# Patient Record
Sex: FEMALE | Race: WHITE | HISPANIC OR LATINO | Employment: OTHER | URBAN - METROPOLITAN AREA
[De-identification: names, ages, dates, MRNs, and addresses within clinical notes are randomized per-mention and may not be internally consistent; named-entity substitution may affect disease eponyms.]

---

## 2017-01-17 ENCOUNTER — HOSPITAL ENCOUNTER (OUTPATIENT)
Dept: LAB | Facility: MEDICAL CENTER | Age: 67
End: 2017-01-17
Attending: FAMILY MEDICINE
Payer: MEDICARE

## 2017-01-17 LAB
ALBUMIN SERPL BCP-MCNC: 3.6 G/DL (ref 3.2–4.9)
ALBUMIN/GLOB SERPL: 1 G/DL
ALP SERPL-CCNC: 58 U/L (ref 30–99)
ALT SERPL-CCNC: 11 U/L (ref 2–50)
ANION GAP SERPL CALC-SCNC: 5 MMOL/L (ref 0–11.9)
AST SERPL-CCNC: 13 U/L (ref 12–45)
BILIRUB SERPL-MCNC: 0.4 MG/DL (ref 0.1–1.5)
BUN SERPL-MCNC: 16 MG/DL (ref 8–22)
CALCIUM SERPL-MCNC: 9.2 MG/DL (ref 8.5–10.5)
CHLORIDE SERPL-SCNC: 102 MMOL/L (ref 96–112)
CO2 SERPL-SCNC: 31 MMOL/L (ref 20–33)
CREAT SERPL-MCNC: 0.63 MG/DL (ref 0.5–1.4)
GLOBULIN SER CALC-MCNC: 3.6 G/DL (ref 1.9–3.5)
GLUCOSE SERPL-MCNC: 134 MG/DL (ref 65–99)
LIPASE SERPL-CCNC: 46 U/L (ref 11–82)
POTASSIUM SERPL-SCNC: 4.2 MMOL/L (ref 3.6–5.5)
PROT SERPL-MCNC: 7.2 G/DL (ref 6–8.2)
SODIUM SERPL-SCNC: 138 MMOL/L (ref 135–145)

## 2017-01-17 PROCEDURE — 80053 COMPREHEN METABOLIC PANEL: CPT

## 2017-01-17 PROCEDURE — 83690 ASSAY OF LIPASE: CPT

## 2017-01-17 PROCEDURE — 36415 COLL VENOUS BLD VENIPUNCTURE: CPT

## 2017-01-17 PROCEDURE — 80061 LIPID PANEL: CPT

## 2017-01-18 LAB
CHOLEST SERPL-MCNC: 182 MG/DL (ref 100–199)
HDLC SERPL-MCNC: 40 MG/DL
LDLC SERPL CALC-MCNC: 114 MG/DL
TRIGL SERPL-MCNC: 141 MG/DL (ref 0–149)

## 2017-07-07 ENCOUNTER — HOSPITAL ENCOUNTER (OUTPATIENT)
Dept: LAB | Facility: MEDICAL CENTER | Age: 67
End: 2017-07-07
Attending: FAMILY MEDICINE
Payer: MEDICARE

## 2017-07-07 LAB
ALBUMIN SERPL BCP-MCNC: 3.7 G/DL (ref 3.2–4.9)
ALBUMIN/GLOB SERPL: 1 G/DL
ALP SERPL-CCNC: 65 U/L (ref 30–99)
ALT SERPL-CCNC: 9 U/L (ref 2–50)
ANION GAP SERPL CALC-SCNC: 6 MMOL/L (ref 0–11.9)
AST SERPL-CCNC: 13 U/L (ref 12–45)
BASOPHILS # BLD AUTO: 1 % (ref 0–1.8)
BASOPHILS # BLD: 0.07 K/UL (ref 0–0.12)
BILIRUB SERPL-MCNC: 0.4 MG/DL (ref 0.1–1.5)
BUN SERPL-MCNC: 17 MG/DL (ref 8–22)
CALCIUM SERPL-MCNC: 9 MG/DL (ref 8.5–10.5)
CHLORIDE SERPL-SCNC: 104 MMOL/L (ref 96–112)
CHOLEST SERPL-MCNC: 153 MG/DL (ref 100–199)
CO2 SERPL-SCNC: 28 MMOL/L (ref 20–33)
CREAT SERPL-MCNC: 0.64 MG/DL (ref 0.5–1.4)
EOSINOPHIL # BLD AUTO: 0.44 K/UL (ref 0–0.51)
EOSINOPHIL NFR BLD: 6 % (ref 0–6.9)
ERYTHROCYTE [DISTWIDTH] IN BLOOD BY AUTOMATED COUNT: 52.1 FL (ref 35.9–50)
GFR SERPL CREATININE-BSD FRML MDRD: >60 ML/MIN/1.73 M 2
GLOBULIN SER CALC-MCNC: 3.8 G/DL (ref 1.9–3.5)
GLUCOSE SERPL-MCNC: 131 MG/DL (ref 65–99)
HCT VFR BLD AUTO: 44.8 % (ref 37–47)
HDLC SERPL-MCNC: 37 MG/DL
HGB BLD-MCNC: 13.6 G/DL (ref 12–16)
IMM GRANULOCYTES # BLD AUTO: 0.02 K/UL (ref 0–0.11)
IMM GRANULOCYTES NFR BLD AUTO: 0.3 % (ref 0–0.9)
LDLC SERPL CALC-MCNC: 85 MG/DL
LYMPHOCYTES # BLD AUTO: 2.59 K/UL (ref 1–4.8)
LYMPHOCYTES NFR BLD: 35.5 % (ref 22–41)
MCH RBC QN AUTO: 29.4 PG (ref 27–33)
MCHC RBC AUTO-ENTMCNC: 30.4 G/DL (ref 33.6–35)
MCV RBC AUTO: 96.8 FL (ref 81.4–97.8)
MONOCYTES # BLD AUTO: 0.41 K/UL (ref 0–0.85)
MONOCYTES NFR BLD AUTO: 5.6 % (ref 0–13.4)
NEUTROPHILS # BLD AUTO: 3.76 K/UL (ref 2–7.15)
NEUTROPHILS NFR BLD: 51.6 % (ref 44–72)
NRBC # BLD AUTO: 0 K/UL
NRBC BLD AUTO-RTO: 0 /100 WBC
PLATELET # BLD AUTO: 271 K/UL (ref 164–446)
PMV BLD AUTO: 11 FL (ref 9–12.9)
POTASSIUM SERPL-SCNC: 4.5 MMOL/L (ref 3.6–5.5)
PROT SERPL-MCNC: 7.5 G/DL (ref 6–8.2)
RBC # BLD AUTO: 4.63 M/UL (ref 4.2–5.4)
SODIUM SERPL-SCNC: 138 MMOL/L (ref 135–145)
TRIGL SERPL-MCNC: 156 MG/DL (ref 0–149)
TSH SERPL DL<=0.005 MIU/L-ACNC: 3.67 UIU/ML (ref 0.3–3.7)
WBC # BLD AUTO: 7.3 K/UL (ref 4.8–10.8)

## 2017-07-07 PROCEDURE — 80061 LIPID PANEL: CPT

## 2017-07-07 PROCEDURE — 84443 ASSAY THYROID STIM HORMONE: CPT

## 2017-07-07 PROCEDURE — 80053 COMPREHEN METABOLIC PANEL: CPT

## 2017-07-07 PROCEDURE — 36415 COLL VENOUS BLD VENIPUNCTURE: CPT

## 2017-07-07 PROCEDURE — 85025 COMPLETE CBC W/AUTO DIFF WBC: CPT

## 2017-10-13 ENCOUNTER — TELEPHONE (OUTPATIENT)
Dept: CARDIOLOGY | Facility: PHYSICIAN GROUP | Age: 67
End: 2017-10-13

## 2017-10-13 NOTE — TELEPHONE ENCOUNTER
Spoke with Indu Medellin's office and they are going to fax over EKg or recent labs if on file to main Right fax number [phone])

## 2017-10-16 ENCOUNTER — OFFICE VISIT (OUTPATIENT)
Dept: CARDIOLOGY | Facility: MEDICAL CENTER | Age: 67
End: 2017-10-16
Payer: MEDICARE

## 2017-10-16 VITALS
OXYGEN SATURATION: 97 % | HEIGHT: 65 IN | BODY MASS INDEX: 36.92 KG/M2 | WEIGHT: 221.6 LBS | DIASTOLIC BLOOD PRESSURE: 80 MMHG | HEART RATE: 68 BPM | SYSTOLIC BLOOD PRESSURE: 110 MMHG

## 2017-10-16 DIAGNOSIS — R06.83 SNORING: ICD-10-CM

## 2017-10-16 DIAGNOSIS — E78.00 PURE HYPERCHOLESTEROLEMIA: ICD-10-CM

## 2017-10-16 DIAGNOSIS — R53.83 OTHER FATIGUE: ICD-10-CM

## 2017-10-16 DIAGNOSIS — E66.9 OBESITY (BMI 30.0-34.9): ICD-10-CM

## 2017-10-16 DIAGNOSIS — R01.1 HEART MURMUR: ICD-10-CM

## 2017-10-16 PROCEDURE — 93000 ELECTROCARDIOGRAM COMPLETE: CPT | Performed by: INTERNAL MEDICINE

## 2017-10-16 PROCEDURE — 99204 OFFICE O/P NEW MOD 45 MIN: CPT | Performed by: INTERNAL MEDICINE

## 2017-10-16 RX ORDER — LISINOPRIL 10 MG/1
10 TABLET ORAL DAILY
COMMUNITY
End: 2019-04-08

## 2017-10-16 ASSESSMENT — ENCOUNTER SYMPTOMS
SPEECH CHANGE: 0
BLURRED VISION: 0
BRUISES/BLEEDS EASILY: 0
CHILLS: 0
FEVER: 0
WHEEZING: 0
WEIGHT LOSS: 1
EYE DISCHARGE: 0
LOSS OF CONSCIOUSNESS: 0
VOMITING: 0
NERVOUS/ANXIOUS: 0
EYE PAIN: 0
DEPRESSION: 0
COUGH: 0
ABDOMINAL PAIN: 0
NAUSEA: 0
INSOMNIA: 1
HEMOPTYSIS: 0
MYALGIAS: 0
PALPITATIONS: 0

## 2017-10-16 NOTE — PROGRESS NOTES
Subjective:   Madelyn Schmitz is a 67 y.o. female who presents today for evaluation of heart murmur     She is seen in consultation at the request of Dr. Medellin for above issue    She primarily speaks Nepalese.  She has diabetes mellitus and hyperlipidemia.  Lately, she has been feeling more tired.   She denies any chest pain, palpitations or shortness of breath.  She is however rather sedentary.  She walks 30 minutes a couple times a week but does not do much elee.    She was recently told that she has heart murmur.  She reported frequent strept throat growing up in [place].    She used to work as a maid for 10 years in [employer]      Past Medical History:   Diagnosis Date   • Diabetes    • Hyperlipidemia      No past surgical history on file.  Family History   Problem Relation Age of Onset   • Heart Disease Father      History   Smoking Status   • Never Smoker   Smokeless Tobacco   • Never Used     Allergies   Allergen Reactions   • Ibuprofen Rash   • Penicillins Rash     Outpatient Encounter Prescriptions as of 10/16/2017   Medication Sig Dispense Refill   • lisinopril (PRINIVIL) 10 MG Tab Take 10 mg by mouth every day.     • NAPROXEN PO Take  by mouth.     • simvastatin (ZOCOR) 20 MG TABS Take 20 mg by mouth every evening.     • metformin SR (GLUCOPHAGE XR) 500 MG TB24 Take 500 mg by mouth every day.     • hydrochlorothiazide (HYDRODIURIL) 25 MG TABS Take 25 mg by mouth every day.     • prometh-phenylephrine-codeine (PHENERGAN VC CODEINE) 5-6.25-10 MG/5ML SYRP Take 5 mL by mouth every 6 hours as needed. 100 mL 0   • doxycycline (VIBRAMYCIN) 100 MG TABS Take 1 Tab by mouth 2 times a day. 20 Tab 0   • hydrocodone-acetaminophen (NORCO) 5-325 MG TABS per tablet Take 1-2 Tabs by mouth every 8 hours as needed. 30 Tab 0   • cyclobenzaprine (FLEXERIL) 10 MG TABS Take 1 Tab by mouth 3 times a day as needed for Mild Pain or Muscle Spasms. 30 Tab 0   • cetirizine (ZYRTEC) 10 MG TABS Take 10 mg by mouth every day.    "  • methylPREDNISolone (MEDROL DOSPACK) 4 MG TABS Take  by mouth every day. follow package directions 1 Each 0     No facility-administered encounter medications on file as of 10/16/2017.      Review of Systems   Constitutional: Positive for malaise/fatigue and weight loss. Negative for chills and fever.        Loss 15 lbs last 8-9 months   HENT: Negative for congestion.    Eyes: Negative for blurred vision, pain and discharge.   Respiratory: Negative for cough, hemoptysis and wheezing.         Snores per    Cardiovascular: Negative for chest pain and palpitations.   Gastrointestinal: Negative for abdominal pain, nausea and vomiting.   Musculoskeletal: Negative for joint pain and myalgias.   Skin: Negative for itching and rash.   Neurological: Negative for speech change and loss of consciousness.   Endo/Heme/Allergies: Does not bruise/bleed easily.   Psychiatric/Behavioral: Negative for depression. The patient has insomnia. The patient is not nervous/anxious.    All other systems reviewed and are negative.       Objective:   /80   Pulse 68   Ht 1.651 m (5' 5\")   Wt 100.5 kg (221 lb 9.6 oz)   SpO2 97%   BMI 36.88 kg/m²     Physical Exam   Constitutional: She is oriented to person, place, and time. No distress.   Obese   HENT:   Mouth/Throat: Mucous membranes are normal.   Eyes: Conjunctivae and EOM are normal.   Neck: No JVD present. No tracheal deviation present. No thyroid mass and no thyromegaly present.   Cardiovascular: Normal rate, regular rhythm and intact distal pulses.  Exam reveals distant heart sounds.    Murmur heard.   Systolic murmur is present with a grade of 3/6   A2 diminished   Pulmonary/Chest: Effort normal and breath sounds normal. No respiratory distress. She exhibits no tenderness.   Abdominal: Soft. There is no tenderness.   Musculoskeletal: Normal range of motion. She exhibits no edema.   Neurological: She is alert and oriented to person, place, and time. She has normal " strength. She displays no tremor.   Skin: Skin is warm and dry. She is not diaphoretic.   Psychiatric: She has a normal mood and affect. Her behavior is normal.   Vitals reviewed.    EKG today by my review is normal  TSH and CMP in July except glucose      Assessment:     1. Heart murmur probably aortic stenosis EKG   2. Other fatigue     3. Obesity (BMI 30.0-34.9)     4. Pure hypercholesterolemia         Medical Decision Making:  Today's Assessment / Status / Plan:     Her murmur is very suggestive of aortic valve issue.  Will obtain echocardiography.  Her fatigue is likely multifactorial including sedentary life style.  She also likely has some degree of sleep apnea.  Will arrange for overnight pulse oxymetry.  I advised her to gradually increase her exercise such as try to walk at least 4-5 days a week. She was encourage to lose weight. Will see her back in 4-6 weeks.  We will keep you posted about our findings and further recommendations as they become available. Please also do not hesitate to call for any questions.  Thank you kindly for allowing me to participate in the care of this patient.

## 2017-10-16 NOTE — LETTER
[facility]   [address], [address]  [place] [address]  Phone: [phone]  Fax: [phone]              Madelyn Schmitz  1950    Encounter Date: 10/16/2017    Anirudh Pappas M.D.          CARDIOLOGY CONSULTATION NOTE:  Subjective:   Madelyn Schmitz is a 67 y.o. female who presents today     Past Medical History:   Diagnosis Date   • Diabetes    • Hyperlipidemia      No past surgical history on file.  Family History   Problem Relation Age of Onset   • Heart Disease Father      History   Smoking Status   • Never Smoker   Smokeless Tobacco   • Never Used     Allergies   Allergen Reactions   • Ibuprofen Rash   • Penicillins Rash     Outpatient Encounter Prescriptions as of 10/16/2017   Medication Sig Dispense Refill   • lisinopril (PRINIVIL) 10 MG Tab Take 10 mg by mouth every day.     • NAPROXEN PO Take  by mouth.     • simvastatin (ZOCOR) 20 MG TABS Take 20 mg by mouth every evening.     • metformin SR (GLUCOPHAGE XR) 500 MG TB24 Take 500 mg by mouth every day.     • hydrochlorothiazide (HYDRODIURIL) 25 MG TABS Take 25 mg by mouth every day.     • prometh-phenylephrine-codeine (PHENERGAN VC CODEINE) 5-6.25-10 MG/5ML SYRP Take 5 mL by mouth every 6 hours as needed. 100 mL 0   • doxycycline (VIBRAMYCIN) 100 MG TABS Take 1 Tab by mouth 2 times a day. 20 Tab 0   • hydrocodone-acetaminophen (NORCO) 5-325 MG TABS per tablet Take 1-2 Tabs by mouth every 8 hours as needed. 30 Tab 0   • cyclobenzaprine (FLEXERIL) 10 MG TABS Take 1 Tab by mouth 3 times a day as needed for Mild Pain or Muscle Spasms. 30 Tab 0   • cetirizine (ZYRTEC) 10 MG TABS Take 10 mg by mouth every day.     • methylPREDNISolone (MEDROL DOSPACK) 4 MG TABS Take  by mouth every day. follow package directions 1 Each 0     No facility-administered encounter medications on file as of 10/16/2017.      Review of Systems   Constitutional: Positive for malaise/fatigue and weight loss. Negative  "for chills and fever.        Loss 15 lbs last 8-9 months   HENT: Negative for congestion.    Eyes: Negative for blurred vision, pain and discharge.   Respiratory: Negative for cough, hemoptysis and wheezing.    Cardiovascular: Negative for chest pain and palpitations.   Gastrointestinal: Negative for abdominal pain, nausea and vomiting.   Musculoskeletal: Negative for joint pain and myalgias.   Skin: Negative for itching and rash.   Neurological: Negative for speech change and loss of consciousness.   Endo/Heme/Allergies: Does not bruise/bleed easily.   Psychiatric/Behavioral: Negative for depression. The patient is not nervous/anxious.    All other systems reviewed and are negative.       Objective:   /80   Pulse 68   Ht 1.651 m (5' 5\")   Wt 100.5 kg (221 lb 9.6 oz)   SpO2 97%   BMI 36.88 kg/m²      Physical Exam   Constitutional: She is oriented to person, place, and time. No distress.   Obese   HENT:   Mouth/Throat: Mucous membranes are normal.   Eyes: Conjunctivae and EOM are normal.   Neck: No JVD present. No tracheal deviation present. No thyroid mass and no thyromegaly present.   Cardiovascular: Normal rate, regular rhythm and intact distal pulses.  Exam reveals distant heart sounds.    Murmur heard.   Systolic murmur is present with a grade of 3/6   A2 diminished   Pulmonary/Chest: Effort normal and breath sounds normal. No respiratory distress. She exhibits no tenderness.   Abdominal: Soft. There is no tenderness.   Musculoskeletal: Normal range of motion. She exhibits no edema.   Neurological: She is alert and oriented to person, place, and time. She has normal strength. She displays no tremor.   Skin: Skin is warm and dry. She is not diaphoretic.   Psychiatric: She has a normal mood and affect. Her behavior is normal.   Vitals reviewed.    EKG today by my review is normal  TSH and CMP in July except glucose      Assessment:     1. Heart murmur probably aortic stenosis EKG   2. Other " fatigue     3. Obesity (BMI 30.0-34.9)     4. Pure hypercholesterolemia         Medical Decision Making:  Today's Assessment / Status / Plan:              No Recipients

## 2017-10-17 LAB — EKG IMPRESSION: NORMAL

## 2017-10-20 ENCOUNTER — TELEPHONE (OUTPATIENT)
Dept: CARDIOLOGY | Facility: MEDICAL CENTER | Age: 67
End: 2017-10-20

## 2017-10-23 ENCOUNTER — HOSPITAL ENCOUNTER (OUTPATIENT)
Dept: LAB | Facility: MEDICAL CENTER | Age: 67
End: 2017-10-23
Attending: FAMILY MEDICINE
Payer: MEDICARE

## 2017-10-23 LAB
ALBUMIN SERPL BCP-MCNC: 3.7 G/DL (ref 3.2–4.9)
ALBUMIN/GLOB SERPL: 1 G/DL
ALP SERPL-CCNC: 67 U/L (ref 30–99)
ALT SERPL-CCNC: 8 U/L (ref 2–50)
ANION GAP SERPL CALC-SCNC: 5 MMOL/L (ref 0–11.9)
AST SERPL-CCNC: 15 U/L (ref 12–45)
BASOPHILS # BLD AUTO: 0.6 % (ref 0–1.8)
BASOPHILS # BLD: 0.04 K/UL (ref 0–0.12)
BILIRUB SERPL-MCNC: 0.5 MG/DL (ref 0.1–1.5)
BUN SERPL-MCNC: 12 MG/DL (ref 8–22)
CALCIUM SERPL-MCNC: 9.4 MG/DL (ref 8.5–10.5)
CHLORIDE SERPL-SCNC: 102 MMOL/L (ref 96–112)
CHOLEST SERPL-MCNC: 177 MG/DL (ref 100–199)
CO2 SERPL-SCNC: 29 MMOL/L (ref 20–33)
CREAT SERPL-MCNC: 0.51 MG/DL (ref 0.5–1.4)
EOSINOPHIL # BLD AUTO: 0.31 K/UL (ref 0–0.51)
EOSINOPHIL NFR BLD: 4.7 % (ref 0–6.9)
ERYTHROCYTE [DISTWIDTH] IN BLOOD BY AUTOMATED COUNT: 50.6 FL (ref 35.9–50)
GFR SERPL CREATININE-BSD FRML MDRD: >60 ML/MIN/1.73 M 2
GLOBULIN SER CALC-MCNC: 3.6 G/DL (ref 1.9–3.5)
GLUCOSE SERPL-MCNC: 115 MG/DL (ref 65–99)
HCT VFR BLD AUTO: 42.8 % (ref 37–47)
HDLC SERPL-MCNC: 40 MG/DL
HGB BLD-MCNC: 13.2 G/DL (ref 12–16)
IMM GRANULOCYTES # BLD AUTO: 0.02 K/UL (ref 0–0.11)
IMM GRANULOCYTES NFR BLD AUTO: 0.3 % (ref 0–0.9)
LDLC SERPL CALC-MCNC: 115 MG/DL
LYMPHOCYTES # BLD AUTO: 2.41 K/UL (ref 1–4.8)
LYMPHOCYTES NFR BLD: 36.9 % (ref 22–41)
MCH RBC QN AUTO: 29.3 PG (ref 27–33)
MCHC RBC AUTO-ENTMCNC: 30.8 G/DL (ref 33.6–35)
MCV RBC AUTO: 94.9 FL (ref 81.4–97.8)
MONOCYTES # BLD AUTO: 0.39 K/UL (ref 0–0.85)
MONOCYTES NFR BLD AUTO: 6 % (ref 0–13.4)
NEUTROPHILS # BLD AUTO: 3.36 K/UL (ref 2–7.15)
NEUTROPHILS NFR BLD: 51.5 % (ref 44–72)
NRBC # BLD AUTO: 0 K/UL
NRBC BLD AUTO-RTO: 0 /100 WBC
PLATELET # BLD AUTO: 298 K/UL (ref 164–446)
PMV BLD AUTO: 10.4 FL (ref 9–12.9)
POTASSIUM SERPL-SCNC: 4.6 MMOL/L (ref 3.6–5.5)
PROT SERPL-MCNC: 7.3 G/DL (ref 6–8.2)
RBC # BLD AUTO: 4.51 M/UL (ref 4.2–5.4)
SODIUM SERPL-SCNC: 136 MMOL/L (ref 135–145)
TRIGL SERPL-MCNC: 112 MG/DL (ref 0–149)
WBC # BLD AUTO: 6.5 K/UL (ref 4.8–10.8)

## 2017-10-23 PROCEDURE — 36415 COLL VENOUS BLD VENIPUNCTURE: CPT

## 2017-10-23 PROCEDURE — 80053 COMPREHEN METABOLIC PANEL: CPT

## 2017-10-23 PROCEDURE — 85025 COMPLETE CBC W/AUTO DIFF WBC: CPT

## 2017-10-23 PROCEDURE — 80061 LIPID PANEL: CPT

## 2017-10-31 ENCOUNTER — HOSPITAL ENCOUNTER (OUTPATIENT)
Dept: CARDIOLOGY | Facility: MEDICAL CENTER | Age: 67
End: 2017-10-31
Attending: INTERNAL MEDICINE
Payer: MEDICARE

## 2017-10-31 DIAGNOSIS — R53.83 OTHER FATIGUE: ICD-10-CM

## 2017-10-31 DIAGNOSIS — R06.83 SNORING: ICD-10-CM

## 2017-10-31 DIAGNOSIS — R01.1 HEART MURMUR: ICD-10-CM

## 2017-10-31 PROCEDURE — 93306 TTE W/DOPPLER COMPLETE: CPT | Mod: 26 | Performed by: INTERNAL MEDICINE

## 2017-10-31 PROCEDURE — 93306 TTE W/DOPPLER COMPLETE: CPT

## 2017-11-01 ENCOUNTER — HOSPITAL ENCOUNTER (OUTPATIENT)
Facility: MEDICAL CENTER | Age: 67
End: 2017-11-01
Attending: FAMILY MEDICINE
Payer: MEDICARE

## 2017-11-01 LAB
CYTOLOGY REG CYTOL: NORMAL
LV EJECT FRACT  99904: 75
LV EJECT FRACT MOD 2C 99903: 70.41
LV EJECT FRACT MOD 4C 99902: 76.68
LV EJECT FRACT MOD BP 99901: 73.61

## 2017-11-01 PROCEDURE — 88175 CYTOPATH C/V AUTO FLUID REDO: CPT

## 2017-11-06 ENCOUNTER — TELEPHONE (OUTPATIENT)
Dept: CARDIOLOGY | Facility: MEDICAL CENTER | Age: 67
End: 2017-11-06

## 2017-11-07 NOTE — TELEPHONE ENCOUNTER
L/m educating pt to please call back to discus Echo and OPO results.     ----- Message from Anirudh Pappas M.D. sent at 11/6/2017 10:22 AM PST -----  ECHO showed moderate aortic stenosis  Need f/u ECHO in 6 months or so  OPO suggested sig sleep apnea  Need sleep study and pulm referral    ----- Message -----  From: Monika Wiggins R.N.  Sent: 11/1/2017   4:05 PM  To: Anirudh Pappas M.D.    FV 12/20.  To JEANNE BECK.

## 2017-11-08 DIAGNOSIS — R06.83 SNORING: ICD-10-CM

## 2017-11-08 DIAGNOSIS — G47.34 NOCTURNAL HYPOXEMIA: ICD-10-CM

## 2017-11-08 DIAGNOSIS — R01.1 HEART MURMUR: ICD-10-CM

## 2017-11-08 DIAGNOSIS — R53.83 FATIGUE, UNSPECIFIED TYPE: ICD-10-CM

## 2017-11-08 NOTE — TELEPHONE ENCOUNTER
S/w pt's daughter-in law, Soci, and informed about Echo and OPO results. They state understanding and will FU with Dr. QUINONEZ 12/20.    Order placed for repeat echo, referral to pulmonary, and sleep study.

## 2017-11-10 ENCOUNTER — TELEPHONE (OUTPATIENT)
Dept: CARDIOLOGY | Facility: MEDICAL CENTER | Age: 67
End: 2017-11-10

## 2017-11-10 NOTE — TELEPHONE ENCOUNTER
The referral has been sent to the scheduling pool.      Should the pt have questions or if they don't get a call they can call and schedule themselves      [phone]     Letter mailed to patient with this information.

## 2017-11-10 NOTE — TELEPHONE ENCOUNTER
Referral has been sent to Centralized Scheduling      [phone] if the pt needs to call     Letter mailed to patient with this information.

## 2017-11-13 ENCOUNTER — HOSPITAL ENCOUNTER (OUTPATIENT)
Dept: RADIOLOGY | Facility: MEDICAL CENTER | Age: 67
End: 2017-11-13
Attending: FAMILY MEDICINE
Payer: MEDICARE

## 2017-11-13 DIAGNOSIS — N93.8 DYSFUNCTIONAL UTERINE BLEEDING: ICD-10-CM

## 2017-11-13 DIAGNOSIS — R10.31 ABDOMINAL PAIN, RIGHT LOWER QUADRANT: ICD-10-CM

## 2017-11-13 PROCEDURE — 76830 TRANSVAGINAL US NON-OB: CPT

## 2017-12-01 ENCOUNTER — HOSPITAL ENCOUNTER (OUTPATIENT)
Dept: RADIOLOGY | Facility: MEDICAL CENTER | Age: 67
End: 2017-12-01

## 2017-12-05 ENCOUNTER — HOSPITAL ENCOUNTER (OUTPATIENT)
Dept: RADIOLOGY | Facility: MEDICAL CENTER | Age: 67
End: 2017-12-05
Attending: FAMILY MEDICINE
Payer: MEDICARE

## 2017-12-05 DIAGNOSIS — Z12.31 VISIT FOR SCREENING MAMMOGRAM: ICD-10-CM

## 2017-12-05 PROCEDURE — G0202 SCR MAMMO BI INCL CAD: HCPCS

## 2017-12-20 ENCOUNTER — OFFICE VISIT (OUTPATIENT)
Dept: CARDIOLOGY | Facility: MEDICAL CENTER | Age: 67
End: 2017-12-20
Payer: MEDICARE

## 2017-12-20 VITALS
HEART RATE: 93 BPM | SYSTOLIC BLOOD PRESSURE: 116 MMHG | DIASTOLIC BLOOD PRESSURE: 78 MMHG | OXYGEN SATURATION: 97 % | HEIGHT: 65 IN | BODY MASS INDEX: 36.32 KG/M2 | WEIGHT: 218 LBS

## 2017-12-20 DIAGNOSIS — E78.00 PURE HYPERCHOLESTEROLEMIA: ICD-10-CM

## 2017-12-20 DIAGNOSIS — E66.9 OBESITY (BMI 30.0-34.9): ICD-10-CM

## 2017-12-20 DIAGNOSIS — I35.0 AORTIC STENOSIS, MODERATE: ICD-10-CM

## 2017-12-20 PROCEDURE — 99214 OFFICE O/P EST MOD 30 MIN: CPT | Performed by: INTERNAL MEDICINE

## 2017-12-20 RX ORDER — ATORVASTATIN CALCIUM 40 MG/1
40 TABLET, FILM COATED ORAL DAILY
Qty: 30 TAB | Refills: 11 | Status: SHIPPED | OUTPATIENT
Start: 2017-12-20 | End: 2019-04-08

## 2017-12-20 ASSESSMENT — ENCOUNTER SYMPTOMS
VOMITING: 0
NERVOUS/ANXIOUS: 0
LOSS OF CONSCIOUSNESS: 0
CHILLS: 0
PALPITATIONS: 0
SPEECH CHANGE: 0
EYE DISCHARGE: 0
FEVER: 0
NAUSEA: 0
MYALGIAS: 0
EYE PAIN: 0
BRUISES/BLEEDS EASILY: 0
ABDOMINAL PAIN: 0
HEMOPTYSIS: 0
COUGH: 0
DEPRESSION: 0
WHEEZING: 0
BLURRED VISION: 0

## 2017-12-20 NOTE — PROGRESS NOTES
Subjective:   Madelyn Schmitz is a 67 y.o. female who presents today for f/u heart murmur, HTN and hypercholesterolemia    The patient is doing well from cardiac standpoint.   She denies any chest pain, palpitation, dizziness, syncope or other heart failure type symptoms.  Denies any side effect from medications.    ECHO showed moderate aortic stenosis, NL EF    Labs in October     Past Medical History:   Diagnosis Date   • Diabetes    • Hyperlipidemia      History reviewed. No pertinent surgical history.  Family History   Problem Relation Age of Onset   • Heart Disease Father      History   Smoking Status   • Never Smoker   Smokeless Tobacco   • Never Used     Allergies   Allergen Reactions   • Ibuprofen Rash   • Penicillins Rash     Outpatient Encounter Prescriptions as of 12/20/2017   Medication Sig Dispense Refill   • lisinopril (PRINIVIL) 10 MG Tab Take 10 mg by mouth every day.     • NAPROXEN PO Take  by mouth.     • simvastatin (ZOCOR) 20 MG TABS Take 20 mg by mouth every evening.     • metformin SR (GLUCOPHAGE XR) 500 MG TB24 Take 500 mg by mouth every day.     • hydrochlorothiazide (HYDRODIURIL) 25 MG TABS Take 25 mg by mouth every day.     • prometh-phenylephrine-codeine (PHENERGAN VC CODEINE) 5-6.25-10 MG/5ML SYRP Take 5 mL by mouth every 6 hours as needed. 100 mL 0   • doxycycline (VIBRAMYCIN) 100 MG TABS Take 1 Tab by mouth 2 times a day. 20 Tab 0   • hydrocodone-acetaminophen (NORCO) 5-325 MG TABS per tablet Take 1-2 Tabs by mouth every 8 hours as needed. 30 Tab 0   • cyclobenzaprine (FLEXERIL) 10 MG TABS Take 1 Tab by mouth 3 times a day as needed for Mild Pain or Muscle Spasms. 30 Tab 0   • cetirizine (ZYRTEC) 10 MG TABS Take 10 mg by mouth every day.     • methylPREDNISolone (MEDROL DOSPACK) 4 MG TABS Take  by mouth every day. follow package directions 1 Each 0     No facility-administered encounter medications on file as of 12/20/2017.      Review of Systems   Constitutional: Negative  "for chills and fever.   HENT: Negative for congestion.    Eyes: Negative for blurred vision, pain and discharge.   Respiratory: Negative for cough, hemoptysis and wheezing.    Cardiovascular: Negative for chest pain and palpitations.   Gastrointestinal: Negative for abdominal pain, nausea and vomiting.   Musculoskeletal: Negative for joint pain and myalgias.   Skin: Negative for itching and rash.   Neurological: Negative for speech change and loss of consciousness.   Endo/Heme/Allergies: Does not bruise/bleed easily.   Psychiatric/Behavioral: Negative for depression. The patient is not nervous/anxious.    All other systems reviewed and are negative.       Objective:   /78   Pulse 93   Ht 1.651 m (5' 5\")   Wt 98.9 kg (218 lb)   SpO2 97%   BMI 36.28 kg/m²     Physical Exam   Constitutional: She is oriented to person, place, and time. She appears well-developed. No distress.   HENT:   Mouth/Throat: Mucous membranes are normal.   Eyes: Conjunctivae and EOM are normal.   Neck: No JVD present. No tracheal deviation present. No thyroid mass and no thyromegaly present.   Cardiovascular: Normal rate, regular rhythm and intact distal pulses.  Exam reveals distant heart sounds.    Murmur heard.   Systolic murmur is present with a grade of 3/6   Pulmonary/Chest: Effort normal and breath sounds normal. No respiratory distress. She exhibits no tenderness.   Abdominal: Soft. There is no tenderness.   Musculoskeletal: Normal range of motion. She exhibits no edema.   Neurological: She is alert and oriented to person, place, and time. She has normal strength. She displays no tremor.   Skin: Skin is warm and dry. She is not diaphoretic.   Psychiatric: She has a normal mood and affect. Her behavior is normal.   Vitals reviewed.    Labs 10/23 , CMP NL except glucose 115    Assessment:     1. Aortic stenosis, moderate     2. Obesity (BMI 30.0-34.9)     3. Pure hypercholesterolemia         Medical Decision Making:  " Today's Assessment / Status / Plan:     We discussed natural history and management of aortic stenosis.  Will repeat echocardiography next summer or sooner as needed.  Her LDL is not at goal. Will try switching from simvastatin to atorvastatin 40 mg/d.  Will recheck lipid in 2-3 months.  RTC 6 months or sooner as needed.  We will keep you posted about our findings and further recommendations as they become available. Please also do not hesitate to call for any questions.  Thank you kindly for allowing me to participate in the care of this patient.

## 2017-12-20 NOTE — LETTER
[facility]   [address], [address]  [place] [address]  Phone: [phone]  Fax: [phone]              Madelyn Schmitz  1950    Encounter Date: 12/20/2017    Anirudh Pappas M.D.          PROGRESS NOTE:  Subjective:   Madelyn Schmitz is a 67 y.o. female who presents today     Past Medical History:   Diagnosis Date   • Diabetes    • Hyperlipidemia      History reviewed. No pertinent surgical history.  Family History   Problem Relation Age of Onset   • Heart Disease Father      History   Smoking Status   • Never Smoker   Smokeless Tobacco   • Never Used     Allergies   Allergen Reactions   • Ibuprofen Rash   • Penicillins Rash     Outpatient Encounter Prescriptions as of 12/20/2017   Medication Sig Dispense Refill   • lisinopril (PRINIVIL) 10 MG Tab Take 10 mg by mouth every day.     • NAPROXEN PO Take  by mouth.     • simvastatin (ZOCOR) 20 MG TABS Take 20 mg by mouth every evening.     • metformin SR (GLUCOPHAGE XR) 500 MG TB24 Take 500 mg by mouth every day.     • hydrochlorothiazide (HYDRODIURIL) 25 MG TABS Take 25 mg by mouth every day.     • prometh-phenylephrine-codeine (PHENERGAN VC CODEINE) 5-6.25-10 MG/5ML SYRP Take 5 mL by mouth every 6 hours as needed. 100 mL 0   • doxycycline (VIBRAMYCIN) 100 MG TABS Take 1 Tab by mouth 2 times a day. 20 Tab 0   • hydrocodone-acetaminophen (NORCO) 5-325 MG TABS per tablet Take 1-2 Tabs by mouth every 8 hours as needed. 30 Tab 0   • cyclobenzaprine (FLEXERIL) 10 MG TABS Take 1 Tab by mouth 3 times a day as needed for Mild Pain or Muscle Spasms. 30 Tab 0   • cetirizine (ZYRTEC) 10 MG TABS Take 10 mg by mouth every day.     • methylPREDNISolone (MEDROL DOSPACK) 4 MG TABS Take  by mouth every day. follow package directions 1 Each 0     No facility-administered encounter medications on file as of 12/20/2017.      Review of Systems   Constitutional: Negative for chills and fever.   HENT: Negative for  "congestion.    Eyes: Negative for blurred vision, pain and discharge.   Respiratory: Negative for cough, hemoptysis and wheezing.    Cardiovascular: Negative for chest pain and palpitations.   Gastrointestinal: Negative for abdominal pain, nausea and vomiting.   Musculoskeletal: Negative for joint pain and myalgias.   Skin: Negative for itching and rash.   Neurological: Negative for speech change and loss of consciousness.   Endo/Heme/Allergies: Does not bruise/bleed easily.   Psychiatric/Behavioral: Negative for depression. The patient is not nervous/anxious.    All other systems reviewed and are negative.       Objective:   /78   Pulse 93   Ht 1.651 m (5' 5\")   Wt 98.9 kg (218 lb)   SpO2 97%   BMI 36.28 kg/m²      Physical Exam   Constitutional: She is oriented to person, place, and time. She appears well-developed. No distress.   HENT:   Mouth/Throat: Mucous membranes are normal.   Eyes: Conjunctivae and EOM are normal.   Neck: No JVD present. No tracheal deviation present. No thyroid mass and no thyromegaly present.   Cardiovascular: Normal rate, regular rhythm and intact distal pulses.  Exam reveals distant heart sounds.    Murmur heard.   Systolic murmur is present with a grade of 3/6   Pulmonary/Chest: Effort normal and breath sounds normal. No respiratory distress. She exhibits no tenderness.   Abdominal: Soft. There is no tenderness.   Musculoskeletal: Normal range of motion. She exhibits no edema.   Neurological: She is alert and oriented to person, place, and time. She has normal strength. She displays no tremor.   Skin: Skin is warm and dry. She is not diaphoretic.   Psychiatric: She has a normal mood and affect. Her behavior is normal.   Vitals reviewed.    Labs 10/23 , CMP NL except glucose 115    Assessment:     1. Aortic stenosis, moderate     2. Obesity (BMI 30.0-34.9)     3. Pure hypercholesterolemia         Medical Decision Making:  Today's Assessment / Status / Plan:            "       No Recipients

## 2018-01-19 ENCOUNTER — HOSPITAL ENCOUNTER (OUTPATIENT)
Dept: LAB | Facility: MEDICAL CENTER | Age: 68
End: 2018-01-19
Attending: FAMILY MEDICINE
Payer: MEDICARE

## 2018-01-19 DIAGNOSIS — E78.00 PURE HYPERCHOLESTEROLEMIA: ICD-10-CM

## 2018-01-19 DIAGNOSIS — E66.9 OBESITY (BMI 30.0-34.9): ICD-10-CM

## 2018-01-19 LAB
CHOLEST SERPL-MCNC: 112 MG/DL (ref 100–199)
HDLC SERPL-MCNC: 40 MG/DL
LDLC SERPL CALC-MCNC: 47 MG/DL
TRIGL SERPL-MCNC: 123 MG/DL (ref 0–149)

## 2018-01-19 PROCEDURE — 80061 LIPID PANEL: CPT

## 2018-01-19 PROCEDURE — 36415 COLL VENOUS BLD VENIPUNCTURE: CPT

## 2018-01-23 ENCOUNTER — TELEPHONE (OUTPATIENT)
Dept: CARDIOLOGY | Facility: MEDICAL CENTER | Age: 68
End: 2018-01-23

## 2018-01-23 NOTE — TELEPHONE ENCOUNTER
----- Message from Anirudh Pappas M.D. sent at 1/23/2018 11:49 AM PST -----  LDL dropped almost 70 point  Now excellent  Great job  Cont same dose if no issue  ----- Message -----  From: Monika Wiggins R.N.  Sent: 1/19/2018   6:16 PM  To: Anirudh Pappas M.D.    FV 6/27.  To JEANNE BECK

## 2018-02-23 ENCOUNTER — SLEEP CENTER VISIT (OUTPATIENT)
Dept: SLEEP MEDICINE | Facility: MEDICAL CENTER | Age: 68
End: 2018-02-23
Payer: MEDICARE

## 2018-02-23 VITALS
TEMPERATURE: 98.1 F | OXYGEN SATURATION: 94 % | DIASTOLIC BLOOD PRESSURE: 70 MMHG | BODY MASS INDEX: 35.82 KG/M2 | WEIGHT: 215 LBS | HEIGHT: 65 IN | RESPIRATION RATE: 15 BRPM | HEART RATE: 73 BPM | SYSTOLIC BLOOD PRESSURE: 115 MMHG

## 2018-02-23 DIAGNOSIS — R63.8 INCREASED BMI: ICD-10-CM

## 2018-02-23 DIAGNOSIS — G47.34 NOCTURNAL HYPOXEMIA: ICD-10-CM

## 2018-02-23 DIAGNOSIS — I10 SYSTEMIC PRIMARY ARTERIAL HYPERTENSION: ICD-10-CM

## 2018-02-23 DIAGNOSIS — G47.33 OBSTRUCTIVE SLEEP APNEA-HYPOPNEA SYNDROME: ICD-10-CM

## 2018-02-23 DIAGNOSIS — E78.5 DYSLIPIDEMIA: ICD-10-CM

## 2018-02-23 PROCEDURE — 99204 OFFICE O/P NEW MOD 45 MIN: CPT | Performed by: INTERNAL MEDICINE

## 2018-02-23 RX ORDER — ZOLPIDEM TARTRATE 5 MG/1
TABLET ORAL
Qty: 3 TAB | Refills: 0 | Status: SHIPPED | OUTPATIENT
Start: 2018-02-23 | End: 2018-03-23

## 2018-02-23 NOTE — PROGRESS NOTES
CC: Evaluation of sleep apnea    HPI:  Ms.Maria Jumana Schmitz is a 69 y/o F kindly referred by Dr. Pritchett for evaluation of nocturnal hypoxemia.     The patient underwent a 10/27/2017 nocturnal oximetry which showed a total of 419 desaturations. Her saturations were less than or equal to 88% for 306.8 minutes and less than or equal to 89% for 325.7 minutes with a low saturation of 62%. The oximetric pattern was both sawtoothed and clustered highly suggestive of sleep apnea.    The patient's symptoms include 3-4 nocturnal awakenings, nocturia ×3-4, tiredness during the day, falling asleep accidentally, snoring, restless and nervous legs 3 times a week, and morning headaches. She may follow asleep accidentally when watching TV.    Dr. QUINONEZ evaluated her in October and found her to have a moderate degree of aortic stenosis.    Charisma assisted with the H and P with Georgian translation.            There are no active problems to display for this patient.      Past Medical History:   Diagnosis Date   • Back pain    • Bronchitis    • Chickenpox    • Diabetes    • Hyperlipidemia    • Mumps    • Nasal drainage         Past Surgical History:   Procedure Laterality Date   • PRIMARY C SECTION         Family History   Problem Relation Age of Onset   • Heart Disease Father    • Asthma Brother    • Asthma Brother    • Sleep Apnea Neg Hx        Social History     Social History   • Marital status:      Spouse name: N/A   • Number of children: N/A   • Years of education: N/A     Occupational History   • Not on file.     Social History Main Topics   • Smoking status: Never Smoker   • Smokeless tobacco: Never Used   • Alcohol use No   • Drug use: No   • Sexual activity: Not on file     Other Topics Concern   • Not on file     Social History Narrative   • No narrative on file       Current Outpatient Prescriptions   Medication Sig Dispense Refill   • Magnesium 400 MG Cap Take  by mouth every day.     • zolpidem (AMBIEN) 5 MG  "Tab Take 1-2 tablets by mouth every evening as needed for insomnia. Bring to sleep study. 3 Tab 0   • atorvastatin (LIPITOR) 40 MG Tab Take 1 Tab by mouth every day. (Patient taking differently: Take 20 mg by mouth every day.) 30 Tab 11   • metformin SR (GLUCOPHAGE XR) 500 MG TB24 Take 500 mg by mouth every day.     • lisinopril (PRINIVIL) 10 MG Tab Take 10 mg by mouth every day.     • NAPROXEN PO Take  by mouth.     • hydrochlorothiazide (HYDRODIURIL) 25 MG TABS Take 25 mg by mouth every day.     • prometh-phenylephrine-codeine (PHENERGAN VC CODEINE) 5-6.25-10 MG/5ML SYRP Take 5 mL by mouth every 6 hours as needed. 100 mL 0   • doxycycline (VIBRAMYCIN) 100 MG TABS Take 1 Tab by mouth 2 times a day. 20 Tab 0   • hydrocodone-acetaminophen (NORCO) 5-325 MG TABS per tablet Take 1-2 Tabs by mouth every 8 hours as needed. 30 Tab 0   • cyclobenzaprine (FLEXERIL) 10 MG TABS Take 1 Tab by mouth 3 times a day as needed for Mild Pain or Muscle Spasms. 30 Tab 0   • cetirizine (ZYRTEC) 10 MG TABS Take 10 mg by mouth every day.     • methylPREDNISolone (MEDROL DOSPACK) 4 MG TABS Take  by mouth every day. follow package directions 1 Each 0     No current facility-administered medications for this visit.     \"CURRENT RX\"    ALLERGIES: Ibuprofen and Penicillins    ROS    Constitutional: Complains of fatigue and weight loss but denies fever, chills, sweats   Eyes: Denies glasses, pain, drainage, double vision. Complains of vision loss.   Ears/Nose/Mouth/Throat: Denies earache, difficulty hearing, ringing/buzzing in ears, rhinitis/nasal congestion, injury, recurrent sore throat, persistent hoarseness, decayed teeth/toothaches.   Cardiovascular: Denies chest pain, tightness, palpitations, swelling in legs/feet, difficulty breathing when lying down but gets better when sitting up. Complains of fainting Respiratory: Denies shortness of breath, cough, sputum, wheezing, painful breathing, coughing up blood.   Sleep: Per history of present " "illness  Gastrointestinal: Denies heartburn, difficulty swallowing, nausea, abdominal pain, diarrhea, constipation.   Genitourinary: Denies frequent urination, painful urination, blood in urine, discharge.   Musculoskeletal: Complains of painful joints, sore muscles, back pain.   Integumentary: Complains of rashes, lumps, color changes.   Neurological: Complains of frequent headaches, dizziness, weakness    PHYSICAL EXAM  MSEW    /70   Pulse 73   Temp 36.7 °C (98.1 °F)   Resp 15   Ht 1.651 m (5' 5\")   Wt 97.5 kg (215 lb)   SpO2 94%   BMI 35.78 kg/m²   Appearance: Well-nourished, well-developed, no acute distress  Eyes:  PERRLA, EOMI  Hearing:  Grossly intact  Nose:  Normal, no lesions or deformities, turbinates moist  Oropharynx:  Tongue normal, posterior pharynx without erythema or exudate; dentures  Mallampati classification:    Neck: Supple, trachea midline, no masses  Respiratory effort:  No intercostal retractions or use of accessory muscles  Lung auscultation:  No wheezes rhonchi rubs or rales  Cardiac auscultation:  No  rubs, or gallops, no regular rhythm, normal rate. 2/6 systolic murmur.  Abdomen:  No tenderness, no organomegaly; obese  Extremities:  No cyanosis, clubbing, edema  Gait and Station:  Normal  Digits and nails: No clubbing, cyanosis, petechiae, or nodes  Musculoskeletal:  Grossly normal  Skin:  No rashes  Orientation:  Oriented time, place, and person  Mood and affect:  No depression, anxiety, agitation  Judgment:  Intact    PROBLEMS:    1. Obstructive sleep apnea-hypopnea syndrome  - zolpidem (AMBIEN) 5 MG Tab; Take 1-2 tablets by mouth every evening as needed for insomnia. Bring to sleep study.  Dispense: 3 Tab; Refill: 0  - POLYSOMNOGRAPHY, 4 OR MORE; Future  2. Increased BMI  - OBESITY COUNSELING (No Charge): Patient identified as having weight management issue.  Appropriate orders and counseling given.  Body mass index is 35.78 kg/m².  3. Nocturnal hypoxemia  4. " Dyslipidemia  5. Systemic primary arterial hypertension  6. Never smoker  7. Vaccinations - encourage to get the many overdue vaccinations - see Best Practice Advisory          PLAN:   The patient has signs and symptoms consistent with obstructive sleep apnea hypopnea syndrome. Will schedule to have a nocturnal polysomnogram using zolpidem to assist with sleep onset and maintenance should the need arise. Will return after the results are available to determine further diagnostic needs and/or treatment options.    The risks of untreated sleep apnea were discussed with the patient at length. Patients with CANDIDA are at increased risk of cardiovascular disease including coronary artery disease, systemic arterial hypertension, pulmonary arterial hypertension, cardiac arrythmias, and stroke. CANDIDA patients have an increased risk of motor vehicle accidents, type 2 diabetes, chronic kidney disease, and non-alcoholic liver disease. The patient was advised to avoid driving a motor vehicle when drowsy.    Positive airway pressure, such as CPAP, is considered first-line and preferred therapy for sleep apnea and may reverse both symptoms and risks.           Return for after sleep study.

## 2018-03-06 ENCOUNTER — HOSPITAL ENCOUNTER (OUTPATIENT)
Facility: MEDICAL CENTER | Age: 68
End: 2018-03-06
Attending: FAMILY MEDICINE
Payer: MEDICARE

## 2018-03-06 PROCEDURE — 87086 URINE CULTURE/COLONY COUNT: CPT

## 2018-03-09 LAB
BACTERIA UR CULT: NORMAL
SIGNIFICANT IND 70042: NORMAL
SITE SITE: NORMAL
SOURCE SOURCE: NORMAL

## 2018-03-29 ENCOUNTER — SLEEP STUDY (OUTPATIENT)
Dept: SLEEP MEDICINE | Facility: MEDICAL CENTER | Age: 68
End: 2018-03-29
Attending: INTERNAL MEDICINE
Payer: MEDICARE

## 2018-03-29 DIAGNOSIS — G47.33 OBSTRUCTIVE SLEEP APNEA-HYPOPNEA SYNDROME: ICD-10-CM

## 2018-03-29 PROCEDURE — 95811 POLYSOM 6/>YRS CPAP 4/> PARM: CPT | Performed by: FAMILY MEDICINE

## 2018-03-30 NOTE — PROCEDURES
CLINICAL COMMENTS:  The patient underwent a split night polysomnogram with a CPAP titration using the standard montage for measurement of parameters of sleep, respiratory events, movement abnormalities, heart rate and rhythm. A microphone was used to monitor snoring.    INTERPRETATION: The diagnostic recording time was 199.5 minutes with a sleep period of 149.9 minutes.  Total sleep time was 69.5 minutes with a sleep efficiency of 34.8%.  The sleep latency was 49.6 minutes, and REM latency was N/A minutes.  The patient had 60 arousals in total, for an arousal index of 51.8.        RESPIRATORY: The patient had 14 apneas in total.  Of these, 14 were obstructive apneas, and 0 were central apneas.  This resulted in an apnea index (AI) of 12.1.  The patient had 106 hypopneas in total, which resulted in a hypopnea index of 91.5.  The overall AHI was 103.6, while the AHI during REM was N/A.  The supine AHI = 103.8.    OXIMETRY: Oxygen saturation monitoring showed a mean SpO2 of 91.0% for the diagnostic part of the study, with a minimum oxygen saturation of 72.0%.  Oxygen saturations were below 89% for 27.6% of sleep time.    CARDIAC: The highest heart rate for the first part of the study was 96.0 beats per minute.  The average heart rate during sleep was 78.6 bpm, while the highest heart rate was 86.0 bpm.    LIMB MOVEMENTS: There were a total of 0 periodic limb movements during sleep, of which 0 were PLMS arousals.  This resulted in a PLMS index of 0.0 and a PLMS arousal index of 0.0.    TREATMENT    Treatment recording time was 243.4 minutes with a total sleep time of 195.0min.  The patient had an arousal index of 9.8.      RESPIRATORY: The patient had 2 obstructive apneas, 0 central apneas, and 30 hypopneas for an overall AHI was 9.8.    OXIMETRY: The mean SpO2 during treatment was 91.5%, with a minimum oxygen saturation of 81.0%.      CPAP was tried from 4cm to [id number].      Technical summary: The patient underwent a  split-night polysomnogram. This was a 16 channel montage study to include a 6 channel EEG, a 2 channel EOG, and chin EMG, left and right leg EMG, a snore channel, a nasal pressure transducer, and a nasal oral airflow semester and a CFLOW pressure transducer.   Respiratory effort was assessed with the use of a thoracic and abdominal monitor and overnight oximetry was obtained. Audio and video recordings were reviewed. This was a fully attended study and sleep stage scoring was performed. The test was technically adequate.    General sleep summary:      Diagnostic:  During the overnight study, the sleep latency was 49 min which is prolonged. The total sleep time was 69 min and sleep efficiency was 34 % which is decreased.  Sleep stage proportions showed no N# or REM sleep.  In regards to sleep quality there was a sever degree of sleep fragmentation as shown by the arousal index of 51 an hour which is increased. The arousals were due to  respiratory events.     Treatment: During the treatment portion of the study, REM and slow wave sleep was observed*.   The total sleep time was 195 and sleep efficiency was 80%.  Sleep stage proportions showed slow wave sleep rebound and decreased REM sleep .  In regards to sleep quality there was a mild degree of sleep fragmentation as shown by the arousal index of 9/hr. The arousals were due to spontaneous arousal.       Respiratory summary:   Diagnostic: During the diagnostic portion of the the study, the patient demonstrated sever obstructive sleep apnea as shown by the apnea hypopnea index of 103/hr. There are a total of 14 apneas and 106 hypopneas. In the supine position the respiratory disturbance index was 103 an hour and in the non-supine position the respiratory disturbance index was 101 per hour. The O2 stacie was 72% and the average SpO2 was 90 %. She spent 27 % of sleep time below 89% O2 saturation. There was snoring. The patient demonstrated a NSR with an average heartbeat  of 79 bpm.     CPAP Titration:  Due to the significant number of obstructive respiratory events observed during the diagnostic portion of the study a CPAP titration trial was performed during the second half of the night. The CPAP pressure was initiated at 5 cm of water and the pressure was increased in an attempt to eliminate all sleep disordered breathing and snoring. The CPAP pressure was increased to 11 cm water and at this final pressure the patient was observed in the supine position but REM sleep stage. The apnea hypopnea index improved to 1.1/hr with improved O2 stacie of  88%.He spent 18 % of sleep time below 89% O2 saturation Snoring was resolved.  The patient continued to demonstrate NSR  and the average HR was 79. The patient utilized small eson 2 mask with heated humidification. The CPAP was well-tolerated and there was minimal air leaks. No supplemental oxygen was required.    Periodic limb movement summary:   Diagnostic:The patient demonstrated an normal degree of periodic limb movements.  Treatment:The patient demonstrated an normal degree of periodic limb movements .  Impression:  1. Inadequate sleep time in diagnostic part of the study  2. Sever obstructive sleep apnea with AHI of 103/hr. Due to severity of the disease she met the split study protocol. It was a successful titration. The titration started with CPAP 5 cm and the best tolerated was 11 cm. The AHI improved to 1.1/hr with improved O2 stacie of 88% and average O2 saturation of 92 %.       Recommendations:  I recommend CPAP  of 11 cm with small eson 2 mask. I also recommend 30 day compliance download to assess the efficacy to the recommended pressure, measure leak, apnea hypopnea index and compliance for further outpatient monitoring and management of CPAP therapy. In some cases alternative treatment options may prove effective in resolving sleep apnea and these options include upper airway surgery, the use of a dental orthotic or weight  loss and positional therapy. Clinical correlation is required. In general patients with sleep apnea are advised to avoid alcohol and sedatives and to not operate a motor vehicle while drowsy and are at a greater risk for cardiovascular disease.

## 2018-04-06 ENCOUNTER — APPOINTMENT (OUTPATIENT)
Dept: SLEEP MEDICINE | Facility: MEDICAL CENTER | Age: 68
End: 2018-04-06
Payer: MEDICARE

## 2018-04-06 ENCOUNTER — SLEEP CENTER VISIT (OUTPATIENT)
Dept: SLEEP MEDICINE | Facility: MEDICAL CENTER | Age: 68
End: 2018-04-06
Payer: MEDICARE

## 2018-04-06 VITALS
HEART RATE: 77 BPM | OXYGEN SATURATION: 95 % | RESPIRATION RATE: 15 BRPM | WEIGHT: 215 LBS | BODY MASS INDEX: 35.82 KG/M2 | DIASTOLIC BLOOD PRESSURE: 82 MMHG | SYSTOLIC BLOOD PRESSURE: 122 MMHG | HEIGHT: 65 IN

## 2018-04-06 DIAGNOSIS — I10 ESSENTIAL HYPERTENSION: ICD-10-CM

## 2018-04-06 DIAGNOSIS — G47.33 OSA (OBSTRUCTIVE SLEEP APNEA): ICD-10-CM

## 2018-04-06 PROCEDURE — 99213 OFFICE O/P EST LOW 20 MIN: CPT | Performed by: NURSE PRACTITIONER

## 2018-04-06 NOTE — PROGRESS NOTES
Chief Complaint   Patient presents with   • Follow-Up     SS Results          HPI: This patient is a 68 y.o. female, who presents for sleep study results.    She was seen in consultation with Dr. Ramirez. She was initially referred by Dr. QUINONEZ cardiology. Overnight oximetry showed desaturations suggestive of CANDIDA. Sleep symptoms included frequent nocturnal awakenings, loud snoring, EDS, a.m. headache.    PSG indicates very severe obstructive sleep apnea with an AHI of 103.6, minimum oxygen saturation of 72 %. She was successfully titrated to CPAP 11 cm H2O.    Our MA Velma provides translation. Pt is Arabic speaking.    Past Medical History:   Diagnosis Date   • Back pain    • Bronchitis    • Chickenpox    • Diabetes    • Hyperlipidemia    • Mumps    • Nasal drainage        Social History   Substance Use Topics   • Smoking status: Never Smoker   • Smokeless tobacco: Never Used   • Alcohol use No       Family History   Problem Relation Age of Onset   • Heart Disease Father    • Asthma Brother    • Asthma Brother    • Sleep Apnea Neg Hx        Current medications as of today   Current Outpatient Prescriptions   Medication Sig Dispense Refill   • Magnesium 400 MG Cap Take  by mouth every day.     • atorvastatin (LIPITOR) 40 MG Tab Take 1 Tab by mouth every day. (Patient taking differently: Take 20 mg by mouth every day.) 30 Tab 11   • lisinopril (PRINIVIL) 10 MG Tab Take 10 mg by mouth every day.     • NAPROXEN PO Take  by mouth.     • metformin SR (GLUCOPHAGE XR) 500 MG TB24 Take 500 mg by mouth every day.     • hydrochlorothiazide (HYDRODIURIL) 25 MG TABS Take 25 mg by mouth every day.     • prometh-phenylephrine-codeine (PHENERGAN VC CODEINE) 5-6.25-10 MG/5ML SYRP Take 5 mL by mouth every 6 hours as needed. 100 mL 0   • doxycycline (VIBRAMYCIN) 100 MG TABS Take 1 Tab by mouth 2 times a day. 20 Tab 0   • hydrocodone-acetaminophen (NORCO) 5-325 MG TABS per tablet Take 1-2 Tabs by mouth every 8 hours as needed. 30 Tab 0  "  • cyclobenzaprine (FLEXERIL) 10 MG TABS Take 1 Tab by mouth 3 times a day as needed for Mild Pain or Muscle Spasms. 30 Tab 0   • cetirizine (ZYRTEC) 10 MG TABS Take 10 mg by mouth every day.     • methylPREDNISolone (MEDROL DOSPACK) 4 MG TABS Take  by mouth every day. follow package directions 1 Each 0     No current facility-administered medications for this visit.        Allergies: Ibuprofen and Penicillins    Blood pressure 122/82, pulse 77, resp. rate 15, height 1.651 m (5' 5\"), weight 97.5 kg (215 lb), SpO2 95 %.      ROS: As per HPI and otherwise negative if not stated.      Physical exam:   Constitutional: Well-nourished, well-developed, in no acute distress  Eyes: PERRL  Neck: supple, trachea midline  Respiratory: no intercostal retractions or accessory muscle use   Musculoskeletal: no clubbing or cyanosis  Skin: No rashes or lesions noted on exposed skin  Neuro: No focal deficit noted  Psychiatric: Oriented to time, person and place.     Diagnosis:  1. CANDIDA (obstructive sleep apnea)  DME CPAP   2. BMI 35.0-35.9,adult     3. Essential hypertension         Plan:  Testing reviewed in detail with the patient. I reviewed with the patient the pathophysiology of obstructive sleep apnea, as well as potential cardiac and neurologic risks associated with untreated sleep apnea. We reviewed treatment options including CPAP/BiPAP therapy, ENT referral, dental appliance. Given the severity of her sleep apnea CPAP is preferred treatment. She is not sure she will be able to tolerate but is amenable to a trial      1. Initiate CPAP 11 cm H2O, order to preferred home care. She understands she can try different masks for comfort.  2. Follow-up in approximately 8-12 weeks to review compliance download, sooner if needed  3. Patient encouraged to follow-up with PCP and cardiology as scheduled for routine health maintenance    "

## 2018-09-07 ENCOUNTER — HOSPITAL ENCOUNTER (OUTPATIENT)
Dept: LAB | Facility: MEDICAL CENTER | Age: 68
End: 2018-09-07
Attending: PHYSICIAN ASSISTANT
Payer: MEDICARE

## 2018-09-07 LAB
ALBUMIN SERPL BCP-MCNC: 3.9 G/DL (ref 3.2–4.9)
ALBUMIN/GLOB SERPL: 1.1 G/DL
ALP SERPL-CCNC: 58 U/L (ref 30–99)
ALT SERPL-CCNC: 8 U/L (ref 2–50)
ANION GAP SERPL CALC-SCNC: 6 MMOL/L (ref 0–11.9)
AST SERPL-CCNC: 14 U/L (ref 12–45)
BASOPHILS # BLD AUTO: 0.6 % (ref 0–1.8)
BASOPHILS # BLD: 0.04 K/UL (ref 0–0.12)
BILIRUB SERPL-MCNC: 0.5 MG/DL (ref 0.1–1.5)
BUN SERPL-MCNC: 18 MG/DL (ref 8–22)
CALCIUM SERPL-MCNC: 9.7 MG/DL (ref 8.5–10.5)
CHLORIDE SERPL-SCNC: 102 MMOL/L (ref 96–112)
CHOLEST SERPL-MCNC: 214 MG/DL (ref 100–199)
CO2 SERPL-SCNC: 30 MMOL/L (ref 20–33)
CREAT SERPL-MCNC: 0.63 MG/DL (ref 0.5–1.4)
CREAT UR-MCNC: 75.2 MG/DL
EOSINOPHIL # BLD AUTO: 0.36 K/UL (ref 0–0.51)
EOSINOPHIL NFR BLD: 5.6 % (ref 0–6.9)
ERYTHROCYTE [DISTWIDTH] IN BLOOD BY AUTOMATED COUNT: 55.1 FL (ref 35.9–50)
GLOBULIN SER CALC-MCNC: 3.5 G/DL (ref 1.9–3.5)
GLUCOSE SERPL-MCNC: 117 MG/DL (ref 65–99)
HCT VFR BLD AUTO: 41 % (ref 37–47)
HDLC SERPL-MCNC: 44 MG/DL
HGB BLD-MCNC: 12.9 G/DL (ref 12–16)
IMM GRANULOCYTES # BLD AUTO: 0.02 K/UL (ref 0–0.11)
IMM GRANULOCYTES NFR BLD AUTO: 0.3 % (ref 0–0.9)
LDLC SERPL CALC-MCNC: 145 MG/DL
LYMPHOCYTES # BLD AUTO: 2.01 K/UL (ref 1–4.8)
LYMPHOCYTES NFR BLD: 31.4 % (ref 22–41)
MCH RBC QN AUTO: 30.4 PG (ref 27–33)
MCHC RBC AUTO-ENTMCNC: 31.5 G/DL (ref 33.6–35)
MCV RBC AUTO: 96.5 FL (ref 81.4–97.8)
MICROALBUMIN UR-MCNC: 1.8 MG/DL
MICROALBUMIN/CREAT UR: 24 MG/G (ref 0–30)
MONOCYTES # BLD AUTO: 0.45 K/UL (ref 0–0.85)
MONOCYTES NFR BLD AUTO: 7 % (ref 0–13.4)
NEUTROPHILS # BLD AUTO: 3.53 K/UL (ref 2–7.15)
NEUTROPHILS NFR BLD: 55.1 % (ref 44–72)
NRBC # BLD AUTO: 0 K/UL
NRBC BLD-RTO: 0 /100 WBC
PLATELET # BLD AUTO: 256 K/UL (ref 164–446)
PMV BLD AUTO: 10.5 FL (ref 9–12.9)
POTASSIUM SERPL-SCNC: 4.7 MMOL/L (ref 3.6–5.5)
PROT SERPL-MCNC: 7.4 G/DL (ref 6–8.2)
RBC # BLD AUTO: 4.25 M/UL (ref 4.2–5.4)
SODIUM SERPL-SCNC: 138 MMOL/L (ref 135–145)
TRIGL SERPL-MCNC: 124 MG/DL (ref 0–149)
TSH SERPL DL<=0.005 MIU/L-ACNC: 3.4 UIU/ML (ref 0.38–5.33)
WBC # BLD AUTO: 6.4 K/UL (ref 4.8–10.8)

## 2018-09-07 PROCEDURE — 84443 ASSAY THYROID STIM HORMONE: CPT

## 2018-09-07 PROCEDURE — 36415 COLL VENOUS BLD VENIPUNCTURE: CPT

## 2018-09-07 PROCEDURE — 82043 UR ALBUMIN QUANTITATIVE: CPT

## 2018-09-07 PROCEDURE — 80053 COMPREHEN METABOLIC PANEL: CPT

## 2018-09-07 PROCEDURE — 82570 ASSAY OF URINE CREATININE: CPT

## 2018-09-07 PROCEDURE — 80061 LIPID PANEL: CPT

## 2018-09-07 PROCEDURE — 85025 COMPLETE CBC W/AUTO DIFF WBC: CPT

## 2018-10-25 ENCOUNTER — HOSPITAL ENCOUNTER (OUTPATIENT)
Dept: RADIOLOGY | Facility: MEDICAL CENTER | Age: 68
End: 2018-10-25
Attending: NURSE PRACTITIONER
Payer: MEDICARE

## 2018-10-25 DIAGNOSIS — N93.9 ABNORMAL UTERINE BLEEDING: ICD-10-CM

## 2018-10-25 PROCEDURE — 76830 TRANSVAGINAL US NON-OB: CPT

## 2019-04-08 DIAGNOSIS — Z01.810 PRE-OPERATIVE CARDIOVASCULAR EXAMINATION: ICD-10-CM

## 2019-04-08 DIAGNOSIS — Z01.811 PRE-OPERATIVE RESPIRATORY EXAMINATION: ICD-10-CM

## 2019-04-08 DIAGNOSIS — Z01.812 PRE-OPERATIVE LABORATORY EXAMINATION: ICD-10-CM

## 2019-04-08 LAB
ANION GAP SERPL CALC-SCNC: 8 MMOL/L (ref 0–11.9)
BASOPHILS # BLD AUTO: 0.6 % (ref 0–1.8)
BASOPHILS # BLD: 0.05 K/UL (ref 0–0.12)
BUN SERPL-MCNC: 17 MG/DL (ref 8–22)
CALCIUM SERPL-MCNC: 9.1 MG/DL (ref 8.5–10.5)
CHLORIDE SERPL-SCNC: 101 MMOL/L (ref 96–112)
CO2 SERPL-SCNC: 29 MMOL/L (ref 20–33)
CREAT SERPL-MCNC: 0.68 MG/DL (ref 0.5–1.4)
EKG IMPRESSION: NORMAL
EOSINOPHIL # BLD AUTO: 0.35 K/UL (ref 0–0.51)
EOSINOPHIL NFR BLD: 4.4 % (ref 0–6.9)
ERYTHROCYTE [DISTWIDTH] IN BLOOD BY AUTOMATED COUNT: 52.9 FL (ref 35.9–50)
EST. AVERAGE GLUCOSE BLD GHB EST-MCNC: 143 MG/DL
GLUCOSE SERPL-MCNC: 90 MG/DL (ref 65–99)
HBA1C MFR BLD: 6.6 % (ref 0–5.6)
HCG SERPL QL: NEGATIVE
HCT VFR BLD AUTO: 42.3 % (ref 37–47)
HGB BLD-MCNC: 13.2 G/DL (ref 12–16)
IMM GRANULOCYTES # BLD AUTO: 0.03 K/UL (ref 0–0.11)
IMM GRANULOCYTES NFR BLD AUTO: 0.4 % (ref 0–0.9)
LYMPHOCYTES # BLD AUTO: 2.6 K/UL (ref 1–4.8)
LYMPHOCYTES NFR BLD: 32.5 % (ref 22–41)
MCH RBC QN AUTO: 30.1 PG (ref 27–33)
MCHC RBC AUTO-ENTMCNC: 31.2 G/DL (ref 33.6–35)
MCV RBC AUTO: 96.6 FL (ref 81.4–97.8)
MONOCYTES # BLD AUTO: 0.6 K/UL (ref 0–0.85)
MONOCYTES NFR BLD AUTO: 7.5 % (ref 0–13.4)
NEUTROPHILS # BLD AUTO: 4.38 K/UL (ref 2–7.15)
NEUTROPHILS NFR BLD: 54.6 % (ref 44–72)
NRBC # BLD AUTO: 0 K/UL
NRBC BLD-RTO: 0 /100 WBC
PLATELET # BLD AUTO: 279 K/UL (ref 164–446)
PMV BLD AUTO: 10.3 FL (ref 9–12.9)
POTASSIUM SERPL-SCNC: 4 MMOL/L (ref 3.6–5.5)
RBC # BLD AUTO: 4.38 M/UL (ref 4.2–5.4)
SODIUM SERPL-SCNC: 138 MMOL/L (ref 135–145)
WBC # BLD AUTO: 8 K/UL (ref 4.8–10.8)

## 2019-04-08 PROCEDURE — 93005 ELECTROCARDIOGRAM TRACING: CPT

## 2019-04-08 PROCEDURE — 93010 ELECTROCARDIOGRAM REPORT: CPT | Performed by: INTERNAL MEDICINE

## 2019-04-08 PROCEDURE — 85025 COMPLETE CBC W/AUTO DIFF WBC: CPT

## 2019-04-08 PROCEDURE — 83036 HEMOGLOBIN GLYCOSYLATED A1C: CPT

## 2019-04-08 PROCEDURE — 36415 COLL VENOUS BLD VENIPUNCTURE: CPT

## 2019-04-08 PROCEDURE — 80048 BASIC METABOLIC PNL TOTAL CA: CPT

## 2019-04-08 PROCEDURE — 84703 CHORIONIC GONADOTROPIN ASSAY: CPT

## 2019-04-08 RX ORDER — BENAZEPRIL HYDROCHLORIDE 20 MG/1
20 TABLET ORAL DAILY
COMMUNITY
End: 2019-06-19

## 2019-04-15 NOTE — H&P
CHIEF COMPLAINT:  Postmenopausal bleeding.    HISTORY OF PRESENT ILLNESS:  This patient is a 69-year-old    female  3, para 3, who came in to see me as a new patient in 2018.  She was referred from the emergency room for postmenopausal   bleeding.  In the emergency room, she had had a pelvic ultrasound that   revealed a thickened endometrial stripe measuring 1.37 cm.  Otherwise, the   uterus measured 2.79 x 6.42 x 5.13 cm.  Ovaries were not visualized.  In   December, she had an endometrial biopsy in the office and the endometrial   biopsy was read as fragments of endocervical polyp and mucous.  No endometrial   glands and stroma present for evaluation.  No dysplasia or carcinoma.  The   patient has continued to have postmenopausal spotting and at this time, she is   scheduled for a hysteroscopy and possible polypectomy.  I have reviewed with   the patient the risks of surgery including infection, bleeding, perforation of   the uterus and if that occurs, an increased risk of damage to adjacent   organs.  Patient has no unanswered questions and wants to proceed.    PAST MEDICAL HISTORY:  1.  Type 2 diabetes.  2.  Hypertension.  3.  High cholesterol.    PAST SURGICAL HISTORY:  Previous  sections x3.    MEDICATIONS:  She is on Metformin 500 mg 1 tablet twice a day.  She is on   lisinopril 10 mg 1 p.o. daily.  She is on atorvastatin 1 p.o. daily.    ALLERGIES:  PENICILLIN CAUSES HIVES, ADVIL CAUSES HIVES, MOTRIN CAUSES HIVES.    OBSTETRICAL HISTORY:  She is a  3, para 3.  In , she had a primary    section at term.  In , she had a repeat  section at term   and in , she had a repeat  section at term.    GYNECOLOGIC HISTORY:  Patient thinks she went through menopause in her late   50s.  She started menstruating at age 9.  She has been spotting on and off   for about a year and most recently 2 weeks ago.  Last Pap smear was in 2018    and it was negative.  Previously patient had a tubal ligation with her last   .    SOCIAL HISTORY:  Patient denies tobacco, alcohol or drug use.  She is .    Patient is a Quaker and is in the bloodless program    FAMILY HISTORY:  Father  of acute MI.    PHYSICAL EXAMINATION:  VITAL SIGNS:  Blood pressure 130/70, heart rate is 80.  GENERAL:  Pleasant female in no acute distress.  LUNGS:  Clear to auscultation bilaterally.  CARDIOVASCULAR:  Regular rate and rhythm, no murmur.  ABDOMEN:  Soft, nontender, nondistended.  EXTREMITIES:  No calf tenderness.  GENITOURINARY:  Normal external female genitalia.  Vagina without any lesions   or discharge.  Cervix, no lesions, no discharge.  Anteverted uterus about 6   weeks' size.  No adnexal masses.  Again, endometrial biopsy from 2018, fragments of endocervical polyp and mucous.  No endometrial glands and   stroma present for evaluation.  No dysplasia or carcinoma identified.    LABORATORY DATA:  Patient's EKG from 2018, sinus rhythm, rate at 75, no   significant changes from her last EKG of 10/16/2017.  Pelvic ultrasound as   above with endometrial stripe of 1.37.    Preoperative labs are white count 8.0, H and H 13.2 and 42.3, and her   platelets are 279.  The patient's CMP:  Sodium 138, potassium 4.0, chloride   101, CO2 of 29, glucose 90, creatinine 0.68.  HCG is negative.  Hemoglobin A1c   6.6.    ASSESSMENT AND PLAN:  A 69-year-old  female.  1.  Postmenopausal bleeding/thick endometrial stripe.  Patient at this time is   scheduled for hysteroscopy with dilation and curettage with MyoSure and   possible polypectomy.  Patient is aware of the risks of infection, bleeding,   making a hole through the uterus and if that occurs, an increased risk of   damaging adjacent organs.  She has no unanswered questions and wants to   proceed.  2.  Quaker.  Patient is in the bloodless program.  She prefers    death over getting blood.  3.  Type 2 diabetes.  4.  Hypertension.  5.  Previous  sections x3.       ____________________________________     MD SAMSON JACKMAN / PEGGY    DD:  2019 23:08:24  DT:  04/15/2019 01:55:10    D#:  [id number]  Job#:  [id number]

## 2019-04-15 NOTE — H&P
DATE OF ADMISSION:  2019    CHIEF COMPLAINT:  Postmenopausal bleeding.    HISTORY OF PRESENT ILLNESS:  This patient is a 69-year-old    female  3, para 3, who came in to see me as a new patient in 2018.  She was referred from the emergency room for postmenopausal   bleeding.  In the emergency room, she had had a pelvic ultrasound that   revealed a thickened endometrial stripe measuring 1.37 cm.  Otherwise, the   uterus measured 2.79 x 6.42 x 5.13 cm.  Ovaries were not visualized.  In   December, she had an endometrial biopsy in the office and the endometrial   biopsy was read as fragments of endocervical polyp and mucous.  No endometrial   glands and stroma present for evaluation.  No dysplasia or carcinoma.  The   patient has continued to have postmenopausal spotting and at this time, she is   scheduled for a hysteroscopy and possible polypectomy.  I have reviewed with   the patient the risks of surgery including infection, bleeding, perforation of   the uterus and if that occurs, an increased risk of damage to adjacent   organs.  Patient has no unanswered questions and wants to proceed.    PAST MEDICAL HISTORY:  1.  Type 2 diabetes.  2.  Hypertension.  3.  High cholesterol.    PAST SURGICAL HISTORY:  Previous  sections x3.    MEDICATIONS:  She is on Metformin 500 mg 1 tablet twice a day.  She is on   lisinopril 10 mg 1 p.o. daily.  She is on atorvastatin 1 p.o. daily.    ALLERGIES:  PENICILLIN CAUSES HIVES, ADVIL CAUSES HIVES, MOTRIN CAUSES HIVES.    OBSTETRICAL HISTORY:  She is a  3, para 3.  In , she had a primary    section at term.  In , she had a repeat  section at term   and in , she had a repeat  section at term.    GYNECOLOGIC HISTORY:  Patient thinks she went through menopause in her late   50s.  She started menstruating at age 9.  She has been spotting on and off   for about a year and most recently 2 weeks ago.   Last Pap smear was in 2018   and it was negative.  Previously patient had a tubal ligation with her last   .    SOCIAL HISTORY:  Patient denies tobacco, alcohol or drug use.  She is .    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    PHYSICAL EXAMINATION:  VITAL SIGNS:  Blood pressure 130/70, heart rate is 80.  GENERAL:  Pleasant female in no acute distress.  LUNGS:  Clear to auscultation bilaterally.  CARDIOVASCULAR:  Regular rate and rhythm, no murmur.  ABDOMEN:  Soft, nontender, nondistended.  EXTREMITIES:  No calf tenderness.  GENITOURINARY:  Normal external female genitalia.  Vagina without any lesions   or discharge.  Cervix, no lesions, no discharge.  Anteverted uterus about 6   weeks' size.  No adnexal masses.  Again, endometrial biopsy from 2018, fragments of endocervical polyp and mucous.  No endometrial glands and   stroma present for evaluation.  No dysplasia or carcinoma identified.    LABORATORY DATA:  Patient's EKG from 2018, sinus rhythm, rate at 75, no   significant changes from her last EKG of 10/16/2017.  Pelvic ultrasound as   above with endometrial stripe of 1.37.    Preoperative labs are white count 8.0, H and H 13.2 and 42.3, and her   platelets are 279.  The patient's CMP:  Sodium 138, potassium 4.0, chloride   101, CO2 of 29, glucose 90, creatinine 0.68.  HCG is negative.  Hemoglobin A1c   6.6.    ASSESSMENT AND PLAN:  A 69-year-old  female.  1.  Postmenopausal bleeding/thick endometrial stripe.  Patient at this time is   scheduled for hysteroscopy with dilation and curettage with MyoSure and   possible polypectomy.  Patient is aware of the risks of infection, bleeding,   making a hole through the uterus and if that occurs, an increased risk of   damaging adjacent organs.  She has no unanswered questions and wants to   proceed.  2.  Restorationist.  Patient is in the  bloodless program.  She prefers   death over getting blood.  3.  Type 2 diabetes.  4.  Hypertension.  5.  Previous  sections x3.       ____________________________________     MD SAMSON JACKMAN / PEGGY    DD:  2019 23:08:24  DT:  04/15/2019 01:55:10    D#:  [id number]  Job#:  [id number]

## 2019-04-17 ENCOUNTER — ANESTHESIA EVENT (OUTPATIENT)
Dept: SURGERY | Facility: MEDICAL CENTER | Age: 69
End: 2019-04-17
Payer: MEDICARE

## 2019-04-17 ENCOUNTER — ANESTHESIA (OUTPATIENT)
Dept: SURGERY | Facility: MEDICAL CENTER | Age: 69
End: 2019-04-17
Payer: MEDICARE

## 2019-04-17 ENCOUNTER — HOSPITAL ENCOUNTER (OUTPATIENT)
Facility: MEDICAL CENTER | Age: 69
End: 2019-04-17
Attending: OBSTETRICS & GYNECOLOGY | Admitting: OBSTETRICS & GYNECOLOGY
Payer: MEDICARE

## 2019-04-17 VITALS
HEIGHT: 62 IN | SYSTOLIC BLOOD PRESSURE: 104 MMHG | TEMPERATURE: 97.4 F | HEART RATE: 69 BPM | OXYGEN SATURATION: 92 % | DIASTOLIC BLOOD PRESSURE: 78 MMHG | RESPIRATION RATE: 16 BRPM | WEIGHT: 207.45 LBS | BODY MASS INDEX: 38.18 KG/M2

## 2019-04-17 DIAGNOSIS — Z09 POSTOPERATIVE FOLLOW-UP: ICD-10-CM

## 2019-04-17 PROBLEM — E13.9 DIABETES 1.5, MANAGED AS TYPE 2 (HCC): Chronic | Status: ACTIVE | Noted: 2019-04-17

## 2019-04-17 LAB
ALBUMIN SERPL BCP-MCNC: 3.5 G/DL (ref 3.2–4.9)
ALBUMIN/GLOB SERPL: 1.3 G/DL
ALP SERPL-CCNC: 49 U/L (ref 30–99)
ALT SERPL-CCNC: 13 U/L (ref 2–50)
ANION GAP SERPL CALC-SCNC: 6 MMOL/L (ref 0–11.9)
AST SERPL-CCNC: 19 U/L (ref 12–45)
BILIRUB SERPL-MCNC: 0.3 MG/DL (ref 0.1–1.5)
BUN SERPL-MCNC: 16 MG/DL (ref 8–22)
CALCIUM SERPL-MCNC: 7.9 MG/DL (ref 8.5–10.5)
CHLORIDE SERPL-SCNC: 109 MMOL/L (ref 96–112)
CO2 SERPL-SCNC: 24 MMOL/L (ref 20–33)
CREAT SERPL-MCNC: 0.63 MG/DL (ref 0.5–1.4)
ERYTHROCYTE [DISTWIDTH] IN BLOOD BY AUTOMATED COUNT: 52.7 FL (ref 35.9–50)
GLOBULIN SER CALC-MCNC: 2.8 G/DL (ref 1.9–3.5)
GLUCOSE BLD-MCNC: 103 MG/DL (ref 65–99)
GLUCOSE SERPL-MCNC: 135 MG/DL (ref 65–99)
HCT VFR BLD AUTO: 40.1 % (ref 37–47)
HGB BLD-MCNC: 12.5 G/DL (ref 12–16)
MCH RBC QN AUTO: 30.2 PG (ref 27–33)
MCHC RBC AUTO-ENTMCNC: 31.2 G/DL (ref 33.6–35)
MCV RBC AUTO: 96.9 FL (ref 81.4–97.8)
PATHOLOGY CONSULT NOTE: NORMAL
PLATELET # BLD AUTO: 259 K/UL (ref 164–446)
PMV BLD AUTO: 9.6 FL (ref 9–12.9)
POTASSIUM SERPL-SCNC: 4.4 MMOL/L (ref 3.6–5.5)
PROT SERPL-MCNC: 6.3 G/DL (ref 6–8.2)
RBC # BLD AUTO: 4.14 M/UL (ref 4.2–5.4)
SODIUM SERPL-SCNC: 139 MMOL/L (ref 135–145)
WBC # BLD AUTO: 14.1 K/UL (ref 4.8–10.8)

## 2019-04-17 PROCEDURE — 160041 HCHG SURGERY MINUTES - EA ADDL 1 MIN LEVEL 4: Performed by: OBSTETRICS & GYNECOLOGY

## 2019-04-17 PROCEDURE — 160035 HCHG PACU - 1ST 60 MINS PHASE I: Performed by: OBSTETRICS & GYNECOLOGY

## 2019-04-17 PROCEDURE — 88305 TISSUE EXAM BY PATHOLOGIST: CPT

## 2019-04-17 PROCEDURE — 502240 HCHG MISC OR SUPPLY RC 0272: Performed by: OBSTETRICS & GYNECOLOGY

## 2019-04-17 PROCEDURE — 700111 HCHG RX REV CODE 636 W/ 250 OVERRIDE (IP)

## 2019-04-17 PROCEDURE — 160009 HCHG ANES TIME/MIN: Performed by: OBSTETRICS & GYNECOLOGY

## 2019-04-17 PROCEDURE — 160029 HCHG SURGERY MINUTES - 1ST 30 MINS LEVEL 4: Performed by: OBSTETRICS & GYNECOLOGY

## 2019-04-17 PROCEDURE — 502587 HCHG PACK, D&C: Performed by: OBSTETRICS & GYNECOLOGY

## 2019-04-17 PROCEDURE — 700111 HCHG RX REV CODE 636 W/ 250 OVERRIDE (IP): Performed by: ANESTHESIOLOGY

## 2019-04-17 PROCEDURE — 700101 HCHG RX REV CODE 250

## 2019-04-17 PROCEDURE — 36415 COLL VENOUS BLD VENIPUNCTURE: CPT

## 2019-04-17 PROCEDURE — 85027 COMPLETE CBC AUTOMATED: CPT

## 2019-04-17 PROCEDURE — 700101 HCHG RX REV CODE 250: Performed by: ANESTHESIOLOGY

## 2019-04-17 PROCEDURE — 82962 GLUCOSE BLOOD TEST: CPT

## 2019-04-17 PROCEDURE — 160025 RECOVERY II MINUTES (STATS): Performed by: OBSTETRICS & GYNECOLOGY

## 2019-04-17 PROCEDURE — 160036 HCHG PACU - EA ADDL 30 MINS PHASE I: Performed by: OBSTETRICS & GYNECOLOGY

## 2019-04-17 PROCEDURE — 80053 COMPREHEN METABOLIC PANEL: CPT

## 2019-04-17 PROCEDURE — 500448 HCHG DRESSING, TELFA 3X4: Performed by: OBSTETRICS & GYNECOLOGY

## 2019-04-17 PROCEDURE — 88342 IMHCHEM/IMCYTCHM 1ST ANTB: CPT | Mod: XU

## 2019-04-17 PROCEDURE — 160046 HCHG PACU - 1ST 60 MINS PHASE II: Performed by: OBSTETRICS & GYNECOLOGY

## 2019-04-17 PROCEDURE — 501838 HCHG SUTURE GENERAL: Performed by: OBSTETRICS & GYNECOLOGY

## 2019-04-17 PROCEDURE — 160048 HCHG OR STATISTICAL LEVEL 1-5: Performed by: OBSTETRICS & GYNECOLOGY

## 2019-04-17 PROCEDURE — 500449 HCHG DRESSING, TELFA 8X10: Performed by: OBSTETRICS & GYNECOLOGY

## 2019-04-17 PROCEDURE — 88360 TUMOR IMMUNOHISTOCHEM/MANUAL: CPT

## 2019-04-17 PROCEDURE — 160002 HCHG RECOVERY MINUTES (STAT): Performed by: OBSTETRICS & GYNECOLOGY

## 2019-04-17 PROCEDURE — 88341 IMHCHEM/IMCYTCHM EA ADD ANTB: CPT | Mod: 91

## 2019-04-17 RX ORDER — OXYCODONE HYDROCHLORIDE AND ACETAMINOPHEN 5; 325 MG/1; MG/1
1-2 TABLET ORAL EVERY 4 HOURS PRN
Qty: 15 TAB | Refills: 0 | Status: SHIPPED | OUTPATIENT
Start: 2019-04-17 | End: 2019-04-24

## 2019-04-17 RX ORDER — PHENYLEPHRINE HYDROCHLORIDE 10 MG/ML
INJECTION, SOLUTION INTRAMUSCULAR; INTRAVENOUS; SUBCUTANEOUS PRN
Status: DISCONTINUED | OUTPATIENT
Start: 2019-04-17 | End: 2019-04-17 | Stop reason: SURG

## 2019-04-17 RX ORDER — CEFAZOLIN SODIUM 1 G/3ML
INJECTION, POWDER, FOR SOLUTION INTRAMUSCULAR; INTRAVENOUS PRN
Status: DISCONTINUED | OUTPATIENT
Start: 2019-04-17 | End: 2019-04-17 | Stop reason: SURG

## 2019-04-17 RX ORDER — SODIUM CHLORIDE, SODIUM LACTATE, POTASSIUM CHLORIDE, CALCIUM CHLORIDE 600; 310; 30; 20 MG/100ML; MG/100ML; MG/100ML; MG/100ML
INJECTION, SOLUTION INTRAVENOUS CONTINUOUS
Status: DISCONTINUED | OUTPATIENT
Start: 2019-04-17 | End: 2019-04-17 | Stop reason: HOSPADM

## 2019-04-17 RX ORDER — OXYCODONE HYDROCHLORIDE AND ACETAMINOPHEN 5; 325 MG/1; MG/1
1 TABLET ORAL EVERY 4 HOURS PRN
Status: CANCELLED | OUTPATIENT
Start: 2019-04-17

## 2019-04-17 RX ORDER — HALOPERIDOL 5 MG/ML
1 INJECTION INTRAMUSCULAR
Status: DISCONTINUED | OUTPATIENT
Start: 2019-04-17 | End: 2019-04-17 | Stop reason: HOSPADM

## 2019-04-17 RX ORDER — ONDANSETRON 2 MG/ML
4 INJECTION INTRAMUSCULAR; INTRAVENOUS
Status: DISCONTINUED | OUTPATIENT
Start: 2019-04-17 | End: 2019-04-17 | Stop reason: HOSPADM

## 2019-04-17 RX ORDER — OXYCODONE HCL 5 MG/5 ML
5 SOLUTION, ORAL ORAL
Status: DISCONTINUED | OUTPATIENT
Start: 2019-04-17 | End: 2019-04-17 | Stop reason: HOSPADM

## 2019-04-17 RX ORDER — CEFAZOLIN SODIUM 1 G/3ML
INJECTION, POWDER, FOR SOLUTION INTRAMUSCULAR; INTRAVENOUS PRN
Status: DISCONTINUED | OUTPATIENT
Start: 2019-04-17 | End: 2019-04-17

## 2019-04-17 RX ORDER — DEXAMETHASONE SODIUM PHOSPHATE 4 MG/ML
INJECTION, SOLUTION INTRA-ARTICULAR; INTRALESIONAL; INTRAMUSCULAR; INTRAVENOUS; SOFT TISSUE PRN
Status: DISCONTINUED | OUTPATIENT
Start: 2019-04-17 | End: 2019-04-17 | Stop reason: SURG

## 2019-04-17 RX ORDER — SIMETHICONE 80 MG
80 TABLET,CHEWABLE ORAL EVERY 8 HOURS PRN
Status: DISCONTINUED | OUTPATIENT
Start: 2019-04-17 | End: 2019-04-17 | Stop reason: HOSPADM

## 2019-04-17 RX ORDER — HYDROMORPHONE HYDROCHLORIDE 1 MG/ML
0.1 INJECTION, SOLUTION INTRAMUSCULAR; INTRAVENOUS; SUBCUTANEOUS
Status: DISCONTINUED | OUTPATIENT
Start: 2019-04-17 | End: 2019-04-17 | Stop reason: HOSPADM

## 2019-04-17 RX ORDER — HYDROMORPHONE HYDROCHLORIDE 1 MG/ML
0.2 INJECTION, SOLUTION INTRAMUSCULAR; INTRAVENOUS; SUBCUTANEOUS
Status: DISCONTINUED | OUTPATIENT
Start: 2019-04-17 | End: 2019-04-17 | Stop reason: HOSPADM

## 2019-04-17 RX ORDER — DOCUSATE SODIUM 100 MG/1
100 CAPSULE, LIQUID FILLED ORAL 2 TIMES DAILY
Qty: 60 CAP | Refills: 1 | Status: SHIPPED | OUTPATIENT
Start: 2019-04-17 | End: 2019-06-19

## 2019-04-17 RX ORDER — MIDAZOLAM HYDROCHLORIDE 1 MG/ML
0.5 INJECTION INTRAMUSCULAR; INTRAVENOUS
Status: DISCONTINUED | OUTPATIENT
Start: 2019-04-17 | End: 2019-04-17 | Stop reason: HOSPADM

## 2019-04-17 RX ORDER — DOCUSATE SODIUM 100 MG/1
100 CAPSULE, LIQUID FILLED ORAL 2 TIMES DAILY
Qty: 30 CAP | Refills: 1 | Status: SHIPPED | OUTPATIENT
Start: 2019-04-17 | End: 2019-06-19

## 2019-04-17 RX ORDER — MEPERIDINE HYDROCHLORIDE 25 MG/ML
25 INJECTION INTRAMUSCULAR; INTRAVENOUS; SUBCUTANEOUS
Status: DISCONTINUED | OUTPATIENT
Start: 2019-04-17 | End: 2019-04-17 | Stop reason: HOSPADM

## 2019-04-17 RX ORDER — ONDANSETRON 2 MG/ML
INJECTION INTRAMUSCULAR; INTRAVENOUS PRN
Status: DISCONTINUED | OUTPATIENT
Start: 2019-04-17 | End: 2019-04-17 | Stop reason: SURG

## 2019-04-17 RX ORDER — HYDROMORPHONE HYDROCHLORIDE 1 MG/ML
0.4 INJECTION, SOLUTION INTRAMUSCULAR; INTRAVENOUS; SUBCUTANEOUS
Status: DISCONTINUED | OUTPATIENT
Start: 2019-04-17 | End: 2019-04-17 | Stop reason: HOSPADM

## 2019-04-17 RX ORDER — BUPIVACAINE HYDROCHLORIDE AND EPINEPHRINE 2.5; 5 MG/ML; UG/ML
INJECTION, SOLUTION INFILTRATION; PERINEURAL
Status: DISCONTINUED | OUTPATIENT
Start: 2019-04-17 | End: 2019-04-17 | Stop reason: HOSPADM

## 2019-04-17 RX ORDER — OXYCODONE HCL 5 MG/5 ML
10 SOLUTION, ORAL ORAL
Status: DISCONTINUED | OUTPATIENT
Start: 2019-04-17 | End: 2019-04-17 | Stop reason: HOSPADM

## 2019-04-17 RX ADMIN — PROPOFOL 200 MG: 10 INJECTION, EMULSION INTRAVENOUS at 07:53

## 2019-04-17 RX ADMIN — SODIUM CHLORIDE, SODIUM LACTATE, POTASSIUM CHLORIDE, CALCIUM CHLORIDE: 600; 310; 30; 20 INJECTION, SOLUTION INTRAVENOUS at 07:45

## 2019-04-17 RX ADMIN — PHENYLEPHRINE HYDROCHLORIDE 250 MCG: 10 INJECTION INTRAVENOUS at 08:00

## 2019-04-17 RX ADMIN — DEXAMETHASONE SODIUM PHOSPHATE 6 MG: 4 INJECTION, SOLUTION INTRA-ARTICULAR; INTRALESIONAL; INTRAMUSCULAR; INTRAVENOUS; SOFT TISSUE at 07:53

## 2019-04-17 RX ADMIN — LIDOCAINE HYDROCHLORIDE 50 MG: 20 INJECTION, SOLUTION INFILTRATION; PERINEURAL at 07:53

## 2019-04-17 RX ADMIN — SODIUM CHLORIDE, SODIUM LACTATE, POTASSIUM CHLORIDE, CALCIUM CHLORIDE: 600; 310; 30; 20 INJECTION, SOLUTION INTRAVENOUS at 07:48

## 2019-04-17 RX ADMIN — ONDANSETRON 4 MG: 2 INJECTION INTRAMUSCULAR; INTRAVENOUS at 07:53

## 2019-04-17 RX ADMIN — FENTANYL CITRATE 100 MCG: 50 INJECTION, SOLUTION INTRAMUSCULAR; INTRAVENOUS at 07:49

## 2019-04-17 RX ADMIN — CEFAZOLIN 2 G: 330 INJECTION, POWDER, FOR SOLUTION INTRAMUSCULAR; INTRAVENOUS at 08:00

## 2019-04-17 RX ADMIN — PHENYLEPHRINE HYDROCHLORIDE 750 MCG: 10 INJECTION INTRAVENOUS at 08:25

## 2019-04-17 ASSESSMENT — PAIN SCALES - GENERAL: PAIN_LEVEL: 0

## 2019-04-17 NOTE — OR SURGEON
Immediate Post OP Note    PreOp Diagnosis: Postmenopausal bleeding  Thick endometrial stripe  [facility]  Type 2 DM  HTN    PostOp Diagnosis: same    Procedure(s):  HYSTEROSCOPY, WITH TISSUE REMOVAL, USING HYSTEROSCOPIC ROTATING CUTTER BLADE - DIAGNOSTIC - Wound Class: Clean Contaminated  DILATION AND CURETTAGE - Wound Class: Clean Contaminated  POLYPECTOMY  Surgeon(s):  Racquel Gatica M.D.    Anesthesiologist/Type of Anesthesia:  Anesthesiologist: Ramiro Felton M.D./General    Surgical Staff:  Circulator: Florencia Villa R.N.  Scrub Person: Phyllis Almeida    Specimens removed if any:  ID Type Source Tests Collected by Time Destination   A : INTRAUTERINE POLYPS Other Other PATHOLOGY SPECIMEN Racquel Gatica M.D. 4/17/2019  8:27 AM        Estimated Blood Loss: 100 cc  IVF: 750 cc LR  UO: 100 cc  Fluid loss NS: 1750cc    Findings: AV 6 wk size with fluffly endometrium and uterine polyps, clear cavity without lesions at the end of the procedure. Both ostium seen.    Complications: none        4/17/2019 8:40 AM Racquel Gatica M.D.

## 2019-04-17 NOTE — OP REPORT
DATE OF SERVICE:  04/17/2019    PREOPERATIVE DIAGNOSES:  1.  Postmenopausal bleeding.  2.  Thick endometrial stripe.  3.  Shinto.  4.  Type 2 diabetes.  5.  Hypertension.    POSTOPERATIVE DIAGNOSES:  1.  Postmenopausal bleeding.  2.  Thick endometrial stripe.  3.  Shinto.  4.  Type 2 diabetes.  5.  Hypertension.    PROCEDURES:  1.  Exam under anesthesia.  2.  Hysteroscopy with polypectomy and virtual curettage with the MyoSure   light.    SURGEON:  Racquel Gatica MD    ANESTHESIOLOGIST:  Ramiro Felton MD    TYPE OF ANESTHESIA:  General.    IV FLUIDS:  750 mL of LR.    URINE OUTPUT:  100 mL.    ESTIMATED BLOOD LOSS:  100 mL.    FLUID LOSS WITH NORMAL SALINE:  1750 mL.    COMPLICATIONS:  None.    RECOVERY:  Stable to the PACU.    FINDINGS:  Anteverted uterus about 6 weeks' size with fluffy endometrium,   uterine polyps, clear cavity without lesions at the end of the procedure, both   ostium was seen.    COMPLICATIONS:  None.    DESCRIPTION OF PROCEDURE:  The patient was taken to the operating room where   she received uncomplicated general endotracheal anesthesia.  She was placed in   [facility].  An exam under anesthesia revealed the above findings.  She   was then prepped and draped in usual sterile fashion.  Her bladder was drained   with in and out catheter.  At this time, a weighted speculum was placed in   the vagina.  Anterior Kriss was placed anteriorly.  The anterior lip of the   cervix was grasped with a single tooth tenaculum and at this time, the cervix   was gently dilated with Hegar dilators up to a 7.  This was done without any   problems.  A 10 mL of Marcaine with 0.25 epinephrine was injected around the   cervix.  At this time, the MyoSure hysteroscope was introduced into the   uterine cavity, revealing the above findings with thickened endometrial stripe   with lot of fluffy endometrium and 2 small polyps.  At this time, both ostium   were seen, and  therefore, once the pictures were taken, the light MyoSure was   introduced and polypectomy was performed and then virtual curettage was   performed also with the MyoSure.  At the end of the procedure, the endometrial   cavity was clear off all lesions.  At this time, again both ostium was seen.    There was no evidence of any perforation.  There was a negative fluid balance   of 1790 mL of normal saline, but again I inspected the uterine cavity and   again there was no evidence of any perforation.  There was some fluid behind   the patient's buttocks, but otherwise the surgery at this time was completed.    The hysteroscope was then removed from the uterus.  The single tooth   tenaculum was removed.  There was one small area of bleeding from the left   tenaculum site on the cervix and this was cauterized with silver nitrate and   at the completion, the cervix was found to be hemostatic.  There was no   bleeding from the uterus.  The speculum was removed from the vagina.  The   patient was then taken out of [facility].  She woke up from anesthesia   without any problems and was taken to the recovery room in stable condition.    CMP will be done.  We will look at her electrolytes.       ____________________________________     MD SAMSON JACKMAN / PEGGY    DD:  04/17/2019 08:47:37  DT:  04/17/2019 09:17:29    D#:  [id number]  Job#:  [id number]   Parent

## 2019-04-17 NOTE — PROGRESS NOTES
0846 [facility]. Report received and care assumed. Monitors on - VSS, breathing even and unlabored. LMA removed promptly on arrival to PACU - pt denies pain or nausea.Azalia pad on - without drainage noted at this time  0900  at bedside  1014 - pt states very little pain, no nausea. kingsley h2o. BP lower than normal however cuff fits poorly - colour good skin warm and dry. HR WNL  1025 pt OOB to bathroom  - voiding without problems  1041 discharge instructions reviewed with pt and famil through juan manuel Santacruz. All questions and concerns addressed.  1058 - Lab here to draw blood work  1157 pt discharged via w/c to private car - [facility] by family and volunteer

## 2019-04-17 NOTE — ADDENDUM NOTE
Addendum  created 04/17/19 0903 by Ramiro Felton M.D.    Order list changed, Order sets accessed

## 2019-04-17 NOTE — DISCHARGE INSTRUCTIONS
Instrucciones Para [place]  (Home Care Instructions)    ACTIVIDAD: Descanse y tome todo con mucha calma las primeras 24 horas después de roberts cirugía.  Konstantin persona adulta responsable debe permanecer con usted tanay hanny periodo de tiempo.  Es normal sentirse sonoliento o sonolienta tanya esas primeras horas.  Le recomendamos que no carol nada que requiera equilibrio, primitivo decisiones a mucha coordinación de roberts parte.    NO CAROL ESTO PURANTE LAS PRIMERAS 24 HORAS:   Manejar o conducir algún vehiculo, operar maquinarias o utilizar electrodomesticos.   Beber cerveza o algún otro tipo de bebida alcohólica.   Primitivo decisiones importantes o firmar documentos legales.    INSTRUCCIONES ESPECIALES: por MD    DIETA: Para evitar las nauseas, prosiga despacito con roberts dieta a medida que pueda ir tolerándola mejor, evite comidas muy condimentadas o grasosas tanya hanny primer día.  Vaya agregando comidas más substanciadas a roberts dieta a medida que asi lo indique roberts médica.  Los bebés pueden beber leche preparada o formula, ásl delia también leche del seno de la madre a medida que vayan teniendo hambre.  SIGA AGREGANDO LIQUIDOS Y COMIDAS CON FIBRA PARA EVITAR ESTREÑIMIENTO.    DELIA BAÑARSE Y CAMBIAR LOS VENDAJES DE LA CIRUGIA: na    MEDICAMENTOS/MEDICINAS:  Vuelva a primitivo javed medicamentos diarios.  [place] los medicamentos que se le prescribe con un poco de comida.  Si no le prescribe ningún tipo de medicamento, entonces puede primitivo medicinas para el dolor que no contienen aspirina, si las necesita.  LAS MEDICINAS PARA EL DOLOR PUEDEN ESTREÑIRLE MUCHO.  [place] un suavizante para el excremento o materia fecal (stool softener) o un laxativo delia por ejemplo: senokot, pericolase, o leche de magnesia, si lo necesita.    La prescripción la administro PERCOCET Y COLACE.  La ultima sosis de medicina para el dolor fue administrada .     Se debe hacer konstantin consulta medica con el doctor en POR DOCTOR, Líame para hacer la jordy.    Usted debe LIAMAR A  ROBERTS MEDICO si tiene los siguientes síntomas:   -   Patti fiebre más torey de 101 grados Fahrenheit.   -   Un dolor incesante aún con los medicamentos, o nauseas y vómito persistente.   -   Un sangrado excesivo (vicky que traspasa los vendajes o gasas) o algúln tipo de drenaje inesperado que proviene de la henda.     -   Un color bran exagerado o hinchazón alrededor del área en donde se le hizo incisión o chris, o un drenaje de pus o con olor rose proveniente de la henda.   -    La inhabilidad de orinar o vaciar roberts vejiga en 8 horas.   -    Problemas con a respiración o william en el pecho.    Usted debe llamar al 911 si se presentan problemas con el dolor al respirar o el pecho.  Si no se puede ponnoer en comunicación con un medica o con el centro de cirugía, usted debe ir a la estación de emergencia (emergency room) más cercana o a un centro de atención de urgencia (urgent care center).  El teléfono del medico es: [phone]    LOS SÍNTOMAS DE UN LEVE RESFRIO SON MUY NORMALES.  ADEMÁS USTED PUEDE LLEGAR A SENTIR WILLIAM GENERALES DE MÚSCULOS, IRRITACIÓN EN LA GARGANTA, WILLIAM DE CROW Y/O UN POCO DE NAUSEAS.    Sie tiene alguna pregunta, llame a roberts médico.  Si roberts médico no se encuentra disponible, por favor llame al Centro de Cirugía at [phone].  el Centro está abierto de Lunes a Viernes desde las 7:00 de la manana hasta las 7:00 de la noche.  Usted también puede llamar al CENTRO DE LLAMADAS SOBRE LA TIANA o HEALTH HOTLINE.  Sugey está abierto viente y cuatro horas por alvina, siete mendieta por semana, allí podrá hablar con patti enfermera.  Llame al [phone], o al número franciscofreedom 7 [phone].    Mi firma a continuación indica que he recibido y entiendco estas instrucciones acera de los cuidados en la casa (Home Care Instructions)    Usted recibirá patti encuesta en la correspondencia en las siguientes semanas y le pedimos que por favor tome un momento para completar pam encuesta y regresaría a hosotros.   Nuestro objetivó es brindarle un cuidado muy manriquez y par lo tanto apreciamos javed coméntanos.  Muchas danae por gloria escogido el Centro [facility] Cirug[facility].

## 2019-04-17 NOTE — ANESTHESIA QCDR
2019 [facility] Clinical Data Registry (for Quality Improvement)     Postoperative nausea/vomiting risk protocol (Adult = 18 yrs and Pediatric 3-17 yrs)- (430 and 463)  General inhalation anesthetic (NOT TIVA) with PONV risk factors: Yes  Provision of anti-emetic therapy with at least 2 different classes of agents: Yes   Patient DID NOT receive anti-emetic therapy and reason is documented in Medical Record:  N/A    Multimodal Pain Management- (AQI59)  Patient undergoing Elective Surgery (i.e. Outpatient, or ASC, or Prescheduled Surgery prior to Hospital Admission): Yes  Use of Multimodal Pain Management, two or more drugs and/or interventions, NOT including systemic opioids: Yes   Exception: Documented allergy to multiple classes of analgesics:  N/A    PACU assessment of acute postoperative pain prior to Anesthesia Care End- Applies to Patients Age = 18- (ABG7)  Initial PACU pain score is which of the following: < 7/10  Patient unable to report pain score: N/A    Post-anesthetic transfer of care checklist/protocol to PACU/ICU- (426 and 427)  Upon conclusion of case, patient transferred to which of the following locations: PACU/Non-ICU  Use of transfer checklist/protocol: Yes  Exclusion: Service Performed in Patient Hospital Room (and thus did not require transfer): N/A    PACU Reintubation- (AQI31)  General anesthesia requiring endotracheal intubation (ETT) along with subsequent extubation in OR or PACU: No  Required reintubation in the PACU: N/A  Extubation was a planned trial documented in the medical record prior to removal of the original airway device: N/A    Unplanned admission to ICU related to anesthesia service up through end of PACU care- (MD51)  Unplanned admission to ICU (not initially anticipated at anesthesia start time): No

## 2019-04-17 NOTE — ANESTHESIA PREPROCEDURE EVALUATION
Relevant Problems   (+) Essential hypertension   (+) CANDIDA (obstructive sleep apnea)       Physical Exam    Airway   Mallampati: II  TM distance: >3 FB  Neck ROM: full       Cardiovascular - normal exam  Rhythm: regular  Rate: normal  (-) murmur     Dental - normal exam         Pulmonary - normal exam  Breath sounds clear to auscultation     Abdominal    Neurological - normal exam                 Anesthesia Plan    ASA 2       Plan - general       Airway plan will be LMA        Induction: intravenous    Postoperative Plan: Postoperative administration of opioids is intended.    Pertinent diagnostic labs and testing reviewed    Informed Consent:    Anesthetic plan and risks discussed with patient.    Use of blood products discussed with: patient whom consented to blood products.

## 2019-04-17 NOTE — ANESTHESIA PROCEDURE NOTES
Airway  Date/Time: 4/17/2019 7:54 AM  Performed by: SHAMAR BOATENG  Authorized by: SHAMAR BOATENG     Location:  OR  Urgency:  Elective  Indications for Airway Management:  Anesthesia  Spontaneous Ventilation: absent    Sedation Level:  Deep  Preoxygenated: Yes    Mask Difficulty Assessment:  0 - not attempted  Final Airway Type:  Supraglottic airway  Final Supraglottic Airway:  Standard LMA  SGA Size:  4  Number of Attempts at Approach:  1  Number of Other Approaches Attempted:  0

## 2019-04-17 NOTE — ANESTHESIA POSTPROCEDURE EVALUATION
Patient: Madelyn Schmitz    Procedure Summary     Date:  04/17/19 Room / Location:  [facility] ROOM 27 / SURGERY SAME DAY [facility]    Anesthesia Start:  0748 Anesthesia Stop:      Procedures:       HYSTEROSCOPY, WITH TISSUE REMOVAL, USING HYSTEROSCOPIC ROTATING CUTTER BLADE - DIAGNOSTIC (N/A Uterus)      DILATION AND CURETTAGE (N/A Uterus) Diagnosis:  (POSTMENOPAUSAL BLEEDING, INCREASED ENDOMETRIAL STRIPE THICKNESS)    Surgeon:  Racquel Gatica M.D. Responsible Provider:  Ramiro Felton M.D.    Anesthesia Type:  general ASA Status:  2          Final Anesthesia Type: general  Last vitals  BP   Blood Pressure : 104/78    Temp   36.7 °C (98.1 °F)    Pulse   Pulse: 89   Resp   16    SpO2   93 %      Anesthesia Post Evaluation    Patient location during evaluation: PACU  Patient participation: complete - patient participated  Level of consciousness: awake and alert  Pain score: 0    Airway patency: patent  Anesthetic complications: no  Cardiovascular status: hemodynamically stable  Respiratory status: acceptable  Hydration status: euvolemic    PONV: none           Nurse Pain Score: 0 (NPRS)

## 2019-04-17 NOTE — OR NURSING
1120 Hand-off from CHELE Rogers  1154 Lab resulted, levels WNL. CHELE Rogers at bedside. Per RN pt may go home now.  D/c criteria met, d/cd via wheelchair, belongings with patient.

## 2019-04-17 NOTE — ANESTHESIA TIME REPORT
Anesthesia Start and Stop Event Times     Date Time Event    4/17/2019 0748 Anesthesia Start        Responsible Staff  04/17/19    Name Role Begin End    Ramiro Felton M.D. Anesth 0748         Preop Diagnosis (Free Text):  Pre-op Diagnosis     POSTMENOPAUSAL BLEEDING, INCREASED ENDOMETRIAL STRIPE THICKNESS        Preop Diagnosis (Codes):  Diagnosis Information     Diagnosis Code(s):         Post op Diagnosis  Postmenopausal bleeding      Premium Reason  Non-Premium    Comments:

## 2019-06-12 ENCOUNTER — HOSPITAL ENCOUNTER (OUTPATIENT)
Dept: RADIOLOGY | Facility: MEDICAL CENTER | Age: 69
End: 2019-06-12
Attending: SPECIALIST
Payer: MEDICARE

## 2019-06-12 DIAGNOSIS — C54.1 ENDOMETRIAL SARCOMA (HCC): ICD-10-CM

## 2019-06-12 PROCEDURE — 71260 CT THORAX DX C+: CPT

## 2019-06-12 PROCEDURE — 700117 HCHG RX CONTRAST REV CODE 255: Performed by: SPECIALIST

## 2019-06-12 RX ADMIN — IOHEXOL 100 ML: 350 INJECTION, SOLUTION INTRAVENOUS at 12:00

## 2019-06-12 RX ADMIN — IOHEXOL 25 ML: 240 INJECTION, SOLUTION INTRATHECAL; INTRAVASCULAR; INTRAVENOUS; ORAL at 12:00

## 2019-06-19 ENCOUNTER — OFFICE VISIT (OUTPATIENT)
Dept: CARDIOLOGY | Facility: MEDICAL CENTER | Age: 69
End: 2019-06-19
Payer: MEDICARE

## 2019-06-19 ENCOUNTER — SLEEP CENTER VISIT (OUTPATIENT)
Dept: SLEEP MEDICINE | Facility: MEDICAL CENTER | Age: 69
End: 2019-06-19
Payer: MEDICARE

## 2019-06-19 ENCOUNTER — TELEPHONE (OUTPATIENT)
Dept: CARDIOLOGY | Facility: MEDICAL CENTER | Age: 69
End: 2019-06-19

## 2019-06-19 ENCOUNTER — HOSPITAL ENCOUNTER (OUTPATIENT)
Dept: CARDIOLOGY | Facility: MEDICAL CENTER | Age: 69
End: 2019-06-19
Attending: INTERNAL MEDICINE
Payer: MEDICARE

## 2019-06-19 VITALS
BODY MASS INDEX: 37.73 KG/M2 | HEIGHT: 62 IN | WEIGHT: 205.03 LBS | SYSTOLIC BLOOD PRESSURE: 112 MMHG | OXYGEN SATURATION: 96 % | DIASTOLIC BLOOD PRESSURE: 64 MMHG | HEART RATE: 72 BPM

## 2019-06-19 VITALS
SYSTOLIC BLOOD PRESSURE: 110 MMHG | OXYGEN SATURATION: 94 % | WEIGHT: 205 LBS | DIASTOLIC BLOOD PRESSURE: 74 MMHG | HEIGHT: 62 IN | RESPIRATION RATE: 16 BRPM | HEART RATE: 72 BPM | BODY MASS INDEX: 37.73 KG/M2

## 2019-06-19 DIAGNOSIS — Z01.810 PRE-OPERATIVE CARDIOVASCULAR EXAMINATION: ICD-10-CM

## 2019-06-19 DIAGNOSIS — G47.33 OSA (OBSTRUCTIVE SLEEP APNEA): ICD-10-CM

## 2019-06-19 DIAGNOSIS — R01.1 HEART MURMUR: ICD-10-CM

## 2019-06-19 DIAGNOSIS — I10 ESSENTIAL HYPERTENSION, BENIGN: ICD-10-CM

## 2019-06-19 DIAGNOSIS — I35.0 NONRHEUMATIC AORTIC (VALVE) STENOSIS: ICD-10-CM

## 2019-06-19 DIAGNOSIS — G47.30 SLEEP APNEA, UNSPECIFIED TYPE: ICD-10-CM

## 2019-06-19 DIAGNOSIS — I10 ESSENTIAL HYPERTENSION: ICD-10-CM

## 2019-06-19 DIAGNOSIS — R01.1 UNDIAGNOSED CARDIAC MURMURS: ICD-10-CM

## 2019-06-19 DIAGNOSIS — E78.00 PURE HYPERCHOLESTEROLEMIA: ICD-10-CM

## 2019-06-19 DIAGNOSIS — R53.83 OTHER FATIGUE: ICD-10-CM

## 2019-06-19 DIAGNOSIS — I35.0 NONRHEUMATIC AORTIC VALVE STENOSIS: ICD-10-CM

## 2019-06-19 LAB
EKG IMPRESSION: NORMAL
LV EJECT FRACT  99904: 75
LV EJECT FRACT MOD 2C 99903: 51.84
LV EJECT FRACT MOD 4C 99902: 58.34
LV EJECT FRACT MOD BP 99901: 57.96

## 2019-06-19 PROCEDURE — 99214 OFFICE O/P EST MOD 30 MIN: CPT | Performed by: NURSE PRACTITIONER

## 2019-06-19 PROCEDURE — 93306 TTE W/DOPPLER COMPLETE: CPT | Mod: 26 | Performed by: INTERNAL MEDICINE

## 2019-06-19 PROCEDURE — 93306 TTE W/DOPPLER COMPLETE: CPT

## 2019-06-19 PROCEDURE — 93000 ELECTROCARDIOGRAM COMPLETE: CPT | Performed by: INTERNAL MEDICINE

## 2019-06-19 PROCEDURE — 99214 OFFICE O/P EST MOD 30 MIN: CPT | Performed by: INTERNAL MEDICINE

## 2019-06-19 ASSESSMENT — ENCOUNTER SYMPTOMS
SHORTNESS OF BREATH: 0
LOSS OF CONSCIOUSNESS: 0
DIZZINESS: 0
PALPITATIONS: 0
NAUSEA: 0
VOMITING: 0
SENSORY CHANGE: 0
DOUBLE VISION: 0
FOCAL WEAKNESS: 0

## 2019-06-19 NOTE — PROGRESS NOTES
"CC:  Here for f/u sleep issues as listed below    HPI:   Madelyn presents today for follow up obstructive sleep apnea and surgical clearance.  Past medical history of type 2 diabetes, hypertension, elevated BMI at 37. She is Burmese speaking and daughter is translating.    PSG from 3/2018 indicated an AHI of 103.6 and low oxygenation of 72%.  She is here for first compliance.  Currently she is being treated with CPAP @ [id number].  Compliance download from the dates 5/20/2019 - 6/18/2019 indicates she is wearing the device 90% for an avg of 0 hours and 38 minutes per night with a reduced AHI of 0.3.  She does not tolerate pressure when it increases.  She wears it while she is awake. I suspect she is wearing it on the ramp pressure and then takes it off once pressures is at treatment at 31euF06. She also took it off, because she was under the impression she didn't need it anymore at that time. She likes the mask.         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     Priority: Medium   • CANDIDA (obstructive sleep apnea) 04/06/2018   • BMI 37.0-37.9, adult 04/06/2018   • Essential hypertension 04/06/2018       Past Medical History:   Diagnosis Date   • Arthritis     osteo, finger/shoulders   • Back pain    • Blood clotting disorder ([facility]) 2004    leg   • Bronchitis    • Chickenpox    • Dental disorder     upper/lower dentures   • Diabetes     oral meds   • Endometrial cancer ([facility])    • Heart valve disease     \"murmur\"   • High cholesterol    • Hyperlipidemia    • Hypertension    • Mumps    • Nasal drainage    • Sleep apnea     CPAP   • Snoring    • Urinary incontinence        Past Surgical History:   Procedure Laterality Date   • HYSTEROSCOPY WITH MYOSURE N/A 4/17/2019    Procedure: HYSTEROSCOPY, WITH TISSUE REMOVAL, USING HYSTEROSCOPIC ROTATING CUTTER BLADE - DIAGNOSTIC;  Surgeon: Racquel Gatica M.D.;  Location: SURGERY SAME DAY [facility];  Service: " "Gynecology   • DILATION AND CURETTAGE N/A 4/17/2019    Procedure: DILATION AND CURETTAGE;  Surgeon: Racquel Gatica M.D.;  Location: SURGERY SAME DAY [facility];  Service: Gynecology   • PRIMARY C SECTION  1972/1974/1977    x 3       Family History   Problem Relation Age of Onset   • Heart Disease Father    • Asthma Brother    • Asthma Brother    • Sleep Apnea Neg Hx        Social History     Social History   • Marital status:      Spouse name: N/A   • Number of children: N/A   • Years of education: N/A     Occupational History   • Not on file.     Social History Main Topics   • Smoking status: Never Smoker   • Smokeless tobacco: Never Used   • Alcohol use No   • Drug use: No   • Sexual activity: Not on file     Other Topics Concern   • Not on file     Social History Narrative   • No narrative on file       Current Outpatient Prescriptions   Medication Sig Dispense Refill   • metformin SR (GLUCOPHAGE XR) 500 MG TB24 Take 500 mg by mouth 2 times a day.     • lisinopril (PRINIVIL) 10 MG Tab Take 10 mg by mouth every morning.     • acetaminophen (TYLENOL) 325 MG Tab Take 325-650 mg by mouth as needed.       No current facility-administered medications for this visit.           Allergies: Bloodless; Ibuprofen; and Penicillins      ROS   Gen: Denies fever, chills, unintentional weight loss, fatigue  Resp:Denies Dyspnea  CV: Denies chest pain, chest tightness  Sleep:Denies morning headache, insomnia,   Neuro: Denies frequent headaches, weakness, dizziness  See HPI.  All other systems reviewed and negative        Vital signs for this encounter:  Vitals:    06/19/19 1614   Height: 1.575 m (5' 2\")   Weight: 93 kg (205 lb)   Weight % change since last entry.: 0 %   BP: 110/74   Pulse: 72   BMI (Calculated): 37.49   Resp: 16                   Physical Exam:   Gen:         Alert and oriented, No apparent distress.   Neck:        No Lymphadenopathy.  Lungs:     Clear to auscultation bilaterally.    CV:          " Regular rate and rhythm. No murmurs, rubs or gallops.   Abd:         Soft non tender, non distended.            Ext:          No clubbing, cyanosis, edema.    Assessment   1. CANDIDA (obstructive sleep apnea)  DME Other   2. BMI 37.0-37.9, adult  OBESITY COUNSELING (No Charge): Patient identified as having weight management issue.  Appropriate orders and counseling given.       Patient is clinically stable and will proceed with following plan. She is requesting surgical clearance with Dr. Mancia for gynecological surgery.  We do not see patient for pulmonary, only for sleep apnea.  Letter completed for CANDIDA, however instructed the daughter since they are requesting pulmonary surgical clearance, to reach back out to Dr. Mancia office, that we are unable to complete it for pulmonary purposes.    PLAN:   Patient Instructions   1) Continue CPAP and decrease to 9cmH20 to help acclimate to machine  2) Clean mask and supplies weekly and change them as insurance allows  3) Light conditioning encouraged  4) Vaccines: Up to date with Pneumovax 23  5) Return in about 2 months (around 8/19/2019), or or OLENA Greene, for if not sooner, follow up with MICHAEL Ovalles, review of symptoms, Compliance.

## 2019-06-19 NOTE — PATIENT INSTRUCTIONS
1) Continue CPAP and decrease to 9cmH20  2) Clean mask and supplies weekly and change them as insurance allows  3) Light conditioning encouraged  4) Vaccines: Up to date with Pneumovax 23  5) Return in about 2 months (around 8/19/2019), or or OLENA Greene, for if not sooner, follow up with MICHAEL Ovalles, review of symptoms, Compliance.

## 2019-06-19 NOTE — LETTER
[facility]   [address], [address]  [place] [address]  Phone: [phone]  Fax: [phone]              Madelyn Schmitz  1950    Encounter Date: 6/19/2019    Anirudh Pappas M.D.          PROGRESS NOTE:  Chief Complaint   Patient presents with   •  Aortic stenosis   Hypercholesterolemia  Hypertension  Sleep apnea    Subjective:   Madelyn Schmitz is a 69 y.o. female who presents today for preoperative cardiac evaluation and follow-up of above issue    Last seen December 2017  She has history of hypertension hypercholesterolemia and aortic stenosis.    Last echocardiography in October 2017 showed moderate aortic stenosis with normal left finger systolic function ejection fraction 75%.  She just underwent hysteroscopy with polypectomy and curettage about 2 months ago without any issue from the standpoint but complicated by postoperative bleeding.  Unfortunately she is a Jehovah witness.      LDL was 47 in January 2018 on atorvastatin 40 mg/d but went up again to 145 in September of last year.    Past Medical History:   Diagnosis Date   • Arthritis    • Back pain    • Blood clotting disorder (HCC) 2004    leg   • Bronchitis    • Chickenpox    • Dental disorder    • Diabetes    • High cholesterol    • Hyperlipidemia    • Hypertension    • Mumps    • Nasal drainage    • Urinary incontinence      Past Surgical History:   Procedure Laterality Date   • HYSTEROSCOPY WITH MYOSURE N/A 4/17/2019    Procedure: HYSTEROSCOPY, WITH TISSUE REMOVAL, USING HYSTEROSCOPIC ROTATING CUTTER BLADE - DIAGNOSTIC;  Surgeon: Racquel Gatica M.D.;  Location: SURGERY SAME DAY [facility];  Service: Gynecology   • DILATION AND CURETTAGE N/A 4/17/2019    Procedure: DILATION AND CURETTAGE;  Surgeon: Racquel Gatica M.D.;  Location: SURGERY SAME DAY [facility];  Service: Gynecology   • PRIMARY C SECTION       Family History   Problem Relation Age of Onset   • Heart  "Disease Father    • Asthma Brother    • Asthma Brother    • Sleep Apnea Neg Hx      Social History     Social History   • Marital status:      Spouse name: N/A   • Number of children: N/A   • Years of education: N/A     Occupational History   • Not on file.     Social History Main Topics   • Smoking status: Never Smoker   • Smokeless tobacco: Never Used   • Alcohol use No   • Drug use: No   • Sexual activity: Not on file     Other Topics Concern   • Not on file     Social History Narrative   • No narrative on file     Allergies   Allergen Reactions   • Bloodless      Jain   • Ibuprofen Rash   • Penicillins Rash     Outpatient Encounter Prescriptions as of 6/19/2019   Medication Sig Dispense Refill   • LISINOPRIL PO Take  by mouth.     • NAPROXEN PO Take  by mouth.     • metformin SR (GLUCOPHAGE XR) 500 MG TB24 Take 500 mg by mouth every day.     • docusate sodium (COLACE) 100 MG Cap Take 1 Cap by mouth 2 times a day. (Patient not taking: Reported on 6/19/2019) 60 Cap 1   • docusate sodium (COLACE) 100 MG Cap Take 1 Cap by mouth 2 times a day. (Patient not taking: Reported on 6/19/2019) 30 Cap 1   • benazepril (LOTENSIN) 20 MG Tab Take 20 mg by mouth every day.     • hydrochlorothiazide (HYDRODIURIL) 25 MG TABS Take 25 mg by mouth every day.       No facility-administered encounter medications on file as of 6/19/2019.      ROS     Objective:   /64 (BP Location: Left arm, Patient Position: Sitting, BP Cuff Size: Adult)   Pulse 72   Ht 1.575 m (5' 2\")   Wt 93 kg (205 lb 0.4 oz)   SpO2 96%   BMI 37.50 kg/m²      Physical Exam    Assessment:     1. Nonrheumatic aortic valve stenosis  EKG    Moderate , last echo October 2017   2. Essential hypertension  EKG   3. CANDIDA (obstructive sleep apnea)     4. Pure hypercholesterolemia         Medical Decision Making:  Today's Assessment / Status / Plan:            No Recipients              "

## 2019-06-19 NOTE — LETTER
June 26, 2019        Madelyn Schmitz  [address] [address]        Dear Dr. Mancia:    Madelyn Schmitz is currently under our care for a history of obstructive sleep apnea. She is at an increased risk for perioperative complications given this condition. However, at this time there is nothing that can be done to decrease her perioperative risk. She has been encouraged to bring her CPAP machine to the surgery.  Last office visit, 6- was her first compliance since starting her machine. She had not been using the machine while asleep.  Thoroughly discussed with daughter and patient importance of using it all night while asleep.        If you have any questions or concerns, please don't hesitate to call.        Sincerely,        STEVIE Zuñiga.    Electronically Signed

## 2019-06-19 NOTE — PROGRESS NOTES
"Chief Complaint   Patient presents with   •  Aortic stenosis   Hypercholesterolemia  Hypertension  Sleep apnea    Subjective:   Madelyn Schmitz is a 69 y.o. female who presents today for preoperative cardiac evaluation and follow-up of above issue    She is scheduled for hysterectomy next week.  Last seen December 2017  She has history of hypertension hypercholesterolemia and aortic stenosis.    Last echocardiography in October 2017 showed moderate aortic stenosis with normal left finger systolic function ejection fraction 75%.  She underwent hysteroscopy with polypectomy and curettage about 2 months ago without any issue from the standpoint.      She complains of feeling tried but has still been able to do most of household activities. She denies CP, other HF symptoms or palpitations.  She has sleep apnea as well but does not use her CPAP throughtout the night.    LDL was 47 in January 2018 on atorvastatin 40 mg/d but went up again to 145 in September of last year. She stopped taking it because she felt \"pulling in her heart\" from the statin.  She was told by a cardiologist in [place] that \"it is bad fr her heart\".      Past Medical History:   Diagnosis Date   • Arthritis    • Back pain    • Blood clotting disorder (HCC) 2004    leg   • Bronchitis    • Chickenpox    • Dental disorder    • Diabetes    • High cholesterol    • Hyperlipidemia    • Hypertension    • Mumps    • Nasal drainage    • Urinary incontinence      Past Surgical History:   Procedure Laterality Date   • HYSTEROSCOPY WITH MYOSURE N/A 4/17/2019    Procedure: HYSTEROSCOPY, WITH TISSUE REMOVAL, USING HYSTEROSCOPIC ROTATING CUTTER BLADE - DIAGNOSTIC;  Surgeon: Racquel Gatica M.D.;  Location: SURGERY SAME DAY [facility];  Service: Gynecology   • DILATION AND CURETTAGE N/A 4/17/2019    Procedure: DILATION AND CURETTAGE;  Surgeon: Racquel Gatica M.D.;  Location: SURGERY SAME DAY [facility];  Service: Gynecology   • PRIMARY C SECTION   "     Family History   Problem Relation Age of Onset   • Heart Disease Father    • Asthma Brother    • Asthma Brother    • Sleep Apnea Neg Hx      Social History     Social History   • Marital status:      Spouse name: N/A   • Number of children: N/A   • Years of education: N/A     Occupational History   • Not on file.     Social History Main Topics   • Smoking status: Never Smoker   • Smokeless tobacco: Never Used   • Alcohol use No   • Drug use: No   • Sexual activity: Not on file     Other Topics Concern   • Not on file     Social History Narrative   • No narrative on file     Allergies   Allergen Reactions   • Bloodless      Caodaism   • Ibuprofen Rash   • Penicillins Rash     Outpatient Encounter Prescriptions as of 6/19/2019   Medication Sig Dispense Refill   • LISINOPRIL PO Take  by mouth.     • NAPROXEN PO Take  by mouth.     • metformin SR (GLUCOPHAGE XR) 500 MG TB24 Take 500 mg by mouth every day.     • docusate sodium (COLACE) 100 MG Cap Take 1 Cap by mouth 2 times a day. (Patient not taking: Reported on 6/19/2019) 60 Cap 1   • docusate sodium (COLACE) 100 MG Cap Take 1 Cap by mouth 2 times a day. (Patient not taking: Reported on 6/19/2019) 30 Cap 1   • benazepril (LOTENSIN) 20 MG Tab Take 20 mg by mouth every day.     • hydrochlorothiazide (HYDRODIURIL) 25 MG TABS Take 25 mg by mouth every day.       No facility-administered encounter medications on file as of 6/19/2019.      Review of Systems   Constitutional: Positive for malaise/fatigue.   HENT: Negative for congestion.    Eyes: Negative for double vision.   Respiratory: Negative for shortness of breath.    Cardiovascular: Negative for chest pain and palpitations.   Gastrointestinal: Negative for nausea and vomiting.   Neurological: Negative for dizziness, sensory change, focal weakness and loss of consciousness.        Objective:   /64 (BP Location: Left arm, Patient Position: Sitting, BP Cuff Size: Adult)   Pulse 72   Ht 1.575  "m (5' 2\")   Wt 93 kg (205 lb 0.4 oz)   SpO2 96%   BMI 37.50 kg/m²     Physical Exam   Constitutional: No distress.   Obese   Eyes: Left eye exhibits no discharge.   Neck: No JVD present. No thyromegaly present.   Cardiovascular: Normal rate and regular rhythm.    Murmur heard.   Systolic murmur is present with a grade of 2/6   Pulmonary/Chest: Effort normal and breath sounds normal.   Abdominal: Soft.   Musculoskeletal: She exhibits no edema.   Neurological: She is alert.   Skin: Skin is warm.     EKG today by my review showed sinus rhythm, otherwise normal    Assessment:     1. Nonrheumatic aortic valve stenosis  EKG    Moderate , last echo October 2017   2. Essential hypertension  EKG   3. CANDIDA (obstructive sleep apnea)     4. Pure hypercholesterolemia         Medical Decision Making:  Today's Assessment / Status / Plan:     I believe she is at moderate risk for an upcoming gynecological surgery under general anesthesia and likely should be able to proceed.    I however would like to reassess her aortic valve prior to the procedure.  I advised to undergo repeated echocardiography.  Her hypertension is well controlled. She does not wish to restart statin.  We will continue her current medication.  We will keep you posted about our findings and further recommendations as they become available. Please also do not hesitate to call for any questions.  Thank you kindly for allowing me to participate in the care of this patient.  "

## 2019-06-24 ENCOUNTER — HOSPITAL ENCOUNTER (OUTPATIENT)
Dept: RADIOLOGY | Facility: MEDICAL CENTER | Age: 69
End: 2019-06-24
Attending: SPECIALIST | Admitting: SPECIALIST
Payer: MEDICARE

## 2019-06-24 DIAGNOSIS — Z01.811 PRE-OPERATIVE RESPIRATORY EXAMINATION: ICD-10-CM

## 2019-06-24 DIAGNOSIS — Z01.812 PRE-OPERATIVE LABORATORY EXAMINATION: ICD-10-CM

## 2019-06-24 LAB
ALBUMIN SERPL BCP-MCNC: 3.9 G/DL (ref 3.2–4.9)
ALBUMIN/GLOB SERPL: 1.2 G/DL
ALP SERPL-CCNC: 63 U/L (ref 30–99)
ALT SERPL-CCNC: 10 U/L (ref 2–50)
ANION GAP SERPL CALC-SCNC: 8 MMOL/L (ref 0–11.9)
APTT PPP: 29.8 SEC (ref 24.7–36)
AST SERPL-CCNC: 14 U/L (ref 12–45)
BASOPHILS # BLD AUTO: 0.6 % (ref 0–1.8)
BASOPHILS # BLD: 0.04 K/UL (ref 0–0.12)
BILIRUB SERPL-MCNC: 0.3 MG/DL (ref 0.1–1.5)
BUN SERPL-MCNC: 14 MG/DL (ref 8–22)
CALCIUM SERPL-MCNC: 9.5 MG/DL (ref 8.5–10.5)
CANCER AG125 SERPL-ACNC: 9.2 U/ML (ref 0–35)
CHLORIDE SERPL-SCNC: 101 MMOL/L (ref 96–112)
CO2 SERPL-SCNC: 30 MMOL/L (ref 20–33)
CREAT SERPL-MCNC: 0.64 MG/DL (ref 0.5–1.4)
EOSINOPHIL # BLD AUTO: 0.35 K/UL (ref 0–0.51)
EOSINOPHIL NFR BLD: 4.9 % (ref 0–6.9)
ERYTHROCYTE [DISTWIDTH] IN BLOOD BY AUTOMATED COUNT: 50.5 FL (ref 35.9–50)
GLOBULIN SER CALC-MCNC: 3.2 G/DL (ref 1.9–3.5)
GLUCOSE SERPL-MCNC: 97 MG/DL (ref 65–99)
HCT VFR BLD AUTO: 40.8 % (ref 37–47)
HGB BLD-MCNC: 12.7 G/DL (ref 12–16)
IMM GRANULOCYTES # BLD AUTO: 0.02 K/UL (ref 0–0.11)
IMM GRANULOCYTES NFR BLD AUTO: 0.3 % (ref 0–0.9)
INR PPP: 1.01 (ref 0.87–1.13)
LYMPHOCYTES # BLD AUTO: 2.51 K/UL (ref 1–4.8)
LYMPHOCYTES NFR BLD: 35.1 % (ref 22–41)
MCH RBC QN AUTO: 29.9 PG (ref 27–33)
MCHC RBC AUTO-ENTMCNC: 31.1 G/DL (ref 33.6–35)
MCV RBC AUTO: 96 FL (ref 81.4–97.8)
MONOCYTES # BLD AUTO: 0.55 K/UL (ref 0–0.85)
MONOCYTES NFR BLD AUTO: 7.7 % (ref 0–13.4)
NEUTROPHILS # BLD AUTO: 3.69 K/UL (ref 2–7.15)
NEUTROPHILS NFR BLD: 51.4 % (ref 44–72)
NRBC # BLD AUTO: 0 K/UL
NRBC BLD-RTO: 0 /100 WBC
PLATELET # BLD AUTO: 259 K/UL (ref 164–446)
PMV BLD AUTO: 10.1 FL (ref 9–12.9)
POTASSIUM SERPL-SCNC: 4 MMOL/L (ref 3.6–5.5)
PROT SERPL-MCNC: 7.1 G/DL (ref 6–8.2)
PROTHROMBIN TIME: 13.5 SEC (ref 12–14.6)
RBC # BLD AUTO: 4.25 M/UL (ref 4.2–5.4)
SODIUM SERPL-SCNC: 139 MMOL/L (ref 135–145)
WBC # BLD AUTO: 7.2 K/UL (ref 4.8–10.8)

## 2019-06-24 PROCEDURE — 85610 PROTHROMBIN TIME: CPT

## 2019-06-24 PROCEDURE — 80053 COMPREHEN METABOLIC PANEL: CPT

## 2019-06-24 PROCEDURE — 85025 COMPLETE CBC W/AUTO DIFF WBC: CPT

## 2019-06-24 PROCEDURE — 86304 IMMUNOASSAY TUMOR CA 125: CPT

## 2019-06-24 PROCEDURE — 71045 X-RAY EXAM CHEST 1 VIEW: CPT

## 2019-06-24 PROCEDURE — 85730 THROMBOPLASTIN TIME PARTIAL: CPT

## 2019-06-24 RX ORDER — LISINOPRIL 10 MG/1
5 TABLET ORAL EVERY MORNING
COMMUNITY
End: 2019-11-06 | Stop reason: SDUPTHER

## 2019-06-24 RX ORDER — ACETAMINOPHEN 325 MG/1
325-650 TABLET ORAL PRN
Status: ON HOLD | COMMUNITY
End: 2019-06-27

## 2019-06-25 NOTE — OR NURSING
Patient Mu-ism, enrolled in bloodless program per her wish. Spoke with Siena, Dr. Mancia's surgery scheduler, to notify of wishes. Per Siena it is ok  not to do the COD at pre admit testing.

## 2019-06-27 ENCOUNTER — ANESTHESIA EVENT (OUTPATIENT)
Dept: SURGERY | Facility: MEDICAL CENTER | Age: 69
End: 2019-06-27
Payer: MEDICARE

## 2019-06-27 ENCOUNTER — APPOINTMENT (OUTPATIENT)
Dept: RADIOLOGY | Facility: MEDICAL CENTER | Age: 69
End: 2019-06-27
Attending: SPECIALIST
Payer: MEDICARE

## 2019-06-27 ENCOUNTER — ANESTHESIA (OUTPATIENT)
Dept: SURGERY | Facility: MEDICAL CENTER | Age: 69
End: 2019-06-27
Payer: MEDICARE

## 2019-06-27 ENCOUNTER — HOSPITAL ENCOUNTER (OUTPATIENT)
Facility: MEDICAL CENTER | Age: 69
End: 2019-06-27
Attending: SPECIALIST | Admitting: SPECIALIST
Payer: MEDICARE

## 2019-06-27 VITALS
HEART RATE: 60 BPM | BODY MASS INDEX: 35.15 KG/M2 | DIASTOLIC BLOOD PRESSURE: 76 MMHG | WEIGHT: 205.91 LBS | OXYGEN SATURATION: 95 % | HEIGHT: 64 IN | TEMPERATURE: 96.8 F | SYSTOLIC BLOOD PRESSURE: 149 MMHG | RESPIRATION RATE: 16 BRPM

## 2019-06-27 DIAGNOSIS — G89.18 POSTOPERATIVE PAIN: ICD-10-CM

## 2019-06-27 LAB
GLUCOSE BLD-MCNC: 73 MG/DL (ref 65–99)
PATHOLOGY CONSULT NOTE: NORMAL

## 2019-06-27 PROCEDURE — 88309 TISSUE EXAM BY PATHOLOGIST: CPT

## 2019-06-27 PROCEDURE — 700101 HCHG RX REV CODE 250: Performed by: SPECIALIST

## 2019-06-27 PROCEDURE — 502779 HCHG SUTURE, QUILL: Performed by: SPECIALIST

## 2019-06-27 PROCEDURE — 700105 HCHG RX REV CODE 258: Performed by: ANESTHESIOLOGY

## 2019-06-27 PROCEDURE — 160042 HCHG SURGERY MINUTES - EA ADDL 1 MIN LEVEL 5: Performed by: SPECIALIST

## 2019-06-27 PROCEDURE — 82962 GLUCOSE BLOOD TEST: CPT

## 2019-06-27 PROCEDURE — 88112 CYTOPATH CELL ENHANCE TECH: CPT

## 2019-06-27 PROCEDURE — 700105 HCHG RX REV CODE 258: Performed by: SPECIALIST

## 2019-06-27 PROCEDURE — 160009 HCHG ANES TIME/MIN: Performed by: SPECIALIST

## 2019-06-27 PROCEDURE — A9270 NON-COVERED ITEM OR SERVICE: HCPCS | Performed by: ANESTHESIOLOGY

## 2019-06-27 PROCEDURE — 700101 HCHG RX REV CODE 250: Performed by: ANESTHESIOLOGY

## 2019-06-27 PROCEDURE — 160048 HCHG OR STATISTICAL LEVEL 1-5: Performed by: SPECIALIST

## 2019-06-27 PROCEDURE — 501838 HCHG SUTURE GENERAL: Performed by: SPECIALIST

## 2019-06-27 PROCEDURE — 160002 HCHG RECOVERY MINUTES (STAT): Performed by: SPECIALIST

## 2019-06-27 PROCEDURE — 160025 RECOVERY II MINUTES (STATS): Performed by: SPECIALIST

## 2019-06-27 PROCEDURE — 502714 HCHG ROBOTIC SURGERY SERVICES: Performed by: SPECIALIST

## 2019-06-27 PROCEDURE — 74018 RADEX ABDOMEN 1 VIEW: CPT

## 2019-06-27 PROCEDURE — 88307 TISSUE EXAM BY PATHOLOGIST: CPT

## 2019-06-27 PROCEDURE — 160047 HCHG PACU  - EA ADDL 30 MINS PHASE II: Performed by: SPECIALIST

## 2019-06-27 PROCEDURE — 160046 HCHG PACU - 1ST 60 MINS PHASE II: Performed by: SPECIALIST

## 2019-06-27 PROCEDURE — 160031 HCHG SURGERY MINUTES - 1ST 30 MINS LEVEL 5: Performed by: SPECIALIST

## 2019-06-27 PROCEDURE — 88305 TISSUE EXAM BY PATHOLOGIST: CPT

## 2019-06-27 PROCEDURE — 500864 HCHG NEEDLE, SPINAL 18G: Performed by: SPECIALIST

## 2019-06-27 PROCEDURE — 700111 HCHG RX REV CODE 636 W/ 250 OVERRIDE (IP): Performed by: ANESTHESIOLOGY

## 2019-06-27 PROCEDURE — 500854 HCHG NEEDLE, INSUFFLATION FOR STEP: Performed by: SPECIALIST

## 2019-06-27 PROCEDURE — 160036 HCHG PACU - EA ADDL 30 MINS PHASE I: Performed by: SPECIALIST

## 2019-06-27 PROCEDURE — 700104 HCHG RX REV CODE 254: Performed by: SPECIALIST

## 2019-06-27 PROCEDURE — 160035 HCHG PACU - 1ST 60 MINS PHASE I: Performed by: SPECIALIST

## 2019-06-27 PROCEDURE — 700102 HCHG RX REV CODE 250 W/ 637 OVERRIDE(OP): Performed by: ANESTHESIOLOGY

## 2019-06-27 RX ORDER — ACETAMINOPHEN 500 MG
1000 TABLET ORAL ONCE
Status: COMPLETED | OUTPATIENT
Start: 2019-06-27 | End: 2019-06-27

## 2019-06-27 RX ORDER — OXYCODONE HCL 20 MG/1
20 TABLET, FILM COATED, EXTENDED RELEASE ORAL ONCE
Status: COMPLETED | OUTPATIENT
Start: 2019-06-27 | End: 2019-06-27

## 2019-06-27 RX ORDER — OXYCODONE AND ACETAMINOPHEN 7.5; 325 MG/1; MG/1
1-2 TABLET ORAL EVERY 6 HOURS PRN
Qty: 28 TAB | Refills: 0 | Status: SHIPPED | OUTPATIENT
Start: 2019-06-27 | End: 2019-07-04

## 2019-06-27 RX ORDER — DEXAMETHASONE SODIUM PHOSPHATE 4 MG/ML
INJECTION, SOLUTION INTRA-ARTICULAR; INTRALESIONAL; INTRAMUSCULAR; INTRAVENOUS; SOFT TISSUE PRN
Status: DISCONTINUED | OUTPATIENT
Start: 2019-06-27 | End: 2019-06-27 | Stop reason: SURG

## 2019-06-27 RX ORDER — INDOCYANINE GREEN AND WATER 25 MG
KIT INJECTION
Status: DISCONTINUED | OUTPATIENT
Start: 2019-06-27 | End: 2019-06-27 | Stop reason: HOSPADM

## 2019-06-27 RX ORDER — BUPIVACAINE HYDROCHLORIDE AND EPINEPHRINE 2.5; 5 MG/ML; UG/ML
INJECTION, SOLUTION EPIDURAL; INFILTRATION; INTRACAUDAL; PERINEURAL
Status: DISCONTINUED | OUTPATIENT
Start: 2019-06-27 | End: 2019-06-27 | Stop reason: HOSPADM

## 2019-06-27 RX ORDER — KETOROLAC TROMETHAMINE 30 MG/ML
INJECTION, SOLUTION INTRAMUSCULAR; INTRAVENOUS PRN
Status: DISCONTINUED | OUTPATIENT
Start: 2019-06-27 | End: 2019-06-27 | Stop reason: SURG

## 2019-06-27 RX ORDER — GABAPENTIN 300 MG/1
600 CAPSULE ORAL ONCE
Status: COMPLETED | OUTPATIENT
Start: 2019-06-27 | End: 2019-06-27

## 2019-06-27 RX ORDER — HALOPERIDOL 5 MG/ML
1 INJECTION INTRAMUSCULAR
Status: DISCONTINUED | OUTPATIENT
Start: 2019-06-27 | End: 2019-06-27 | Stop reason: HOSPADM

## 2019-06-27 RX ORDER — SODIUM CHLORIDE, SODIUM LACTATE, POTASSIUM CHLORIDE, CALCIUM CHLORIDE 600; 310; 30; 20 MG/100ML; MG/100ML; MG/100ML; MG/100ML
INJECTION, SOLUTION INTRAVENOUS CONTINUOUS
Status: DISCONTINUED | OUTPATIENT
Start: 2019-06-27 | End: 2019-06-27 | Stop reason: HOSPADM

## 2019-06-27 RX ORDER — SODIUM CHLORIDE, SODIUM GLUCONATE, SODIUM ACETATE, POTASSIUM CHLORIDE AND MAGNESIUM CHLORIDE 526; 502; 368; 37; 30 MG/100ML; MG/100ML; MG/100ML; MG/100ML; MG/100ML
INJECTION, SOLUTION INTRAVENOUS
Status: DISCONTINUED | OUTPATIENT
Start: 2019-06-27 | End: 2019-06-27 | Stop reason: SURG

## 2019-06-27 RX ORDER — ONDANSETRON 2 MG/ML
INJECTION INTRAMUSCULAR; INTRAVENOUS PRN
Status: DISCONTINUED | OUTPATIENT
Start: 2019-06-27 | End: 2019-06-27 | Stop reason: SURG

## 2019-06-27 RX ORDER — ONDANSETRON 4 MG/1
4 TABLET, FILM COATED ORAL EVERY 6 HOURS PRN
Qty: 30 TAB | Refills: 0 | Status: SHIPPED | OUTPATIENT
Start: 2019-06-27 | End: 2019-08-01

## 2019-06-27 RX ORDER — OXYCODONE HCL 5 MG/5 ML
10 SOLUTION, ORAL ORAL
Status: COMPLETED | OUTPATIENT
Start: 2019-06-27 | End: 2019-06-27

## 2019-06-27 RX ORDER — MEPERIDINE HYDROCHLORIDE 25 MG/ML
12.5 INJECTION INTRAMUSCULAR; INTRAVENOUS; SUBCUTANEOUS
Status: DISCONTINUED | OUTPATIENT
Start: 2019-06-27 | End: 2019-06-27 | Stop reason: HOSPADM

## 2019-06-27 RX ORDER — DIPHENHYDRAMINE HYDROCHLORIDE 50 MG/ML
12.5 INJECTION INTRAMUSCULAR; INTRAVENOUS
Status: DISCONTINUED | OUTPATIENT
Start: 2019-06-27 | End: 2019-06-27 | Stop reason: HOSPADM

## 2019-06-27 RX ORDER — ONDANSETRON 2 MG/ML
4 INJECTION INTRAMUSCULAR; INTRAVENOUS
Status: COMPLETED | OUTPATIENT
Start: 2019-06-27 | End: 2019-06-27

## 2019-06-27 RX ORDER — HYDROMORPHONE HYDROCHLORIDE 1 MG/ML
1 INJECTION, SOLUTION INTRAMUSCULAR; INTRAVENOUS; SUBCUTANEOUS
Status: DISCONTINUED | OUTPATIENT
Start: 2019-06-27 | End: 2019-06-27 | Stop reason: HOSPADM

## 2019-06-27 RX ORDER — CELECOXIB 200 MG/1
400 CAPSULE ORAL ONCE
Status: COMPLETED | OUTPATIENT
Start: 2019-06-27 | End: 2019-06-27

## 2019-06-27 RX ORDER — SODIUM CHLORIDE, SODIUM GLUCONATE, SODIUM ACETATE, POTASSIUM CHLORIDE AND MAGNESIUM CHLORIDE 526; 502; 368; 37; 30 MG/100ML; MG/100ML; MG/100ML; MG/100ML; MG/100ML
500 INJECTION, SOLUTION INTRAVENOUS CONTINUOUS
Status: DISCONTINUED | OUTPATIENT
Start: 2019-06-27 | End: 2019-06-27 | Stop reason: HOSPADM

## 2019-06-27 RX ORDER — HYDROMORPHONE HYDROCHLORIDE 1 MG/ML
0.2 INJECTION, SOLUTION INTRAMUSCULAR; INTRAVENOUS; SUBCUTANEOUS
Status: DISCONTINUED | OUTPATIENT
Start: 2019-06-27 | End: 2019-06-27 | Stop reason: HOSPADM

## 2019-06-27 RX ORDER — HYDROMORPHONE HYDROCHLORIDE 1 MG/ML
0.4 INJECTION, SOLUTION INTRAMUSCULAR; INTRAVENOUS; SUBCUTANEOUS
Status: DISCONTINUED | OUTPATIENT
Start: 2019-06-27 | End: 2019-06-27 | Stop reason: HOSPADM

## 2019-06-27 RX ORDER — ONDANSETRON 4 MG/1
4 TABLET, FILM COATED ORAL EVERY 6 HOURS PRN
Qty: 30 TAB | Refills: 0 | Status: SHIPPED | OUTPATIENT
Start: 2019-06-27 | End: 2019-08-23

## 2019-06-27 RX ORDER — MIDAZOLAM HYDROCHLORIDE 1 MG/ML
1 INJECTION INTRAMUSCULAR; INTRAVENOUS
Status: DISCONTINUED | OUTPATIENT
Start: 2019-06-27 | End: 2019-06-27 | Stop reason: HOSPADM

## 2019-06-27 RX ORDER — OXYCODONE HCL 5 MG/5 ML
5 SOLUTION, ORAL ORAL
Status: COMPLETED | OUTPATIENT
Start: 2019-06-27 | End: 2019-06-27

## 2019-06-27 RX ORDER — IPRATROPIUM BROMIDE AND ALBUTEROL SULFATE 2.5; .5 MG/3ML; MG/3ML
3 SOLUTION RESPIRATORY (INHALATION)
Status: DISCONTINUED | OUTPATIENT
Start: 2019-06-27 | End: 2019-06-27 | Stop reason: HOSPADM

## 2019-06-27 RX ADMIN — GABAPENTIN 600 MG: 300 CAPSULE ORAL at 11:27

## 2019-06-27 RX ADMIN — KETOROLAC TROMETHAMINE 15 MG: 30 INJECTION, SOLUTION INTRAMUSCULAR at 13:58

## 2019-06-27 RX ADMIN — SODIUM CHLORIDE, SODIUM GLUCONATE, SODIUM ACETATE, POTASSIUM CHLORIDE AND MAGNESIUM CHLORIDE 500 ML: 526; 502; 368; 37; 30 INJECTION, SOLUTION INTRAVENOUS at 14:36

## 2019-06-27 RX ADMIN — EPHEDRINE SULFATE 10 MG: 50 INJECTION INTRAMUSCULAR; INTRAVENOUS; SUBCUTANEOUS at 12:48

## 2019-06-27 RX ADMIN — PROPOFOL 150 MG: 10 INJECTION, EMULSION INTRAVENOUS at 12:36

## 2019-06-27 RX ADMIN — CEFOTETAN DISODIUM 2 G: 2 INJECTION, POWDER, FOR SOLUTION INTRAMUSCULAR; INTRAVENOUS at 12:35

## 2019-06-27 RX ADMIN — ONDANSETRON 4 MG: 2 INJECTION INTRAMUSCULAR; INTRAVENOUS at 14:37

## 2019-06-27 RX ADMIN — MIDAZOLAM HYDROCHLORIDE 2 MG: 1 INJECTION, SOLUTION INTRAMUSCULAR; INTRAVENOUS at 12:31

## 2019-06-27 RX ADMIN — ONDANSETRON 4 MG: 2 INJECTION INTRAMUSCULAR; INTRAVENOUS at 13:58

## 2019-06-27 RX ADMIN — ACETAMINOPHEN 1000 MG: 500 TABLET ORAL at 11:27

## 2019-06-27 RX ADMIN — FENTANYL CITRATE 25 MCG: 50 INJECTION, SOLUTION INTRAMUSCULAR; INTRAVENOUS at 14:39

## 2019-06-27 RX ADMIN — SODIUM CHLORIDE, POTASSIUM CHLORIDE, SODIUM LACTATE AND CALCIUM CHLORIDE: 600; 310; 30; 20 INJECTION, SOLUTION INTRAVENOUS at 12:29

## 2019-06-27 RX ADMIN — OXYCODONE HYDROCHLORIDE 5 MG: 5 SOLUTION ORAL at 14:33

## 2019-06-27 RX ADMIN — FENTANYL CITRATE 100 MCG: 50 INJECTION, SOLUTION INTRAMUSCULAR; INTRAVENOUS at 13:15

## 2019-06-27 RX ADMIN — CELECOXIB 400 MG: 200 CAPSULE ORAL at 11:27

## 2019-06-27 RX ADMIN — LIDOCAINE HYDROCHLORIDE 40 MG: 20 INJECTION, SOLUTION INTRAVENOUS at 12:36

## 2019-06-27 RX ADMIN — DEXAMETHASONE SODIUM PHOSPHATE 8 MG: 4 INJECTION, SOLUTION INTRA-ARTICULAR; INTRALESIONAL; INTRAMUSCULAR; INTRAVENOUS; SOFT TISSUE at 12:40

## 2019-06-27 RX ADMIN — SUGAMMADEX 200 MG: 100 INJECTION, SOLUTION INTRAVENOUS at 13:58

## 2019-06-27 RX ADMIN — OXYCODONE HYDROCHLORIDE 20 MG: 20 TABLET, FILM COATED, EXTENDED RELEASE ORAL at 11:27

## 2019-06-27 RX ADMIN — FENTANYL CITRATE 50 MCG: 50 INJECTION, SOLUTION INTRAMUSCULAR; INTRAVENOUS at 13:12

## 2019-06-27 RX ADMIN — FENTANYL CITRATE 100 MCG: 50 INJECTION, SOLUTION INTRAMUSCULAR; INTRAVENOUS at 12:50

## 2019-06-27 RX ADMIN — SODIUM CHLORIDE, SODIUM GLUCONATE, SODIUM ACETATE, POTASSIUM CHLORIDE AND MAGNESIUM CHLORIDE: 526; 502; 368; 37; 30 INJECTION, SOLUTION INTRAVENOUS at 13:57

## 2019-06-27 RX ADMIN — SODIUM CHLORIDE, POTASSIUM CHLORIDE, SODIUM LACTATE AND CALCIUM CHLORIDE: 600; 310; 30; 20 INJECTION, SOLUTION INTRAVENOUS at 11:27

## 2019-06-27 RX ADMIN — ROCURONIUM BROMIDE 50 MG: 10 INJECTION, SOLUTION INTRAVENOUS at 12:36

## 2019-06-27 ASSESSMENT — PAIN SCALES - GENERAL: PAIN_LEVEL: 1

## 2019-06-27 NOTE — OR NURSING
Called to update pt's Spouse, Luca, on pt's status. She is alert and oriented x4. No complaints of paint. 4 lap stabs on abdomen that are CDI with 2x2s and regaderm. All questions answered, no other needs at this time.

## 2019-06-27 NOTE — ANESTHESIA PROCEDURE NOTES
Airway  Date/Time: 6/27/2019 12:37 PM  Performed by: CLARENCE YIN  Authorized by: CLARENCE YIN     Location:  OR  Urgency:  Elective  Difficult Airway: No    Indications for Airway Management:  Anesthesia  Spontaneous Ventilation: absent    Sedation Level:  Deep  Preoxygenated: Yes    Patient Position:  Sniffing  Final Airway Type:  Endotracheal airway  Final Endotracheal Airway:  ETT  Cuffed: Yes    Technique Used for Successful ETT Placement:  Direct laryngoscopy  Insertion Site:  Oral  Blade Type:  Medellin  Laryngoscope Blade/Videolaryngoscope Blade Size:  2  ETT Size (mm):  7.5  Measured from:  Lips  ETT to Lips (cm):  21  Placement Verified by: auscultation and capnometry    Cormack-Lehane Classification:  Grade III - view of epiglottis only  Number of Attempts at Approach:  1

## 2019-06-27 NOTE — PROGRESS NOTES
Pt arrived to floor from PACU. Pt A+Ox4, able to make needs known, pt Omani speaking, family at bedside to translate, pt okay with translation. PIV Patent and SL. Pain 0/10 to abd, Dressing to 4 lap sites CDI. Pt tolerating fluids.  CSMT's and OLGA's WNL,  Ice pack applied.  Educated pt on call light and Incentive Spirometer.   1800 Pt voided  Pt's VSS; denies N/V; states pain is 0/10 but tolerable level. Dressing CDI to abd. D/c orders received. IV dc'd. Pt changed into clothing with assistance. Discharge instructions given; pt and family verbalized understanding and questions answered. Patient states ready to d/c home. Prescriptions given. Pt  Completed phase 2.

## 2019-06-27 NOTE — ANESTHESIA POSTPROCEDURE EVALUATION
Patient: Madelyn Schmitz    Procedure Summary     Date:  06/27/19 Room / Location:  [place] [address] / SURGERY [facility]    Anesthesia Start:  1229 Anesthesia Stop:  1422    Procedures:       HYSTERECTOMY, ROBOT-ASSISTED, USING DA YO XI (Vagina )      SALPINGO-OOPHORECTOMY (Bilateral Vagina )      BIOPSY, LYMPH NODE, SENTINEL (Vagina ) Diagnosis:  (ENDOMETRIAL CANCER)    Surgeon:  Alexandr Mancia M.D. Responsible Provider:  Romario Thompson III, M.D.    Anesthesia Type:  general ASA Status:  3          Final Anesthesia Type: general  Last vitals  BP   Blood Pressure : 149/76    Temp   36.3 °C (97.3 °F)    Pulse   Heart Rate (Monitored): 75   Resp   16    SpO2   97 %      Anesthesia Post Evaluation    Patient location during evaluation: PACU  Patient participation: complete - patient participated  Level of consciousness: awake and alert  Pain score: 1    Airway patency: patent  Anesthetic complications: no  Cardiovascular status: hemodynamically stable  Respiratory status: acceptable  Hydration status: euvolemic    PONV: none           Nurse Pain Score: 1 (NPRS)

## 2019-06-27 NOTE — ED NOTES
Med Rec completed per patient and    Allergies reviewed  No ORAL antibiotics in last 14 days

## 2019-06-27 NOTE — ANESTHESIA TIME REPORT
Anesthesia Start and Stop Event Times     Date Time Event    6/27/2019 1229 Anesthesia Start     1422 Anesthesia Stop        Responsible Staff  06/27/19    Name Role Begin End    Romario Thompson III, M.D. Anesth 1229 1422        Preop Diagnosis (Free Text):  Pre-op Diagnosis     ENDOMETRIAL CANCER        Preop Diagnosis (Codes):  Diagnosis Information     Diagnosis Code(s):         Post op Diagnosis  Endometrial cancer (HCC)      Premium Reason  Non-Premium    Comments:

## 2019-06-27 NOTE — OR SURGEON
Immediate Post OP Note    PreOp Diagnosis: endometrial cancer    PostOp Diagnosis: same    Procedure(s):  HYSTERECTOMY, ROBOT-ASSISTED, USING DA YO XI - Wound Class: Clean Contaminated  SALPINGO-OOPHORECTOMY - Wound Class: Clean Contaminated  BIOPSY, LYMPH NODE, SENTINEL - Wound Class: Clean Contaminated    Surgeon(s):  Alexandr Mancia M.D.    Anesthesiologist/Type of Anesthesia:  Anesthesiologist: Romario Thompson III, M.D./General    Surgical Staff:  Assistant: BERTHA Rodriguez  Circulator: Hayley Nelson R.N.  Relief Circulator: Priscila Moralez R.N.  Relief Scrub: Adriana Lazaro  Scrub Person: Charity Cartagena; Yuridia Shultz    Specimens removed if any:  ID Type Source Tests Collected by Time Destination   A : right external iliac sentinel lymph node Tissue Lymph Node PATHOLOGY SPECIMEN Alexandr Mancia M.D. 6/27/2019  1:38 PM    B : left external iliac sentinel lymph node Tissue Lymph Node PATHOLOGY SPECIMEN Alexandr Mancia M.D. 6/27/2019  1:38 PM    C : uterus, bilateral tubes and ovaries, cervix Tissue Uterus PATHOLOGY SPECIMEN Alexandr Mancia M.D. 6/27/2019  1:39 PM    D : pelvic washings Body Fluid Abdominal PATHOLOGY SPECIMEN Alexandr Mancia M.D. 6/27/2019  1:55 PM        Estimated Blood Loss: 50 cc    Findings: normal uterus, sentinel nodes external iliac     Complications: none        6/27/2019 2:31 PM Alexandr Mancia M.D.

## 2019-06-27 NOTE — ANESTHESIA PREPROCEDURE EVALUATION
Relevant Problems   (+) Diabetes 1.5, managed as type 2 (HCC)   (+) Essential hypertension   (+) CANDIDA (obstructive sleep apnea)       Physical Exam    Airway   Mallampati: III  TM distance: >3 FB  Neck ROM: limited       Cardiovascular - normal exam  Rhythm: regular  Rate: normal  (-) murmur     Dental - normal exam         Pulmonary - normal exam  Breath sounds clear to auscultation     Abdominal    Neurological - normal exam         Other findings: obese            Anesthesia Plan    ASA 3       Plan - general       Airway plan will be ETT  (Bloodless)      Induction: intravenous    Postoperative Plan: Postoperative administration of opioids is intended.    Pertinent diagnostic labs and testing reviewed    Informed Consent:    Anesthetic plan and risks discussed with patient.    Use of blood products discussed with: patient whom consented to blood products.

## 2019-06-27 NOTE — ANESTHESIA QCDR
2019 [facility] Clinical Data Registry (for Quality Improvement)     Postoperative nausea/vomiting risk protocol (Adult = 18 yrs and Pediatric 3-17 yrs)- (430 and 463)  General inhalation anesthetic (NOT TIVA) with PONV risk factors: Yes  Provision of anti-emetic therapy with at least 2 different classes of agents: Yes   Patient DID NOT receive anti-emetic therapy and reason is documented in Medical Record:  N/A    Multimodal Pain Management- (AQI59)  Patient undergoing Elective Surgery (i.e. Outpatient, or ASC, or Prescheduled Surgery prior to Hospital Admission): Yes  Use of Multimodal Pain Management, two or more drugs and/or interventions, NOT including systemic opioids: Yes   Exception: Documented allergy to multiple classes of analgesics:  N/A    PACU assessment of acute postoperative pain prior to Anesthesia Care End- Applies to Patients Age = 18- (ABG7)  Initial PACU pain score is which of the following: < 7/10  Patient unable to report pain score: N/A    Post-anesthetic transfer of care checklist/protocol to PACU/ICU- (426 and 427)  Upon conclusion of case, patient transferred to which of the following locations: PACU/Non-ICU  Use of transfer checklist/protocol: Yes  Exclusion: Service Performed in Patient Hospital Room (and thus did not require transfer): N/A    PACU Reintubation- (AQI31)  General anesthesia requiring endotracheal intubation (ETT) along with subsequent extubation in OR or PACU: No  Required reintubation in the PACU: N/A  Extubation was a planned trial documented in the medical record prior to removal of the original airway device: N/A    Unplanned admission to ICU related to anesthesia service up through end of PACU care- (MD51)  Unplanned admission to ICU (not initially anticipated at anesthesia start time): No

## 2019-06-27 NOTE — OR NURSING
"Pre-op complete. POC reviewed with pt and family. Call light within reach, educated to call for assistance. Multimodals given with sip of water. Pt offered  services through iPad, pt prefers to use  and son to interpret. Refusal form signed and scanned into [employer]. Pt on bloodless program. Bloodless stickers on chart, armband in place, bloodless consents signed and \"bloodless\" entered into allergy section. FSBS= 73. Pt denies feeling symptomatic, this RN educated her to let RN know of any new s/s. Dr. Thompson notified, no new orders at this time.   "

## 2019-06-28 NOTE — DISCHARGE INSTRUCTIONS
ACTIVIDAD: Descansa y descansa las primeras 24 horas. Se recomienda que un adulto responsable permanezca con usted tanya hanny tiempo. Es normal sentirse somnoliento. Lo alentamos a no hacer nada que requiera equilibrio, juicio o coordinación.    Los síntomas parecidos a los de la gripe son normales. USTED PUEDE EXPERIMENTAR ACCIDENTES MUSCULARES GENERALIZADOS, IRRITACIÓN DE LA GARGANTA, DOLORO DE CROW Y / O ALGUNA NUSEA.    POR 24 HORAS NO:  Manejar, operar maquinaria o ejecutar electrodomésticos.  Beber cerveza o bebidas alcohólicas.  Primitivo decisiones importantes o firmar documentos legales.    INSTRUCCIONES ESPECIALES:  1. No levantar objetos pesados ??de más de 10 libras por un mínimo de 6 semanas  2. Nada en la vagina (es decir, sin tampones, duchas, relaciones sexuales) tanya un mínimo de 6 semanas  3. No conducir mientras esté tomando narcóticos  4. Regrese a nuestra oficina según las indicaciones y llame para confirmar la jordy.  Llame a nuestra oficina al [phone] si desarrolla fiebre, escalofríos, náuseas / vómitos, sangrado vaginal abundante o enrojecimiento, sensibilidad y / o drenaje de la herida, si tiene secreciones acuosas persistentes mientras deambula o drena de las heces. vagina  5. La ducha está sabas después de la ducha, asegúrese de que la herida esté seca.  6. Puedes mantener el vendaje de la herida y cambiarlo todos los días. Después de 2 semanas de la cirugía, usted puede mantener la herida desapareciendo.  7. Es posible que tenga manchas vaginales o sangrado leve, lo cual es normal.  8. Si no ha tenido konstantin evacuación intestinal tanya 2 días, tome la leche de magnesio de venta master, 1 cucharada cada 4 horas. Después de 4 dosis y si aún no ha evacuado el intestino, llame a roberts médico.  9. Puede comer konstantin dieta blanda, elio sopa, líquido, para el día # 1 y, si lo tolera, puede reanudar roberts dieta habitual.      DIETA: Para evitar las náuseas, avance lentamente la dieta según lo tolere,  evitando los alimentos picantes o grasosos tanya el primer día. Agregue alimentos más sustanciales a roberts dieta de acuerdo con las instrucciones de roberts médico. Los bebés pueden ser alimentados con fórmula o leche materna tan pronto elio tengan hambre. AUMENTE LOS FLUIDOS Y LA FIBRA PARA EVITAR LA CONSTIPACIÓN.    VESTIDO QUIRÚRGICO / BAÑO: La ducha está sabas después de la ducha, asegúrese de que la herida esté seca.    SANA DE SEGUIMIENTO:  Se debe concertar konstantin sana de seguimiento con roberts médico en 1-2 semanas; llamar para programar    Debe llamar a roberts médico si desarrolla:  Fiebre mayor de 101 grados F.  Dolor no aliviado con medicamentos, o náuseas o vómitos persistentes.  Sangrado excesivo (vicky empapada a través del apósito) o drenaje inesperado de la herida.  Enrojecimiento o hinchazón extremos alrededor del sitio de la incisión, drenaje de pus o drenaje con olor desagradable.  Incapacidad para orinar o vaciar roberts vejiga en 8 horas.  Problemas para respirar o dolor en el pecho.    Debe llamar al 911 si tiene problemas para respirar o dolor en el pecho.  Si no puede comunicarse con roberts médico o centro quirúrgico, debe ir a la alley de emergencias o al centro de atención de urgencias más cercano. Teléfono del médico: DR MENON [phone]      Depresión / riesgo de suicidio    A medida que se le da de torey de esta instalación de [facility], es importante que aprenda cómo protegerse y no dañarse a sí mismo.    Reconocer las señales de advertencia:  Cambios bruscos de personalidad, positivos o negativos, incluido el aumento de energía.  Regalando posesiones  Cambios en los patrones de alimentación: cambios significativos de peso, positivos o negativos.  Cambio en los patrones de sueño: no puedo dormir o dormir todo el tiempo  Falta de voluntad o incapacidad para comunicarse  Depresión  Tristeza inusual, desánimo y joi.  Hablar de querer morir  Descuido de la apariencia personal.  Rebelde- Comportamiento  imprudente.  Retiro de las personas / actividades que alexa  Confusión- incapacidad para concentrarse    Si usted o un ser querido observa alguno de estos comportamientos o tiene inquietudes sobre la autolesión, esto es lo que puede hacer:  Habla de ello, tus sentimientos y las razones para lastimarte.  Elimine cualquier medio que pueda usar para lastimarse (ejemplos: pastillas, cuerdas, extensiones eléctricas, arma de elissa)  Obtenga ayuda profesional de la comunidad (clem mental, abuso de sustancias, asesoramiento psicológico)  No esté solo: llame a roberts contacto seguro, alguien en quien confíe y que esté allí para usted.  Llame a roberts CRISIS HOTLINE [phone] local o al [phone]  Llame a roberts equipo local de [facility] [phone] o [email or web address]  Llame a las líneas gratuitas nacionales de prevención del suicidio  [place] de Prevención del Suicidio [phone] ([phone])  Red de línea de mario nacional [phone] ([phone]

## 2019-08-01 ENCOUNTER — HOSPITAL ENCOUNTER (OUTPATIENT)
Dept: RADIATION ONCOLOGY | Facility: MEDICAL CENTER | Age: 69
End: 2019-08-31
Attending: RADIOLOGY
Payer: MEDICARE

## 2019-08-01 VITALS
HEART RATE: 78 BPM | BODY MASS INDEX: 34.33 KG/M2 | SYSTOLIC BLOOD PRESSURE: 122 MMHG | OXYGEN SATURATION: 62 % | WEIGHT: 200 LBS | DIASTOLIC BLOOD PRESSURE: 68 MMHG | TEMPERATURE: 97.8 F

## 2019-08-01 DIAGNOSIS — C54.1 ENDOMETRIAL CANCER (HCC): ICD-10-CM

## 2019-08-01 PROCEDURE — 99214 OFFICE O/P EST MOD 30 MIN: CPT | Mod: 25 | Performed by: RADIOLOGY

## 2019-08-01 PROCEDURE — 49411 INS MARK ABD/PEL FOR RT PERQ: CPT | Performed by: RADIOLOGY

## 2019-08-01 PROCEDURE — A4648 IMPLANTABLE TISSUE MARKER: HCPCS | Performed by: RADIOLOGY

## 2019-08-01 PROCEDURE — 99205 OFFICE O/P NEW HI 60 MIN: CPT | Mod: 25 | Performed by: RADIOLOGY

## 2019-08-01 ASSESSMENT — PAIN SCALES - GENERAL: PAINLEVEL: NO PAIN

## 2019-08-01 NOTE — CT SIMULATION
Treatment Planning CT Simulation      Order Questions     Question Answer Comment    Is this for a new course of treatment? Yes     Is this an Addendum? No     Implanted Device/Pacemaker No     Simulation Status Initial     Planned Start Date 8/12/2019     Treatment Site Pelvis     Laterality None     Treatment Technique IMRT     Treatment Pattern/Frequency Daily     Concurrent Chemotherapy No     CT Technique 3D     Slice Thickness 2mm     Scan Extent Pelvis     Contrast IV      Small Bowel     IV Contrast Volume 80 cc     Treatment Device(s) Belly Board      Vac Benedict     Patient Attire Gown     Patient Position Prone     Patient Orientation Head First     Bladder Full     Treatment Image Guidance CBCT     Frequency (CBCT) Daily     Image Guidance Match Bone gold markers

## 2019-08-01 NOTE — NON-PROVIDER
Patient was seen today in clinic with Dr. Bustillos for Endometrial Cancer.  Vitals signs and weight were obtained and pain assessment was completed.  Allergies and medications were reviewed with the patient.  Review of systems completed.     Vitals/Pain:  Vitals:    08/01/19 0930   BP: 122/68   Pulse: 78   Temp: 36.6 °C (97.8 °F)   SpO2: (!) 62%   Weight: 90.7 kg (200 lb)   Pain Score: No pain        Allergies:   Bloodless; Ibuprofen; and Penicillins    Current Medications:  Current Outpatient Medications   Medication Sig Dispense Refill   • metFORMIN (GLUCOPHAGE) 500 MG Tab Take 500 mg by mouth 2 times a day, with meals.  4   • ondansetron (ZOFRAN) 4 MG Tab tablet Take 1 Tab by mouth every 6 hours as needed. 30 Tab 0   • lisinopril (PRINIVIL) 10 MG Tab Take 10 mg by mouth every morning.       No current facility-administered medications for this encounter.          PCP:  Dameon Bell R.N.

## 2019-08-01 NOTE — CONSULTS
RADIATION ONCOLOGY CONSULT    DATE OF SERVICE: 8/1/2019    IDENTIFICATION: A 69 y.o. female with Endometrial cancer (HCC)  Staging form: Corpus Uteri - Carcinoma and Carcinosarcoma, AJCC 8th Edition  - Pathologic stage from 8/1/2019: FIGO Stage IB (pT1b, pN0, cM0) - Signed by Galdino RIDLEY M.D. on 8/1/2019  Histologic grade (G): G2  Histologic grading system: 3 grade system  Lymph-vascular invasion (LVI): LVI not present (absent)/not identified  Residual tumor (R): R0 - None  Histopathologic type: Endometrioid adenocarcinoma, NOS  Diagnostic confirmation: Positive histology  Specimen type: Excision  Staged by: Managing physician  Lymph node metastasis: Absent  Morcellation performed: No  .  She is here at the kind request of Dr. Mancia to discuss adjuvant therapy.    HISTORY OF PRESENT ILLNESS: Obese, G3, P3 female who presented with postmenopausal bleeding approximately 1 years duration.  Patient is a Jain.  She had a pelvic ultrasound demonstrating thickened endometrial stripe 1.37 cm and a uterus measuring 2.8 x 6.4 x 5.1 cm.  She was referred for gynecologic evaluation with Dr. Gatica.  Initial endometrial biopsy was inserted unsuccessful.  She underwent hysteroscopy with D&C on 4/17/2019 demonstrating grade 2 endometrial adenocarcinoma.    Referral made to Dr. Mancia who evaluated patient and subsequently performed robotic assisted hysterectomy, bilateral salpingo-oophorectomy, pelvic washings, and sentinel node dissection 6/27/2019.  Pathology demonstrated 1 right external iliac node and 3 left external iliac nodes without evidence of metastatic disease.  Uterus was involved with grade 2 endometrioid adenocarcinoma invading 8 of 13 mm of the myometrium.  Cervix was not involved.  Lower uterine segment was not involved.  No evidence of lymphovascular invasion.  Pelvic washings were negative. Mismatch repair proteins were all expressed.    Currently she is feeling well denies any abdominal  "complaints.  Reports normal bowel and urinary function.  She denies any further vaginal bleeding or discharge.    PAST MEDICAL HISTORY:   Past Medical History:   Diagnosis Date   • Arthritis     osteo, finger/shoulders   • Back pain    • Blood clotting disorder (HCC) 2004    leg   • Bronchitis    • Chickenpox    • Dental disorder     upper/lower dentures   • Diabetes     oral meds   • Endometrial cancer ([facility])    • Heart valve disease     \"murmur\"   • High cholesterol    • Hyperlipidemia    • Hypertension    • Mumps    • Nasal drainage    • Sleep apnea     CPAP   • Snoring    • Urinary incontinence        PAST SURGICAL HISTORY:  Past Surgical History:   Procedure Laterality Date   • HYSTERECTOMY ROBOTIC XI  6/27/2019    Procedure: HYSTERECTOMY, ROBOT-ASSISTED, USING DA YO XI;  Surgeon: Alexandr Mancia M.D.;  Location: SURGERY [facility];  Service: Gynecology   • SALPINGO OOPHORECTOMY Bilateral 6/27/2019    Procedure: SALPINGO-OOPHORECTOMY;  Surgeon: Alexandr Mancia M.D.;  Location: SURGERY [facility];  Service: Gynecology   • NODE BIOPSY SENTINEL  6/27/2019    Procedure: BIOPSY, LYMPH NODE, SENTINEL;  Surgeon: Alexandr Mancia M.D.;  Location: SURGERY [facility];  Service: Gynecology   • HYSTEROSCOPY WITH MYOSURE N/A 4/17/2019    Procedure: HYSTEROSCOPY, WITH TISSUE REMOVAL, USING HYSTEROSCOPIC ROTATING CUTTER BLADE - DIAGNOSTIC;  Surgeon: Racquel Gatica M.D.;  Location: SURGERY SAME DAY [facility];  Service: Gynecology   • DILATION AND CURETTAGE N/A 4/17/2019    Procedure: DILATION AND CURETTAGE;  Surgeon: Racquel Gatica M.D.;  Location: SURGERY SAME DAY [facility];  Service: Gynecology   • PRIMARY C SECTION  1972/1974/1977    x 3       CURRENT MEDICATIONS:  Current Outpatient Medications   Medication Sig Dispense Refill   • metFORMIN (GLUCOPHAGE) 500 MG Tab Take 500 mg by mouth 2 times a day, with meals.  4   • ondansetron (ZOFRAN) 4 MG Tab tablet Take 1 Tab by mouth every 6 hours as needed. " 30 Tab 0   • lisinopril (PRINIVIL) 10 MG Tab Take 10 mg by mouth every morning.       No current facility-administered medications for this encounter.        ALLERGIES:    Bloodless; Ibuprofen; and Penicillins    FAMILY HISTORY:    Maternal Aunt: GYN cancer    SOCIAL HISTORY:     reports that she has never smoked. She has never used smokeless tobacco. She reports that she does not drink alcohol or use drugs.   Patient is , lives in [place], [place] and has 3 children. Patient is retired (used to work in a warehouse)    REVIEW OF SYSTEMS:  Review of systems for today's date of service was reviewed and uploaded into the electronic medical record.     Pain Scale: 0-10  Pain Assessement: 0  Pain Location, Orientation and Scale: no complaints of pain      GYNECOLOGICAL STATUS:  : 3, Para: 3 and Number of Interrupted Pregnancies: 0    HORMONE USE:  Post-menopause use 0 years    PHYSICAL EXAM:   ECOG PERFORMANCE STATUS:  1= Restricted in physically strenuous activity, but ambulatory and able to carry out work of a light sedentary nature, e.g., light housework, office work.  /68   Pulse 78   Temp 36.6 °C (97.8 °F)   Wt 90.7 kg (200 lb)   LMP  (LMP Unknown)   SpO2 (!) 62%   BMI 34.33 kg/m²   GENERAL: Obese, alert, oriented, female no acute distress.  HEENT:  Pupils are equal, round, and reactive to light.  Extraocular muscles   are intact. Sclerae nonicteric.  Conjunctivae pink.  Oral cavity, tongue   protrudes midline.   NECK:  Supple without evidence of thyromegaly.  NODES:  No peripheral adenopathy of the neck, supraclavicular fossa or axillae   bilaterally.  LUNGS:  Clear to ascultation and resonant to percussion.  HEART:  Regular rate and rhythm.  Grade 2/6 systolic ejection murmur.  ABDOMEN:  Soft. No evidence of hepatosplenomegaly.  Positive bowel sounds.  EXTREMITIES:  Without Edema.    PELVIC: Normal introitus.  Vaginal mucosa pink.  Cuff intact.  3 gold fiducial markers inserted into the cervix  to aid in the placement of radiotherapy fields.  NEUROLOGIC:  Cranial nerves II through XII were intact.  Strength is 5/5 in   lower extremities bilaterally.   There was no focal sensory deficit appreciated.    LABORATORY DATA:   Lab Results   Component Value Date/Time    SODIUM 139 06/24/2019 01:49 PM    POTASSIUM 4.0 06/24/2019 01:49 PM    CHLORIDE 101 06/24/2019 01:49 PM    CO2 30 06/24/2019 01:49 PM    GLUCOSE 97 06/24/2019 01:49 PM    BUN 14 06/24/2019 01:49 PM    CREATININE 0.64 06/24/2019 01:49 PM     Lab Results   Component Value Date/Time    ALKPHOSPHAT 63 06/24/2019 01:49 PM    ASTSGOT 14 06/24/2019 01:49 PM    ALTSGPT 10 06/24/2019 01:49 PM    TBILIRUBIN 0.3 06/24/2019 01:49 PM      Lab Results   Component Value Date/Time    WBC 7.2 06/24/2019 01:49 PM    RBC 4.25 06/24/2019 01:49 PM    HEMOGLOBIN 12.7 06/24/2019 01:49 PM    HEMATOCRIT 40.8 06/24/2019 01:49 PM    MCV 96.0 06/24/2019 01:49 PM    MCH 29.9 06/24/2019 01:49 PM    MCHC 31.1 (L) 06/24/2019 01:49 PM    MPV 10.1 06/24/2019 01:49 PM    NEUTSPOLYS 51.40 06/24/2019 01:49 PM    LYMPHOCYTES 35.10 06/24/2019 01:49 PM    MONOCYTES 7.70 06/24/2019 01:49 PM    EOSINOPHILS 4.90 06/24/2019 01:49 PM    BASOPHILS 0.60 06/24/2019 01:49 PM        RADIOLOGY DATA:  Results for orders placed during the hospital encounter of 06/12/19   CT-CHEST,ABDOMEN,PELVIS WITH    Impression 1.  No evidence for metastatic disease within the abdomen    2.  4 mm subpleural nodule within the right lower lobe. This is exceedingly unlikely to be metastatic given lack of abdominal disease and is probably postinflammatory    3.  Aortic and branch vessel atherosclerotic plaque    4.  Hepatic steatosis and hepatomegaly    5.  Thickened endometrium and small amount of endometrial fluid         IMPRESSION:    A 69 y.o. with stage Ib endometrioid adenocarcinoma with 2 adverse risk features including deep myometrial invasion and grade 2 disease.  High intermediate risk.    RECOMMENDATIONS:    Reviewed recent  trial which compared vaginal brachii therapy and 6 cycles of systemic chemotherapy with pelvic radiotherapy with or without vaginal brachytherapy.  The external beam option resulted in significantly fewer pelvic recurrences.  With similar rates of distant metastatic disease.  Reviewed these findings with the patient.  Recommended external beam radiotherapy rather than vaginal brachytherapy alone using evidence from 249.  Technical aspects benefits risks associate with radiotherapy were reviewed.  She will be treated in the prone position to minimize bowel in the radiotherapy field.  IMRT will be used to minimize bowel dose.  I do not anticipate any significant long-term adverse effects from treatment.  After discussion she would like to proceed.  She will return for simulation on August 2 with treatment anticipated to start on August 12.    Thank you for the opportunity to participate in her care.  If any questions or comments, please do not hesitate in calling.    Galdino RIDLEY M.D.  Electronically signed by: Galdino Bustillos V, 8/1/2019 10:57 AM  [phone]

## 2019-08-02 ENCOUNTER — HOSPITAL ENCOUNTER (OUTPATIENT)
Dept: RADIATION ONCOLOGY | Facility: MEDICAL CENTER | Age: 69
End: 2019-08-02

## 2019-08-02 PROCEDURE — 77334 RADIATION TREATMENT AID(S): CPT | Mod: 26 | Performed by: RADIOLOGY

## 2019-08-02 PROCEDURE — 77263 THER RADIOLOGY TX PLNG CPLX: CPT | Performed by: RADIOLOGY

## 2019-08-02 PROCEDURE — 77332 RADIATION TREATMENT AID(S): CPT | Performed by: RADIOLOGY

## 2019-08-02 PROCEDURE — 77290 THER RAD SIMULAJ FIELD CPLX: CPT | Performed by: RADIOLOGY

## 2019-08-08 PROCEDURE — 77338 DESIGN MLC DEVICE FOR IMRT: CPT | Mod: 26 | Performed by: RADIOLOGY

## 2019-08-08 PROCEDURE — 77300 RADIATION THERAPY DOSE PLAN: CPT | Mod: 26 | Performed by: RADIOLOGY

## 2019-08-08 PROCEDURE — 77301 RADIOTHERAPY DOSE PLAN IMRT: CPT | Mod: 26 | Performed by: RADIOLOGY

## 2019-08-08 PROCEDURE — 77300 RADIATION THERAPY DOSE PLAN: CPT | Performed by: RADIOLOGY

## 2019-08-08 PROCEDURE — 77338 DESIGN MLC DEVICE FOR IMRT: CPT | Performed by: RADIOLOGY

## 2019-08-08 PROCEDURE — 77301 RADIOTHERAPY DOSE PLAN IMRT: CPT | Performed by: RADIOLOGY

## 2019-08-12 ENCOUNTER — HOSPITAL ENCOUNTER (OUTPATIENT)
Dept: RADIATION ONCOLOGY | Facility: MEDICAL CENTER | Age: 69
End: 2019-08-12

## 2019-08-12 LAB
CHEMOTHERAPY INFUSION START DATE: NORMAL
CHEMOTHERAPY RECORDS: 1.8
CHEMOTHERAPY RECORDS: 4860
CHEMOTHERAPY RECORDS: NORMAL
CHEMOTHERAPY RX CANCER: NORMAL
RAD ONC ARIA COURSE TREATMENT ELAPSED DAYS: NORMAL
RAD ONC ARIA PLAN TREATMENT DATES: NORMAL
RAD ONC ARIA REFERENCE POINT DOSAGE GIVEN TO DATE: 1.8
RAD ONC ARIA REFERENCE POINT DOSAGE GIVEN TO DATE: 1.84
RAD ONC ARIA REFERENCE POINT ID: NORMAL
RAD ONC ARIA REFERENCE POINT ID: NORMAL
RAD ONC ARIA REFERENCE POINT SESSION DOSAGE GIVEN: 1.8
RAD ONC ARIA REFERENCE POINT SESSION DOSAGE GIVEN: 1.84

## 2019-08-12 PROCEDURE — 77386 HCHG IMRT DELIVERY COMPLEX: CPT | Performed by: RADIOLOGY

## 2019-08-12 PROCEDURE — 77280 THER RAD SIMULAJ FIELD SMPL: CPT | Performed by: RADIOLOGY

## 2019-08-12 NOTE — CT SIMULATION
TREATMENT:     Radiation Therapy Episodes     Radiation Therapy: IMRT (8/12/2019)      Radiation Treatments     Plan Most Recent Treatment Date Elapsed Course Treatment Days Fractions Treated Prescribed Fraction Dose (cGy) Prescribed Total Dose (cGy)    Pelvis 8/12/2019 0 @ [id number] 1 of 27 180 4,860       Reference Point Most Recent Treatment Date Elapsed Course Treatment Days Most Recent Session Dose (cGy) Total Dose (cGy)    Pelvis 8/12/2019 0 @ [id number] 180 180    Pelvis CP 8/12/2019 0 @ [id number] 184 184          First Visit Simple Simulation: Called by [employer] machine to verify treatment parameters including:  treatment site, treatment dose, and treatment setup prior to first treatment. CBCT derived shifts reviewed in Axial, Sagital and Coronal views were reviewed.  Shifts approved.  Patient treated.    I have personally reviewed the relevant data, performed the target localization, and determined all relevant factors for this patient’s simulation.

## 2019-08-13 ENCOUNTER — HOSPITAL ENCOUNTER (OUTPATIENT)
Dept: RADIATION ONCOLOGY | Facility: MEDICAL CENTER | Age: 69
End: 2019-08-13

## 2019-08-13 LAB
CHEMOTHERAPY INFUSION START DATE: NORMAL
CHEMOTHERAPY RECORDS: 1.8
CHEMOTHERAPY RECORDS: 4860
CHEMOTHERAPY RECORDS: NORMAL
CHEMOTHERAPY RX CANCER: NORMAL
RAD ONC ARIA COURSE TREATMENT ELAPSED DAYS: NORMAL
RAD ONC ARIA PLAN TREATMENT DATES: NORMAL
RAD ONC ARIA REFERENCE POINT DOSAGE GIVEN TO DATE: 3.6
RAD ONC ARIA REFERENCE POINT DOSAGE GIVEN TO DATE: 3.68
RAD ONC ARIA REFERENCE POINT ID: NORMAL
RAD ONC ARIA REFERENCE POINT ID: NORMAL
RAD ONC ARIA REFERENCE POINT SESSION DOSAGE GIVEN: 1.8
RAD ONC ARIA REFERENCE POINT SESSION DOSAGE GIVEN: 1.84

## 2019-08-13 PROCEDURE — 77386 HCHG IMRT DELIVERY COMPLEX: CPT | Performed by: RADIOLOGY

## 2019-08-13 PROCEDURE — 77014 PR CT GUIDANCE PLACEMENT RAD THERAPY FIELDS: CPT | Mod: 26 | Performed by: RADIOLOGY

## 2019-08-14 ENCOUNTER — HOSPITAL ENCOUNTER (OUTPATIENT)
Dept: RADIATION ONCOLOGY | Facility: MEDICAL CENTER | Age: 69
End: 2019-08-14

## 2019-08-14 VITALS
HEART RATE: 72 BPM | OXYGEN SATURATION: 98 % | BODY MASS INDEX: 34.84 KG/M2 | WEIGHT: 203 LBS | TEMPERATURE: 97.6 F | SYSTOLIC BLOOD PRESSURE: 138 MMHG | DIASTOLIC BLOOD PRESSURE: 73 MMHG

## 2019-08-14 DIAGNOSIS — C54.1 ENDOMETRIAL CANCER (HCC): ICD-10-CM

## 2019-08-14 LAB
CHEMOTHERAPY INFUSION START DATE: NORMAL
CHEMOTHERAPY RECORDS: 1.8
CHEMOTHERAPY RECORDS: 4860
CHEMOTHERAPY RECORDS: NORMAL
CHEMOTHERAPY RX CANCER: NORMAL
RAD ONC ARIA COURSE TREATMENT ELAPSED DAYS: NORMAL
RAD ONC ARIA PLAN TREATMENT DATES: NORMAL
RAD ONC ARIA REFERENCE POINT DOSAGE GIVEN TO DATE: 5.4
RAD ONC ARIA REFERENCE POINT DOSAGE GIVEN TO DATE: 5.52
RAD ONC ARIA REFERENCE POINT ID: NORMAL
RAD ONC ARIA REFERENCE POINT ID: NORMAL
RAD ONC ARIA REFERENCE POINT SESSION DOSAGE GIVEN: 1.8
RAD ONC ARIA REFERENCE POINT SESSION DOSAGE GIVEN: 1.84

## 2019-08-14 PROCEDURE — 77336 RADIATION PHYSICS CONSULT: CPT | Performed by: RADIOLOGY

## 2019-08-14 PROCEDURE — 77386 HCHG IMRT DELIVERY COMPLEX: CPT | Performed by: RADIOLOGY

## 2019-08-14 PROCEDURE — 77014 PR CT GUIDANCE PLACEMENT RAD THERAPY FIELDS: CPT | Mod: 26 | Performed by: RADIOLOGY

## 2019-08-14 ASSESSMENT — PAIN SCALES - GENERAL: PAINLEVEL: NO PAIN

## 2019-08-14 NOTE — PROGRESS NOTES
ON TREATMENT  NOTE  RADIATION ONCOLOGY DEPARTMENT    Patient name:  Madelyn Schmitz    Primary Physician:  Miriam Xiao M.D. MRN: [id number]  CSN: [id number]   Referring physician:  Alexandr Mancia M.D. : 1950, 69 y.o.     ENCOUNTER DATE:  19    DIAGNOSIS:  Visit Diagnoses     ICD-10-CM   1. Endometrial cancer (HCC) C54.1     Endometrial cancer (HCC)  Staging form: Corpus Uteri - Carcinoma and Carcinosarcoma, AJCC 8th Edition  - Pathologic stage from 2019: FIGO Stage IB (pT1b, pN0, cM0) - Signed by Galdino RIDLEY M.D. on 2019  Histologic grade (G): G2  Histologic grading system: 3 grade system  Lymph-vascular invasion (LVI): LVI not present (absent)/not identified  Residual tumor (R): R0 - None  Histopathologic type: Endometrioid adenocarcinoma, NOS  Diagnostic confirmation: Positive histology  Specimen type: Excision  Staged by: Managing physician  Lymph node metastasis: Absent  Morcellation performed: No      TREATMENT SUMMARY:  Radiation Therapy Episodes     Radiation Therapy: IMRT (2019)      Radiation Treatments     Plan Most Recent Treatment Date Elapsed Course Treatment Days Fractions Treated Prescribed Fraction Dose (cGy) Prescribed Total Dose (cGy)    Pelvis 2019 2 @ [id number] 3 of 27 180 4,860       Reference Point Most Recent Treatment Date Elapsed Course Treatment Days Most Recent Session Dose (cGy) Total Dose (cGy)    Pelvis 2019 2 @ [id number] 180 540    Pelvis CP 2019 2 @ [id number] 184 552          SUBJECTIVE:  RUQ pain last night. Resolved. No Diarrhea, N, V.      VITAL SIGNS:  KPS: 80, Normal activity with effort; some signs or symptoms of disease (ECOG equivalent 1)  Encounter Vitals  Temperature: 36.4 °C (97.6 °F)  Blood Pressure : 138/73  Pulse: 72  Pulse Oximetry: 98 %  Weight: 92.1 kg (203 lb)  Pain Score: No pain  Pain Assessment 2019   Pain Assessment Denies Pain -   Pain Score 0 0   Some recent data might be hidden           PHYSICAL EXAM:  Physical Exam   Constitutional: She is oriented to person, place, and time. She appears well-developed and well-nourished.   HENT:   Head: Normocephalic.   Neurological: She is alert and oriented to person, place, and time.   Skin: Skin is warm and dry. No erythema.   Psychiatric: She has a normal mood and affect. Her behavior is normal. Judgment and thought content normal.   Nursing note and vitals reviewed.       Toxicity Assessment 8/14/2019   Toxicity Assessment Female Pelvis   Fatigue (lethargy, malaise, asthenia) None   Radiation Dermatitis None   Anorexia None   Colitis None   Constipation None   Dehydration None   Diarrhea w/o Colostomy None   Flatulence None   Nausea None   Proctitis None   Vomiting None   RT - Pain due to RT None   Dysuria (painful urination) None   Urinary Retention Normal       CURRENT MEDICATIONS:    Current Outpatient Medications:   •  metFORMIN (GLUCOPHAGE) 500 MG Tab, Take 500 mg by mouth 2 times a day, with meals., Disp: , Rfl: 4  •  ondansetron (ZOFRAN) 4 MG Tab tablet, Take 1 Tab by mouth every 6 hours as needed., Disp: 30 Tab, Rfl: 0  •  lisinopril (PRINIVIL) 10 MG Tab, Take 10 mg by mouth every morning., Disp: , Rfl:     LABORATORY DATA:   Lab Results   Component Value Date/Time    SODIUM 139 06/24/2019 01:49 PM    POTASSIUM 4.0 06/24/2019 01:49 PM    CHLORIDE 101 06/24/2019 01:49 PM    CO2 30 06/24/2019 01:49 PM    GLUCOSE 97 06/24/2019 01:49 PM    BUN 14 06/24/2019 01:49 PM    CREATININE 0.64 06/24/2019 01:49 PM       Lab Results   Component Value Date/Time    WBC 7.2 06/24/2019 01:49 PM    RBC 4.25 06/24/2019 01:49 PM    HEMOGLOBIN 12.7 06/24/2019 01:49 PM    HEMATOCRIT 40.8 06/24/2019 01:49 PM    MCV 96.0 06/24/2019 01:49 PM    MCH 29.9 06/24/2019 01:49 PM    MCHC 31.1 (L) 06/24/2019 01:49 PM    RDW 50.5 (H) 06/24/2019 01:49 PM    PLATELETCT 259 06/24/2019 01:49 PM    MPV 10.1 06/24/2019 01:49 PM    NEUTSPOLYS 51.40 06/24/2019 01:49 PM    LYMPHOCYTES  35.10 06/24/2019 01:49 PM    MONOCYTES 7.70 06/24/2019 01:49 PM    EOSINOPHILS 4.90 06/24/2019 01:49 PM    BASOPHILS 0.60 06/24/2019 01:49 PM        RADIOLOGY DATA:  No results found.    IMPRESSION:  Cancer Staging  Endometrial cancer (HCC)  Staging form: Corpus Uteri - Carcinoma and Carcinosarcoma, AJCC 8th Edition  - Pathologic stage from 8/1/2019: FIGO Stage IB (pT1b, pN0, cM0) - Signed by Galdino RIDLEY M.D. on 8/1/2019      PLAN:  No change.    Disposition:  Treatment plan reviewed. Questions answered. Continue therapy outlined.     Galdino RIDLEY M.D.

## 2019-08-15 ENCOUNTER — HOSPITAL ENCOUNTER (OUTPATIENT)
Dept: RADIATION ONCOLOGY | Facility: MEDICAL CENTER | Age: 69
End: 2019-08-15

## 2019-08-15 LAB
CHEMOTHERAPY INFUSION START DATE: NORMAL
CHEMOTHERAPY RECORDS: 1.8
CHEMOTHERAPY RECORDS: 4860
CHEMOTHERAPY RECORDS: NORMAL
CHEMOTHERAPY RX CANCER: NORMAL
RAD ONC ARIA COURSE TREATMENT ELAPSED DAYS: NORMAL
RAD ONC ARIA PLAN TREATMENT DATES: NORMAL
RAD ONC ARIA REFERENCE POINT DOSAGE GIVEN TO DATE: 7.2
RAD ONC ARIA REFERENCE POINT DOSAGE GIVEN TO DATE: 7.36
RAD ONC ARIA REFERENCE POINT ID: NORMAL
RAD ONC ARIA REFERENCE POINT ID: NORMAL
RAD ONC ARIA REFERENCE POINT SESSION DOSAGE GIVEN: 1.8
RAD ONC ARIA REFERENCE POINT SESSION DOSAGE GIVEN: 1.84

## 2019-08-15 PROCEDURE — 77014 PR CT GUIDANCE PLACEMENT RAD THERAPY FIELDS: CPT | Mod: 26 | Performed by: RADIOLOGY

## 2019-08-15 PROCEDURE — 77386 HCHG IMRT DELIVERY COMPLEX: CPT | Performed by: RADIOLOGY

## 2019-08-16 ENCOUNTER — HOSPITAL ENCOUNTER (OUTPATIENT)
Dept: RADIATION ONCOLOGY | Facility: MEDICAL CENTER | Age: 69
End: 2019-08-16

## 2019-08-16 LAB
CHEMOTHERAPY INFUSION START DATE: NORMAL
CHEMOTHERAPY RECORDS: 1.8
CHEMOTHERAPY RECORDS: 4860
CHEMOTHERAPY RECORDS: NORMAL
CHEMOTHERAPY RX CANCER: NORMAL
RAD ONC ARIA COURSE TREATMENT ELAPSED DAYS: NORMAL
RAD ONC ARIA PLAN TREATMENT DATES: NORMAL
RAD ONC ARIA REFERENCE POINT DOSAGE GIVEN TO DATE: 9
RAD ONC ARIA REFERENCE POINT DOSAGE GIVEN TO DATE: 9.2
RAD ONC ARIA REFERENCE POINT ID: NORMAL
RAD ONC ARIA REFERENCE POINT ID: NORMAL
RAD ONC ARIA REFERENCE POINT SESSION DOSAGE GIVEN: 1.8
RAD ONC ARIA REFERENCE POINT SESSION DOSAGE GIVEN: 1.84

## 2019-08-16 PROCEDURE — 77014 PR CT GUIDANCE PLACEMENT RAD THERAPY FIELDS: CPT | Mod: 26 | Performed by: RADIOLOGY

## 2019-08-16 PROCEDURE — 77386 HCHG IMRT DELIVERY COMPLEX: CPT | Performed by: RADIOLOGY

## 2019-08-16 PROCEDURE — 77427 RADIATION TX MANAGEMENT X5: CPT | Performed by: RADIOLOGY

## 2019-08-19 ENCOUNTER — HOSPITAL ENCOUNTER (OUTPATIENT)
Dept: RADIATION ONCOLOGY | Facility: MEDICAL CENTER | Age: 69
End: 2019-08-19

## 2019-08-19 LAB
CHEMOTHERAPY INFUSION START DATE: NORMAL
CHEMOTHERAPY RECORDS: 1.8
CHEMOTHERAPY RECORDS: 4860
CHEMOTHERAPY RECORDS: NORMAL
CHEMOTHERAPY RX CANCER: NORMAL
RAD ONC ARIA COURSE TREATMENT ELAPSED DAYS: NORMAL
RAD ONC ARIA PLAN TREATMENT DATES: NORMAL
RAD ONC ARIA REFERENCE POINT DOSAGE GIVEN TO DATE: 10.8
RAD ONC ARIA REFERENCE POINT DOSAGE GIVEN TO DATE: 11.05
RAD ONC ARIA REFERENCE POINT ID: NORMAL
RAD ONC ARIA REFERENCE POINT ID: NORMAL
RAD ONC ARIA REFERENCE POINT SESSION DOSAGE GIVEN: 1.8
RAD ONC ARIA REFERENCE POINT SESSION DOSAGE GIVEN: 1.84

## 2019-08-19 PROCEDURE — 77386 HCHG IMRT DELIVERY COMPLEX: CPT | Performed by: RADIOLOGY

## 2019-08-19 PROCEDURE — 77014 PR CT GUIDANCE PLACEMENT RAD THERAPY FIELDS: CPT | Mod: 26 | Performed by: RADIOLOGY

## 2019-08-20 ENCOUNTER — HOSPITAL ENCOUNTER (OUTPATIENT)
Dept: RADIATION ONCOLOGY | Facility: MEDICAL CENTER | Age: 69
End: 2019-08-20

## 2019-08-20 LAB
CHEMOTHERAPY INFUSION START DATE: NORMAL
CHEMOTHERAPY RECORDS: 1.8
CHEMOTHERAPY RECORDS: 4860
CHEMOTHERAPY RECORDS: NORMAL
CHEMOTHERAPY RX CANCER: NORMAL
RAD ONC ARIA COURSE TREATMENT ELAPSED DAYS: NORMAL
RAD ONC ARIA PLAN TREATMENT DATES: NORMAL
RAD ONC ARIA REFERENCE POINT DOSAGE GIVEN TO DATE: 12.6
RAD ONC ARIA REFERENCE POINT DOSAGE GIVEN TO DATE: 12.89
RAD ONC ARIA REFERENCE POINT ID: NORMAL
RAD ONC ARIA REFERENCE POINT ID: NORMAL
RAD ONC ARIA REFERENCE POINT SESSION DOSAGE GIVEN: 1.8
RAD ONC ARIA REFERENCE POINT SESSION DOSAGE GIVEN: 1.84

## 2019-08-20 PROCEDURE — 77014 PR CT GUIDANCE PLACEMENT RAD THERAPY FIELDS: CPT | Mod: 26 | Performed by: RADIOLOGY

## 2019-08-20 PROCEDURE — 77386 HCHG IMRT DELIVERY COMPLEX: CPT | Performed by: RADIOLOGY

## 2019-08-21 VITALS
DIASTOLIC BLOOD PRESSURE: 71 MMHG | WEIGHT: 200 LBS | TEMPERATURE: 97.8 F | BODY MASS INDEX: 34.33 KG/M2 | OXYGEN SATURATION: 98 % | SYSTOLIC BLOOD PRESSURE: 126 MMHG | HEART RATE: 90 BPM

## 2019-08-21 DIAGNOSIS — C54.1 ENDOMETRIAL CANCER (HCC): ICD-10-CM

## 2019-08-21 LAB
CHEMOTHERAPY INFUSION START DATE: NORMAL
CHEMOTHERAPY RECORDS: 1.8
CHEMOTHERAPY RECORDS: 4860
CHEMOTHERAPY RECORDS: NORMAL
CHEMOTHERAPY RX CANCER: NORMAL
RAD ONC ARIA COURSE TREATMENT ELAPSED DAYS: NORMAL
RAD ONC ARIA PLAN TREATMENT DATES: NORMAL
RAD ONC ARIA REFERENCE POINT DOSAGE GIVEN TO DATE: 14.4
RAD ONC ARIA REFERENCE POINT DOSAGE GIVEN TO DATE: 14.73
RAD ONC ARIA REFERENCE POINT ID: NORMAL
RAD ONC ARIA REFERENCE POINT ID: NORMAL
RAD ONC ARIA REFERENCE POINT SESSION DOSAGE GIVEN: 1.8
RAD ONC ARIA REFERENCE POINT SESSION DOSAGE GIVEN: 1.84

## 2019-08-21 PROCEDURE — 77336 RADIATION PHYSICS CONSULT: CPT | Performed by: RADIOLOGY

## 2019-08-21 PROCEDURE — 77014 PR CT GUIDANCE PLACEMENT RAD THERAPY FIELDS: CPT | Mod: 26 | Performed by: RADIOLOGY

## 2019-08-21 PROCEDURE — 77386 HCHG IMRT DELIVERY COMPLEX: CPT | Performed by: RADIOLOGY

## 2019-08-21 RX ORDER — DOCUSATE SODIUM 100 MG/1
100 CAPSULE, LIQUID FILLED ORAL
Refills: 1 | COMMUNITY
Start: 2019-06-25 | End: 2019-09-18

## 2019-08-21 ASSESSMENT — PAIN SCALES - GENERAL: PAINLEVEL: NO PAIN

## 2019-08-21 NOTE — PROGRESS NOTES
ON TREATMENT  NOTE  RADIATION ONCOLOGY DEPARTMENT    Patient name:  Madelyn Schmitz    Primary Physician:  Miriam Xiao M.D. MRN: [id number]  CSN: [id number]   Referring physician:  Alexandr Mancia M.D. : 1950, 69 y.o.     ENCOUNTER DATE:  19    DIAGNOSIS:  Visit Diagnoses     ICD-10-CM   1. Endometrial cancer (HCC) C54.1     Endometrial cancer (HCC)  Staging form: Corpus Uteri - Carcinoma and Carcinosarcoma, AJCC 8th Edition  - Pathologic stage from 2019: FIGO Stage IB (pT1b, pN0, cM0) - Signed by Galdino RIDLEY M.D. on 2019  Histologic grade (G): G2  Histologic grading system: 3 grade system  Lymph-vascular invasion (LVI): LVI not present (absent)/not identified  Residual tumor (R): R0 - None  Histopathologic type: Endometrioid adenocarcinoma, NOS  Diagnostic confirmation: Positive histology  Specimen type: Excision  Staged by: Managing physician  Lymph node metastasis: Absent  Morcellation performed: No      TREATMENT SUMMARY:  Radiation Therapy Episodes     Radiation Therapy: IMRT (2019)      Radiation Treatments     Plan Most Recent Treatment Date Elapsed Course Treatment Days Fractions Treated Prescribed Fraction Dose (cGy) Prescribed Total Dose (cGy)    Pelvis 2019 9 @ [id number] 8  180 4,860       Reference Point Most Recent Treatment Date Elapsed Course Treatment Days Most Recent Session Dose (cGy) Total Dose (cGy)    Pelvis 2019 9 @ [id number] 180 1,440    Pelvis CP 2019 9 @ [id number] 184 1,473          SUBJECTIVE:  Fleeting abdominal pain. No N, V, D.      VITAL SIGNS:  KPS: 70, Cares for self; unable to carry on normal activity or to do active work (ECOG equivalent 1)  Encounter Vitals  Temperature: 36.6 °C (97.8 °F)  Blood Pressure : 126/71  Pulse: 90  Pulse Oximetry: 98 %  Weight: 90.7 kg (200 lb)  Pain Score: No pain  Pain Assessment 2019   Pain Assessment - Denies Pain -   Pain Score 0 0 0   Some recent data might  be hidden          PHYSICAL EXAM:  Physical Exam   Constitutional: She is oriented to person, place, and time. She appears well-developed and well-nourished.   HENT:   Head: Normocephalic.   Neurological: She is alert and oriented to person, place, and time.   Skin: Skin is warm and dry. No erythema.   Psychiatric: She has a normal mood and affect. Her behavior is normal. Judgment and thought content normal.   Nursing note and vitals reviewed.       Toxicity Assessment 8/14/2019   Toxicity Assessment Female Pelvis   Fatigue (lethargy, malaise, asthenia) None   Radiation Dermatitis None   Anorexia None   Colitis None   Constipation None   Dehydration None   Diarrhea w/o Colostomy None   Flatulence None   Nausea None   Proctitis None   Vomiting None   RT - Pain due to RT None   Dysuria (painful urination) None   Urinary Retention Normal       CURRENT MEDICATIONS:    Current Outpatient Medications:   •   MG Cap, Take 100 mg by mouth., Disp: , Rfl: 1  •  metFORMIN (GLUCOPHAGE) 500 MG Tab, Take 500 mg by mouth 2 times a day, with meals., Disp: , Rfl: 4  •  ondansetron (ZOFRAN) 4 MG Tab tablet, Take 1 Tab by mouth every 6 hours as needed., Disp: 30 Tab, Rfl: 0  •  lisinopril (PRINIVIL) 10 MG Tab, Take 10 mg by mouth every morning., Disp: , Rfl:     LABORATORY DATA:   Lab Results   Component Value Date/Time    SODIUM 139 06/24/2019 01:49 PM    POTASSIUM 4.0 06/24/2019 01:49 PM    CHLORIDE 101 06/24/2019 01:49 PM    CO2 30 06/24/2019 01:49 PM    GLUCOSE 97 06/24/2019 01:49 PM    BUN 14 06/24/2019 01:49 PM    CREATININE 0.64 06/24/2019 01:49 PM       Lab Results   Component Value Date/Time    WBC 7.2 06/24/2019 01:49 PM    RBC 4.25 06/24/2019 01:49 PM    HEMOGLOBIN 12.7 06/24/2019 01:49 PM    HEMATOCRIT 40.8 06/24/2019 01:49 PM    MCV 96.0 06/24/2019 01:49 PM    MCH 29.9 06/24/2019 01:49 PM    MCHC 31.1 (L) 06/24/2019 01:49 PM    RDW 50.5 (H) 06/24/2019 01:49 PM    PLATELETCT 259 06/24/2019 01:49 PM    MPV 10.1  06/24/2019 01:49 PM    NEUTSPOLYS 51.40 06/24/2019 01:49 PM    LYMPHOCYTES 35.10 06/24/2019 01:49 PM    MONOCYTES 7.70 06/24/2019 01:49 PM    EOSINOPHILS 4.90 06/24/2019 01:49 PM    BASOPHILS 0.60 06/24/2019 01:49 PM        RADIOLOGY DATA:  No results found.    IMPRESSION:  Cancer Staging  Endometrial cancer (HCC)  Staging form: Corpus Uteri - Carcinoma and Carcinosarcoma, AJCC 8th Edition  - Pathologic stage from 8/1/2019: FIGO Stage IB (pT1b, pN0, cM0) - Signed by Galdino RIDLEY M.D. on 8/1/2019      PLAN:  No change.     Disposition:  Treatment plan reviewed. Questions answered. Continue therapy outlined.     Galdino RIDLEY M.D.    Orders Placed This Encounter   • Rad Onc Aria Session Summary   •  MG Cap

## 2019-08-22 ENCOUNTER — HOSPITAL ENCOUNTER (OUTPATIENT)
Dept: RADIATION ONCOLOGY | Facility: MEDICAL CENTER | Age: 69
End: 2019-08-22

## 2019-08-22 LAB
CHEMOTHERAPY INFUSION START DATE: NORMAL
CHEMOTHERAPY RECORDS: 1.8
CHEMOTHERAPY RECORDS: 4860
CHEMOTHERAPY RECORDS: NORMAL
CHEMOTHERAPY RX CANCER: NORMAL
RAD ONC ARIA COURSE TREATMENT ELAPSED DAYS: NORMAL
RAD ONC ARIA PLAN TREATMENT DATES: NORMAL
RAD ONC ARIA REFERENCE POINT DOSAGE GIVEN TO DATE: 16.2
RAD ONC ARIA REFERENCE POINT DOSAGE GIVEN TO DATE: 16.57
RAD ONC ARIA REFERENCE POINT ID: NORMAL
RAD ONC ARIA REFERENCE POINT ID: NORMAL
RAD ONC ARIA REFERENCE POINT SESSION DOSAGE GIVEN: 1.8
RAD ONC ARIA REFERENCE POINT SESSION DOSAGE GIVEN: 1.84

## 2019-08-22 PROCEDURE — 77386 HCHG IMRT DELIVERY COMPLEX: CPT | Performed by: RADIOLOGY

## 2019-08-22 PROCEDURE — 77014 PR CT GUIDANCE PLACEMENT RAD THERAPY FIELDS: CPT | Mod: 26 | Performed by: RADIOLOGY

## 2019-08-23 ENCOUNTER — HOSPITAL ENCOUNTER (OUTPATIENT)
Dept: RADIATION ONCOLOGY | Facility: MEDICAL CENTER | Age: 69
End: 2019-08-23

## 2019-08-23 ENCOUNTER — SLEEP CENTER VISIT (OUTPATIENT)
Dept: SLEEP MEDICINE | Facility: MEDICAL CENTER | Age: 69
End: 2019-08-23
Payer: MEDICARE

## 2019-08-23 VITALS
DIASTOLIC BLOOD PRESSURE: 70 MMHG | BODY MASS INDEX: 33.46 KG/M2 | HEIGHT: 64 IN | HEART RATE: 75 BPM | RESPIRATION RATE: 16 BRPM | OXYGEN SATURATION: 99 % | SYSTOLIC BLOOD PRESSURE: 102 MMHG | WEIGHT: 196 LBS

## 2019-08-23 DIAGNOSIS — G47.33 OSA (OBSTRUCTIVE SLEEP APNEA): ICD-10-CM

## 2019-08-23 LAB
CHEMOTHERAPY INFUSION START DATE: NORMAL
CHEMOTHERAPY RECORDS: 1.8
CHEMOTHERAPY RECORDS: 4860
CHEMOTHERAPY RECORDS: NORMAL
CHEMOTHERAPY RX CANCER: NORMAL
RAD ONC ARIA COURSE TREATMENT ELAPSED DAYS: NORMAL
RAD ONC ARIA PLAN TREATMENT DATES: NORMAL
RAD ONC ARIA REFERENCE POINT DOSAGE GIVEN TO DATE: 18
RAD ONC ARIA REFERENCE POINT DOSAGE GIVEN TO DATE: 18.41
RAD ONC ARIA REFERENCE POINT ID: NORMAL
RAD ONC ARIA REFERENCE POINT ID: NORMAL
RAD ONC ARIA REFERENCE POINT SESSION DOSAGE GIVEN: 1.8
RAD ONC ARIA REFERENCE POINT SESSION DOSAGE GIVEN: 1.84

## 2019-08-23 PROCEDURE — 77386 HCHG IMRT DELIVERY COMPLEX: CPT | Performed by: RADIOLOGY

## 2019-08-23 PROCEDURE — 77427 RADIATION TX MANAGEMENT X5: CPT | Performed by: RADIOLOGY

## 2019-08-23 PROCEDURE — 77014 PR CT GUIDANCE PLACEMENT RAD THERAPY FIELDS: CPT | Mod: 26 | Performed by: RADIOLOGY

## 2019-08-23 PROCEDURE — 99213 OFFICE O/P EST LOW 20 MIN: CPT | Performed by: NURSE PRACTITIONER

## 2019-08-23 RX ORDER — POLYETHYLENE GLYCOL-3350 AND ELECTROLYTES 236; 6.74; 5.86; 2.97; 22.74 G/274.31G; G/274.31G; G/274.31G; G/274.31G; G/274.31G
POWDER, FOR SOLUTION ORAL
Refills: 0 | COMMUNITY
Start: 2019-06-25 | End: 2019-09-11

## 2019-08-23 ASSESSMENT — ENCOUNTER SYMPTOMS
BRUISES/BLEEDS EASILY: 0
CHILLS: 0
GASTROINTESTINAL NEGATIVE: 1
NEUROLOGICAL NEGATIVE: 1
PSYCHIATRIC NEGATIVE: 1
EYE PAIN: 0
FEVER: 0
WEIGHT LOSS: 0
CARDIOVASCULAR NEGATIVE: 1
DIAPHORESIS: 0
RESPIRATORY NEGATIVE: 1
EYE DISCHARGE: 0
MUSCULOSKELETAL NEGATIVE: 1

## 2019-08-23 NOTE — PROGRESS NOTES
"Chief Complaint   Patient presents with   • Apnea     Last Seen 06/19/19         HPI: This patient is a 69 y.o. female, who presents for follow-up of CANDIDA with compliance check.  He is Estonian-speaking only, her granddaughter translates.    PSG from 3/2018 indicated an AHI of 103.6 and low oxygenation of 72%.  She is here for first compliance.  Currently she is being treated with CPAP @ 9cmH20. Compliance download over the past 30 days indicates 93 % compliance, average use of 2 hours 3 minutes per night, AHI of 1. Patient reports she is acclimating to therapy.  Prior compliance showed only 38 minutes of use per night.  She still feels pressures are slightly too high.  She is using nasal pillows which are comfortable.  She feels restricted by the tubing and is unable to move around.  She we will get up and go to the bathroom and does not put her machine back on after that time.  She does however feel she is getting a deeper sleep for those 2 hours and has more energy during the day      Past Medical History:   Diagnosis Date   • Arthritis     osteo, finger/shoulders   • Back pain    • Blood clotting disorder (HCC) 2004    leg   • Bronchitis    • Chickenpox    • Dental disorder     upper/lower dentures   • Diabetes     oral meds   • Endometrial cancer (HCC)    • Heart valve disease     \"murmur\"   • High cholesterol    • Hyperlipidemia    • Hypertension    • Mumps    • Nasal drainage    • Sleep apnea     CPAP   • Snoring    • Urinary incontinence        Social History     Tobacco Use   • Smoking status: Never Smoker   • Smokeless tobacco: Never Used   Substance Use Topics   • Alcohol use: No   • Drug use: No       Family History   Problem Relation Age of Onset   • Heart Disease Father    • Asthma Brother    • Asthma Brother    • Cancer Maternal Aunt         gyn cancer   • Sleep Apnea Neg Hx        Immunization History   Administered Date(s) Administered   • Pneumococcal polysaccharide vaccine (PPSV-23) 02/20/2017 " "      Current medications as of today   Current Outpatient Medications   Medication Sig Dispense Refill   • metFORMIN (GLUCOPHAGE) 500 MG Tab Take 500 mg by mouth 2 times a day, with meals.  4   • lisinopril (PRINIVIL) 10 MG Tab Take 10 mg by mouth every morning.     • GAVILYTE-G 236 g Recon Soln TAKE THE EVENING PRIOR TO SURGERY AS DIRECTED IN PRE OP INSTRUCTIONS  0   •  MG Cap Take 100 mg by mouth.  1   • ondansetron (ZOFRAN) 4 MG Tab tablet Take 1 Tab by mouth every 6 hours as needed. (Patient not taking: Reported on 8/23/2019) 30 Tab 0     No current facility-administered medications for this visit.        Allergies: Bloodless; Ibuprofen; and Penicillins    /70 (BP Location: Right arm, Patient Position: Sitting, BP Cuff Size: Adult)   Pulse 75   Resp 16   Ht 1.626 m (5' 4\")   Wt 88.9 kg (196 lb)   SpO2 99%       Review of Systems   Constitutional: Positive for malaise/fatigue. Negative for chills, diaphoresis, fever and weight loss.   HENT: Negative.    Eyes: Negative for pain and discharge.   Respiratory: Negative.    Cardiovascular: Negative.    Gastrointestinal: Negative.    Musculoskeletal: Negative.    Skin: Negative.    Neurological: Negative.    Endo/Heme/Allergies: Negative for environmental allergies. Does not bruise/bleed easily.   Psychiatric/Behavioral: Negative.        Physical Exam   Constitutional: She is oriented to person, place, and time and well-developed, well-nourished, and in no distress.   HENT:   Head: Normocephalic and atraumatic.   Eyes: Pupils are equal, round, and reactive to light.   Neck: Normal range of motion. Neck supple. No tracheal deviation present.   Cardiovascular: Normal rate, regular rhythm and normal heart sounds.   Pulmonary/Chest: Effort normal and breath sounds normal.   Musculoskeletal: Normal range of motion.   Neurological: She is alert and oriented to person, place, and time. Gait normal.   Skin: Skin is warm and dry.   Psychiatric: Mood, memory, " affect and judgment normal.       Diagnoses/Plan:    1. CANDIDA (obstructive sleep apnea)  We will adjust CPAP pressure down to 8 cm H2O.  I like her back in 2 to 3 months for compliance check.  I reiterated the importance of using her machine daily at least 4 hours per night for insurance purposes.  Ideally I would like her using her machine all night long.  I have also encouraged her to use her machine during the day to therapy.  She is very motivated to continue.      This dictation was created using voice recognition software. The accuracy of the dictation is limited to the abilities of the software. I expect there may be some errors of grammar and possibly content.

## 2019-08-26 ENCOUNTER — HOSPITAL ENCOUNTER (OUTPATIENT)
Dept: RADIATION ONCOLOGY | Facility: MEDICAL CENTER | Age: 69
End: 2019-08-26

## 2019-08-26 ENCOUNTER — HOSPITAL ENCOUNTER (OUTPATIENT)
Dept: LAB | Facility: MEDICAL CENTER | Age: 69
End: 2019-08-26
Attending: RADIOLOGY
Payer: MEDICARE

## 2019-08-26 DIAGNOSIS — C54.1 ENDOMETRIAL CANCER (HCC): ICD-10-CM

## 2019-08-26 LAB
ALBUMIN SERPL BCP-MCNC: 3.8 G/DL (ref 3.2–4.9)
ALBUMIN/GLOB SERPL: 1.2 G/DL
ALP SERPL-CCNC: 53 U/L (ref 30–99)
ALT SERPL-CCNC: 7 U/L (ref 2–50)
ANION GAP SERPL CALC-SCNC: 7 MMOL/L (ref 0–11.9)
AST SERPL-CCNC: 12 U/L (ref 12–45)
BASOPHILS # BLD AUTO: 0.6 % (ref 0–1.8)
BASOPHILS # BLD: 0.03 K/UL (ref 0–0.12)
BILIRUB SERPL-MCNC: 0.4 MG/DL (ref 0.1–1.5)
BUN SERPL-MCNC: 13 MG/DL (ref 8–22)
CALCIUM SERPL-MCNC: 8.9 MG/DL (ref 8.5–10.5)
CHEMOTHERAPY INFUSION START DATE: NORMAL
CHEMOTHERAPY RECORDS: 1.8
CHEMOTHERAPY RECORDS: 4860
CHEMOTHERAPY RECORDS: NORMAL
CHEMOTHERAPY RX CANCER: NORMAL
CHLORIDE SERPL-SCNC: 103 MMOL/L (ref 96–112)
CO2 SERPL-SCNC: 28 MMOL/L (ref 20–33)
CREAT SERPL-MCNC: 0.94 MG/DL (ref 0.5–1.4)
EOSINOPHIL # BLD AUTO: 0.38 K/UL (ref 0–0.51)
EOSINOPHIL NFR BLD: 7.1 % (ref 0–6.9)
ERYTHROCYTE [DISTWIDTH] IN BLOOD BY AUTOMATED COUNT: 52.6 FL (ref 35.9–50)
GLOBULIN SER CALC-MCNC: 3.2 G/DL (ref 1.9–3.5)
GLUCOSE SERPL-MCNC: 89 MG/DL (ref 65–99)
HCT VFR BLD AUTO: 39.3 % (ref 37–47)
HGB BLD-MCNC: 11.8 G/DL (ref 12–16)
IMM GRANULOCYTES # BLD AUTO: 0.01 K/UL (ref 0–0.11)
IMM GRANULOCYTES NFR BLD AUTO: 0.2 % (ref 0–0.9)
LYMPHOCYTES # BLD AUTO: 1.07 K/UL (ref 1–4.8)
LYMPHOCYTES NFR BLD: 20 % (ref 22–41)
MCH RBC QN AUTO: 29.1 PG (ref 27–33)
MCHC RBC AUTO-ENTMCNC: 30 G/DL (ref 33.6–35)
MCV RBC AUTO: 96.8 FL (ref 81.4–97.8)
MONOCYTES # BLD AUTO: 0.47 K/UL (ref 0–0.85)
MONOCYTES NFR BLD AUTO: 8.8 % (ref 0–13.4)
NEUTROPHILS # BLD AUTO: 3.4 K/UL (ref 2–7.15)
NEUTROPHILS NFR BLD: 63.3 % (ref 44–72)
NRBC # BLD AUTO: 0 K/UL
NRBC BLD-RTO: 0 /100 WBC
PLATELET # BLD AUTO: 188 K/UL (ref 164–446)
PMV BLD AUTO: 10.2 FL (ref 9–12.9)
POTASSIUM SERPL-SCNC: 4.3 MMOL/L (ref 3.6–5.5)
PROT SERPL-MCNC: 7 G/DL (ref 6–8.2)
RAD ONC ARIA COURSE TREATMENT ELAPSED DAYS: NORMAL
RAD ONC ARIA PLAN TREATMENT DATES: NORMAL
RAD ONC ARIA REFERENCE POINT DOSAGE GIVEN TO DATE: 19.8
RAD ONC ARIA REFERENCE POINT DOSAGE GIVEN TO DATE: 20.25
RAD ONC ARIA REFERENCE POINT ID: NORMAL
RAD ONC ARIA REFERENCE POINT ID: NORMAL
RAD ONC ARIA REFERENCE POINT SESSION DOSAGE GIVEN: 1.8
RAD ONC ARIA REFERENCE POINT SESSION DOSAGE GIVEN: 1.84
RBC # BLD AUTO: 4.06 M/UL (ref 4.2–5.4)
SODIUM SERPL-SCNC: 138 MMOL/L (ref 135–145)
WBC # BLD AUTO: 5.4 K/UL (ref 4.8–10.8)

## 2019-08-26 PROCEDURE — 85025 COMPLETE CBC W/AUTO DIFF WBC: CPT

## 2019-08-26 PROCEDURE — 77014 PR CT GUIDANCE PLACEMENT RAD THERAPY FIELDS: CPT | Mod: 26 | Performed by: RADIOLOGY

## 2019-08-26 PROCEDURE — 77386 HCHG IMRT DELIVERY COMPLEX: CPT | Performed by: RADIOLOGY

## 2019-08-26 PROCEDURE — 80053 COMPREHEN METABOLIC PANEL: CPT

## 2019-08-26 PROCEDURE — 36415 COLL VENOUS BLD VENIPUNCTURE: CPT

## 2019-08-27 ENCOUNTER — HOSPITAL ENCOUNTER (OUTPATIENT)
Dept: RADIATION ONCOLOGY | Facility: MEDICAL CENTER | Age: 69
End: 2019-08-27

## 2019-08-27 LAB
CHEMOTHERAPY INFUSION START DATE: NORMAL
CHEMOTHERAPY RECORDS: 1.8
CHEMOTHERAPY RECORDS: 4860
CHEMOTHERAPY RECORDS: NORMAL
CHEMOTHERAPY RX CANCER: NORMAL
RAD ONC ARIA COURSE TREATMENT ELAPSED DAYS: NORMAL
RAD ONC ARIA PLAN TREATMENT DATES: NORMAL
RAD ONC ARIA REFERENCE POINT DOSAGE GIVEN TO DATE: 21.6
RAD ONC ARIA REFERENCE POINT DOSAGE GIVEN TO DATE: 22.09
RAD ONC ARIA REFERENCE POINT ID: NORMAL
RAD ONC ARIA REFERENCE POINT ID: NORMAL
RAD ONC ARIA REFERENCE POINT SESSION DOSAGE GIVEN: 1.8
RAD ONC ARIA REFERENCE POINT SESSION DOSAGE GIVEN: 1.84

## 2019-08-27 PROCEDURE — 77386 HCHG IMRT DELIVERY COMPLEX: CPT | Performed by: RADIOLOGY

## 2019-08-27 PROCEDURE — 77014 PR CT GUIDANCE PLACEMENT RAD THERAPY FIELDS: CPT | Mod: 26 | Performed by: RADIOLOGY

## 2019-08-27 NOTE — RADIATION PLANNING NOTES
Date of Service:  08/2/19    Referring Physician: No ref. provider found    DIAGNOSIS:    Endometrial cancer (HCC)  Staging form: Corpus Uteri - Carcinoma and Carcinosarcoma, AJCC 8th Edition  - Pathologic stage from 8/1/2019: FIGO Stage IB (pT1b, pN0, cM0) - Signed by Galdino RIDLEY M.D. on 8/1/2019  Histologic grade (G): G2  Histologic grading system: 3 grade system  Lymph-vascular invasion (LVI): LVI not present (absent)/not identified  Residual tumor (R): R0 - None  Histopathologic type: Endometrioid adenocarcinoma, NOS  Diagnostic confirmation: Positive histology  Specimen type: Excision  Staged by: Managing physician  Lymph node metastasis: Absent  Morcellation performed: No       TYPE OF SIMULATION: Pelvis    GOAL OF TREATMENT:   [x] Curative  [] Palliative  [] Oligometastatic    CONTRAST:    [x] IV Contrast  [] Oral Contrast  [] Rectal  [] Urethral              POSITION:    []  Supine  [x] Prone     COMPLEX:  [] Complex Blocking   [x]Arcs  [] Custom Blocks  [] >3 Sites    PROCEDURE: Patient positioned on CT table on belly board with legs immobilized in Vac-Benedict immobilization device. CT acquired thorough the entire volume of interest.  Images reviewed and exported to treatment planning system.    I have personally reviewed the relevant data, performed the target localization, and determined all relevant factors for this patient’s simulation.

## 2019-08-27 NOTE — RADIATION PLANNING NOTES
Clinical Treatment Planning Note    Date of Service: 08/2/19    DIAGNOSIS:    Endometrial cancer (HCC)  Staging form: Corpus Uteri - Carcinoma and Carcinosarcoma, AJCC 8th Edition  - Pathologic stage from 8/1/2019: FIGO Stage IB (pT1b, pN0, cM0) - Signed by Galdino RIDLEY M.D. on 8/1/2019  Histologic grade (G): G2  Histologic grading system: 3 grade system  Lymph-vascular invasion (LVI): LVI not present (absent)/not identified  Residual tumor (R): R0 - None  Histopathologic type: Endometrioid adenocarcinoma, NOS  Diagnostic confirmation: Positive histology  Specimen type: Excision  Staged by: Managing physician  Lymph node metastasis: Absent  Morcellation performed: No         IMAGING REVIEWED:    [x] CT     [] MRI     [] PET/CT     [] BONE SCAN     [] MAMMO     [] OTHER      TREATMENT INTENT:    []  CONTROL     [x] CURATIVE     [] MAINTENANCE     []  PALLIATIVE      []  SUPPORTIVE     []  PROPHYLACTIC     [] BENIGN     []  CONSOLIDATIVE      [] DEFINITIVE   []  OLOGIMETASTATIC      LINE OF TREATMENT:    [x] ADJUVANT   [] DEFINITIVE   [] NEOADJUVANT   [] RE-TREATMENT      TECHNIQUE PLANNED:    [] IMRT   [] 3D   [] SBRT   [] SRS/SRT   [] HDR   [] ELECTRON       IMRT JUSTIFICATION:    []   An immediately adjacent area has been previously irradiated and abutting portals must be established with high precision.    []  Dose escalation is planned to deliver radiation doses in excess of those commonly utilized for similar tumors with conventional treatment.    []  The target volume is concave or convex, and the critical normal tissues are within or around that convexity or concavity.    [x]  The target volume is in close proximity to critical structures that must be protected.    [x]  The volume of interest must be covered with narrow margins to adequately protect  immediately adjacent structures.    FIELDS & BLOCKING:    [] COMPLEX BLOCKS     []  = 3 TX AREAS     [x]  ARCS     []  CUSTOM SHEILD        []  SIMPLE  BLOCK      CHEMOTHERAPY:    []  CONCURRENT     []  INDUCTION     [] SEQUENTIAL     []  <30 DAYS FROM XRT      NOTES:    OAR CONSTRAINTS: (GUIDELINES ONLY NOT ABSOLUTE)   Target Prescribed Coverage   PTV 95% of PTV covered by 100% (cGy) of RX Dose     PTV 99% of PTV covered by 93% (cGy) of RX Dose       KENNEDI Goal   Rectum/Sigmoid V40Gy < 60%   Bladder V45Gy < 35%   R Femoral Head V30Gy < 15%   L Femoral Head V30Gy < 15%   Bowel (small & large) V40Gy < 30%   Individual Small Bowel Loops V15Gy < 120cc   Entire Cavity Small Bowel V45Gy < 195cc   *RTOG 0921, QUANTEC

## 2019-08-28 ENCOUNTER — HOSPITAL ENCOUNTER (OUTPATIENT)
Dept: RADIATION ONCOLOGY | Facility: MEDICAL CENTER | Age: 69
End: 2019-08-28

## 2019-08-28 VITALS
DIASTOLIC BLOOD PRESSURE: 59 MMHG | BODY MASS INDEX: 34.67 KG/M2 | SYSTOLIC BLOOD PRESSURE: 119 MMHG | WEIGHT: 202 LBS | HEART RATE: 78 BPM | OXYGEN SATURATION: 99 % | TEMPERATURE: 97.9 F

## 2019-08-28 DIAGNOSIS — C54.1 ENDOMETRIAL CANCER (HCC): ICD-10-CM

## 2019-08-28 LAB
CHEMOTHERAPY INFUSION START DATE: NORMAL
CHEMOTHERAPY RECORDS: 1.8
CHEMOTHERAPY RECORDS: 4860
CHEMOTHERAPY RECORDS: NORMAL
CHEMOTHERAPY RX CANCER: NORMAL
RAD ONC ARIA COURSE TREATMENT ELAPSED DAYS: NORMAL
RAD ONC ARIA PLAN TREATMENT DATES: NORMAL
RAD ONC ARIA REFERENCE POINT DOSAGE GIVEN TO DATE: 23.4
RAD ONC ARIA REFERENCE POINT DOSAGE GIVEN TO DATE: 23.93
RAD ONC ARIA REFERENCE POINT ID: NORMAL
RAD ONC ARIA REFERENCE POINT ID: NORMAL
RAD ONC ARIA REFERENCE POINT SESSION DOSAGE GIVEN: 1.8
RAD ONC ARIA REFERENCE POINT SESSION DOSAGE GIVEN: 1.84

## 2019-08-28 PROCEDURE — 77336 RADIATION PHYSICS CONSULT: CPT | Performed by: RADIOLOGY

## 2019-08-28 PROCEDURE — 77386 HCHG IMRT DELIVERY COMPLEX: CPT | Performed by: RADIOLOGY

## 2019-08-28 PROCEDURE — 77014 PR CT GUIDANCE PLACEMENT RAD THERAPY FIELDS: CPT | Mod: 26 | Performed by: RADIOLOGY

## 2019-08-28 ASSESSMENT — PAIN SCALES - GENERAL
PAINLEVEL: NO PAIN
PAINLEVEL: NO PAIN

## 2019-08-28 NOTE — PROGRESS NOTES
ON TREATMENT  NOTE  RADIATION ONCOLOGY DEPARTMENT    Patient name:  Madelyn Schmitz    Primary Physician:  Miriam Xiao M.D. MRN: [id number]  CSN: [id number]   Referring physician:  Alexandr Mancia M.D. : 1950, 69 y.o.     ENCOUNTER DATE:  19    DIAGNOSIS:  Visit Diagnoses     ICD-10-CM   1. Endometrial cancer (HCC) C54.1     Endometrial cancer (HCC)  Staging form: Corpus Uteri - Carcinoma and Carcinosarcoma, AJCC 8th Edition  - Pathologic stage from 2019: FIGO Stage IB (pT1b, pN0, cM0) - Signed by Galdino RIDLEY M.D. on 2019  Histologic grade (G): G2  Histologic grading system: 3 grade system  Lymph-vascular invasion (LVI): LVI not present (absent)/not identified  Residual tumor (R): R0 - None  Histopathologic type: Endometrioid adenocarcinoma, NOS  Diagnostic confirmation: Positive histology  Specimen type: Excision  Staged by: Managing physician  Lymph node metastasis: Absent  Morcellation performed: No      TREATMENT SUMMARY:  Radiation Therapy Episodes     Radiation Therapy: IMRT (2019)      Radiation Treatments     Plan Most Recent Treatment Date Elapsed Course Treatment Days Fractions Treated Prescribed Fraction Dose (cGy) Prescribed Total Dose (cGy)    Pelvis 2019 16 @ [id number] 13  27 180 4,860       Reference Point Most Recent Treatment Date Elapsed Course Treatment Days Most Recent Session Dose (cGy) Total Dose (cGy)    Pelvis 2019 16 @ [id number] 180 2,340    Pelvis CP 2019 16 @ [id number] 184 2,393          SUBJECTIVE:  Diarrhea 5-10 times last night. No pain.      VITAL SIGNS:  KPS: 90, Able to carry on normal activity; minor signs or symptoms of disease (ECOG equivalent 0)  Encounter Vitals  Temperature: 36.6 °C (97.9 °F)  Blood Pressure : 119/59  Pulse: 78  Pulse Oximetry: 99 %  Weight: 91.6 kg (202 lb)  Pain Score: No pain  Pain Assessment 2019   Pain Assessment Denies Pain - - Denies Pain   Pain Score 0 0  0 0   Some recent data might be hidden     Karnofsky Score: 90    PHYSICAL EXAM:  Physical Exam   Constitutional: She is oriented to person, place, and time. She appears well-developed and well-nourished.   HENT:   Head: Normocephalic.   Abdominal: Soft.   Neurological: She is alert and oriented to person, place, and time.   Skin: Skin is warm and dry. No erythema.   Psychiatric: She has a normal mood and affect. Her behavior is normal. Judgment and thought content normal.   Nursing note and vitals reviewed.       Toxicity Assessment 8/28/2019 8/14/2019   Toxicity Assessment Female Pelvis Female Pelvis   Fatigue (lethargy, malaise, asthenia) Increased fatigue over baseline, but not altering normal activities None   Radiation Dermatitis None None   Anorexia None None   Colitis Abdominal pain with mucus and/or blood in stool None   Constipation None None   Dehydration None None   Diarrhea w/o Colostomy Increase of 4-6 stools/day, or nocternal stools None   Flatulence Mild None   Nausea None None   Proctitis Increased stool frequency, occasional blood-streaked stools or rectal discomfort (including hemorrhoids) not requiring medication None   Vomiting None None   RT - Pain due to RT None None   Tumor Pain (onset or exacerbation of tumor pain due to treatment) None -   Dysuria (painful urination) None None   Urinary Frequency Normal -   Urinary Urgency None -   Bladder Spasms Absent -   Incontinence None -   Urinary Retention Normal Normal   Vaginitis (not due to infection) None -   Vaginal Bleeding None -   Vaginal Dryness Normal -       CURRENT MEDICATIONS:    Current Outpatient Medications:   •  metFORMIN (GLUCOPHAGE) 500 MG Tab, Take 500 mg by mouth 2 times a day, with meals., Disp: , Rfl: 4  •  lisinopril (PRINIVIL) 10 MG Tab, Take 10 mg by mouth every morning., Disp: , Rfl:   •  GAVILYTE-G 236 g Recon Soln, TAKE THE EVENING PRIOR TO SURGERY AS DIRECTED IN PRE OP INSTRUCTIONS, Disp: , Rfl: 0  •   MG Cap,  Take 100 mg by mouth., Disp: , Rfl: 1    LABORATORY DATA:   Lab Results   Component Value Date/Time    SODIUM 138 08/26/2019 01:11 PM    POTASSIUM 4.3 08/26/2019 01:11 PM    CHLORIDE 103 08/26/2019 01:11 PM    CO2 28 08/26/2019 01:11 PM    GLUCOSE 89 08/26/2019 01:11 PM    BUN 13 08/26/2019 01:11 PM    CREATININE 0.94 08/26/2019 01:11 PM       Lab Results   Component Value Date/Time    WBC 5.4 08/26/2019 01:11 PM    HEMOGLOBIN 11.8 (L) 08/26/2019 01:11 PM    HEMATOCRIT 39.3 08/26/2019 01:11 PM    MCV 96.8 08/26/2019 01:11 PM    PLATELETCT 188 08/26/2019 01:11 PM        RADIOLOGY DATA:  No results found.    IMPRESSION:  Cancer Staging  Endometrial cancer (HCC)  Staging form: Corpus Uteri - Carcinoma and Carcinosarcoma, AJCC 8th Edition  - Pathologic stage from 8/1/2019: FIGO Stage IB (pT1b, pN0, cM0) - Signed by Galdino RIDLEY M.D. on 8/1/2019      PLAN:  Symptomatic - conservative management with lifestyle changes and OTC meds   Recommended Immodium and change in diet.    Disposition:  Treatment plan reviewed. Questions answered. Continue therapy outlined.     Galdino RIDLEY M.D.    No orders of the defined types were placed in this encounter.

## 2019-08-29 ENCOUNTER — HOSPITAL ENCOUNTER (OUTPATIENT)
Dept: RADIATION ONCOLOGY | Facility: MEDICAL CENTER | Age: 69
End: 2019-08-29

## 2019-08-29 LAB
CHEMOTHERAPY INFUSION START DATE: NORMAL
CHEMOTHERAPY RECORDS: 1.8
CHEMOTHERAPY RECORDS: 4860
CHEMOTHERAPY RECORDS: NORMAL
CHEMOTHERAPY RX CANCER: NORMAL
RAD ONC ARIA COURSE TREATMENT ELAPSED DAYS: NORMAL
RAD ONC ARIA PLAN TREATMENT DATES: NORMAL
RAD ONC ARIA REFERENCE POINT DOSAGE GIVEN TO DATE: 25.2
RAD ONC ARIA REFERENCE POINT DOSAGE GIVEN TO DATE: 25.77
RAD ONC ARIA REFERENCE POINT ID: NORMAL
RAD ONC ARIA REFERENCE POINT ID: NORMAL
RAD ONC ARIA REFERENCE POINT SESSION DOSAGE GIVEN: 1.8
RAD ONC ARIA REFERENCE POINT SESSION DOSAGE GIVEN: 1.84

## 2019-08-29 PROCEDURE — 77386 HCHG IMRT DELIVERY COMPLEX: CPT | Performed by: RADIOLOGY

## 2019-08-29 PROCEDURE — 77014 PR CT GUIDANCE PLACEMENT RAD THERAPY FIELDS: CPT | Mod: 26 | Performed by: RADIOLOGY

## 2019-08-30 ENCOUNTER — HOSPITAL ENCOUNTER (OUTPATIENT)
Dept: RADIATION ONCOLOGY | Facility: MEDICAL CENTER | Age: 69
End: 2019-08-30

## 2019-08-30 LAB
CHEMOTHERAPY INFUSION START DATE: NORMAL
CHEMOTHERAPY RECORDS: 1.8
CHEMOTHERAPY RECORDS: 4860
CHEMOTHERAPY RECORDS: NORMAL
CHEMOTHERAPY RX CANCER: NORMAL
RAD ONC ARIA COURSE TREATMENT ELAPSED DAYS: NORMAL
RAD ONC ARIA PLAN TREATMENT DATES: NORMAL
RAD ONC ARIA REFERENCE POINT DOSAGE GIVEN TO DATE: 27
RAD ONC ARIA REFERENCE POINT DOSAGE GIVEN TO DATE: 27.61
RAD ONC ARIA REFERENCE POINT ID: NORMAL
RAD ONC ARIA REFERENCE POINT ID: NORMAL
RAD ONC ARIA REFERENCE POINT SESSION DOSAGE GIVEN: 1.8
RAD ONC ARIA REFERENCE POINT SESSION DOSAGE GIVEN: 1.84

## 2019-08-30 PROCEDURE — 77427 RADIATION TX MANAGEMENT X5: CPT | Performed by: RADIOLOGY

## 2019-08-30 PROCEDURE — 77014 PR CT GUIDANCE PLACEMENT RAD THERAPY FIELDS: CPT | Mod: 26 | Performed by: RADIOLOGY

## 2019-08-30 PROCEDURE — 77386 HCHG IMRT DELIVERY COMPLEX: CPT | Performed by: RADIOLOGY

## 2019-09-03 ENCOUNTER — HOSPITAL ENCOUNTER (OUTPATIENT)
Dept: RADIATION ONCOLOGY | Facility: MEDICAL CENTER | Age: 69
End: 2019-09-03

## 2019-09-03 ENCOUNTER — HOSPITAL ENCOUNTER (OUTPATIENT)
Dept: RADIATION ONCOLOGY | Facility: MEDICAL CENTER | Age: 69
End: 2019-09-30
Attending: RADIOLOGY
Payer: MEDICARE

## 2019-09-03 LAB
CHEMOTHERAPY INFUSION START DATE: NORMAL
CHEMOTHERAPY RECORDS: 1.8
CHEMOTHERAPY RECORDS: 4860
CHEMOTHERAPY RECORDS: NORMAL
CHEMOTHERAPY RX CANCER: NORMAL
RAD ONC ARIA COURSE TREATMENT ELAPSED DAYS: NORMAL
RAD ONC ARIA PLAN TREATMENT DATES: NORMAL
RAD ONC ARIA REFERENCE POINT DOSAGE GIVEN TO DATE: 28.8
RAD ONC ARIA REFERENCE POINT DOSAGE GIVEN TO DATE: 29.46
RAD ONC ARIA REFERENCE POINT ID: NORMAL
RAD ONC ARIA REFERENCE POINT ID: NORMAL
RAD ONC ARIA REFERENCE POINT SESSION DOSAGE GIVEN: 1.8
RAD ONC ARIA REFERENCE POINT SESSION DOSAGE GIVEN: 1.84

## 2019-09-03 PROCEDURE — 77386 HCHG IMRT DELIVERY COMPLEX: CPT | Performed by: RADIOLOGY

## 2019-09-03 PROCEDURE — 77014 PR CT GUIDANCE PLACEMENT RAD THERAPY FIELDS: CPT | Mod: 26 | Performed by: RADIOLOGY

## 2019-09-04 ENCOUNTER — HOSPITAL ENCOUNTER (OUTPATIENT)
Dept: RADIATION ONCOLOGY | Facility: MEDICAL CENTER | Age: 69
End: 2019-09-04

## 2019-09-04 VITALS
BODY MASS INDEX: 34.95 KG/M2 | OXYGEN SATURATION: 96 % | HEART RATE: 78 BPM | DIASTOLIC BLOOD PRESSURE: 73 MMHG | WEIGHT: 203.6 LBS | SYSTOLIC BLOOD PRESSURE: 135 MMHG | TEMPERATURE: 97.6 F

## 2019-09-04 DIAGNOSIS — C54.1 ENDOMETRIAL CANCER (HCC): ICD-10-CM

## 2019-09-04 LAB
CHEMOTHERAPY INFUSION START DATE: NORMAL
CHEMOTHERAPY RECORDS: 1.8
CHEMOTHERAPY RECORDS: 4860
CHEMOTHERAPY RECORDS: NORMAL
CHEMOTHERAPY RX CANCER: NORMAL
RAD ONC ARIA COURSE TREATMENT ELAPSED DAYS: NORMAL
RAD ONC ARIA PLAN TREATMENT DATES: NORMAL
RAD ONC ARIA REFERENCE POINT DOSAGE GIVEN TO DATE: 30.6
RAD ONC ARIA REFERENCE POINT DOSAGE GIVEN TO DATE: 31.3
RAD ONC ARIA REFERENCE POINT ID: NORMAL
RAD ONC ARIA REFERENCE POINT ID: NORMAL
RAD ONC ARIA REFERENCE POINT SESSION DOSAGE GIVEN: 1.8
RAD ONC ARIA REFERENCE POINT SESSION DOSAGE GIVEN: 1.84

## 2019-09-04 PROCEDURE — 77386 HCHG IMRT DELIVERY COMPLEX: CPT | Performed by: RADIOLOGY

## 2019-09-04 PROCEDURE — 77014 PR CT GUIDANCE PLACEMENT RAD THERAPY FIELDS: CPT | Mod: 26 | Performed by: RADIOLOGY

## 2019-09-04 ASSESSMENT — PAIN SCALES - GENERAL
PAINLEVEL: NO PAIN
PAINLEVEL: NO PAIN

## 2019-09-04 NOTE — PROGRESS NOTES
ON TREATMENT  NOTE  RADIATION ONCOLOGY DEPARTMENT    Patient name:  Madelyn Schmitz    Primary Physician:  Miriam Xiao M.D. MRN: [id number]  CSN: [id number]   Referring physician:  Alexandr Mancia M.D. : 1950, 69 y.o.     ENCOUNTER DATE:  19    DIAGNOSIS:  Visit Diagnoses     ICD-10-CM   1. Endometrial cancer (HCC) C54.1     Endometrial cancer (HCC)  Staging form: Corpus Uteri - Carcinoma and Carcinosarcoma, AJCC 8th Edition  - Pathologic stage from 2019: FIGO Stage IB (pT1b, pN0, cM0) - Signed by Galdino RIDLEY M.D. on 2019  Histologic grade (G): G2  Histologic grading system: 3 grade system  Lymph-vascular invasion (LVI): LVI not present (absent)/not identified  Residual tumor (R): R0 - None  Histopathologic type: Endometrioid adenocarcinoma, NOS  Diagnostic confirmation: Positive histology  Specimen type: Excision  Staged by: Managing physician  Lymph node metastasis: Absent  Morcellation performed: No      TREATMENT SUMMARY:  Radiation Therapy Episodes     Radiation Therapy: IMRT (2019)      Radiation Treatments     Plan Most Recent Treatment Date Elapsed Course Treatment Days Fractions Treated Prescribed Fraction Dose (cGy) Prescribed Total Dose (cGy)    Pelvis 2019 23 @ [id number] 17 of 27 180 4,860       Reference Point Most Recent Treatment Date Elapsed Course Treatment Days Most Recent Session Dose (cGy) Total Dose (cGy)    Pelvis 2019 23 @ [id number] 180 3,060    Pelvis CP 2019 23 @ [id number] 184 3,130          SUBJECTIVE:  No pain, nausea or vomiting. Diarrhea under control.      VITAL SIGNS:  KPS: 80, Normal activity with effort; some signs or symptoms of disease (ECOG equivalent 1)  Encounter Vitals  Temperature: 36.4 °C (97.6 °F)  Blood Pressure : 135/73  Pulse: 78  Pulse Oximetry: 96 %  Weight: 92.4 kg (203 lb 9.6 oz)  Pain Score: No pain  Pain Assessment 2019   Pain Assessment Denies Pain - Denies Pain -   Pain  Score 0 0 0 0   Some recent data might be hidden          PHYSICAL EXAM:  Physical Exam     Toxicity Assessment 9/4/2019 8/28/2019 8/14/2019   Toxicity Assessment Female Pelvis Female Pelvis Female Pelvis   Fatigue (lethargy, malaise, asthenia) Increased fatigue over baseline, but not altering normal activities Increased fatigue over baseline, but not altering normal activities None   Radiation Dermatitis None None None   Anorexia None None None   Colitis None Abdominal pain with mucus and/or blood in stool None   Constipation None None None   Dehydration None None None   Diarrhea w/o Colostomy None Increase of 4-6 stools/day, or nocternal stools None   Flatulence None Mild None   Nausea None None None   Proctitis - Increased stool frequency, occasional blood-streaked stools or rectal discomfort (including hemorrhoids) not requiring medication None   Vomiting None None None   RT - Pain due to RT None None None   Tumor Pain (onset or exacerbation of tumor pain due to treatment) - None -   Dysuria (painful urination) None None None   Urinary Frequency Normal Normal -   Urinary Urgency - None -   Bladder Spasms Absent Absent -   Incontinence None None -   Urinary Retention Normal Normal Normal   Vaginitis (not due to infection) - None -   Vaginal Bleeding None None -   Vaginal Dryness - Normal -       CURRENT MEDICATIONS:    Current Outpatient Medications:   •  metFORMIN (GLUCOPHAGE) 500 MG Tab, Take 500 mg by mouth 2 times a day, with meals., Disp: , Rfl: 4  •  lisinopril (PRINIVIL) 10 MG Tab, Take 10 mg by mouth every morning., Disp: , Rfl:   •  GAVILYTE-G 236 g Recon Soln, TAKE THE EVENING PRIOR TO SURGERY AS DIRECTED IN PRE OP INSTRUCTIONS, Disp: , Rfl: 0  •   MG Cap, Take 100 mg by mouth., Disp: , Rfl: 1    LABORATORY DATA:   Lab Results   Component Value Date/Time    SODIUM 138 08/26/2019 01:11 PM    POTASSIUM 4.3 08/26/2019 01:11 PM    CHLORIDE 103 08/26/2019 01:11 PM    CO2 28 08/26/2019 01:11 PM     GLUCOSE 89 08/26/2019 01:11 PM    BUN 13 08/26/2019 01:11 PM    CREATININE 0.94 08/26/2019 01:11 PM       Lab Results   Component Value Date/Time    WBC 5.4 08/26/2019 01:11 PM    HEMOGLOBIN 11.8 (L) 08/26/2019 01:11 PM    HEMATOCRIT 39.3 08/26/2019 01:11 PM    MCV 96.8 08/26/2019 01:11 PM    PLATELETCT 188 08/26/2019 01:11 PM        RADIOLOGY DATA:  No results found.    IMPRESSION:  Cancer Staging  Endometrial cancer (HCC)  Staging form: Corpus Uteri - Carcinoma and Carcinosarcoma, AJCC 8th Edition  - Pathologic stage from 8/1/2019: FIGO Stage IB (pT1b, pN0, cM0) - Signed by Galdino RIDLEY M.D. on 8/1/2019      PLAN:  No change in treatment plan and Asymptomatic or Mildly symptomatic with no changes     Disposition:  Treatment plan reviewed. Questions answered. Continue therapy outlined.     Galdino RIDLEY M.D.    No orders of the defined types were placed in this encounter.

## 2019-09-05 ENCOUNTER — HOSPITAL ENCOUNTER (OUTPATIENT)
Dept: RADIATION ONCOLOGY | Facility: MEDICAL CENTER | Age: 69
End: 2019-09-05

## 2019-09-05 LAB
CHEMOTHERAPY INFUSION START DATE: NORMAL
CHEMOTHERAPY RECORDS: 1.8
CHEMOTHERAPY RECORDS: 4860
CHEMOTHERAPY RECORDS: NORMAL
CHEMOTHERAPY RX CANCER: NORMAL
RAD ONC ARIA COURSE TREATMENT ELAPSED DAYS: NORMAL
RAD ONC ARIA PLAN TREATMENT DATES: NORMAL
RAD ONC ARIA REFERENCE POINT DOSAGE GIVEN TO DATE: 32.4
RAD ONC ARIA REFERENCE POINT DOSAGE GIVEN TO DATE: 33.14
RAD ONC ARIA REFERENCE POINT ID: NORMAL
RAD ONC ARIA REFERENCE POINT ID: NORMAL
RAD ONC ARIA REFERENCE POINT SESSION DOSAGE GIVEN: 1.8
RAD ONC ARIA REFERENCE POINT SESSION DOSAGE GIVEN: 1.84

## 2019-09-05 PROCEDURE — 77336 RADIATION PHYSICS CONSULT: CPT | Performed by: RADIOLOGY

## 2019-09-05 PROCEDURE — 77014 PR CT GUIDANCE PLACEMENT RAD THERAPY FIELDS: CPT | Mod: 26 | Performed by: RADIOLOGY

## 2019-09-05 PROCEDURE — 77386 HCHG IMRT DELIVERY COMPLEX: CPT | Performed by: RADIOLOGY

## 2019-09-06 ENCOUNTER — HOSPITAL ENCOUNTER (OUTPATIENT)
Dept: RADIATION ONCOLOGY | Facility: MEDICAL CENTER | Age: 69
End: 2019-09-06

## 2019-09-06 LAB
CHEMOTHERAPY INFUSION START DATE: NORMAL
CHEMOTHERAPY RECORDS: 1.8
CHEMOTHERAPY RECORDS: 4860
CHEMOTHERAPY RECORDS: NORMAL
CHEMOTHERAPY RX CANCER: NORMAL
RAD ONC ARIA COURSE TREATMENT ELAPSED DAYS: NORMAL
RAD ONC ARIA PLAN TREATMENT DATES: NORMAL
RAD ONC ARIA REFERENCE POINT DOSAGE GIVEN TO DATE: 34.2
RAD ONC ARIA REFERENCE POINT DOSAGE GIVEN TO DATE: 34.98
RAD ONC ARIA REFERENCE POINT ID: NORMAL
RAD ONC ARIA REFERENCE POINT ID: NORMAL
RAD ONC ARIA REFERENCE POINT SESSION DOSAGE GIVEN: 1.8
RAD ONC ARIA REFERENCE POINT SESSION DOSAGE GIVEN: 1.84

## 2019-09-06 PROCEDURE — 77014 PR CT GUIDANCE PLACEMENT RAD THERAPY FIELDS: CPT | Mod: 26 | Performed by: RADIOLOGY

## 2019-09-06 PROCEDURE — 77386 HCHG IMRT DELIVERY COMPLEX: CPT | Performed by: RADIOLOGY

## 2019-09-09 ENCOUNTER — HOSPITAL ENCOUNTER (OUTPATIENT)
Dept: RADIATION ONCOLOGY | Facility: MEDICAL CENTER | Age: 69
End: 2019-09-09

## 2019-09-09 LAB
CHEMOTHERAPY INFUSION START DATE: NORMAL
CHEMOTHERAPY RECORDS: 1.8
CHEMOTHERAPY RECORDS: 4860
CHEMOTHERAPY RECORDS: NORMAL
CHEMOTHERAPY RX CANCER: NORMAL
RAD ONC ARIA COURSE TREATMENT ELAPSED DAYS: NORMAL
RAD ONC ARIA PLAN TREATMENT DATES: NORMAL
RAD ONC ARIA REFERENCE POINT DOSAGE GIVEN TO DATE: 36
RAD ONC ARIA REFERENCE POINT DOSAGE GIVEN TO DATE: 36.82
RAD ONC ARIA REFERENCE POINT ID: NORMAL
RAD ONC ARIA REFERENCE POINT ID: NORMAL
RAD ONC ARIA REFERENCE POINT SESSION DOSAGE GIVEN: 1.8
RAD ONC ARIA REFERENCE POINT SESSION DOSAGE GIVEN: 1.84

## 2019-09-09 PROCEDURE — 77014 PR CT GUIDANCE PLACEMENT RAD THERAPY FIELDS: CPT | Mod: 26 | Performed by: RADIOLOGY

## 2019-09-09 PROCEDURE — 77427 RADIATION TX MANAGEMENT X5: CPT | Performed by: RADIOLOGY

## 2019-09-09 PROCEDURE — 77386 HCHG IMRT DELIVERY COMPLEX: CPT | Performed by: RADIOLOGY

## 2019-09-10 ENCOUNTER — HOSPITAL ENCOUNTER (OUTPATIENT)
Dept: RADIATION ONCOLOGY | Facility: MEDICAL CENTER | Age: 69
End: 2019-09-10

## 2019-09-10 LAB
CHEMOTHERAPY INFUSION START DATE: NORMAL
CHEMOTHERAPY RECORDS: 1.8
CHEMOTHERAPY RECORDS: 4860
CHEMOTHERAPY RECORDS: NORMAL
CHEMOTHERAPY RX CANCER: NORMAL
RAD ONC ARIA COURSE TREATMENT ELAPSED DAYS: NORMAL
RAD ONC ARIA PLAN TREATMENT DATES: NORMAL
RAD ONC ARIA REFERENCE POINT DOSAGE GIVEN TO DATE: 37.8
RAD ONC ARIA REFERENCE POINT DOSAGE GIVEN TO DATE: 38.66
RAD ONC ARIA REFERENCE POINT ID: NORMAL
RAD ONC ARIA REFERENCE POINT ID: NORMAL
RAD ONC ARIA REFERENCE POINT SESSION DOSAGE GIVEN: 1.8
RAD ONC ARIA REFERENCE POINT SESSION DOSAGE GIVEN: 1.84

## 2019-09-10 PROCEDURE — 77386 HCHG IMRT DELIVERY COMPLEX: CPT | Performed by: RADIOLOGY

## 2019-09-10 PROCEDURE — 77014 PR CT GUIDANCE PLACEMENT RAD THERAPY FIELDS: CPT | Mod: 26 | Performed by: RADIOLOGY

## 2019-09-11 VITALS
DIASTOLIC BLOOD PRESSURE: 66 MMHG | SYSTOLIC BLOOD PRESSURE: 131 MMHG | OXYGEN SATURATION: 100 % | HEART RATE: 76 BPM | TEMPERATURE: 98.1 F | BODY MASS INDEX: 34.29 KG/M2 | WEIGHT: 199.76 LBS

## 2019-09-11 DIAGNOSIS — R30.0 DYSURIA: ICD-10-CM

## 2019-09-11 DIAGNOSIS — C54.1 ENDOMETRIAL CANCER (HCC): ICD-10-CM

## 2019-09-11 PROCEDURE — 77014 PR CT GUIDANCE PLACEMENT RAD THERAPY FIELDS: CPT | Mod: 26 | Performed by: RADIOLOGY

## 2019-09-11 PROCEDURE — 77386 HCHG IMRT DELIVERY COMPLEX: CPT | Performed by: RADIOLOGY

## 2019-09-11 RX ORDER — PHENAZOPYRIDINE HYDROCHLORIDE 200 MG/1
200 TABLET, FILM COATED ORAL 3 TIMES DAILY PRN
Qty: 10 TAB | Refills: 0 | Status: SHIPPED | OUTPATIENT
Start: 2019-09-11 | End: 2019-09-14

## 2019-09-11 ASSESSMENT — PAIN SCALES - GENERAL
PAINLEVEL: 2=MINIMAL-SLIGHT
PAINLEVEL: 2=MINIMAL-SLIGHT

## 2019-09-11 NOTE — PROGRESS NOTES
ON TREATMENT  NOTE  RADIATION ONCOLOGY DEPARTMENT    Patient name:  Madelyn Schmitz    Primary Physician:  iMriam Xiao M.D. MRN: [id number]  CSN: [id number]   Referring physician:  Alexandr Mancia M.D. : 1950, 69 y.o.     ENCOUNTER DATE:  19    DIAGNOSIS:  Visit Diagnoses     ICD-10-CM   1. Endometrial cancer (HCC) C54.1   2. Dysuria R30.0     Endometrial cancer (HCC)  Staging form: Corpus Uteri - Carcinoma and Carcinosarcoma, AJCC 8th Edition  - Pathologic stage from 2019: FIGO Stage IB (pT1b, pN0, cM0) - Signed by Galdino RIDLEY M.D. on 2019  Histologic grade (G): G2  Histologic grading system: 3 grade system  Lymph-vascular invasion (LVI): LVI not present (absent)/not identified  Residual tumor (R): R0 - None  Histopathologic type: Endometrioid adenocarcinoma, NOS  Diagnostic confirmation: Positive histology  Specimen type: Excision  Staged by: Managing physician  Lymph node metastasis: Absent  Morcellation performed: No      TREATMENT SUMMARY:    Radiation Therapy Episodes     Radiation Therapy: IMRT (2019)      Radiation Treatments     Plan Most Recent Treatment Date Elapsed Course Treatment Days Fractions Treated Prescribed Fraction Dose (cGy) Prescribed Total Dose (cGy)    Pelvis 2019 30 @ [id number] 22 of 27 180 4,860       Reference Point Most Recent Treatment Date Elapsed Course Treatment Days Most Recent Session Dose (cGy) Total Dose (cGy)    Pelvis 2019 30 @ [id number] 180 3,960    Pelvis CP 2019 30 @ [id number] 184 4,048            SUBJECTIVE:  Mild dysuria and bowel irritation.      VITAL SIGNS:  KPS: 90, Able to carry on normal activity; minor signs or symptoms of disease (ECOG equivalent 0)  Encounter Vitals  Temperature: 36.7 °C (98.1 °F)  Blood Pressure : 131/66  Pulse: 76  Pulse Oximetry: 100 %  Weight: 90.6 kg (199 lb 12.2 oz)  Pain Score: 2=Minimal-Slight  Pain Assessment 2019   Pain Assessment Acute Pain -  Denies Pain -   Pain Score 2 2 0 0   Pain Loc Pelvic Pelvic - -   Some recent data might be hidden          PHYSICAL EXAM:  Physical Exam   Constitutional: She is oriented to person, place, and time. She appears well-developed and well-nourished.   HENT:   Head: Normocephalic.   Abdominal: Soft. Bowel sounds are normal.   Neurological: She is alert and oriented to person, place, and time.   Skin: Skin is warm and dry. No erythema.   Psychiatric: She has a normal mood and affect. Her behavior is normal. Judgment and thought content normal.   Nursing note and vitals reviewed.       Toxicity Assessment 9/11/2019 9/4/2019 8/28/2019 8/14/2019   Toxicity Assessment Female Pelvis Female Pelvis Female Pelvis Female Pelvis   Fatigue (lethargy, malaise, asthenia) Increased fatigue over baseline, but not altering normal activities Increased fatigue over baseline, but not altering normal activities Increased fatigue over baseline, but not altering normal activities None   Radiation Dermatitis None None None None   Anorexia Loss of appetite None None None   Colitis - None Abdominal pain with mucus and/or blood in stool None   Constipation None None None None   Dehydration None None None None   Diarrhea w/o Colostomy None None Increase of 4-6 stools/day, or nocternal stools None   Flatulence None None Mild None   Nausea None None None None   Proctitis - - Increased stool frequency, occasional blood-streaked stools or rectal discomfort (including hemorrhoids) not requiring medication None   Vomiting None None None None   RT - Pain due to RT Mild pain not interfering with function None None None   Tumor Pain (onset or exacerbation of tumor pain due to treatment) - - None -   Dysuria (painful urination) Mild symptoms requiring no intervention None None None   Urinary Frequency Normal Normal Normal -   Urinary Urgency None - None -   Bladder Spasms - Absent Absent -   Incontinence - None None -   Urinary Retention - Normal Normal  Normal   Vaginitis (not due to infection) - - None -   Vaginal Bleeding None None None -   Vaginal Dryness - - Normal -       CURRENT MEDICATIONS:    Current Outpatient Medications:   •  phenazopyridine (PYRIDIUM) 200 MG Tab, Take 1 Tab by mouth 3 times a day as needed for up to 3 days., Disp: 10 Tab, Rfl: 0  •   MG Cap, Take 100 mg by mouth., Disp: , Rfl: 1  •  metFORMIN (GLUCOPHAGE) 500 MG Tab, Take 500 mg by mouth 2 times a day, with meals., Disp: , Rfl: 4  •  lisinopril (PRINIVIL) 10 MG Tab, Take 10 mg by mouth every morning., Disp: , Rfl:     LABORATORY DATA:   Lab Results   Component Value Date/Time    SODIUM 138 08/26/2019 01:11 PM    POTASSIUM 4.3 08/26/2019 01:11 PM    CHLORIDE 103 08/26/2019 01:11 PM    CO2 28 08/26/2019 01:11 PM    GLUCOSE 89 08/26/2019 01:11 PM    BUN 13 08/26/2019 01:11 PM    CREATININE 0.94 08/26/2019 01:11 PM       Lab Results   Component Value Date/Time    WBC 5.4 08/26/2019 01:11 PM    HEMOGLOBIN 11.8 (L) 08/26/2019 01:11 PM    HEMATOCRIT 39.3 08/26/2019 01:11 PM    MCV 96.8 08/26/2019 01:11 PM    PLATELETCT 188 08/26/2019 01:11 PM        RADIOLOGY DATA:  No results found.    IMPRESSION:  Cancer Staging  Endometrial cancer (HCC)  Staging form: Corpus Uteri - Carcinoma and Carcinosarcoma, AJCC 8th Edition  - Pathologic stage from 8/1/2019: FIGO Stage IB (pT1b, pN0, cM0) - Signed by Galdino RIDLEY M.D. on 8/1/2019      PLAN:  Symptomatic - prescription given and/or additional testing required    Disposition:  Treatment plan reviewed. Questions answered. Continue therapy outlined.     Galdino RIDLEY M.D.    Orders Placed This Encounter   • phenazopyridine (PYRIDIUM) 200 MG Tab

## 2019-09-12 ENCOUNTER — HOSPITAL ENCOUNTER (OUTPATIENT)
Dept: RADIATION ONCOLOGY | Facility: MEDICAL CENTER | Age: 69
End: 2019-09-12

## 2019-09-12 LAB
CHEMOTHERAPY INFUSION START DATE: NORMAL
CHEMOTHERAPY RECORDS: 1.8
CHEMOTHERAPY RECORDS: 4860
CHEMOTHERAPY RECORDS: NORMAL
CHEMOTHERAPY RX CANCER: NORMAL
RAD ONC ARIA COURSE TREATMENT ELAPSED DAYS: NORMAL
RAD ONC ARIA PLAN TREATMENT DATES: NORMAL
RAD ONC ARIA REFERENCE POINT DOSAGE GIVEN TO DATE: 41.4
RAD ONC ARIA REFERENCE POINT DOSAGE GIVEN TO DATE: 42.34
RAD ONC ARIA REFERENCE POINT ID: NORMAL
RAD ONC ARIA REFERENCE POINT ID: NORMAL
RAD ONC ARIA REFERENCE POINT SESSION DOSAGE GIVEN: 1.8
RAD ONC ARIA REFERENCE POINT SESSION DOSAGE GIVEN: 1.84

## 2019-09-12 PROCEDURE — 77336 RADIATION PHYSICS CONSULT: CPT | Performed by: RADIOLOGY

## 2019-09-12 PROCEDURE — 77014 PR CT GUIDANCE PLACEMENT RAD THERAPY FIELDS: CPT | Mod: 26 | Performed by: RADIOLOGY

## 2019-09-12 PROCEDURE — 77386 HCHG IMRT DELIVERY COMPLEX: CPT | Performed by: RADIOLOGY

## 2019-09-13 ENCOUNTER — HOSPITAL ENCOUNTER (OUTPATIENT)
Dept: RADIATION ONCOLOGY | Facility: MEDICAL CENTER | Age: 69
End: 2019-09-13

## 2019-09-13 LAB
CHEMOTHERAPY INFUSION START DATE: NORMAL
CHEMOTHERAPY RECORDS: 1.8
CHEMOTHERAPY RECORDS: 4860
CHEMOTHERAPY RECORDS: NORMAL
CHEMOTHERAPY RX CANCER: NORMAL
RAD ONC ARIA COURSE TREATMENT ELAPSED DAYS: NORMAL
RAD ONC ARIA PLAN TREATMENT DATES: NORMAL
RAD ONC ARIA REFERENCE POINT DOSAGE GIVEN TO DATE: 43.2
RAD ONC ARIA REFERENCE POINT DOSAGE GIVEN TO DATE: 44.18
RAD ONC ARIA REFERENCE POINT ID: NORMAL
RAD ONC ARIA REFERENCE POINT ID: NORMAL
RAD ONC ARIA REFERENCE POINT SESSION DOSAGE GIVEN: 1.8
RAD ONC ARIA REFERENCE POINT SESSION DOSAGE GIVEN: 1.84

## 2019-09-13 PROCEDURE — 77386 HCHG IMRT DELIVERY COMPLEX: CPT | Performed by: RADIOLOGY

## 2019-09-13 PROCEDURE — 77014 PR CT GUIDANCE PLACEMENT RAD THERAPY FIELDS: CPT | Mod: 26 | Performed by: RADIOLOGY

## 2019-09-16 ENCOUNTER — HOSPITAL ENCOUNTER (OUTPATIENT)
Dept: RADIATION ONCOLOGY | Facility: MEDICAL CENTER | Age: 69
End: 2019-09-16

## 2019-09-16 LAB
CHEMOTHERAPY INFUSION START DATE: NORMAL
CHEMOTHERAPY RECORDS: 1.8
CHEMOTHERAPY RECORDS: 4860
CHEMOTHERAPY RECORDS: NORMAL
CHEMOTHERAPY RX CANCER: NORMAL
RAD ONC ARIA COURSE TREATMENT ELAPSED DAYS: NORMAL
RAD ONC ARIA PLAN TREATMENT DATES: NORMAL
RAD ONC ARIA REFERENCE POINT DOSAGE GIVEN TO DATE: 45
RAD ONC ARIA REFERENCE POINT DOSAGE GIVEN TO DATE: 46.02
RAD ONC ARIA REFERENCE POINT ID: NORMAL
RAD ONC ARIA REFERENCE POINT ID: NORMAL
RAD ONC ARIA REFERENCE POINT SESSION DOSAGE GIVEN: 1.8
RAD ONC ARIA REFERENCE POINT SESSION DOSAGE GIVEN: 1.84

## 2019-09-16 PROCEDURE — 77386 HCHG IMRT DELIVERY COMPLEX: CPT | Performed by: RADIOLOGY

## 2019-09-16 PROCEDURE — 77014 PR CT GUIDANCE PLACEMENT RAD THERAPY FIELDS: CPT | Mod: 26 | Performed by: RADIOLOGY

## 2019-09-16 PROCEDURE — 77427 RADIATION TX MANAGEMENT X5: CPT | Performed by: RADIOLOGY

## 2019-09-17 ENCOUNTER — HOSPITAL ENCOUNTER (OUTPATIENT)
Dept: RADIATION ONCOLOGY | Facility: MEDICAL CENTER | Age: 69
End: 2019-09-17

## 2019-09-17 LAB
CHEMOTHERAPY INFUSION START DATE: NORMAL
CHEMOTHERAPY RECORDS: 1.8
CHEMOTHERAPY RECORDS: 4860
CHEMOTHERAPY RECORDS: NORMAL
CHEMOTHERAPY RX CANCER: NORMAL
RAD ONC ARIA COURSE TREATMENT ELAPSED DAYS: NORMAL
RAD ONC ARIA PLAN TREATMENT DATES: NORMAL
RAD ONC ARIA REFERENCE POINT DOSAGE GIVEN TO DATE: 46.8
RAD ONC ARIA REFERENCE POINT DOSAGE GIVEN TO DATE: 47.87
RAD ONC ARIA REFERENCE POINT ID: NORMAL
RAD ONC ARIA REFERENCE POINT ID: NORMAL
RAD ONC ARIA REFERENCE POINT SESSION DOSAGE GIVEN: 1.8
RAD ONC ARIA REFERENCE POINT SESSION DOSAGE GIVEN: 1.84

## 2019-09-17 PROCEDURE — 77014 PR CT GUIDANCE PLACEMENT RAD THERAPY FIELDS: CPT | Mod: 26 | Performed by: RADIOLOGY

## 2019-09-17 PROCEDURE — 77386 HCHG IMRT DELIVERY COMPLEX: CPT | Performed by: RADIOLOGY

## 2019-09-18 ENCOUNTER — APPOINTMENT (OUTPATIENT)
Dept: RADIOLOGY | Facility: MEDICAL CENTER | Age: 69
DRG: 419 | End: 2019-09-18
Attending: EMERGENCY MEDICINE
Payer: MEDICARE

## 2019-09-18 ENCOUNTER — HOSPITAL ENCOUNTER (INPATIENT)
Facility: MEDICAL CENTER | Age: 69
LOS: 2 days | DRG: 419 | End: 2019-09-20
Attending: EMERGENCY MEDICINE | Admitting: INTERNAL MEDICINE
Payer: MEDICARE

## 2019-09-18 VITALS
TEMPERATURE: 97.9 F | WEIGHT: 203.94 LBS | OXYGEN SATURATION: 100 % | DIASTOLIC BLOOD PRESSURE: 63 MMHG | HEART RATE: 72 BPM | SYSTOLIC BLOOD PRESSURE: 135 MMHG | BODY MASS INDEX: 35.01 KG/M2

## 2019-09-18 DIAGNOSIS — C54.1 ENDOMETRIAL CANCER (HCC): ICD-10-CM

## 2019-09-18 DIAGNOSIS — K81.0 ACUTE CHOLECYSTITIS: ICD-10-CM

## 2019-09-18 DIAGNOSIS — K44.9 HIATAL HERNIA: ICD-10-CM

## 2019-09-18 LAB
ALBUMIN SERPL BCP-MCNC: 4.1 G/DL (ref 3.2–4.9)
ALBUMIN/GLOB SERPL: 1.2 G/DL
ALP SERPL-CCNC: 58 U/L (ref 30–99)
ALT SERPL-CCNC: 9 U/L (ref 2–50)
AMORPH CRY #/AREA URNS HPF: PRESENT /HPF
ANION GAP SERPL CALC-SCNC: 8 MMOL/L (ref 0–11.9)
APPEARANCE UR: CLEAR
AST SERPL-CCNC: 13 U/L (ref 12–45)
BACTERIA #/AREA URNS HPF: NEGATIVE /HPF
BASOPHILS # BLD AUTO: 0.3 % (ref 0–1.8)
BASOPHILS # BLD: 0.02 K/UL (ref 0–0.12)
BILIRUB SERPL-MCNC: 0.4 MG/DL (ref 0.1–1.5)
BUN SERPL-MCNC: 12 MG/DL (ref 8–22)
CALCIUM SERPL-MCNC: 9.2 MG/DL (ref 8.5–10.5)
CHEMOTHERAPY INFUSION START DATE: NORMAL
CHEMOTHERAPY RECORDS: 1.8
CHEMOTHERAPY RECORDS: 4860
CHEMOTHERAPY RECORDS: NORMAL
CHEMOTHERAPY RX CANCER: NORMAL
CHLORIDE SERPL-SCNC: 101 MMOL/L (ref 96–112)
CO2 SERPL-SCNC: 28 MMOL/L (ref 20–33)
COLOR UR: NORMAL
CREAT SERPL-MCNC: 0.55 MG/DL (ref 0.5–1.4)
EKG IMPRESSION: NORMAL
EOSINOPHIL # BLD AUTO: 0.08 K/UL (ref 0–0.51)
EOSINOPHIL NFR BLD: 1.2 % (ref 0–6.9)
EPI CELLS #/AREA URNS HPF: ABNORMAL /HPF
ERYTHROCYTE [DISTWIDTH] IN BLOOD BY AUTOMATED COUNT: 53.4 FL (ref 35.9–50)
GLOBULIN SER CALC-MCNC: 3.3 G/DL (ref 1.9–3.5)
GLUCOSE SERPL-MCNC: 118 MG/DL (ref 65–99)
HCT VFR BLD AUTO: 39.3 % (ref 37–47)
HGB BLD-MCNC: 12.7 G/DL (ref 12–16)
HYALINE CASTS #/AREA URNS LPF: ABNORMAL /LPF
IMM GRANULOCYTES # BLD AUTO: 0.04 K/UL (ref 0–0.11)
IMM GRANULOCYTES NFR BLD AUTO: 0.6 % (ref 0–0.9)
LIPASE SERPL-CCNC: 17 U/L (ref 11–82)
LYMPHOCYTES # BLD AUTO: 0.49 K/UL (ref 1–4.8)
LYMPHOCYTES NFR BLD: 7.3 % (ref 22–41)
MCH RBC QN AUTO: 30.5 PG (ref 27–33)
MCHC RBC AUTO-ENTMCNC: 32.3 G/DL (ref 33.6–35)
MCV RBC AUTO: 94.2 FL (ref 81.4–97.8)
MICRO URNS: NORMAL
MONOCYTES # BLD AUTO: 0.52 K/UL (ref 0–0.85)
MONOCYTES NFR BLD AUTO: 7.8 % (ref 0–13.4)
MUCOUS THREADS #/AREA URNS HPF: ABNORMAL /HPF
NEUTROPHILS # BLD AUTO: 5.53 K/UL (ref 2–7.15)
NEUTROPHILS NFR BLD: 82.8 % (ref 44–72)
NRBC # BLD AUTO: 0 K/UL
NRBC BLD-RTO: 0 /100 WBC
PLATELET # BLD AUTO: 220 K/UL (ref 164–446)
PMV BLD AUTO: 8.8 FL (ref 9–12.9)
POTASSIUM SERPL-SCNC: 4 MMOL/L (ref 3.6–5.5)
PROT SERPL-MCNC: 7.4 G/DL (ref 6–8.2)
RAD ONC ARIA COURSE TREATMENT ELAPSED DAYS: NORMAL
RAD ONC ARIA PLAN TREATMENT DATES: NORMAL
RAD ONC ARIA REFERENCE POINT DOSAGE GIVEN TO DATE: 48.6
RAD ONC ARIA REFERENCE POINT DOSAGE GIVEN TO DATE: 49.71
RAD ONC ARIA REFERENCE POINT ID: NORMAL
RAD ONC ARIA REFERENCE POINT ID: NORMAL
RAD ONC ARIA REFERENCE POINT SESSION DOSAGE GIVEN: 1.8
RAD ONC ARIA REFERENCE POINT SESSION DOSAGE GIVEN: 1.84
RBC # BLD AUTO: 4.17 M/UL (ref 4.2–5.4)
RBC # URNS HPF: ABNORMAL /HPF
SODIUM SERPL-SCNC: 137 MMOL/L (ref 135–145)
SP GR UR STRIP.AUTO: NORMAL
TROPONIN T SERPL-MCNC: <6 NG/L (ref 6–19)
TROPONIN T SERPL-MCNC: <6 NG/L (ref 6–19)
WBC # BLD AUTO: 6.7 K/UL (ref 4.8–10.8)
WBC #/AREA URNS HPF: ABNORMAL /HPF

## 2019-09-18 PROCEDURE — 83690 ASSAY OF LIPASE: CPT

## 2019-09-18 PROCEDURE — 700105 HCHG RX REV CODE 258: Performed by: EMERGENCY MEDICINE

## 2019-09-18 PROCEDURE — 99285 EMERGENCY DEPT VISIT HI MDM: CPT

## 2019-09-18 PROCEDURE — 700111 HCHG RX REV CODE 636 W/ 250 OVERRIDE (IP): Performed by: EMERGENCY MEDICINE

## 2019-09-18 PROCEDURE — 71045 X-RAY EXAM CHEST 1 VIEW: CPT

## 2019-09-18 PROCEDURE — 74177 CT ABD & PELVIS W/CONTRAST: CPT

## 2019-09-18 PROCEDURE — 77014 PR CT GUIDANCE PLACEMENT RAD THERAPY FIELDS: CPT | Mod: 26 | Performed by: RADIOLOGY

## 2019-09-18 PROCEDURE — 96365 THER/PROPH/DIAG IV INF INIT: CPT

## 2019-09-18 PROCEDURE — 93005 ELECTROCARDIOGRAM TRACING: CPT | Performed by: EMERGENCY MEDICINE

## 2019-09-18 PROCEDURE — 36415 COLL VENOUS BLD VENIPUNCTURE: CPT

## 2019-09-18 PROCEDURE — 77386 HCHG IMRT DELIVERY COMPLEX: CPT | Performed by: RADIOLOGY

## 2019-09-18 PROCEDURE — 81001 URINALYSIS AUTO W/SCOPE: CPT

## 2019-09-18 PROCEDURE — 770006 HCHG ROOM/CARE - MED/SURG/GYN SEMI*

## 2019-09-18 PROCEDURE — 99223 1ST HOSP IP/OBS HIGH 75: CPT | Mod: GC | Performed by: INTERNAL MEDICINE

## 2019-09-18 PROCEDURE — 85025 COMPLETE CBC W/AUTO DIFF WBC: CPT

## 2019-09-18 PROCEDURE — 84484 ASSAY OF TROPONIN QUANT: CPT

## 2019-09-18 PROCEDURE — 80053 COMPREHEN METABOLIC PANEL: CPT

## 2019-09-18 PROCEDURE — 76705 ECHO EXAM OF ABDOMEN: CPT

## 2019-09-18 PROCEDURE — 700117 HCHG RX CONTRAST REV CODE 255: Performed by: EMERGENCY MEDICINE

## 2019-09-18 RX ORDER — SODIUM CHLORIDE 9 MG/ML
INJECTION, SOLUTION INTRAVENOUS CONTINUOUS
Status: DISCONTINUED | OUTPATIENT
Start: 2019-09-18 | End: 2019-09-19

## 2019-09-18 RX ORDER — POLYETHYLENE GLYCOL 3350 17 G/17G
1 POWDER, FOR SOLUTION ORAL
Status: DISCONTINUED | OUTPATIENT
Start: 2019-09-18 | End: 2019-09-20 | Stop reason: HOSPADM

## 2019-09-18 RX ORDER — DOCUSATE SODIUM 100 MG/1
100 CAPSULE, LIQUID FILLED ORAL DAILY
COMMUNITY
End: 2021-09-30

## 2019-09-18 RX ORDER — AMOXICILLIN 250 MG
2 CAPSULE ORAL 2 TIMES DAILY
Status: DISCONTINUED | OUTPATIENT
Start: 2019-09-18 | End: 2019-09-20 | Stop reason: HOSPADM

## 2019-09-18 RX ORDER — ACETAMINOPHEN 325 MG/1
650 TABLET ORAL EVERY 6 HOURS PRN
Status: DISCONTINUED | OUTPATIENT
Start: 2019-09-18 | End: 2019-09-20 | Stop reason: HOSPADM

## 2019-09-18 RX ORDER — BISACODYL 10 MG
10 SUPPOSITORY, RECTAL RECTAL
Status: DISCONTINUED | OUTPATIENT
Start: 2019-09-18 | End: 2019-09-20 | Stop reason: HOSPADM

## 2019-09-18 RX ADMIN — IOHEXOL 100 ML: 350 INJECTION, SOLUTION INTRAVENOUS at 19:07

## 2019-09-18 RX ADMIN — CEFTRIAXONE SODIUM 2 G: 2 INJECTION, POWDER, FOR SOLUTION INTRAMUSCULAR; INTRAVENOUS at 21:35

## 2019-09-18 ASSESSMENT — COGNITIVE AND FUNCTIONAL STATUS - GENERAL
SUGGESTED CMS G CODE MODIFIER DAILY ACTIVITY: CH
DAILY ACTIVITIY SCORE: 24
MOBILITY SCORE: 24
SUGGESTED CMS G CODE MODIFIER MOBILITY: CH

## 2019-09-18 ASSESSMENT — LIFESTYLE VARIABLES
CONSUMPTION TOTAL: NEGATIVE
EVER FELT BAD OR GUILTY ABOUT YOUR DRINKING: NO
HOW MANY TIMES IN THE PAST YEAR HAVE YOU HAD 5 OR MORE DRINKS IN A DAY: 0
EVER_SMOKED: NEVER
TOTAL SCORE: 0
AVERAGE NUMBER OF DAYS PER WEEK YOU HAVE A DRINK CONTAINING ALCOHOL: 0
HAVE YOU EVER FELT YOU SHOULD CUT DOWN ON YOUR DRINKING: NO
ON A TYPICAL DAY WHEN YOU DRINK ALCOHOL HOW MANY DRINKS DO YOU HAVE: 0
EVER HAD A DRINK FIRST THING IN THE MORNING TO STEADY YOUR NERVES TO GET RID OF A HANGOVER: NO
ALCOHOL_USE: NO
TOTAL SCORE: 0
TOTAL SCORE: 0
HAVE PEOPLE ANNOYED YOU BY CRITICIZING YOUR DRINKING: NO

## 2019-09-18 ASSESSMENT — PAIN SCALES - GENERAL: PAINLEVEL: 7=MODERATE-SEVERE PAIN

## 2019-09-18 ASSESSMENT — PATIENT HEALTH QUESTIONNAIRE - PHQ9
2. FEELING DOWN, DEPRESSED, IRRITABLE, OR HOPELESS: NOT AT ALL
1. LITTLE INTEREST OR PLEASURE IN DOING THINGS: NOT AT ALL
SUM OF ALL RESPONSES TO PHQ9 QUESTIONS 1 AND 2: 0

## 2019-09-18 NOTE — ED NOTES
Called to senior lounge by pt family.  Pt having increased pain.  Pt appears to be in mild distress.  V/s reassessed.  Apologized for long wait.

## 2019-09-18 NOTE — ED TRIAGE NOTES
Chief Complaint   Patient presents with   • Abdominal Pain     Pt states that pain began last night at 0000 while at rest.  Pt has a history of endometrial cancer, hystorectomy, and finished radiation therapy today.  Pt states had one episode of vomiting this morning and last night.  Denies diarrhea.  Denies dysuria.  Mask applied to patient.  Triage process explained to patient.  Pt back to waiting room.  Pt instructed to inform RN if any changes or questions arise.

## 2019-09-18 NOTE — PROGRESS NOTES
"  ON TREATMENT  NOTE  RADIATION ONCOLOGY DEPARTMENT    Patient name:  Madelyn Schmitz    Primary Physician:  Miriam Xiao M.D. MRN: [id number]  CSN: [id number]   Referring physician:  Alexandr Mancia M.D. : 1950, 69 y.o.     ENCOUNTER DATE:  19    DIAGNOSIS:  Visit Diagnoses     ICD-10-CM   1. Endometrial cancer (HCC) C54.1     Endometrial cancer (HCC)  Staging form: Corpus Uteri - Carcinoma and Carcinosarcoma, AJCC 8th Edition  - Pathologic stage from 2019: FIGO Stage IB (pT1b, pN0, cM0) - Signed by Galdino RIDLEY M.D. on 2019  Histologic grade (G): G2  Histologic grading system: 3 grade system  Lymph-vascular invasion (LVI): LVI not present (absent)/not identified  Residual tumor (R): R0 - None  Histopathologic type: Endometrioid adenocarcinoma, NOS  Diagnostic confirmation: Positive histology  Specimen type: Excision  Staged by: Managing physician  Lymph node metastasis: Absent  Morcellation performed: No      TREATMENT SUMMARY:  Radiation Therapy Episodes     Radiation Therapy: IMRT (2019)      Radiation Treatments     Plan Most Recent Treatment Date Elapsed Course Treatment Days Fractions Treated Prescribed Fraction Dose (cGy) Prescribed Total Dose (cGy)    Pelvis 2019 37 @ [id number] 27 of 27 180 4,860       Reference Point Most Recent Treatment Date Elapsed Course Treatment Days Most Recent Session Dose (cGy) Total Dose (cGy)    Pelvis 2019 37 @ [id number] 180 4,860    Pelvis CP 2019 37 @ [id number] 184 4,971          SUBJECTIVE:  Adjuvant XRT to pelvis without chemotherapy for Stage IB endometrial cancer.  Sudden onset of abdominal pain, epigastric and RUQ  With radiation to back. Pain described as \"bomb\" continuous. Some vomiting. Had small stool this am. Not passing gas. Took pyridium for dysuria.      VITAL SIGNS:  KPS: 80, Normal activity with effort; some signs or symptoms of disease (ECOG equivalent 1)  Encounter Vitals  Temperature: 36.6 °C (97.9 " °F)  Temp src: Temporal  Blood Pressure : 135/63  Pulse: 72  Pulse Oximetry: 100 %  Weight: 92.5 kg (203 lb 15 oz)  Pain Score: 7=Moderate-Severe Pain  Pain Assessment 9/18/2019 9/11/2019 9/11/2019   Pain Assessment Acute Pain Acute Pain -   Pain Score 7 2 2   Pain Loc Abdomen Pelvic Pelvic   What decreases pain? pain medication  - -   Some recent data might be hidden          PHYSICAL EXAM:  Physical Exam   Constitutional: She is oriented to person, place, and time. She appears well-developed and well-nourished.   HENT:   Head: Normocephalic.   Abdominal: Soft.   Tenderness mid abdomen and ruq. Decreased bowel sounds.    Neurological: She is alert and oriented to person, place, and time.   Skin: Skin is warm and dry. No erythema.   Psychiatric: She has a normal mood and affect. Her behavior is normal. Judgment and thought content normal.   Nursing note and vitals reviewed.       Toxicity Assessment 9/18/2019 9/11/2019 9/4/2019 8/28/2019 8/14/2019   Toxicity Assessment Female Pelvis Female Pelvis Female Pelvis Female Pelvis Female Pelvis   Fatigue (lethargy, malaise, asthenia) Increased fatigue over baseline, but not altering normal activities Increased fatigue over baseline, but not altering normal activities Increased fatigue over baseline, but not altering normal activities Increased fatigue over baseline, but not altering normal activities None   Radiation Dermatitis None None None None None   Anorexia Loss of appetite Loss of appetite None None None   Colitis None - None Abdominal pain with mucus and/or blood in stool None   Constipation None None None None None   Dehydration None None None None None   Diarrhea w/o Colostomy None None None Increase of 4-6 stools/day, or nocternal stools None   Flatulence None None None Mild None   Nausea Oral intake significantly decreased None None None None   Proctitis None - - Increased stool frequency, occasional blood-streaked stools or rectal discomfort (including  hemorrhoids) not requiring medication None   Vomiting 1 episode in 24 hours over pretreatment None None None None   RT - Pain due to RT None Mild pain not interfering with function None None None   Tumor Pain (onset or exacerbation of tumor pain due to treatment) None - - None -   Dysuria (painful urination) None Mild symptoms requiring no intervention None None None   Urinary Frequency Normal Normal Normal Normal -   Urinary Urgency None None - None -   Bladder Spasms Absent - Absent Absent -   Incontinence None - None None -   Urinary Retention Normal - Normal Normal Normal   Vaginitis (not due to infection) None - - None -   Vaginal Bleeding None None None None -   Vaginal Dryness Normal - - Normal -       CURRENT MEDICATIONS:    Current Outpatient Medications:   •   MG Cap, Take 100 mg by mouth., Disp: , Rfl: 1  •  metFORMIN (GLUCOPHAGE) 500 MG Tab, Take 500 mg by mouth 2 times a day, with meals., Disp: , Rfl: 4  •  lisinopril (PRINIVIL) 10 MG Tab, Take 10 mg by mouth every morning., Disp: , Rfl:     LABORATORY DATA:   Lab Results   Component Value Date/Time    SODIUM 138 08/26/2019 01:11 PM    POTASSIUM 4.3 08/26/2019 01:11 PM    CHLORIDE 103 08/26/2019 01:11 PM    CO2 28 08/26/2019 01:11 PM    GLUCOSE 89 08/26/2019 01:11 PM    BUN 13 08/26/2019 01:11 PM    CREATININE 0.94 08/26/2019 01:11 PM       Lab Results   Component Value Date/Time    WBC 5.4 08/26/2019 01:11 PM    HEMOGLOBIN 11.8 (L) 08/26/2019 01:11 PM    HEMATOCRIT 39.3 08/26/2019 01:11 PM    MCV 96.8 08/26/2019 01:11 PM    PLATELETCT 188 08/26/2019 01:11 PM        RADIOLOGY DATA:  No results found.    IMPRESSION:  Cancer Staging  Endometrial cancer (HCC)  Staging form: Corpus Uteri - Carcinoma and Carcinosarcoma, AJCC 8th Edition  - Pathologic stage from 8/1/2019: FIGO Stage IB (pT1b, pN0, cM0) - Signed by Galdino RIDLEY M.D. on 8/1/2019      PLAN:  Completed therapy. Refer to ER for additional workup for acute  abdomen.    Disposition:  Treatment plan reviewed. Questions answered. Continue therapy outlined.     Galdino RIDLEY M.D.    Orders Placed This Encounter   • Rad Onc Aria Session Summary

## 2019-09-19 ENCOUNTER — PATIENT OUTREACH (OUTPATIENT)
Dept: HEALTH INFORMATION MANAGEMENT | Facility: OTHER | Age: 69
End: 2019-09-19

## 2019-09-19 ENCOUNTER — ANESTHESIA EVENT (OUTPATIENT)
Dept: SURGERY | Facility: MEDICAL CENTER | Age: 69
DRG: 419 | End: 2019-09-19
Payer: MEDICARE

## 2019-09-19 ENCOUNTER — ANESTHESIA (OUTPATIENT)
Dept: SURGERY | Facility: MEDICAL CENTER | Age: 69
DRG: 419 | End: 2019-09-19
Payer: MEDICARE

## 2019-09-19 PROBLEM — E11.9 DIABETES MELLITUS (HCC): Status: ACTIVE | Noted: 2019-09-19

## 2019-09-19 PROBLEM — G47.30 SLEEP APNEA: Status: ACTIVE | Noted: 2018-04-06

## 2019-09-19 PROBLEM — K81.0 ACUTE CHOLECYSTITIS: Status: ACTIVE | Noted: 2019-09-19

## 2019-09-19 LAB
ALBUMIN SERPL BCP-MCNC: 3.7 G/DL (ref 3.2–4.9)
ALBUMIN/GLOB SERPL: 1.3 G/DL
ALP SERPL-CCNC: 55 U/L (ref 30–99)
ALT SERPL-CCNC: 8 U/L (ref 2–50)
ANION GAP SERPL CALC-SCNC: 9 MMOL/L (ref 0–11.9)
AST SERPL-CCNC: 13 U/L (ref 12–45)
BASOPHILS # BLD AUTO: 0.2 % (ref 0–1.8)
BASOPHILS # BLD: 0.01 K/UL (ref 0–0.12)
BILIRUB SERPL-MCNC: 0.4 MG/DL (ref 0.1–1.5)
BUN SERPL-MCNC: 9 MG/DL (ref 8–22)
CALCIUM SERPL-MCNC: 8.8 MG/DL (ref 8.5–10.5)
CHLORIDE SERPL-SCNC: 106 MMOL/L (ref 96–112)
CO2 SERPL-SCNC: 26 MMOL/L (ref 20–33)
CREAT SERPL-MCNC: 0.55 MG/DL (ref 0.5–1.4)
EOSINOPHIL # BLD AUTO: 0.16 K/UL (ref 0–0.51)
EOSINOPHIL NFR BLD: 3.7 % (ref 0–6.9)
ERYTHROCYTE [DISTWIDTH] IN BLOOD BY AUTOMATED COUNT: 53.9 FL (ref 35.9–50)
GLOBULIN SER CALC-MCNC: 2.8 G/DL (ref 1.9–3.5)
GLUCOSE BLD-MCNC: 111 MG/DL (ref 65–99)
GLUCOSE BLD-MCNC: 125 MG/DL (ref 65–99)
GLUCOSE BLD-MCNC: 138 MG/DL (ref 65–99)
GLUCOSE BLD-MCNC: 96 MG/DL (ref 65–99)
GLUCOSE SERPL-MCNC: 121 MG/DL (ref 65–99)
HCT VFR BLD AUTO: 36.3 % (ref 37–47)
HGB BLD-MCNC: 11.6 G/DL (ref 12–16)
IMM GRANULOCYTES # BLD AUTO: 0.02 K/UL (ref 0–0.11)
IMM GRANULOCYTES NFR BLD AUTO: 0.5 % (ref 0–0.9)
LYMPHOCYTES # BLD AUTO: 0.41 K/UL (ref 1–4.8)
LYMPHOCYTES NFR BLD: 9.5 % (ref 22–41)
MAGNESIUM SERPL-MCNC: 2 MG/DL (ref 1.5–2.5)
MCH RBC QN AUTO: 30.3 PG (ref 27–33)
MCHC RBC AUTO-ENTMCNC: 32 G/DL (ref 33.6–35)
MCV RBC AUTO: 94.8 FL (ref 81.4–97.8)
MONOCYTES # BLD AUTO: 0.55 K/UL (ref 0–0.85)
MONOCYTES NFR BLD AUTO: 12.7 % (ref 0–13.4)
NEUTROPHILS # BLD AUTO: 3.17 K/UL (ref 2–7.15)
NEUTROPHILS NFR BLD: 73.4 % (ref 44–72)
NRBC # BLD AUTO: 0 K/UL
NRBC BLD-RTO: 0 /100 WBC
PATHOLOGY CONSULT NOTE: NORMAL
PLATELET # BLD AUTO: 184 K/UL (ref 164–446)
PMV BLD AUTO: 8.8 FL (ref 9–12.9)
POTASSIUM SERPL-SCNC: 4 MMOL/L (ref 3.6–5.5)
PROT SERPL-MCNC: 6.5 G/DL (ref 6–8.2)
RBC # BLD AUTO: 3.83 M/UL (ref 4.2–5.4)
SODIUM SERPL-SCNC: 141 MMOL/L (ref 135–145)
WBC # BLD AUTO: 4.3 K/UL (ref 4.8–10.8)

## 2019-09-19 PROCEDURE — 501584 HCHG TROCAR, THRD CAN&SEAL11X100: Performed by: SURGERY

## 2019-09-19 PROCEDURE — 160035 HCHG PACU - 1ST 60 MINS PHASE I: Performed by: SURGERY

## 2019-09-19 PROCEDURE — 700102 HCHG RX REV CODE 250 W/ 637 OVERRIDE(OP): Performed by: ANESTHESIOLOGY

## 2019-09-19 PROCEDURE — 501577 HCHG TROCAR, STEP 11MM: Performed by: SURGERY

## 2019-09-19 PROCEDURE — 700102 HCHG RX REV CODE 250 W/ 637 OVERRIDE(OP): Performed by: SURGERY

## 2019-09-19 PROCEDURE — 501338 HCHG SHEARS, ENDO: Performed by: SURGERY

## 2019-09-19 PROCEDURE — 501838 HCHG SUTURE GENERAL: Performed by: SURGERY

## 2019-09-19 PROCEDURE — 770006 HCHG ROOM/CARE - MED/SURG/GYN SEMI*

## 2019-09-19 PROCEDURE — 0FT44ZZ RESECTION OF GALLBLADDER, PERCUTANEOUS ENDOSCOPIC APPROACH: ICD-10-PCS | Performed by: SURGERY

## 2019-09-19 PROCEDURE — A9270 NON-COVERED ITEM OR SERVICE: HCPCS | Performed by: STUDENT IN AN ORGANIZED HEALTH CARE EDUCATION/TRAINING PROGRAM

## 2019-09-19 PROCEDURE — 160002 HCHG RECOVERY MINUTES (STAT): Performed by: SURGERY

## 2019-09-19 PROCEDURE — 500868 HCHG NEEDLE, SURGI(VARES): Performed by: SURGERY

## 2019-09-19 PROCEDURE — 700101 HCHG RX REV CODE 250: Performed by: SURGERY

## 2019-09-19 PROCEDURE — 700101 HCHG RX REV CODE 250: Performed by: STUDENT IN AN ORGANIZED HEALTH CARE EDUCATION/TRAINING PROGRAM

## 2019-09-19 PROCEDURE — 160048 HCHG OR STATISTICAL LEVEL 1-5: Performed by: SURGERY

## 2019-09-19 PROCEDURE — 700105 HCHG RX REV CODE 258: Performed by: ANESTHESIOLOGY

## 2019-09-19 PROCEDURE — 700111 HCHG RX REV CODE 636 W/ 250 OVERRIDE (IP): Performed by: STUDENT IN AN ORGANIZED HEALTH CARE EDUCATION/TRAINING PROGRAM

## 2019-09-19 PROCEDURE — 700102 HCHG RX REV CODE 250 W/ 637 OVERRIDE(OP): Performed by: STUDENT IN AN ORGANIZED HEALTH CARE EDUCATION/TRAINING PROGRAM

## 2019-09-19 PROCEDURE — 82962 GLUCOSE BLOOD TEST: CPT | Mod: 91

## 2019-09-19 PROCEDURE — 160036 HCHG PACU - EA ADDL 30 MINS PHASE I: Performed by: SURGERY

## 2019-09-19 PROCEDURE — A9270 NON-COVERED ITEM OR SERVICE: HCPCS | Performed by: SURGERY

## 2019-09-19 PROCEDURE — 501399 HCHG SPECIMAN BAG, ENDO CATC: Performed by: SURGERY

## 2019-09-19 PROCEDURE — 80053 COMPREHEN METABOLIC PANEL: CPT

## 2019-09-19 PROCEDURE — 160009 HCHG ANES TIME/MIN: Performed by: SURGERY

## 2019-09-19 PROCEDURE — 36415 COLL VENOUS BLD VENIPUNCTURE: CPT

## 2019-09-19 PROCEDURE — 501583 HCHG TROCAR, THRD CAN&SEAL 5X100: Performed by: SURGERY

## 2019-09-19 PROCEDURE — 700101 HCHG RX REV CODE 250: Performed by: ANESTHESIOLOGY

## 2019-09-19 PROCEDURE — 160039 HCHG SURGERY MINUTES - EA ADDL 1 MIN LEVEL 3: Performed by: SURGERY

## 2019-09-19 PROCEDURE — A9270 NON-COVERED ITEM OR SERVICE: HCPCS | Performed by: ANESTHESIOLOGY

## 2019-09-19 PROCEDURE — 88304 TISSUE EXAM BY PATHOLOGIST: CPT

## 2019-09-19 PROCEDURE — 501570 HCHG TROCAR, SEPARATOR: Performed by: SURGERY

## 2019-09-19 PROCEDURE — 85025 COMPLETE CBC W/AUTO DIFF WBC: CPT

## 2019-09-19 PROCEDURE — 500002 HCHG ADHESIVE, DERMABOND: Performed by: SURGERY

## 2019-09-19 PROCEDURE — 502571 HCHG PACK, LAP CHOLE: Performed by: SURGERY

## 2019-09-19 PROCEDURE — 700105 HCHG RX REV CODE 258: Performed by: STUDENT IN AN ORGANIZED HEALTH CARE EDUCATION/TRAINING PROGRAM

## 2019-09-19 PROCEDURE — 83735 ASSAY OF MAGNESIUM: CPT

## 2019-09-19 PROCEDURE — 700111 HCHG RX REV CODE 636 W/ 250 OVERRIDE (IP): Performed by: ANESTHESIOLOGY

## 2019-09-19 PROCEDURE — 160028 HCHG SURGERY MINUTES - 1ST 30 MINS LEVEL 3: Performed by: SURGERY

## 2019-09-19 RX ORDER — OXYCODONE HCL 5 MG/5 ML
5 SOLUTION, ORAL ORAL
Status: COMPLETED | OUTPATIENT
Start: 2019-09-19 | End: 2019-09-19

## 2019-09-19 RX ORDER — BUPIVACAINE HYDROCHLORIDE AND EPINEPHRINE 2.5; 5 MG/ML; UG/ML
INJECTION, SOLUTION EPIDURAL; INFILTRATION; INTRACAUDAL; PERINEURAL
Status: DISCONTINUED | OUTPATIENT
Start: 2019-09-19 | End: 2019-09-19 | Stop reason: HOSPADM

## 2019-09-19 RX ORDER — ONDANSETRON 2 MG/ML
4 INJECTION INTRAMUSCULAR; INTRAVENOUS
Status: DISCONTINUED | OUTPATIENT
Start: 2019-09-19 | End: 2019-09-19 | Stop reason: HOSPADM

## 2019-09-19 RX ORDER — HYDROMORPHONE HYDROCHLORIDE 1 MG/ML
0.1 INJECTION, SOLUTION INTRAMUSCULAR; INTRAVENOUS; SUBCUTANEOUS
Status: DISCONTINUED | OUTPATIENT
Start: 2019-09-19 | End: 2019-09-19 | Stop reason: HOSPADM

## 2019-09-19 RX ORDER — HYDROMORPHONE HYDROCHLORIDE 1 MG/ML
0.4 INJECTION, SOLUTION INTRAMUSCULAR; INTRAVENOUS; SUBCUTANEOUS
Status: DISCONTINUED | OUTPATIENT
Start: 2019-09-19 | End: 2019-09-19 | Stop reason: HOSPADM

## 2019-09-19 RX ORDER — ONDANSETRON 4 MG/1
4 TABLET, ORALLY DISINTEGRATING ORAL EVERY 4 HOURS PRN
Status: DISCONTINUED | OUTPATIENT
Start: 2019-09-19 | End: 2019-09-20 | Stop reason: HOSPADM

## 2019-09-19 RX ORDER — HYDRALAZINE HYDROCHLORIDE 20 MG/ML
5 INJECTION INTRAMUSCULAR; INTRAVENOUS
Status: DISCONTINUED | OUTPATIENT
Start: 2019-09-19 | End: 2019-09-19 | Stop reason: HOSPADM

## 2019-09-19 RX ORDER — METOPROLOL TARTRATE 1 MG/ML
1 INJECTION, SOLUTION INTRAVENOUS
Status: DISCONTINUED | OUTPATIENT
Start: 2019-09-19 | End: 2019-09-19 | Stop reason: HOSPADM

## 2019-09-19 RX ORDER — MAGNESIUM SULFATE HEPTAHYDRATE 40 MG/ML
INJECTION, SOLUTION INTRAVENOUS PRN
Status: DISCONTINUED | OUTPATIENT
Start: 2019-09-19 | End: 2019-09-19 | Stop reason: SURG

## 2019-09-19 RX ORDER — HYDROMORPHONE HYDROCHLORIDE 1 MG/ML
0.2 INJECTION, SOLUTION INTRAMUSCULAR; INTRAVENOUS; SUBCUTANEOUS
Status: DISCONTINUED | OUTPATIENT
Start: 2019-09-19 | End: 2019-09-19 | Stop reason: HOSPADM

## 2019-09-19 RX ORDER — LISINOPRIL 10 MG/1
10 TABLET ORAL EVERY MORNING
Status: DISCONTINUED | OUTPATIENT
Start: 2019-09-19 | End: 2019-09-20 | Stop reason: HOSPADM

## 2019-09-19 RX ORDER — SODIUM CHLORIDE, SODIUM LACTATE, POTASSIUM CHLORIDE, CALCIUM CHLORIDE 600; 310; 30; 20 MG/100ML; MG/100ML; MG/100ML; MG/100ML
INJECTION, SOLUTION INTRAVENOUS CONTINUOUS
Status: DISCONTINUED | OUTPATIENT
Start: 2019-09-19 | End: 2019-09-19 | Stop reason: HOSPADM

## 2019-09-19 RX ORDER — ONDANSETRON 2 MG/ML
INJECTION INTRAMUSCULAR; INTRAVENOUS PRN
Status: DISCONTINUED | OUTPATIENT
Start: 2019-09-19 | End: 2019-09-19 | Stop reason: SURG

## 2019-09-19 RX ORDER — OXYCODONE HCL 5 MG/5 ML
5 SOLUTION, ORAL ORAL
Status: DISCONTINUED | OUTPATIENT
Start: 2019-09-19 | End: 2019-09-19 | Stop reason: HOSPADM

## 2019-09-19 RX ORDER — HALOPERIDOL 5 MG/ML
1 INJECTION INTRAMUSCULAR
Status: DISCONTINUED | OUTPATIENT
Start: 2019-09-19 | End: 2019-09-19 | Stop reason: HOSPADM

## 2019-09-19 RX ORDER — ONDANSETRON 2 MG/ML
4 INJECTION INTRAMUSCULAR; INTRAVENOUS EVERY 4 HOURS PRN
Status: DISCONTINUED | OUTPATIENT
Start: 2019-09-19 | End: 2019-09-20 | Stop reason: HOSPADM

## 2019-09-19 RX ORDER — OXYCODONE HYDROCHLORIDE 5 MG/1
5-10 TABLET ORAL EVERY 4 HOURS PRN
Status: DISCONTINUED | OUTPATIENT
Start: 2019-09-19 | End: 2019-09-20 | Stop reason: HOSPADM

## 2019-09-19 RX ORDER — MORPHINE SULFATE 4 MG/ML
1 INJECTION, SOLUTION INTRAMUSCULAR; INTRAVENOUS EVERY 4 HOURS PRN
Status: DISCONTINUED | OUTPATIENT
Start: 2019-09-19 | End: 2019-09-19

## 2019-09-19 RX ORDER — LABETALOL HYDROCHLORIDE 5 MG/ML
5 INJECTION, SOLUTION INTRAVENOUS
Status: DISCONTINUED | OUTPATIENT
Start: 2019-09-19 | End: 2019-09-19 | Stop reason: HOSPADM

## 2019-09-19 RX ORDER — OXYCODONE HCL 5 MG/5 ML
10 SOLUTION, ORAL ORAL
Status: COMPLETED | OUTPATIENT
Start: 2019-09-19 | End: 2019-09-19

## 2019-09-19 RX ORDER — MEPERIDINE HYDROCHLORIDE 25 MG/ML
12.5 INJECTION INTRAMUSCULAR; INTRAVENOUS; SUBCUTANEOUS
Status: DISCONTINUED | OUTPATIENT
Start: 2019-09-19 | End: 2019-09-19 | Stop reason: HOSPADM

## 2019-09-19 RX ORDER — MORPHINE SULFATE 4 MG/ML
2 INJECTION, SOLUTION INTRAMUSCULAR; INTRAVENOUS
Status: DISCONTINUED | OUTPATIENT
Start: 2019-09-19 | End: 2019-09-20 | Stop reason: HOSPADM

## 2019-09-19 RX ORDER — METOCLOPRAMIDE HYDROCHLORIDE 5 MG/ML
INJECTION INTRAMUSCULAR; INTRAVENOUS PRN
Status: DISCONTINUED | OUTPATIENT
Start: 2019-09-19 | End: 2019-09-19 | Stop reason: SURG

## 2019-09-19 RX ORDER — OXYCODONE HCL 5 MG/5 ML
10 SOLUTION, ORAL ORAL
Status: DISCONTINUED | OUTPATIENT
Start: 2019-09-19 | End: 2019-09-19 | Stop reason: HOSPADM

## 2019-09-19 RX ORDER — SODIUM CHLORIDE, SODIUM LACTATE, POTASSIUM CHLORIDE, CALCIUM CHLORIDE 600; 310; 30; 20 MG/100ML; MG/100ML; MG/100ML; MG/100ML
INJECTION, SOLUTION INTRAVENOUS
Status: DISCONTINUED | OUTPATIENT
Start: 2019-09-19 | End: 2019-09-19 | Stop reason: SURG

## 2019-09-19 RX ADMIN — OXYCODONE HYDROCHLORIDE 5 MG: 5 TABLET ORAL at 14:43

## 2019-09-19 RX ADMIN — FENTANYL CITRATE 25 MCG: 50 INJECTION INTRAMUSCULAR; INTRAVENOUS at 08:48

## 2019-09-19 RX ADMIN — SODIUM CHLORIDE, POTASSIUM CHLORIDE, SODIUM LACTATE AND CALCIUM CHLORIDE: 600; 310; 30; 20 INJECTION, SOLUTION INTRAVENOUS at 08:18

## 2019-09-19 RX ADMIN — MAGNESIUM SULFATE IN WATER 2 G: 40 INJECTION, SOLUTION INTRAVENOUS at 07:34

## 2019-09-19 RX ADMIN — METRONIDAZOLE 500 MG: 500 INJECTION, SOLUTION INTRAVENOUS at 06:05

## 2019-09-19 RX ADMIN — OXYCODONE HYDROCHLORIDE 10 MG: 5 TABLET ORAL at 21:34

## 2019-09-19 RX ADMIN — ONDANSETRON 4 MG: 2 INJECTION INTRAMUSCULAR; INTRAVENOUS at 08:15

## 2019-09-19 RX ADMIN — SENNOSIDES, DOCUSATE SODIUM 2 TABLET: 50; 8.6 TABLET, FILM COATED ORAL at 06:05

## 2019-09-19 RX ADMIN — PROPOFOL 150 MG: 10 INJECTION, EMULSION INTRAVENOUS at 07:34

## 2019-09-19 RX ADMIN — SUCCINYLCHOLINE CHLORIDE 100 MG: 20 INJECTION, SOLUTION INTRAMUSCULAR; INTRAVENOUS at 07:34

## 2019-09-19 RX ADMIN — METOCLOPRAMIDE 10 MG: 5 INJECTION, SOLUTION INTRAMUSCULAR; INTRAVENOUS at 08:16

## 2019-09-19 RX ADMIN — ROCURONIUM BROMIDE 50 MG: 10 INJECTION, SOLUTION INTRAVENOUS at 07:34

## 2019-09-19 RX ADMIN — OXYCODONE HYDROCHLORIDE 5 MG: 5 SOLUTION ORAL at 08:47

## 2019-09-19 RX ADMIN — FENTANYL CITRATE 25 MCG: 50 INJECTION, SOLUTION INTRAMUSCULAR; INTRAVENOUS at 08:08

## 2019-09-19 RX ADMIN — METRONIDAZOLE 500 MG: 500 INJECTION, SOLUTION INTRAVENOUS at 00:11

## 2019-09-19 RX ADMIN — FENTANYL CITRATE 25 MCG: 50 INJECTION, SOLUTION INTRAMUSCULAR; INTRAVENOUS at 07:57

## 2019-09-19 RX ADMIN — LIDOCAINE HYDROCHLORIDE 40 MG: 20 INJECTION, SOLUTION INTRAVENOUS at 07:34

## 2019-09-19 RX ADMIN — FENTANYL CITRATE 50 MCG: 50 INJECTION, SOLUTION INTRAMUSCULAR; INTRAVENOUS at 08:20

## 2019-09-19 RX ADMIN — SUGAMMADEX 200 MG: 100 INJECTION, SOLUTION INTRAVENOUS at 08:18

## 2019-09-19 RX ADMIN — SENNOSIDES, DOCUSATE SODIUM 2 TABLET: 50; 8.6 TABLET, FILM COATED ORAL at 00:11

## 2019-09-19 RX ADMIN — LISINOPRIL 10 MG: 10 TABLET ORAL at 06:05

## 2019-09-19 RX ADMIN — METRONIDAZOLE 500 MG: 500 INJECTION, SOLUTION INTRAVENOUS at 21:45

## 2019-09-19 RX ADMIN — METRONIDAZOLE 500 MG: 500 INJECTION, SOLUTION INTRAVENOUS at 14:39

## 2019-09-19 RX ADMIN — CEFTRIAXONE SODIUM 2 G: 2 INJECTION, POWDER, FOR SOLUTION INTRAMUSCULAR; INTRAVENOUS at 18:50

## 2019-09-19 RX ADMIN — SODIUM CHLORIDE: 9 INJECTION, SOLUTION INTRAVENOUS at 00:11

## 2019-09-19 ASSESSMENT — ENCOUNTER SYMPTOMS
EYE DISCHARGE: 0
VOMITING: 1
MYALGIAS: 0
PHOTOPHOBIA: 0
COUGH: 0
NECK PAIN: 0
NAUSEA: 0
DIARRHEA: 0
SINUS PAIN: 0
BACK PAIN: 0
HEARTBURN: 0
BLURRED VISION: 0
PALPITATIONS: 0
EYE REDNESS: 0
WEIGHT LOSS: 0
DIZZINESS: 0
CONSTIPATION: 0
SEIZURES: 0
SPUTUM PRODUCTION: 0
CHILLS: 0
LOSS OF CONSCIOUSNESS: 0
EYE PAIN: 0
HEMOPTYSIS: 0
FEVER: 0
DEPRESSION: 0
WHEEZING: 0
DOUBLE VISION: 0
VOMITING: 0
TINGLING: 0
WEAKNESS: 0
ABDOMINAL PAIN: 1
ORTHOPNEA: 0
SHORTNESS OF BREATH: 0
HEADACHES: 0
NAUSEA: 1

## 2019-09-19 ASSESSMENT — LIFESTYLE VARIABLES: SUBSTANCE_ABUSE: 0

## 2019-09-19 NOTE — CARE PLAN
Problem: Communication  Goal: The ability to communicate needs accurately and effectively will improve  Outcome: PROGRESSING AS EXPECTED  Will use . Encouraged questions     Problem: Infection  Goal: Will remain free from infection  Outcome: PROGRESSING AS EXPECTED  Standard precautions implemented. Educated on infection prevention

## 2019-09-19 NOTE — SENIOR ADMIT NOTE
Senior Admit Note     CC: RUQ pain     HPI:  Mrs. Schmitz is a pleasant 70 yo Senegalese speaking female with PMhx of DM, HLD, HTN, CANDIDA, mild-moderate Aortic stenosis, and hx of endometrial cancer s/p radiation (last treatment today) who presents to the ED with family due to abdominal pain that started at 3 am last night and awoke her from sleep. Pain is sharp is located in the epigastric, RUQ region with radiation to the back and associated with nausea and vomiting. Pain has been persistent until today thus she presented to the ED. She denies fever or chills, diarrhea, blood in her stool, chest pain or shortness of breath.     In ED, Temp 97.2, HR 73, RR 16, /66, 96% on RA. Labs remarkable for normal WBC with left shit, electrolytes wnl aside from glucose 118.     CT abdomen shows distended gallbladder with thickened wall and pericholecystic fluid suggestive of early cholecystitis. US confirmed findings of acute cholecystitis     Physical Exam   Constitutional: She is oriented to person, place, and time. She appears well-developed and well-nourished. No distress.   Appears uncomfortable but in no acute distress    HENT:   Head: Normocephalic and atraumatic.   Dry mucous membranes   Eyes: Pupils are equal, round, and reactive to light. Conjunctivae and EOM are normal. No scleral icterus.   Neck: Neck supple. No JVD present.   Cardiovascular: Normal rate, regular rhythm and intact distal pulses.   Murmur heard.  Systolic murmur head best and left sternal border   Pulmonary/Chest: Effort normal and breath sounds normal. No respiratory distress. She has no wheezes.   Abdominal: Soft. She exhibits no distension. There is tenderness.   Tenderness in the epigastric region and RUQ, no rebound    Musculoskeletal: Normal range of motion. She exhibits no edema.   Neurological: She is alert and oriented to person, place, and time. No cranial nerve deficit.   Skin: Skin is warm and dry. No erythema.   Psychiatric: She has a  normal mood and affect. Thought content normal.     Assessment and Plan     68 yo Female present with acute RUQ pain, nausea and vomiting with imaging suggestive of acute cholecystitis     - admit to surgery   - general Sx Dr. Blankenship following, plan to go to OR tomorrow for cholecystectomy   - NPO at midnight, SCDs for DVT prophylaxis   - continue IVF  - continue with ceftriaxone and flagyl   - ISS, hypoglycemic protocol   - supportive care with pain control and antiemetics     Vanda Whitney MD

## 2019-09-19 NOTE — ASSESSMENT & PLAN NOTE
- On metformin 500 mg BD at home  - last HbA1c was 6.6 from April 2019  - Started on ISS and hypoglycemia protocol

## 2019-09-19 NOTE — DISCHARGE SUMMARY
Internal Medicine Discharge Summary  Note Author: Griselda Guaman M.D.       Name Madelyn Schmitz 1950   Age/Sex 69 y.o. female   MRN [id number]         Admit Date:  2019       Discharge Date:   2019    Service:   [facility] Internal Medicine Purple Team  Attending Physician(s):   Dr. Stephens    Senior Resident(s):   Dr. Guaman  Christoph Resident(s):   Dr. Hernandez  PCP: Pcp Pt States None      Primary Diagnosis:   Acute cholecystitis    Secondary Diagnoses:                Principal Problem:    Diabetes mellitus (HCC) POA: Yes  Active Problems:    Acute cholecystitis POA: Yes    Sleep apnea POA: Yes    Hypertension POA: Yes  Resolved Problems:    * No resolved hospital problems. *      Hospital Summary (Brief Narrative):       69-year-old Lithuanian-speaking woman with past medical history of endometrial cancer diagnosed 2019 on radiation therapy, type 2 diabetes mellitus, hypertension, hyperlipidemia, obstructive sleep apnea, mild-moderate aortic stenosis, s/p hysterectomy presented with 1-day history of epigastric, right upper quadrant abdominal pain, nausea, vomiting secondary to acute cholecystitis.  Afebrile, hemodynamically stable, with no leukocytosis, negative lipase, troponin.  CT abdomen pelvis and US right abdominal quadrant showed distended gallbladder with sludge and wall thickening, no cholelithiasis, suggestive of early acute cholecystitis.  Initiated on fluids, ceftriaxone, Flagyl.  Underwent laparoscopic cholecystectomy with ICG imaging of biliary system 2019 without complications.  Able to tolerate oral intake well afterwards.  Given increased risk of infection with perforated gallbladder and associated purulence as noted during procedure, patient discharged with Flagyl and Cipro (total 7-day course including inpatient antibiotics).  Oxygen saturation was >95% on room air the morning of discharge.  Patient to follow up closely with PCP and general surgery.  Patient  Problem: Goal Outcome Summary  Goal: Goal Outcome Summary  Outcome: Declining  Neuro: PERRLA.  Pt unable to follow commands.  Able to move all extremities.    CV: ST at the start of the shift. SR this am after precedex started.  Episodes of bradycardia with suction and turns.  Tele HUB aware.   Pulmonary: No changes made to the ventilator. Lungs coarse with thick secretions.   GI/:  Hyperactive overnight.  Levine still draining bright red. UOP adequate. Tube feeding now at goal.   Skin: No major skin issues   Drips: Precedex at 1 mcg/kg/hr. D5 at 75 mL/hr.               discharged home in stable condition.    Consultants:     General surgery    Procedures:        Laparoscopic cholecystectomy with ICG imaging of biliary system 9/19/2019    Imaging/ Testing:      US-RUQ   Final Result      Distended gallbladder with mild pericholecystic fluid. The gallbladder thickness measures an approximately 4 mm. These findings are concerning for acute cholecystitis.      CT-ABDOMEN-PELVIS WITH   Final Result      1.  Distended gallbladder with mildly thickened wall and small amount of pericholecystic fluid, suggestive of early acute cholecystitis. Consider ultrasound to evaluate for gallstones.   2.  Hysterectomy. No metastatic disease in the abdomen or pelvis.   3.  Colonic diverticulosis.   4.  Small hiatal hernia.      DX-CHEST-PORTABLE (1 VIEW)   Final Result      1.  There is minimal opacity at the left CP angle which could be due to a small amount of pleural fluid or atelectasis.          Discharge Medications:         Medication Reconciliation: Completed       Medication List      START taking these medications      Instructions   acetaminophen 325 MG Tabs  Commonly known as:  TYLENOL   Take 2 Tabs by mouth every 6 hours as needed (Mild Pain; (Pain scale 1-3); Temp greater than 100.5 F).  Dose:  650 mg     ciprofloxacin 500 MG Tabs  Commonly known as:  CIPRO   Take 1 Tab by mouth 2 times a day for 11 doses. Start taking 9/20/2019  Dose:  500 mg     metroNIDAZOLE 500 MG Tabs  Commonly known as:  FLAGYL   Take 1 Tab by mouth every 8 hours for 17 doses. Start taking 9/20/2019  Dose:  500 mg     oxyCODONE immediate-release 5 MG Tabs  Commonly known as:  ROXICODONE   Take 1 Tab by mouth every 6 hours as needed for Severe Pain for up to 3 days.  Dose:  5 mg        CONTINUE taking these medications      Instructions   docusate sodium 100 MG Caps  Commonly known as:  COLACE   Take 100 mg by mouth every day.  Dose:  100 mg     lisinopril 10 MG Tabs  Commonly known as:  PRINIVIL   Take 10 mg by  mouth every morning.  Dose:  10 mg     metFORMIN 500 MG Tabs  Commonly known as:  GLUCOPHAGE   Take 500 mg by mouth 2 times a day, with meals.  Dose:  500 mg            Disposition:   Discharge home    Diet:   Low-fat, diabetic    Activity:   As tolerated    Instructions:        The patient was instructed to return to the ER in the event of worsening symptoms. I have counseled the patient on the importance of compliance and the patient has agreed to proceed with all medical recommendations and follow up plan indicated above.   The patient understands that all medications come with benefits and risks. Risks may include permanent injury or death and these risks can be minimized with close reassessment and monitoring.        Primary Care Provider:    Dr. Mike      Follow up appointment details :      Future Appointments   Date Time Provider Department Center   11/26/2019  1:00 PM JV Dawson [facility] None   12/10/2019  8:40 AM Anirudh Pappas M.D. [facility] None   9/27/2019, 3:20 PM, Dr. Johnson, General Surgery  9/26/2019, 1 PM, Dr. Mike, Geriatrics, PCP    Pending Studies:        Gallbladder pathology    Time spent on discharge day patient visit, preparing discharge paperwork and arranging for patient follow up.    Summary of follow up issues:   Postoperative follow-up  Management of chronic issues per PCP, oncology, radiation oncology, cardiology    Discharge Time (Minutes) :    50 minutes  Hospital Course Type:  Inpatient Stay >2 midnights      Condition on Discharge  Stable  ______________________________________________________________________    Interval history/exam for day of discharge:     Constitutional: She is oriented to person, place, and time and well-developed, well-nourished, and in no distress.  Head: Normocephalic and atraumatic.   Eyes: Conjunctivae and EOM are normal.  Neck: Normal range of motion. Neck supple.   Cardiovascular: Normal rate and regular rhythm. Exam reveals  no gallop and no friction rub. 3/6 systolic murmur, loudest in right upper sternal border.  Pulmonary/Chest: Effort normal and breath sounds normal. No respiratory distress. She has no wheezes. She has no rales.   Abdominal: Soft. She exhibits no distension.  Small closed incisions apparent in right upper quadrant, clean, no significant erythema or drainage.  Musculoskeletal: Normal range of motion. She exhibits no edema or deformity.   Neurological: She is alert and oriented to person, place, and time. No focal deficits.  Skin: Skin is warm and dry.  Psychiatric: Mood, memory, affect and judgment normal.       Most Recent Labs:    Lab Results   Component Value Date/Time    WBC 6.3 09/20/2019 05:14 AM    RBC 3.69 (L) 09/20/2019 05:14 AM    HEMOGLOBIN 10.9 (L) 09/20/2019 05:14 AM    HEMATOCRIT 35.9 (L) 09/20/2019 05:14 AM    MCV 97.3 09/20/2019 05:14 AM    MCH 29.5 09/20/2019 05:14 AM    MCHC 30.4 (L) 09/20/2019 05:14 AM    MPV 8.8 (L) 09/20/2019 05:14 AM    NEUTSPOLYS 86.50 (H) 09/20/2019 05:14 AM    LYMPHOCYTES 4.30 (L) 09/20/2019 05:14 AM    MONOCYTES 7.60 09/20/2019 05:14 AM    EOSINOPHILS 1.10 09/20/2019 05:14 AM    BASOPHILS 0.20 09/20/2019 05:14 AM      Lab Results   Component Value Date/Time    SODIUM 139 09/20/2019 05:14 AM    POTASSIUM 4.0 09/20/2019 05:14 AM    CHLORIDE 106 09/20/2019 05:14 AM    CO2 26 09/20/2019 05:14 AM    GLUCOSE 128 (H) 09/20/2019 05:14 AM    BUN 9 09/20/2019 05:14 AM    CREATININE 0.59 09/20/2019 05:14 AM      Lab Results   Component Value Date/Time    ALTSGPT 20 09/20/2019 05:14 AM    ASTSGOT 29 09/20/2019 05:14 AM    ALKPHOSPHAT 46 09/20/2019 05:14 AM    TBILIRUBIN 0.4 09/20/2019 05:14 AM    LIPASE 17 09/18/2019 03:17 PM    ALBUMIN 3.3 09/20/2019 05:14 AM    GLOBULIN 2.7 09/20/2019 05:14 AM    INR 1.01 06/24/2019 01:49 PM     Lab Results   Component Value Date/Time    PROTHROMBTM 13.5 06/24/2019 01:49 PM    INR 1.01 06/24/2019 01:49 PM

## 2019-09-19 NOTE — PROGRESS NOTES
2 RN skin check completed with Sofya REICH RN  Devices in place-None  Confirmed pressure ulcers found on-None  Healed laparascopic incisions noted to abdomen. No bruising, no wounds. No edema. Skin pale.  Patient encouraged to turn and reposition herself often. Encouraged to ambulate.

## 2019-09-19 NOTE — ANESTHESIA TIME REPORT
Anesthesia Start and Stop Event Times     Date Time Event    9/19/2019 0641 Ready for Procedure     0726 Anesthesia Start     0829 Anesthesia Stop        Responsible Staff  09/19/19    Name Role Begin End    Ghulam Hill M.D. Anesth 0726 0829        Preop Diagnosis (Free Text):  Pre-op Diagnosis     Acute cholecystitis         Preop Diagnosis (Codes):    Post op Diagnosis  Acute cholecystitis      Premium Reason  Non-Premium    Comments:

## 2019-09-19 NOTE — PROGRESS NOTES
Progress Note               Author: Jenniffer NAVA Alex Date & Time created: 2019  6:55 AM     Interval History:  No significant events overnight.     Review of Systems:  Review of Systems   Constitutional: Negative for chills and fever.   Gastrointestinal: Negative for nausea and vomiting.       Physical Exam:  Physical Exam   Constitutional: She is oriented to person, place, and time. She appears well-developed and well-nourished. No distress.   HENT:   Head: Normocephalic and atraumatic.   Eyes: Conjunctivae are normal.   Neck: Normal range of motion.   Musculoskeletal: Normal range of motion.   Neurological: She is alert and oriented to person, place, and time.   Skin: Skin is warm and dry. She is not diaphoretic.       Labs:          Recent Labs     193   SODIUM 137  --    POTASSIUM 4.0  --    CHLORIDE 101  --    CO2 28  --    BUN 12  --    CREATININE 0.55  --    MAGNESIUM  --  2.0   CALCIUM 9.2  --      Recent Labs     19   ALTSGPT 9   ASTSGOT 13   ALKPHOSPHAT 58   TBILIRUBIN 0.4   LIPASE 17   GLUCOSE 118*     Recent Labs     19  0453   RBC 4.17* 3.83*   HEMOGLOBIN 12.7 11.6*   HEMATOCRIT 39.3 36.3*   PLATELETCT 220 184     Recent Labs     19  0453   WBC 6.7 4.3*   NEUTSPOLYS 82.80* 73.40*   LYMPHOCYTES 7.30* 9.50*   MONOCYTES 7.80 12.70   EOSINOPHILS 1.20 3.70   BASOPHILS 0.30 0.20   ASTSGOT 13  --    ALTSGPT 9  --    ALKPHOSPHAT 58  --    TBILIRUBIN 0.4  --      Hemodynamics:  Temp (24hrs), Av.3 °C (97.3 °F), Min:36 °C (96.8 °F), Max:36.4 °C (97.6 °F)  Temperature: 36.4 °C (97.6 °F)  Pulse  Av  Min: 66  Max: 84   Blood Pressure : 113/60     Respiratory:    Respiration: 17, Pulse Oximetry: 96 %           Fluids:    Intake/Output Summary (Last 24 hours) at 2019 0655  Last data filed at 2019 0202  Gross per 24 hour   Intake 353.55 ml   Output --   Net 353.55 ml     Weight: 91 kg (200 lb 9.9  oz)  GI/Nutrition:  Orders Placed This Encounter   Procedures   • Diet NPO     Standing Status:   Standing     Number of Occurrences:   1     Order Specific Question:   Restrict to:     Answer:   Sips with Medications [3]     Medical Decision Making, by Problem:  Active Hospital Problems    Diagnosis   • Acute cholecystitis [K81.0]   • Diabetes mellitus (HCC) [E11.9]   • Hypertension [I10]   • Sleep apnea [G47.30]       Plan:  To OR today for laparoscopic cholecystectomy.   Patient is on the bloodless program and understands she could die from this decision. She will accept auto-transfusion of her own blood if necessary.     Quality-Core Measures

## 2019-09-19 NOTE — OR NURSING
Patient awake and alert. Tolerating sips of water without issue. VSS. Pt has lap stabs to abdomen closed with dermabond and are CDI. Pt pain decreasing with pain medication. Pt denies nausea at this time.     FSBS 125 on arrival to PACU and orders from anesthesia to follow sliding scale insulin order in chart.  No dose required at this time.     Pt has been updated on plan of care and has no questions at this time. Will continue to monitor.     Language line used to translate.

## 2019-09-19 NOTE — ED NOTES
Med rec updated and complete. Allergies reviewed.  No antibiotic use in last 14 days  Home pharmacy [facility].

## 2019-09-19 NOTE — CONSULTS
"Surgery General History & Physical Note    Date  9/18/2019    Primary Care Physician  Pcp Pt States None    CC  Epigastric and right upper quadrant pain    HPI  This is a 69 y.o. female who presented with right upper quadrant and epigastric pain associated with nausea and vomiting since last night. She saw Dr. Bustillos today for her last radiation treatment, and he sent her to the ED for further evaluation. Her radiation has been in the pelvic area as treatment for her endometrial cancer. She has not, and will not, have any chemotherapy. She has not had pain like this before.     Past Medical History:   Diagnosis Date   • Arthritis     osteo, finger/shoulders   • Back pain    • Blood clotting disorder (HCC) 2004    leg   • Bronchitis    • Chickenpox    • Dental disorder     upper/lower dentures   • Diabetes     oral meds   • Endometrial cancer (HCC)    • Heart valve disease     \"murmur\"   • High cholesterol    • Hyperlipidemia    • Hypertension    • Mumps    • Nasal drainage    • Sleep apnea     CPAP   • Snoring    • Urinary incontinence        Past Surgical History:   Procedure Laterality Date   • HYSTERECTOMY ROBOTIC XI  6/27/2019    Procedure: HYSTERECTOMY, ROBOT-ASSISTED, USING DA YO XI;  Surgeon: Alexandr Mancia M.D.;  Location: [facility];  Service: Gynecology   • SALPINGO OOPHORECTOMY Bilateral 6/27/2019    Procedure: SALPINGO-OOPHORECTOMY;  Surgeon: Alexandr Mancia M.D.;  Location: [facility];  Service: Gynecology   • NODE BIOPSY SENTINEL  6/27/2019    Procedure: BIOPSY, LYMPH NODE, SENTINEL;  Surgeon: Alexandr Mancia M.D.;  Location: [facility];  Service: Gynecology   • HYSTEROSCOPY WITH MYOSURE N/A 4/17/2019    Procedure: HYSTEROSCOPY, WITH TISSUE REMOVAL, USING HYSTEROSCOPIC ROTATING CUTTER BLADE - DIAGNOSTIC;  Surgeon: Racquel Gatica M.D.;  Location: SURGERY SAME DAY [facility];  Service: Gynecology   • DILATION AND CURETTAGE N/A 4/17/2019    Procedure: DILATION " AND CURETTAGE;  Surgeon: Racquel Gatica M.D.;  Location: SURGERY SAME DAY [facility];  Service: Gynecology   • PRIMARY C SECTION  1972/1974/1977    x 3       Current Facility-Administered Medications   Medication Dose Route Frequency Provider Last Rate Last Dose   • cefTRIAXone (ROCEPHIN) 2 g in  mL IVPB  2 g Intravenous Once Leah Vergara D.O. 200 mL/hr at 09/18/19 2135 2 g at 09/18/19 2135   • metroNIDAZOLE (FLAGYL) IVPB 500 mg  500 mg Intravenous Once Leah ANY Vergara, D.O.         Current Outpatient Medications   Medication Sig Dispense Refill   • docusate sodium (COLACE) 100 MG Cap Take 100 mg by mouth 2 times a day.     • metFORMIN (GLUCOPHAGE) 500 MG Tab Take 500 mg by mouth 2 times a day, with meals.  4   • lisinopril (PRINIVIL) 10 MG Tab Take 10 mg by mouth every morning.         Social History     Socioeconomic History   • Marital status:      Spouse name: Not on file   • Number of children: Not on file   • Years of education: Not on file   • Highest education level: Not on file   Occupational History   • Not on file   Social Needs   • Financial resource strain: Not on file   • Food insecurity:     Worry: Not on file     Inability: Not on file   • Transportation needs:     Medical: Not on file     Non-medical: Not on file   Tobacco Use   • Smoking status: Never Smoker   • Smokeless tobacco: Never Used   Substance and Sexual Activity   • Alcohol use: No   • Drug use: No   • Sexual activity: Not on file   Lifestyle   • Physical activity:     Days per week: Not on file     Minutes per session: Not on file   • Stress: Not on file   Relationships   • Social connections:     Talks on phone: Not on file     Gets together: Not on file     Attends Jainism service: Not on file     Active member of club or organization: Not on file     Attends meetings of clubs or organizations: Not on file     Relationship status: Not on file   • Intimate partner violence:     Fear of current or ex partner: Not on  file     Emotionally abused: Not on file     Physically abused: Not on file     Forced sexual activity: Not on file   Other Topics Concern   • Not on file   Social History Narrative   • Not on file       Family History   Problem Relation Age of Onset   • Heart Disease Father    • Asthma Brother    • Asthma Brother    • Cancer Maternal Aunt         gyn cancer   • Sleep Apnea Neg Hx        Allergies  Bloodless; Ibuprofen; and Penicillins    Review of Systems  Negative except for as above in the HPI    Physical Exam   Constitutional: She is oriented to person, place, and time. She appears well-developed and well-nourished. No distress.   HENT:   Head: Normocephalic and atraumatic.   Right Ear: External ear normal.   Left Ear: External ear normal.   Eyes: Conjunctivae are normal. Right eye exhibits no discharge. Left eye exhibits no discharge. No scleral icterus.   Neck: Normal range of motion. No tracheal deviation present. No thyromegaly present.   Cardiovascular: Normal rate, regular rhythm and intact distal pulses.   Pulmonary/Chest: Effort normal and breath sounds normal. No stridor. No respiratory distress.   Abdominal: Soft. She exhibits no distension. There is tenderness.   RUQ tenderness, negative Wiggins's sign   Musculoskeletal: Normal range of motion. She exhibits no edema, tenderness or deformity.   Lymphadenopathy:     She has no cervical adenopathy.   Neurological: She is alert and oriented to person, place, and time.   Skin: Skin is warm and dry. No rash noted. She is not diaphoretic. No erythema.   Psychiatric: She has a normal mood and affect. Her behavior is normal.       Vital Signs  Blood Pressure : 116/57   Temperature: 36 °C (96.8 °F)   Pulse: 73   Respiration: 16   Pulse Oximetry: 97 %       Labs:  Recent Labs     09/18/19  1517   WBC 6.7   RBC 4.17*   HEMOGLOBIN 12.7   HEMATOCRIT 39.3   MCV 94.2   MCH 30.5   MCHC 32.3*   RDW 53.4*   PLATELETCT 220   MPV 8.8*     Recent Labs     09/18/19  1517    SODIUM 137   POTASSIUM 4.0   CHLORIDE 101   CO2 28   GLUCOSE 118*   BUN 12   CREATININE 0.55   CALCIUM 9.2         Recent Labs     09/18/19  1517   ASTSGOT 13   ALTSGPT 9   TBILIRUBIN 0.4   ALKPHOSPHAT 58   GLOBULIN 3.3       Radiology:  US-RUQ   Final Result      Distended gallbladder with mild pericholecystic fluid. The gallbladder thickness measures an approximately 4 mm. These findings are concerning for acute cholecystitis.      CT-ABDOMEN-PELVIS WITH   Final Result      1.  Distended gallbladder with mildly thickened wall and small amount of pericholecystic fluid, suggestive of early acute cholecystitis. Consider ultrasound to evaluate for gallstones.   2.  Hysterectomy. No metastatic disease in the abdomen or pelvis.   3.  Colonic diverticulosis.   4.  Small hiatal hernia.      DX-CHEST-PORTABLE (1 VIEW)   Final Result      1.  There is minimal opacity at the left CP angle which could be due to a small amount of pleural fluid or atelectasis.            Assessment/Plan:  69 year old woman with acute cholecystitis, just finished radiation therapy for endometrial cancer today    I discussed the diagnosis with the patient and her family, and I recommended she have a cholecystectomy. She will need to be NPO after midnight, but may have clear liquids prior to that. Prophylactic anticoagulation should be held. We discussed risks, benefits, and alternatives with risks, including, but not limited to, bleeding, infection, damage to surrounding structures such as small or large intestine, damage to the common bile duct possibly requiring hepaticojejunostomy, possible cystic duct stump leak requiring further procedures, hernia, need for an open procedure. All questions were answered to the patient's apparent satisfaction and they agreed to proceed. We will proceed with surgery tomorrow morning at 7:30AM.

## 2019-09-19 NOTE — ASSESSMENT & PLAN NOTE
-Pt was diagnosed with endometrial cancer 3 months back and she underwent hysterectomy and is currently undergoing radiation treatment. Last radiation was in the morning today  -No chemotherapy  -currently stable  -follow up with her gynecologist

## 2019-09-19 NOTE — ASSESSMENT & PLAN NOTE
- Pt started having RUQ pain with vomitings since last night. She had these episodes once in a while since the last 6 months.  - CT abdomen and ultrasound showed early signs of acute cholecystitis  - 9/19/19 Laparoscopic cholecystectomy done with ICG imaging of the biliary system: Edematous gallbladder with pus and stones  - Anticoagulation held for surgery  - Stopped ceftriaxone and flagyl today  - Discharge home with oral antibiotics (ciprofloxacin and flagyl for 5 days)  - F/U with surgery on 9/27/19 as outpatient

## 2019-09-19 NOTE — ED NOTES
Report called to CHELE Gonzalez, on Ortho floor. Pt being taken upstairs by transport tech via gurney at this time. Pt's family accompanying. Pt is awake and alert, talking to staff, in no apparent distress at time of transfer. Pt's paperwork and belongings sent upstairs with pt, family, and staff.

## 2019-09-19 NOTE — ANESTHESIA PROCEDURE NOTES
Airway  Date/Time: 9/19/2019 7:35 AM  Performed by: Ghulam Hill M.D.  Authorized by: Ghulam Hill M.D.     Location:  OR  Urgency:  Elective  Difficult Airway: No    Indications for Airway Management:  Anesthesia  Spontaneous Ventilation: absent    Sedation Level:  Deep  Preoxygenated: Yes    Patient Position:  Sniffing  Final Airway Type:  Endotracheal airway  Final Endotracheal Airway:  ETT  Cuffed: Yes    Technique Used for Successful ETT Placement:  Direct laryngoscopy  Insertion Site:  Oral  Blade Type:  Agnieszka  Laryngoscope Blade/Videolaryngoscope Blade Size:  3  ETT Size (mm):  7.5  Measured from:  Lips  ETT to Lips (cm):  21  Placement Verified by: auscultation and capnometry    Cormack-Lehane Classification:  Grade I - full view of glottis  Number of Attempts at Approach:  1

## 2019-09-19 NOTE — ED NOTES
Received report from Kelli LUO RN. Upon shift change pt is resting in bed with even and unlabored breaths, in no apparent distress. Pt's family at bedside. Will continue to monitor pt while awaiting further orders.

## 2019-09-19 NOTE — PROGRESS NOTES
ID  # [id number] used for pre operative assessment    Pt confirmed that she is bloodless, bloodless paperwork scanned into Epic from June 2019    Dr. Johnson is aware

## 2019-09-19 NOTE — H&P
Internal Medicine Admitting History and Physical    Note Author: Debbie Lopez M.D.       Name Madelyn Schmitz 1950   Age/Sex 69 y.o. female   MRN [id number]   Code Status Full code     After 5PM or if no immediate response to page, please call for cross-coverage  Attending/Team: /gareth See Patient List for primary contact information  Call [phone] to page    1st Call - Day Intern (R1):    2nd Call - Day Sr. Resident (R2/R3):          Chief Complaint:   Abdominal pain    HPI:   is a 69 yr old female with past medical H/o of DM type II, HTN, Hyperlipidemia and endometrial cancer s/p hysterectomy and radiation therapy comes to [facility] [place] with complaints of epigastric and right upper quadrant pain that started last night. The pain was so severe that woke her from sleep. She also had associated nausea. She was having pain even in the morning and had 2 episodes of vomiting. She went for her last radiation therapy for endometrial cancer today and she felt very sick, dizzy and she was transferred to [facility]. She states she had these symptoms once in a while since the last 6 months.   No complaints of fever with chills, chest pain, shortness of breath and headaches  No urinary complaints  She denies smoking, alcohol intake and illicit drug use  Pt does not understand english and history was obtained from the family.  Pt and family mentioned they don't accept blood transfusions.    ED course: In the ER her pulse was 75 and BP was 126/64. Labs were reassuring and troponin was in normal limits. Chest x ray showed minimal opacity at the left costophrenic angle. CT abdomen showed Distended gallbladder with mildly thickened wall and small amount of pericholecystic fluid, suggestive of early acute cholecystitis. Ultrasound also suggested gall bladder sludge and distension. She received one dose of ceftriaxone in the ER. Surgical team has seen the pt and planned for  cholecystectomy tomorrow morning.      Review of Systems   Constitutional: Negative for chills, fever and weight loss.   HENT: Negative for ear pain, hearing loss and tinnitus.    Eyes: Negative for blurred vision and double vision.   Respiratory: Negative for cough, hemoptysis and shortness of breath.    Cardiovascular: Negative for chest pain, palpitations and leg swelling.   Gastrointestinal: Positive for abdominal pain, nausea and vomiting. Negative for diarrhea and heartburn.   Genitourinary: Negative for dysuria and urgency.   Musculoskeletal: Negative for back pain, myalgias and neck pain.   Skin: Negative for itching and rash.   Neurological: Negative for dizziness, tingling, weakness and headaches.   Psychiatric/Behavioral: Negative for depression and suicidal ideas.             Past Medical History (Chronic medical problem, known complications and current treatment)    -DM type II and on metformin twice a day  -HTN and is on lisinopril  -Sleep apnea and is on CPAP  -endometrial cancer s/p hysterectomy 3 months back and radiation therapy. Last radiation was in the morning today.  -No cardiac issues, asthma and seizures    Past Surgical History:  Past Surgical History:   Procedure Laterality Date   • HYSTERECTOMY ROBOTIC XI  6/27/2019    Procedure: HYSTERECTOMY, ROBOT-ASSISTED, USING DA YO XI;  Surgeon: Alexandr Mancia M.D.;  Location: [facility];  Service: Gynecology   • SALPINGO OOPHORECTOMY Bilateral 6/27/2019    Procedure: SALPINGO-OOPHORECTOMY;  Surgeon: Alexandr Mancia M.D.;  Location: [facility];  Service: Gynecology   • NODE BIOPSY SENTINEL  6/27/2019    Procedure: BIOPSY, LYMPH NODE, SENTINEL;  Surgeon: Alexandr Mancia M.D.;  Location: [facility];  Service: Gynecology   • HYSTEROSCOPY WITH MYOSURE N/A 4/17/2019    Procedure: HYSTEROSCOPY, WITH TISSUE REMOVAL, USING HYSTEROSCOPIC ROTATING CUTTER BLADE - DIAGNOSTIC;  Surgeon: Racquel Gatica M.D.;  Location:  SURGERY SAME DAY [facility];  Service: Gynecology   • DILATION AND CURETTAGE N/A 4/17/2019    Procedure: DILATION AND CURETTAGE;  Surgeon: Racquel Gatica M.D.;  Location: SURGERY SAME DAY [facility];  Service: Gynecology   • PRIMARY C SECTION  1972/1974/1977    x 3       Current Outpatient Medications:  Home Medications     Reviewed by Melani Andrade (Pharmacy Tech) on 09/18/19 at 2205  Med List Status: Complete   Medication Last Dose Status   docusate sodium (COLACE) 100 MG Cap 9/18/2019 Active   lisinopril (PRINIVIL) 10 MG Tab 9/18/2019 Active   metFORMIN (GLUCOPHAGE) 500 MG Tab 9/18/2019 Active                Medication Allergy/Sensitivities:  Allergies   Allergen Reactions   • Bloodless      Buddhist   • Ibuprofen Rash and Swelling     .   • Penicillins Rash and Swelling     .         Family History (mandatory)   Family History   Problem Relation Age of Onset   • Heart Disease Father    • Asthma Brother    • Asthma Brother    • Cancer Maternal Aunt         gyn cancer   • Sleep Apnea Neg Hx        Social History (mandatory)   Social History     Socioeconomic History   • Marital status:      Spouse name: Not on file   • Number of children: Not on file   • Years of education: Not on file   • Highest education level: Not on file   Occupational History   • Not on file   Social Needs   • Financial resource strain: Not on file   • Food insecurity:     Worry: Not on file     Inability: Not on file   • Transportation needs:     Medical: Not on file     Non-medical: Not on file   Tobacco Use   • Smoking status: Never Smoker   • Smokeless tobacco: Never Used   Substance and Sexual Activity   • Alcohol use: No   • Drug use: No   • Sexual activity: Not on file   Lifestyle   • Physical activity:     Days per week: Not on file     Minutes per session: Not on file   • Stress: Not on file   Relationships   • Social connections:     Talks on phone: Not on file     Gets together: Not on file      Attends Denominational service: Not on file     Active member of club or organization: Not on file     Attends meetings of clubs or organizations: Not on file     Relationship status: Not on file   • Intimate partner violence:     Fear of current or ex partner: Not on file     Emotionally abused: Not on file     Physically abused: Not on file     Forced sexual activity: Not on file   Other Topics Concern   • Not on file   Social History Narrative   • Not on file     Living situation:   PCP : Pcp Pt States None    Physical Exam     Vitals:    09/18/19 2002 09/18/19 2030 09/18/19 2100 09/18/19 2131   BP: 120/55 126/64 124/65 116/57   Pulse: 71 75 80 73   Resp:  16  16   Temp:       TempSrc:       SpO2: 96% 95% 95% 97%   Weight:       Height:         Body mass index is 38.27 kg/m².  O2 therapy: Pulse Oximetry: 97 %, O2 Delivery: None (Room Air)    Physical Exam   Constitutional: She is oriented to person, place, and time and well-developed, well-nourished, and in no distress.   HENT:   Head: Normocephalic and atraumatic.   Eyes: Pupils are equal, round, and reactive to light. Conjunctivae and EOM are normal.   Neck: Normal range of motion. Neck supple.   Cardiovascular: Normal rate, regular rhythm and normal heart sounds.   Pulmonary/Chest: Effort normal and breath sounds normal.   Abdominal: Soft. Bowel sounds are normal. She exhibits no distension. There is tenderness.   RUQ tenderness present and with positive vasques's sign.   Musculoskeletal: Normal range of motion.   Neurological: She is alert and oriented to person, place, and time.   Skin: Skin is warm and dry.   Psychiatric: Affect and judgment normal.         Data Review       Old Records Request:   Completed  Current Records review/summary: Completed    Lab Data Review:  Recent Results (from the past 24 hour(s))   Rad Onc Aria Session Summary    Collection Time: 09/18/19  2:00 PM   Result Value Ref Range    Course ID [email or web address]     Course Start Date [id number]      Course Elapsed Days 37 @ [id number]     Course Intent Curative     Plan Treatment Dates       First Treatment Date: [id number]  Last Treatment Date: [id number]      RP ID Pelvis     RP Dosage Given to Date (Gy) 48.6     RP Session Dosage Given (Gy) 1.8     RP ID Pelvis CP     RP Dosage Given to Date (Gy) 49.93541063     RP Session Dosage Given (Gy) 1.91222355     Plan ID Pelvis     Plan Name Pelvis     Plan Fractions Treated to Date 27 of 27     Plan Prescribed Dose Per Fraction (Gy) 1.8     Plan Total Prescribed Dose (cGy) 4,860    CBC WITH DIFFERENTIAL    Collection Time: 09/18/19  3:17 PM   Result Value Ref Range    WBC 6.7 4.8 - 10.8 K/uL    RBC 4.17 (L) 4.20 - 5.40 M/uL    Hemoglobin 12.7 12.0 - 16.0 g/dL    Hematocrit 39.3 37.0 - 47.0 %    MCV 94.2 81.4 - 97.8 fL    MCH 30.5 27.0 - 33.0 pg    MCHC 32.3 (L) 33.6 - 35.0 g/dL    RDW 53.4 (H) 35.9 - 50.0 fL    Platelet Count 220 164 - 446 K/uL    MPV 8.8 (L) 9.0 - 12.9 fL    Neutrophils-Polys 82.80 (H) 44.00 - 72.00 %    Lymphocytes 7.30 (L) 22.00 - 41.00 %    Monocytes 7.80 0.00 - 13.40 %    Eosinophils 1.20 0.00 - 6.90 %    Basophils 0.30 0.00 - 1.80 %    Immature Granulocytes 0.60 0.00 - 0.90 %    Nucleated RBC 0.00 /100 WBC    Neutrophils (Absolute) 5.53 2.00 - 7.15 K/uL    Lymphs (Absolute) 0.49 (L) 1.00 - 4.80 K/uL    Monos (Absolute) 0.52 0.00 - 0.85 K/uL    Eos (Absolute) 0.08 0.00 - 0.51 K/uL    Baso (Absolute) 0.02 0.00 - 0.12 K/uL    Immature Granulocytes (abs) 0.04 0.00 - 0.11 K/uL    NRBC (Absolute) 0.00 K/uL   COMP METABOLIC PANEL    Collection Time: 09/18/19  3:17 PM   Result Value Ref Range    Sodium 137 135 - 145 mmol/L    Potassium 4.0 3.6 - 5.5 mmol/L    Chloride 101 96 - 112 mmol/L    Co2 28 20 - 33 mmol/L    Anion Gap 8.0 0.0 - 11.9    Glucose 118 (H) 65 - 99 mg/dL    Bun 12 8 - 22 mg/dL    Creatinine 0.55 0.50 - 1.40 mg/dL    Calcium 9.2 8.5 - 10.5 mg/dL    AST(SGOT) 13 12 - 45 U/L    ALT(SGPT) 9 2 - 50 U/L    Alkaline Phosphatase 58  30 - 99 U/L    Total Bilirubin 0.4 0.1 - 1.5 mg/dL    Albumin 4.1 3.2 - 4.9 g/dL    Total Protein 7.4 6.0 - 8.2 g/dL    Globulin 3.3 1.9 - 3.5 g/dL    A-G Ratio 1.2 g/dL   LIPASE    Collection Time: 19  3:17 PM   Result Value Ref Range    Lipase 17 11 - 82 U/L   URINALYSIS,CULTURE IF INDICATED    Collection Time: 19  3:17 PM   Result Value Ref Range    Color Orange     Character Clear     Specific Gravity See Comment <1.035    Micro Urine Req Microscopic    URINE MICROSCOPIC (W/UA)    Collection Time: 19  3:17 PM   Result Value Ref Range    WBC 2-5 /hpf    RBC 2-5 (A) /hpf    Bacteria Negative None /hpf    Epithelial Cells Few /hpf    Mucous Threads Few /hpf    Amorphous Crystal Present /hpf    Hyaline Cast 0-2 /lpf   ESTIMATED GFR    Collection Time: 19  3:17 PM   Result Value Ref Range    GFR If African American >60 >60 mL/min/1.73 m 2    GFR If Non African American >60 >60 mL/min/1.73 m 2   TROPONIN    Collection Time: 19  3:17 PM   Result Value Ref Range    Troponin T <6 6 - 19 ng/L   TROPONIN    Collection Time: 19  4:42 PM   Result Value Ref Range    Troponin T <6 6 - 19 ng/L   EKG (NOW)    Collection Time: 19  6:44 PM   Result Value Ref Range    Report       [facility] Emergency Dept.    Test Date:  2019  Pt Name:    JONI DIAMOND               Department: ER  MRN:        [id number]                      Room:        11  Gender:     Female                       Technician: [id number]  :        1950                   Requested By:LEAH LYNN  Order #:    [id number]                    Reading MD: Leah Lynn    Measurements  Intervals                                Axis  Rate:       77                           P:          18  IL:         165                          QRS:        63  QRSD:       90                           T:          42  QT:         408  QTc:        462    Interpretive Statements  Sinus rhythm  No ST elevation or depression  noted.  Compared to ECG 06/19/2019 07:58:31  No significant changes    Electronically Signed On 9- 18:55:30 PDT by Leah Vergara         Imaging/Procedures Review:    Independant Imaging Review: Completed  US-RUQ   Final Result      Distended gallbladder with mild pericholecystic fluid. The gallbladder thickness measures an approximately 4 mm. These findings are concerning for acute cholecystitis.      CT-ABDOMEN-PELVIS WITH   Final Result      1.  Distended gallbladder with mildly thickened wall and small amount of pericholecystic fluid, suggestive of early acute cholecystitis. Consider ultrasound to evaluate for gallstones.   2.  Hysterectomy. No metastatic disease in the abdomen or pelvis.   3.  Colonic diverticulosis.   4.  Small hiatal hernia.      DX-CHEST-PORTABLE (1 VIEW)   Final Result      1.  There is minimal opacity at the left CP angle which could be due to a small amount of pleural fluid or atelectasis.               EKG:   EKG Independent Review: Completed  QTc:, HR: , Normal Sinus Rhythm, no ST/T changes     Records reviewed and summarized in current documentation :  Yes  [employer] teaching service handout given to patient:  Yes         Assessment/Plan     Diabetes mellitus (HCC)  Assessment & Plan  -Pt is taking metformin at home  -Her blood glucose was 118  -Her last HbA1c was 6.6 from April 2019  -will hold metformin  -added sliding scale insulin  -monitor blood glucose levels    Acute cholecystitis  Assessment & Plan  -Pt started having RUQ pain with vomitings since last night. She had these episodes once in a while since the last 6 months.  -CT abdomen and ultrasound showed early signs of acute cholecystitis  -Surgical team saw and planned for cholecystectomy tomorrow morning  -NPO midnight  -maintenance fluids at 83cc/hr  -hold anticoagulation until surgery  -will continue ceftriaxone and flagyl to cover for possible infection   -tylenol for fever and pain  -monitor    Endometrial cancer  (HCC)  Assessment & Plan  -Pt was diagnosed with endometrial cancer 3 months back and she underwent hysterectomy and is currently undergoing radiation treatment. Last radiation was in the morning today  -No chemotherapy  -currently stable  -follow up with her gynecologist      Hypertension  Assessment & Plan  -currently stable and Bp in the ER was 126/64  -continue lisinopril  -monitor BP readings    Sleep apnea  Assessment & Plan  -Pt has sleep apnea and uses CPAP while sleeping  -currently stable  -outpatient follow up      Anticipated Hospital stay:  >2 midnights        Quality Measures  Quality-Core Measures   Reviewed items::  Labs reviewed and Medications reviewed  Watkins catheter::  No Watkins  DVT prophylaxis - mechanical:  SCDs  Ulcer Prophylaxis::  No    PCP: Pcp Pt States None

## 2019-09-19 NOTE — ED NOTES
Pt resting in bed talking to family at bedside. Pt's breaths are even and unlabored, no distress is noted at this time. Pt and family updated regarding status waiting for bed assignment in hospital. Will continue to monitor.

## 2019-09-19 NOTE — ED PROVIDER NOTES
ED Provider Note    Scribed for Leah Vergara D.O. by David Navarrete. 9/18/2019, 6:00 PM.    Primary care provider: Patient's family states none  Means of arrival: walk in  History obtained from: Patient, family  History limited by: none    CHIEF COMPLAINT  Chief Complaint   Patient presents with   • Abdominal Pain       HPI  Madelyn Schmitz is a 69 y.o. Female who presents to the Emergency Department with abdominal pain onset midnight last night. She states her pain awoke her from her sleep, and she describes her pain as sharp. She states the pain radiates to her back.  Her pain is exacerbated by movement. She states that drinking lemon juice which she tried last night does not alleviate her pain. She notes that she had mild chest pain this morning. She reports 2 episodes of vomiting since onset. She denies bilious or bloody vomit. She denies any diarrhea, melena, or hematochezia. The patient states her last bowel movement was this morning. She denies any current dysuria, but notes that she had some 2 weeks ago. She denies any syncope, seizure, congestion, coughing, wheezing, or shortness of breath. The patient's family states the patient does not currently have a PCP. The patient has a history of endometrial cancer and had her last radiation therapy today. She also notes that she has an abdominal surgical history of multiple c-sections and hysterectomy.    REVIEW OF SYSTEMS  See HPI for further details. All other systems are negative.     PAST MEDICAL HISTORY   has a past medical history of Arthritis, Back pain, Blood clotting disorder (HCC) (2004), Bronchitis, Chickenpox, Dental disorder, Diabetes, Endometrial cancer ([facility]), Heart valve disease, High cholesterol, Hyperlipidemia, Hypertension, Mumps, Nasal drainage, Sleep apnea, Snoring, and Urinary incontinence.    SURGICAL HISTORY   has a past surgical history that includes primary c section (1972/1974/1977); hysteroscopy with myosure (N/A, 4/17/2019);  "dilation and curettage (N/A, 4/17/2019); hysterectomy robotic xi (6/27/2019); salpingo oophorectomy (Bilateral, 6/27/2019); and node biopsy sentinel (6/27/2019).    SOCIAL HISTORY  Social History     Tobacco Use   • Smoking status: Never Smoker   • Smokeless tobacco: Never Used   Substance Use Topics   • Alcohol use: No   • Drug use: No      Social History     Substance and Sexual Activity   Drug Use No       FAMILY HISTORY  Family History   Problem Relation Age of Onset   • Heart Disease Father    • Asthma Brother    • Asthma Brother    • Cancer Maternal Aunt         gyn cancer   • Sleep Apnea Neg Hx        CURRENT MEDICATIONS  Reviewed.  See Encounter Summary.     ALLERGIES  Allergies   Allergen Reactions   • Bloodless      Advent   • Ibuprofen Rash and Swelling     .   • Penicillins Rash and Swelling     .       PHYSICAL EXAM  VITAL SIGNS: /59   Pulse 66   Temp 36 °C (96.8 °F) (Temporal)   Resp (!) 22   Ht 1.6 m (5' 3\")   Wt 98 kg (216 lb 0.8 oz)   LMP  (LMP Unknown)   SpO2 96%   BMI 38.27 kg/m²   Constitutional: Alert and in no apparent distress.  HENT: Normocephalic atraumatic. Bilateral external ears normal. Nose normal. Mucous membranes are moist.  Eyes: Pupils are equal and reactive. Conjunctiva normal. Non-icteric sclera.   Neck: Normal range of motion without tenderness. Supple. No meningeal signs.  Cardiovascular: Regular rate and rhythm. 2/6 systolic murmur heard best over left sternal border. No gallops or rubs.  Thorax & Lungs: Breath sounds are clear to auscultation bilaterally. No wheezing, rhonchi or rales.  Abdomen: Tenderness to palpation in epigastrium and RUQ. Soft, nondistended. No peritoneal signs noted.  Skin: Multiple well-healed surgical scars present on abdomen. Warm and dry. No rashes are noted.  Back: No bony tenderness, No CVA tenderness.   Extremities: 2+ peripheral pulses. Cap refill is less than 2 seconds. No edema, cyanosis, or clubbing.  Musculoskeletal: " Good range of motion in all major joints. No major deformities noted.   Neurologic: Alert and oriented ×3. The patient moves all 4 extremities and follows commands.  Psychiatric: Affect is normal. Judgment appears to be intact.    DIAGNOSTIC STUDIES / PROCEDURES     LABS  Results for orders placed or performed during the hospital encounter of 09/18/19   CBC WITH DIFFERENTIAL   Result Value Ref Range    WBC 6.7 4.8 - 10.8 K/uL    RBC 4.17 (L) 4.20 - 5.40 M/uL    Hemoglobin 12.7 12.0 - 16.0 g/dL    Hematocrit 39.3 37.0 - 47.0 %    MCV 94.2 81.4 - 97.8 fL    MCH 30.5 27.0 - 33.0 pg    MCHC 32.3 (L) 33.6 - 35.0 g/dL    RDW 53.4 (H) 35.9 - 50.0 fL    Platelet Count 220 164 - 446 K/uL    MPV 8.8 (L) 9.0 - 12.9 fL    Neutrophils-Polys 82.80 (H) 44.00 - 72.00 %    Lymphocytes 7.30 (L) 22.00 - 41.00 %    Monocytes 7.80 0.00 - 13.40 %    Eosinophils 1.20 0.00 - 6.90 %    Basophils 0.30 0.00 - 1.80 %    Immature Granulocytes 0.60 0.00 - 0.90 %    Nucleated RBC 0.00 /100 WBC    Neutrophils (Absolute) 5.53 2.00 - 7.15 K/uL    Lymphs (Absolute) 0.49 (L) 1.00 - 4.80 K/uL    Monos (Absolute) 0.52 0.00 - 0.85 K/uL    Eos (Absolute) 0.08 0.00 - 0.51 K/uL    Baso (Absolute) 0.02 0.00 - 0.12 K/uL    Immature Granulocytes (abs) 0.04 0.00 - 0.11 K/uL    NRBC (Absolute) 0.00 K/uL   COMP METABOLIC PANEL   Result Value Ref Range    Sodium 137 135 - 145 mmol/L    Potassium 4.0 3.6 - 5.5 mmol/L    Chloride 101 96 - 112 mmol/L    Co2 28 20 - 33 mmol/L    Anion Gap 8.0 0.0 - 11.9    Glucose 118 (H) 65 - 99 mg/dL    Bun 12 8 - 22 mg/dL    Creatinine 0.55 0.50 - 1.40 mg/dL    Calcium 9.2 8.5 - 10.5 mg/dL    AST(SGOT) 13 12 - 45 U/L    ALT(SGPT) 9 2 - 50 U/L    Alkaline Phosphatase 58 30 - 99 U/L    Total Bilirubin 0.4 0.1 - 1.5 mg/dL    Albumin 4.1 3.2 - 4.9 g/dL    Total Protein 7.4 6.0 - 8.2 g/dL    Globulin 3.3 1.9 - 3.5 g/dL    A-G Ratio 1.2 g/dL   LIPASE   Result Value Ref Range    Lipase 17 11 - 82 U/L   URINALYSIS,CULTURE IF INDICATED    Result Value Ref Range    Color Orange     Character Clear     Specific Gravity See Comment <1.035    Micro Urine Req Microscopic    URINE MICROSCOPIC (W/UA)   Result Value Ref Range    WBC 2-5 /hpf    RBC 2-5 (A) /hpf    Bacteria Negative None /hpf    Epithelial Cells Few /hpf    Mucous Threads Few /hpf    Amorphous Crystal Present /hpf    Hyaline Cast 0-2 /lpf   ESTIMATED GFR   Result Value Ref Range    GFR If African American >60 >60 mL/min/1.73 m 2    GFR If Non African American >60 >60 mL/min/1.73 m 2   TROPONIN   Result Value Ref Range    Troponin T <6 6 - 19 ng/L   TROPONIN   Result Value Ref Range    Troponin T <6 6 - 19 ng/L   EKG (NOW)   Result Value Ref Range    Report       [facility] Emergency [facility].    Test Date:  2019  Pt Name:    JONI DIAMOND               Department: ER  MRN:        [id number]                      Room:        11  Gender:     Female                       Technician: [id number]  :        1950                   Requested By:LEAH LYNN  Order #:    [id number]                    Reading MD: Leah Lynn    Measurements  Intervals                                Axis  Rate:       77                           P:          18  WI:         165                          QRS:        63  QRSD:       90                           T:          42  QT:         408  QTc:        462    Interpretive Statements  Sinus rhythm  No ST elevation or depression noted.  Compared to ECG 2019 07:58:31  No significant changes    Electronically Signed On 2019 18:55:30 PDT by Leah Lynn         All labs were reviewed by me.    RADIOLOGY  US-RUQ   Final Result      Distended gallbladder with mild pericholecystic fluid. The gallbladder thickness measures an approximately 4 mm. These findings are concerning for acute cholecystitis.      CT-ABDOMEN-PELVIS WITH   Final Result      1.  Distended gallbladder with mildly thickened wall and small amount of pericholecystic fluid,  suggestive of early acute cholecystitis. Consider ultrasound to evaluate for gallstones.   2.  Hysterectomy. No metastatic disease in the abdomen or pelvis.   3.  Colonic diverticulosis.   4.  Small hiatal hernia.      DX-CHEST-PORTABLE (1 VIEW)   Final Result      1.  There is minimal opacity at the left CP angle which could be due to a small amount of pleural fluid or atelectasis.        The radiologist's interpretation of all radiological studies have been reviewed by me.    COURSE & MEDICAL DECISION MAKING  Pertinent Labs & Imaging studies reviewed. (See chart for details)    6:00 PM - Patient seen and examined at bedside. Ordered troponin x2, CT-abdomen pelvis with contrast, DX-chest to evaluate her symptoms. I discussed the plan of care, including CT scan and EKG to further evaluate.    7:28 PM - Ordered for US-RUQ at this time.    8:49 PM Paged for General Surgery at this time.    8:53 PM - Spoke with Dr. Johnson, General Surgery, about the patient's condition.    8:57 PM - Paged for [facility] at this time. Ordered for Rocephin 2 g in  mL IVPB, Flagyl 500 mg IVPB for the patient's symptoms.    9:04 PM - Spoke with Dr. Whitney, [facility] Internal [facility], about the patient's condition. Dr. Whitney agrees to admit the patient    9:22 PM - Patient was reevaluated at bedside. Discussed lab and radiology results with the patient and informed her she has a gall bladder infection. I updated her on the plan of care, including the plan for admission. I answered all her questions regarding her care. Patient verbalizes understanding and agreement to this plan of care.      Decision Making:  This is a 69 y.o. year old female who presents with abdominal pain and vomiting.  On my initial evaluation, she appeared well and in no acute distress.  Her vital signs were normal.  Her abdominal exam was notable for tenderness palpation in the epigastrium and right upper quadrant with no evidence of peritonitis.   Blood work had been initiated in triage and was notable for a neutrophilic predominance on CBC.  Her H&H were normal.  Metabolic panel was normal with no significant derangements and her LFTs were normal.  Lipase was also normal.  She had also complained of chest pain and an EKG was performed which did not show any evidence of acute ischemia or arrhythmia.  Troponins x2 were negative and I am less concerned for ACS as an etiology of her discomfort.  Chest x-ray was performed and revealed a minimal opacity at the left costophrenic angle likely secondary to atelectasis versus small pleural fluid.  I have low clinical suspicion for pneumonia given her lack of cough, fever, and normal white blood cell count.  I did obtain a CT of the abdomen and pelvis given her tenderness, and this was notable for distended gallbladder with a mildly thickened wall and a small amount of pericholecystic fluid likely secondary to an early acute cholecystitis.  I did obtain an ultrasound as well which was consistent with acute cholecystitis without choledocholithiasis. Given her normal white blood cell count and reassuring vital signs with no evidence of fever, I am less concerned for sepsis at this time.  I discussed the case with general surgery who recommended that the patient be admitted to the hospitalist.  She agreed with the plan for ceftriaxone and metronidazole at this time.  I did call and discussed the case with [facility] resident who agreed with the plan and accepted the patient.  I updated the patient and her family who are also agreeable with the plan.  She remained stable while in the ED.    DISPOSITION:  Patient will be admitted to Dr. Whitney in guarded condition.    FINAL IMPRESSION  1. Acute cholecystitis    2. Hiatal hernia          David SCOTT (Britt), am scribing for, and in the presence of, Leah Vergara D.O..    Electronically signed by: David Gore), 9/18/2019    Leah SCOTT D.O. personally  performed the services described in this documentation, as scribed by David Navarrete in my presence, and it is both accurate and complete. C    The note accurately reflects work and decisions made by me.  Leah Vergara  9/18/2019  7:00 PM

## 2019-09-19 NOTE — PROGRESS NOTES
Internal Medicine Interval Note  Note Author: Jojo Hernandez M.D.     Name Madelyn Schmitz     1950   Age/Sex 69 y.o. female   MRN [id number]   Code Status  Full code     After 5PM or if no immediate response to page, please call for cross-coverage  Attending/Team:    Dr. Stephens / Melissa See Patient List for primary contact information  Call [phone] to page    1st Call - Day Intern (R1):    Dr. Hernandez 2nd Call - Day Sr. Resident (R2/R3):    Dr. Guaman         Reason for interval visit  (Principal Problem)   Acute cholecystitis s/p laparoscopic cholecystectomy      Interval Problem Daily Status Update  (24 hours, problem oriented, brief subjective history, new lab/imaging data pertinent to that problem)   - Patient admitted overnight with abdominal pain in the right upper quadrant with nausea and vomiting. She had  laparoscopic cholecystectomy done this morning. She says her pain in minimal now. Ate soup and is tolerating well. No nausea or vomiting. Pt speaks Trinid[place]. Her daughter-in-law helped with the translation. She has no concerns at this time.    Review of Systems   Constitutional: Negative for chills, fever, malaise/fatigue and weight loss.   HENT: Negative for congestion, ear discharge, ear pain, hearing loss, nosebleeds, sinus pain and tinnitus.    Eyes: Negative for blurred vision, double vision, photophobia, pain, discharge and redness.   Respiratory: Negative for cough, hemoptysis, sputum production, shortness of breath and wheezing.    Cardiovascular: Negative for chest pain, palpitations, orthopnea and leg swelling.   Gastrointestinal: Positive for abdominal pain. Negative for constipation, diarrhea, heartburn, nausea and vomiting.   Genitourinary: Negative for dysuria, frequency and urgency.   Musculoskeletal: Negative for back pain, joint pain, myalgias and neck pain.   Skin: Negative for itching and rash.   Neurological: Negative for dizziness, seizures, loss of  consciousness, weakness and headaches.   Psychiatric/Behavioral: Negative for depression, substance abuse and suicidal ideas.       Disposition/Barriers to discharge:   She had surgery this morning, receiving IV antibiotics. Will observe her overnight. Possible discharge tomorrow.    Consultants/Specialty:  Surgery    PCP: Pcp Pt States None      Quality Measures  Quality-Core Measures   Reviewed items::  Labs reviewed, Medications reviewed and Radiology images reviewed  Watkins catheter::  No Watkins  DVT prophylaxis pharmacological::  Not indicated at this time, ambulatory  DVT prophylaxis - mechanical:  SCDs  Ulcer Prophylaxis::  No          Physical Exam       Vitals:    09/19/19 0945 09/19/19 1050 09/19/19 1120 09/19/19 1150   BP: (!) 94/43 (!) 116/38 106/64 106/48   Pulse: 70 72 74 67   Resp: (!) 21 16 16 15   Temp:  36.3 °C (97.3 °F) 36.4 °C (97.5 °F) 36.7 °C (98.1 °F)   TempSrc:  Temporal Temporal Temporal   SpO2: 93% 95% 98% 98%   Weight:       Height:         Body mass index is 35.54 kg/m². Weight: 91 kg (200 lb 9.9 oz)  Oxygen Therapy:  Pulse Oximetry: 98 %, O2 (LPM): 2, O2 Delivery: Nasal Cannula    Physical Exam   Constitutional: She is oriented to person, place, and time and well-developed, well-nourished, and in no distress. No distress.   HENT:   Head: Normocephalic and atraumatic.   Mouth/Throat: Oropharynx is clear and moist.   Eyes: Pupils are equal, round, and reactive to light. Conjunctivae and EOM are normal. Right eye exhibits no discharge. Left eye exhibits no discharge.   Neck: Normal range of motion. Neck supple.   Cardiovascular: Normal rate and regular rhythm. Exam reveals no gallop and no friction rub.   Murmur heard.  Pulmonary/Chest: Effort normal and breath sounds normal. No respiratory distress. She has no wheezes. She has no rales. She exhibits no tenderness.   Abdominal: Soft. She exhibits no distension and no mass.   Post-surgical wounds present   Musculoskeletal: Normal range of  motion. She exhibits no edema or deformity.   Neurological: She is alert and oriented to person, place, and time. Gait normal.   Skin: Skin is warm. No rash noted. No erythema.   Psychiatric: Mood, memory, affect and judgment normal.             Assessment/Plan     * Diabetes mellitus (HCC)- (present on admission)  Assessment & Plan  - On metformin 500 mg BD at home  - last HbA1c was 6.6 from April 2019  - Started on ISS and hypoglycemia protocol    Acute cholecystitis- (present on admission)  Assessment & Plan  - Pt started having RUQ pain with vomitings since last night. She had these episodes once in a while since the last 6 months.  - CT abdomen and ultrasound showed early signs of acute cholecystitis  - 9/19/19 Laparoscopic cholecystectomy  done with ICG imaging of the biliary system: Edematous gallbladder with pus and stones  - Anticoagulation held for surgery  - Continue ceftriaxone and flagyl post-op  - Tylenol for fever and pain  - IV fluids discontinued  - Patient tolerating PO ->  Advance diets as tolerated  - Ambulate as tolerated  - Incentive spirometry  - Monitor CBC, BMP tomorrow    Hypertension- (present on admission)  Assessment & Plan  -continue lisinopril 10 mg  -monitor BP readings    Sleep apnea- (present on admission)  Assessment & Plan  - Pt has sleep apnea and uses CPAP while sleeping  - Patient is on 2L O2 with saturations at 95 -98% following surgery  - Outpatient follow up    Endometrial cancer (HCC)  Assessment & Plan  -Pt was diagnosed with endometrial cancer 3 months back and she underwent hysterectomy and is currently undergoing radiation treatment. Last radiation was in the morning today  -No chemotherapy  -currently stable  -follow up with her gynecologist

## 2019-09-19 NOTE — OP REPORT
DATE OF OPERATION: September 19, 2019    PREOPERATIVE DIAGNOSIS: Acute cholecystitis with cholelithiasis, recent pelvic radiation for endometrial cancer, diabetes, hypertension, CANDIDA on CPAP  POSTOPERATIVE DIAGNOSIS: Acute cholecystitis with cholelithiasis, recent pelvic radiation for endometrial cancer, diabetes, hypertension, CANDIDA on CPAP    PROCEDURE: Laparoscopic cholecystectomy with ICG imaging of the biliary system    SURGEON: Jenniffer Gomez MD   ASSISTANT:  none    ANESTHESIOLOGIST: Dr. Hill with [facility]    SPECIMENS: Gallbladder and contents.     FINDINGS: Edematous gallbladder with pus and stones    INDICATIONS: Ms. Layne is a 69 year old woman who finished her pelvic radiation for endometrial cancer yesterday, and was sent to the ED by Dr. Bustillos for epigastric pain. She was found to have gallbladder sludge, gallbladder wall thickening and pericholecystic fluid consistent with acute cholecystitis. We discussed definitive surgical therapy with risks including, but   not limited to bleeding, infection, damage to the common duct, possibly   requiring hepaticojejunostomy. Patient understood and agreed to proceed.     DESCRIPTION OF PROCEDURE: The patient was brought to the operating room. She   was placed in a comfortable supine position on the operating room table with   all appropriate points padded. General anesthesia was induced without   complication. A time-out was held and completed confirming correct patient,   correct site, correct procedure and SCDs on and functioning. She received   antibiotics prior to incision. After prepping the abdomen with ChloraPrep and   draping in usual sterile fashion, 0.25% Marcaine with epinephrine was   infiltrated about 5cm supraumbilically and a 1-cm vertical incision was made in this area. This   was taken down the fascia bluntly using a hemostat and a Kocher was used to grasp the umbilical stalk and elevate the abdominal wall. A   Veress needle was placed into the  abdomen and a saline drop test showed no   evidence of injury to bowel or vessel. I then insufflated the abdomen to a   pressure of 15 mmHg with CO2. An 11-mm trocar was placed through this   incision into the abdomen and a camera was then inserted confirming no   evidence of injury to bowel or vessel. On initial survey of the abdomen there were no adhesions, there was a very small reducible umbilical hernia. I then placed an 11-mm   trocar in the epigastrium and two 5-mm trocars in the right upper quadrant   under direct visualization after appropriate numbing of the skin. I grasped   the fundus of the gallbladder lifted up over the liver edge. The gallbladder was fairly tense and the grasper perforated it, spilling clear green bile and pus. I then grasped the   infundibulum of the gallbladder and   retracted it laterally and circumferentially dissected the cystic duct and the   cystic artery from lateral to medial using a [place] grasper, obtaining the critical view of safety and confirming with ICG visualization. She had a very short cystic duct. I then placed   three 10-mm clips on the patient's side and one on the specimen side on the   cystic duct and divided sharply. I then placed 2 clips on the cystic artery   on the patient's side and one on the specimen side and divided sharply and   removed the gallbladder from the liver bed using Bovie electrocautery.The gallbladder was then placed in an EndoCatch   bag and removed through the epigastric port incision. I then obtained   excellent hemostasis and copiously irrigated the area with 1 liter of warm   saline until entirely clear. I closed the fascia on the epigastric and umbilical ports using   a single interrupted 0 Vicryl with an Endoclose under direct visualization   and then removed the two 5-mm ports confirming good hemostasis.  I then closed the skin using a single running 4-0   Vicryl in a subcuticular fashion. These were then dressed with dry  dressings   and patient was awoken from anesthesia in good condition having tolerated the   procedure well. All counts were correct at the end of the case x2.

## 2019-09-19 NOTE — ANESTHESIA PREPROCEDURE EVALUATION
Relevant Problems   ANESTHESIA   (+) Sleep apnea      CARDIAC   (+) Hypertension      Other   (+) Acute cholecystitis   (+) BMI 37.0-37.9, adult   (+) Diabetes mellitus (HCC)       Physical Exam    Airway   Mallampati: I  TM distance: >3 FB  Neck ROM: full       Cardiovascular - normal exam  Rhythm: regular  Rate: normal  (+) murmur     Dental   (+) upper dentures, lower dentures         Pulmonary - normal exam  Breath sounds clear to auscultation     Abdominal    Neurological - normal exam                 Anesthesia Plan    ASA 3   ASA physical status 3 criteria: diabetes - poorly controlled    Plan - general       Airway plan will be ETT        Induction: intravenous      Pertinent diagnostic labs and testing reviewed    Informed Consent:    Anesthetic plan and risks discussed with patient.      Special considerations: [employer]an.

## 2019-09-19 NOTE — ANESTHESIA POSTPROCEDURE EVALUATION
Patient: Madelyn Schmitz    Procedure Summary     Date:  09/19/19 Room / Location:  [facility] 09 / SURGERY [facility]    Anesthesia Start:  0726 Anesthesia Stop:  0829    Procedure:  CHOLECYSTECTOMY, LAPAROSCOPIC (Abdomen) Diagnosis:  (Acute cholecystitis )    Surgeon:  Jenniffer Johnson M.D. Responsible Provider:  Ghulam Hill M.D.    Anesthesia Type:  general ASA Status:  3          Final Anesthesia Type: general  Last vitals  BP   Blood Pressure : (!) 94/43    Temp   36.8 °C (98.3 °F)    Pulse   Pulse: 70   Resp   (!) 21    SpO2   93 %      Anesthesia Post Evaluation    Patient location during evaluation: PACU  Patient participation: complete - patient participated  Level of consciousness: awake and alert    Airway patency: patent  Anesthetic complications: no  Cardiovascular status: hemodynamically stable  Respiratory status: acceptable  Hydration status: euvolemic    PONV: none           Nurse Pain Score: 3 (NPRS)

## 2019-09-19 NOTE — ASSESSMENT & PLAN NOTE
- Pt has sleep apnea and uses CPAP while sleeping  - Patient is on 2L O2 with saturations at 95 -98% following surgery  - Outpatient follow up

## 2019-09-19 NOTE — PROGRESS NOTES
Patient A&O x4. Stand by assist OOB. [place] speaking. VSS. R AC patent with NS infusing at 83ml/hr. Denies pain or nausea. Just abdominal tenderness on palpation. Family at bedside. CHG bath done for morning procedure. Educated to use call light for assistance. Needs met at this time. TM

## 2019-09-19 NOTE — ANESTHESIA QCDR
2019 [facility] Clinical Data Registry (for Quality Improvement)     Postoperative nausea/vomiting risk protocol (Adult = 18 yrs and Pediatric 3-17 yrs)- (430 and 463)  General inhalation anesthetic (NOT TIVA) with PONV risk factors: Yes  Provision of anti-emetic therapy with at least 2 different classes of agents: Yes   Patient DID NOT receive anti-emetic therapy and reason is documented in Medical Record:  N/A    Multimodal Pain Management- (AQI59)  Patient undergoing Elective Surgery (i.e. Outpatient, or ASC, or Prescheduled Surgery prior to Hospital Admission): No  Use of Multimodal Pain Management, two or more drugs and/or interventions, NOT including systemic opioids: N/A  Exception: Documented allergy to multiple classes of analgesics: N/A    PACU assessment of acute postoperative pain prior to Anesthesia Care End- Applies to Patients Age = 18- (ABG7)  Initial PACU pain score is which of the following: < 7/10  Patient unable to report pain score: N/A    Post-anesthetic transfer of care checklist/protocol to PACU/ICU- (426 and 427)  Upon conclusion of case, patient transferred to which of the following locations: PACU/Non-ICU  Use of transfer checklist/protocol: Yes  Exclusion: Service Performed in Patient Hospital Room (and thus did not require transfer): N/A    PACU Reintubation- (AQI31)  General anesthesia requiring endotracheal intubation (ETT) along with subsequent extubation in OR or PACU: Yes  Required reintubation in the PACU: No   Extubation was a planned trial documented in the medical record prior to removal of the original airway device:  N/A    Unplanned admission to ICU related to anesthesia service up through end of PACU care- (MD51)  Unplanned admission to ICU (not initially anticipated at anesthesia start time): No

## 2019-09-20 ENCOUNTER — TELEPHONE (OUTPATIENT)
Dept: MEDICAL GROUP | Facility: MEDICAL CENTER | Age: 69
End: 2019-09-20

## 2019-09-20 ENCOUNTER — PATIENT OUTREACH (OUTPATIENT)
Dept: HEALTH INFORMATION MANAGEMENT | Facility: OTHER | Age: 69
End: 2019-09-20

## 2019-09-20 VITALS
DIASTOLIC BLOOD PRESSURE: 47 MMHG | BODY MASS INDEX: 35.55 KG/M2 | RESPIRATION RATE: 15 BRPM | OXYGEN SATURATION: 93 % | WEIGHT: 200.62 LBS | SYSTOLIC BLOOD PRESSURE: 97 MMHG | HEIGHT: 63 IN | HEART RATE: 82 BPM | TEMPERATURE: 97.5 F

## 2019-09-20 LAB
ALBUMIN SERPL BCP-MCNC: 3.3 G/DL (ref 3.2–4.9)
ALBUMIN/GLOB SERPL: 1.2 G/DL
ALP SERPL-CCNC: 46 U/L (ref 30–99)
ALT SERPL-CCNC: 20 U/L (ref 2–50)
ANION GAP SERPL CALC-SCNC: 7 MMOL/L (ref 0–11.9)
AST SERPL-CCNC: 29 U/L (ref 12–45)
BASOPHILS # BLD AUTO: 0.2 % (ref 0–1.8)
BASOPHILS # BLD: 0.01 K/UL (ref 0–0.12)
BILIRUB SERPL-MCNC: 0.4 MG/DL (ref 0.1–1.5)
BUN SERPL-MCNC: 9 MG/DL (ref 8–22)
CALCIUM SERPL-MCNC: 8.2 MG/DL (ref 8.5–10.5)
CHEMOTHERAPY INFUSION START DATE: NORMAL
CHEMOTHERAPY RECORDS: 1.8
CHEMOTHERAPY RECORDS: 4860
CHEMOTHERAPY RECORDS: NORMAL
CHEMOTHERAPY RX CANCER: NORMAL
CHLORIDE SERPL-SCNC: 106 MMOL/L (ref 96–112)
CO2 SERPL-SCNC: 26 MMOL/L (ref 20–33)
CREAT SERPL-MCNC: 0.59 MG/DL (ref 0.5–1.4)
EOSINOPHIL # BLD AUTO: 0.07 K/UL (ref 0–0.51)
EOSINOPHIL NFR BLD: 1.1 % (ref 0–6.9)
ERYTHROCYTE [DISTWIDTH] IN BLOOD BY AUTOMATED COUNT: 57.5 FL (ref 35.9–50)
GLOBULIN SER CALC-MCNC: 2.7 G/DL (ref 1.9–3.5)
GLUCOSE BLD-MCNC: 109 MG/DL (ref 65–99)
GLUCOSE SERPL-MCNC: 128 MG/DL (ref 65–99)
HCT VFR BLD AUTO: 35.9 % (ref 37–47)
HGB BLD-MCNC: 10.9 G/DL (ref 12–16)
IMM GRANULOCYTES # BLD AUTO: 0.02 K/UL (ref 0–0.11)
IMM GRANULOCYTES NFR BLD AUTO: 0.3 % (ref 0–0.9)
LYMPHOCYTES # BLD AUTO: 0.27 K/UL (ref 1–4.8)
LYMPHOCYTES NFR BLD: 4.3 % (ref 22–41)
MCH RBC QN AUTO: 29.5 PG (ref 27–33)
MCHC RBC AUTO-ENTMCNC: 30.4 G/DL (ref 33.6–35)
MCV RBC AUTO: 97.3 FL (ref 81.4–97.8)
MONOCYTES # BLD AUTO: 0.48 K/UL (ref 0–0.85)
MONOCYTES NFR BLD AUTO: 7.6 % (ref 0–13.4)
NEUTROPHILS # BLD AUTO: 5.43 K/UL (ref 2–7.15)
NEUTROPHILS NFR BLD: 86.5 % (ref 44–72)
NRBC # BLD AUTO: 0 K/UL
NRBC BLD-RTO: 0 /100 WBC
PLATELET # BLD AUTO: 168 K/UL (ref 164–446)
PMV BLD AUTO: 8.8 FL (ref 9–12.9)
POTASSIUM SERPL-SCNC: 4 MMOL/L (ref 3.6–5.5)
PROT SERPL-MCNC: 6 G/DL (ref 6–8.2)
RAD ONC ARIA COURSE TREATMENT ELAPSED DAYS: NORMAL
RAD ONC ARIA PLAN TREATMENT DATES: NORMAL
RAD ONC ARIA REFERENCE POINT DOSAGE GIVEN TO DATE: 48.6
RAD ONC ARIA REFERENCE POINT DOSAGE GIVEN TO DATE: 49.71
RAD ONC ARIA REFERENCE POINT ID: NORMAL
RAD ONC ARIA REFERENCE POINT ID: NORMAL
RBC # BLD AUTO: 3.69 M/UL (ref 4.2–5.4)
SODIUM SERPL-SCNC: 139 MMOL/L (ref 135–145)
WBC # BLD AUTO: 6.3 K/UL (ref 4.8–10.8)

## 2019-09-20 PROCEDURE — A9270 NON-COVERED ITEM OR SERVICE: HCPCS | Performed by: SURGERY

## 2019-09-20 PROCEDURE — 85025 COMPLETE CBC W/AUTO DIFF WBC: CPT

## 2019-09-20 PROCEDURE — 36415 COLL VENOUS BLD VENIPUNCTURE: CPT

## 2019-09-20 PROCEDURE — 94660 CPAP INITIATION&MGMT: CPT

## 2019-09-20 PROCEDURE — A9270 NON-COVERED ITEM OR SERVICE: HCPCS | Performed by: STUDENT IN AN ORGANIZED HEALTH CARE EDUCATION/TRAINING PROGRAM

## 2019-09-20 PROCEDURE — 82962 GLUCOSE BLOOD TEST: CPT

## 2019-09-20 PROCEDURE — 700102 HCHG RX REV CODE 250 W/ 637 OVERRIDE(OP): Performed by: SURGERY

## 2019-09-20 PROCEDURE — 700111 HCHG RX REV CODE 636 W/ 250 OVERRIDE (IP): Performed by: STUDENT IN AN ORGANIZED HEALTH CARE EDUCATION/TRAINING PROGRAM

## 2019-09-20 PROCEDURE — 700101 HCHG RX REV CODE 250: Performed by: STUDENT IN AN ORGANIZED HEALTH CARE EDUCATION/TRAINING PROGRAM

## 2019-09-20 PROCEDURE — 99238 HOSP IP/OBS DSCHRG MGMT 30/<: CPT | Mod: GC | Performed by: INTERNAL MEDICINE

## 2019-09-20 PROCEDURE — 700102 HCHG RX REV CODE 250 W/ 637 OVERRIDE(OP): Performed by: STUDENT IN AN ORGANIZED HEALTH CARE EDUCATION/TRAINING PROGRAM

## 2019-09-20 PROCEDURE — 80053 COMPREHEN METABOLIC PANEL: CPT

## 2019-09-20 RX ORDER — ACETAMINOPHEN 325 MG/1
650 TABLET ORAL EVERY 6 HOURS PRN
Qty: 30 TAB | Refills: 0 | Status: SHIPPED | OUTPATIENT
Start: 2019-09-20 | End: 2022-04-20

## 2019-09-20 RX ORDER — OXYCODONE HYDROCHLORIDE 5 MG/1
5 TABLET ORAL EVERY 6 HOURS PRN
Qty: 12 TAB | Refills: 0 | Status: SHIPPED | OUTPATIENT
Start: 2019-09-20 | End: 2019-09-23

## 2019-09-20 RX ORDER — CIPROFLOXACIN 500 MG/1
500 TABLET, FILM COATED ORAL 2 TIMES DAILY
Qty: 11 TAB | Refills: 0 | Status: SHIPPED | OUTPATIENT
Start: 2019-09-20 | End: 2019-09-26

## 2019-09-20 RX ORDER — METRONIDAZOLE 500 MG/1
500 TABLET ORAL EVERY 8 HOURS
Qty: 17 TAB | Refills: 0 | Status: SHIPPED | OUTPATIENT
Start: 2019-09-20 | End: 2019-09-26

## 2019-09-20 RX ORDER — OXYCODONE HYDROCHLORIDE 5 MG/1
5 TABLET ORAL EVERY 6 HOURS PRN
Qty: 12 TAB | Refills: 0 | Status: SHIPPED | OUTPATIENT
Start: 2019-09-20 | End: 2019-09-20

## 2019-09-20 RX ADMIN — LISINOPRIL 10 MG: 10 TABLET ORAL at 05:53

## 2019-09-20 RX ADMIN — OXYCODONE HYDROCHLORIDE 5 MG: 5 TABLET ORAL at 07:36

## 2019-09-20 RX ADMIN — METRONIDAZOLE 500 MG: 500 INJECTION, SOLUTION INTRAVENOUS at 05:51

## 2019-09-20 RX ADMIN — ENOXAPARIN SODIUM 40 MG: 100 INJECTION SUBCUTANEOUS at 05:51

## 2019-09-20 RX ADMIN — SENNOSIDES, DOCUSATE SODIUM 2 TABLET: 50; 8.6 TABLET, FILM COATED ORAL at 05:51

## 2019-09-20 ASSESSMENT — ENCOUNTER SYMPTOMS
NAUSEA: 0
VOMITING: 0
FEVER: 0
CHILLS: 0
ABDOMINAL PAIN: 1

## 2019-09-20 NOTE — PROGRESS NOTES
Progress Note               Author: Jenniffer C Alex Date & Time created: 9/20/2019  8:34 AM     Interval History:  Doing well this morning. Pain adequately controlled with PO meds. No N/V. Tolerating regular diet.     Review of Systems:  Review of Systems   Constitutional: Negative for chills and fever.   Gastrointestinal: Positive for abdominal pain. Negative for nausea and vomiting.       Physical Exam:  Physical Exam   Constitutional: She is oriented to person, place, and time. She appears well-developed and well-nourished. No distress.   HENT:   Head: Normocephalic and atraumatic.   Eyes: Conjunctivae are normal.   Neck: Normal range of motion.   Cardiovascular: Normal rate.   Pulmonary/Chest: Effort normal.   Abdominal: Soft. She exhibits no distension. There is tenderness.   Appropriate incisional tenderness. Incisions c/d/i, no ecchymosis or hematoma.    Musculoskeletal: Normal range of motion.   Neurological: She is alert and oriented to person, place, and time.   Skin: Skin is warm and dry. She is not diaphoretic.   Psychiatric: She has a normal mood and affect. Her behavior is normal.       Labs:          Recent Labs     09/18/19 1517 09/19/19 0453 09/20/19  0514   SODIUM 137 141 139   POTASSIUM 4.0 4.0 4.0   CHLORIDE 101 106 106   CO2 28 26 26   BUN 12 9 9   CREATININE 0.55 0.55 0.59   MAGNESIUM  --  2.0  --    CALCIUM 9.2 8.8 8.2*     Recent Labs     09/18/19  1517 09/19/19 0453 09/20/19  0514   ALTSGPT 9 8 20   ASTSGOT 13 13 29   ALKPHOSPHAT 58 55 46   TBILIRUBIN 0.4 0.4 0.4   LIPASE 17  --   --    GLUCOSE 118* 121* 128*     Recent Labs     09/18/19  1517 09/19/19  0453 09/20/19  0514   RBC 4.17* 3.83* 3.69*   HEMOGLOBIN 12.7 11.6* 10.9*   HEMATOCRIT 39.3 36.3* 35.9*   PLATELETCT 220 184 168     Recent Labs     09/18/19  1517 09/19/19  0453 09/20/19  0514   WBC 6.7 4.3* 6.3   NEUTSPOLYS 82.80* 73.40* 86.50*   LYMPHOCYTES 7.30* 9.50* 4.30*   MONOCYTES 7.80 12.70 7.60   EOSINOPHILS 1.20 3.70 1.10    BASOPHILS 0.30 0.20 0.20   ASTSGOT 13 13 29   ALTSGPT 9 8 20   ALKPHOSPHAT 58 55 46   TBILIRUBIN 0.4 0.4 0.4     Hemodynamics:  Temp (24hrs), Av.7 °C (98.1 °F), Min:36.3 °C (97.3 °F), Max:37.2 °C (98.9 °F)  Temperature: 36.8 °C (98.2 °F)  Pulse  Av.8  Min: 65  Max: 88   Blood Pressure : (!) 99/50     Respiratory:    Respiration: 16, Pulse Oximetry: 92 %           Fluids:    Intake/Output Summary (Last 24 hours) at 2019 0834  Last data filed at 2019 2145  Gross per 24 hour   Intake 580 ml   Output --   Net 580 ml        GI/Nutrition:  Orders Placed This Encounter   Procedures   • Diet Order Diabetic     Standing Status:   Standing     Number of Occurrences:   1     Order Specific Question:   Diet:     Answer:   Diabetic [3]     Order Specific Question:   Macronutrient modifications:     Answer:   Low Fat [5]     Medical Decision Making, by Problem:  Active Hospital Problems    Diagnosis   • *Diabetes mellitus (HCC) [E11.9]   • Acute cholecystitis [K81.0]   • Hypertension [I10]   • Sleep apnea [G47.30]       Plan:  Ok to d/c home from my standpoint.   Follow up with me next . [phone]  Ok to shower, no baths/swimming for two weeks.   No lifting more than 20 pounds for the next 4 weeks.   Call the office for increased pain, nausea, vomiting, chest pain, shortness of breath, redness or drainage from incisions or any other questions or concerns.     Quality-Core Measures   Reviewed items::  Labs reviewed  Watkins catheter::  No Watkins

## 2019-09-20 NOTE — PROGRESS NOTES
Patient discharged home with no needs, family at bedside to assist with discharge, oxycodone prescription given and consent signed, all discharge instructions and appointments given, verbalize understanding, all personal belongings sent home with patient

## 2019-09-20 NOTE — CARE PLAN
Problem: Venous Thromboembolism (VTW)/Deep Vein Thrombosis (DVT) Prevention:  Goal: Patient will participate in Venous Thrombosis (VTE)/Deep Vein Thrombosis (DVT)Prevention Measures  Outcome: PROGRESSING AS EXPECTED  Note:   Lovenox for pharm DVT prophylaxis.  SCDs when in bed.  Patient ambulatory.     Problem: Urinary Elimination:  Goal: Ability to reestablish a normal urinary elimination pattern will improve  Outcome: PROGRESSING AS EXPECTED  Note:   POD#O: Patient has no issues with voiding.

## 2019-09-20 NOTE — CARE PLAN
Problem: Pain Management  Goal: Pain level will decrease to patient's comfort goal  9/20/2019 0939 by Deana Cano R.N.  Outcome: MET  9/20/2019 0915 by Deana Cano R.N.  Outcome: PROGRESSING AS EXPECTED  Note:   Oxycodone prn and effective     Problem: Urinary Elimination:  Goal: Ability to reestablish a normal urinary elimination pattern will improve  Outcome: MET     Problem: Communication  Goal: The ability to communicate needs accurately and effectively will improve  9/20/2019 0939 by Deana Cano R.N.  Outcome: MET  9/20/2019 0915 by Deana Cano R.N.  Outcome: PROGRESSING AS EXPECTED  Note:   Family at bedside to help express needs     Problem: Safety  Goal: Will remain free from injury  9/20/2019 0939 by Deana Cano R.N.  Outcome: MET  9/20/2019 0915 by Deana Cano R.N.  Outcome: PROGRESSING AS EXPECTED  Goal: Will remain free from falls  Outcome: MET     Problem: Infection  Goal: Will remain free from infection  Outcome: MET     Problem: Venous Thromboembolism (VTW)/Deep Vein Thrombosis (DVT) Prevention:  Goal: Patient will participate in Venous Thrombosis (VTE)/Deep Vein Thrombosis (DVT)Prevention Measures  Outcome: MET     Problem: Bowel/Gastric:  Goal: Normal bowel function is maintained or improved  Outcome: MET  Goal: Will not experience complications related to bowel motility  Outcome: MET     Problem: Knowledge Deficit  Goal: Knowledge of disease process/condition, treatment plan, diagnostic tests, and medications will improve  Outcome: MET  Goal: Knowledge of the prescribed therapeutic regimen will improve  Outcome: MET     Problem: Discharge Barriers/Planning  Goal: Patient's continuum of care needs will be met  Outcome: MET     Problem: Respiratory:  Goal: Respiratory status will improve  Outcome: MET

## 2019-09-20 NOTE — PROGRESS NOTES
Internal Medicine Interval Note  Note Author: Jojo Hernandez M.D.     Name Madelyn Schmitz     1950   Age/Sex 69 y.o. female   MRN [id number]   Code Status  Full code     After 5PM or if no immediate response to page, please call for cross-coverage  Attending/Team:   Dr. Stephens / Melissa See Patient List for primary contact information  Call [phone] to page    1st Call - Day Intern (R1):    Dr. Hernandez 2nd Call - Day Sr. Resident (R2/R3):    Dr. Gauman         Reason for interval visit  (Principal Problem)   Acute cholecystitis s/p laparoscopic cholecystectomy (POD #1)      Interval Problem Daily Status Update  (24 hours, problem oriented, brief subjective history, new lab/imaging data pertinent to that problem)   - No acute events overnight. Patient complains of pain in the abdomen, 10. Taking pain medications with some relief. She was passing gas, but no bowel movement yet. Able to tolerate oral feeds. No nausea / no vomiting / no fever. She is on 2L oxygen. On CPAP at night, but her O2 saturation dropped to 85-87% and was switched back to 2L O2. This morning she was able to walk with out oxygen and did fine.    Review of Systems   Constitutional: Negative for chills, fever, malaise/fatigue and weight loss.   HENT: Negative for congestion, ear discharge, ear pain, hearing loss, nosebleeds, sinus pain and tinnitus.    Eyes: Negative for blurred vision, double vision, photophobia, pain, discharge and redness.   Respiratory: Negative for cough, hemoptysis, sputum production, shortness of breath and wheezing.    Cardiovascular: Negative for chest pain, palpitations, orthopnea and leg swelling.   Gastrointestinal: Positive for abdominal pain. Negative for constipation, diarrhea, heartburn, nausea and vomiting.   Genitourinary: Negative for dysuria, frequency and urgency.   Musculoskeletal: Negative for back pain, joint pain, myalgias and neck pain.   Skin: Negative for itching and rash.    Neurological: Negative for dizziness, seizures, loss of consciousness, weakness and headaches.   Psychiatric/Behavioral: Negative for depression, substance abuse and suicidal ideas.     Disposition/Barriers to discharge:   Discharge today     Consultants/Specialty  Surgery    PCP: Pcp Pt States None      Quality Measures  Quality-Core Measures   Reviewed items::  Labs reviewed, Medications reviewed and Radiology images reviewed  Watkins catheter::  No Watkins  DVT prophylaxis pharmacological::  Not indicated at this time, ambulatory  DVT prophylaxis - mechanical:  SCDs  Ulcer Prophylaxis::  No        Physical Exam       Vitals:    09/19/19 1700 09/19/19 2100 09/20/19 0009 09/20/19 0552   BP: 103/51 114/61 (!) 97/51 (!) 99/50   Pulse: 70 82 88 87   Resp: 15 16 16 16   Temp: 36.9 °C (98.5 °F) 36.8 °C (98.2 °F) 37.2 °C (98.9 °F) 36.8 °C (98.2 °F)   TempSrc: Temporal Temporal Temporal Temporal   SpO2: 96% 96% 94% 92%   Weight:       Height:         Body mass index is 35.54 kg/m².    Oxygen Therapy:  Pulse Oximetry: 92 %, O2 (LPM): 2, O2 Delivery: CPAP    Physical Exam   Constitutional: She is oriented to person, place, and time and well-developed, well-nourished, and in no distress. No distress.   HENT:   Head: Normocephalic and atraumatic.   Mouth/Throat: Oropharynx is clear and moist.   Eyes: Pupils are equal, round, and reactive to light. Conjunctivae and EOM are normal. Right eye exhibits no discharge. Left eye exhibits no discharge.   Neck: Normal range of motion. Neck supple.   Cardiovascular: Normal rate and regular rhythm. Exam reveals no gallop and no friction rub.   Murmur heard.  Pulmonary/Chest: Effort normal and breath sounds normal. No respiratory distress. She has no wheezes. She has no rales. She exhibits no tenderness.   Abdominal: Soft. She exhibits no distension and no mass. Bowel sounds present.  Post-surgical incisions present. Tenderness present  Musculoskeletal: Normal range of motion. She exhibits  no edema or deformity.   Neurological: She is alert and oriented to person, place, and time. Gait normal.   Skin: Skin is warm. No rash noted. No erythema.   Psychiatric: Mood, memory, affect and judgment normal.           Assessment/Plan     * Diabetes mellitus (HCC)- (present on admission)  Assessment & Plan  - On metformin 500 mg BD at home  - last HbA1c was 6.6 from April 2019  - Started on ISS and hypoglycemia protocol    Acute cholecystitis- (present on admission)  Assessment & Plan  - Pt started having RUQ pain with vomitings since last night. She had these episodes once in a while since the last 6 months.  - CT abdomen and ultrasound showed early signs of acute cholecystitis  - 9/19/19 Laparoscopic cholecystectomy  done with ICG imaging of the biliary system: Edematous gallbladder with pus and stones  - Anticoagulation held for surgery  - Stopped ceftriaxone and flagyl today  - Discharge home with oral antibiotics (ciprofloxacin and flagyl for 5 days)  - F/U with surgery on 9/27/19 as outpatient    Hypertension- (present on admission)  Assessment & Plan  -continue lisinopril 10 mg  -monitor BP readings  - Follow up with PCP    Sleep apnea- (present on admission)  Assessment & Plan  - Pt has sleep apnea and uses CPAP while sleeping  - Patient is on 2L O2 with saturations at 95 -98% following surgery  - Outpatient follow up    Endometrial cancer (HCC)  Assessment & Plan  -Pt was diagnosed with endometrial cancer 3 months back and she underwent hysterectomy and is currently undergoing radiation treatment. Last radiation was in the morning today  -No chemotherapy  -currently stable  -follow up with her gynecologist

## 2019-09-20 NOTE — DISCHARGE INSTRUCTIONS
1. DIET: Upon discharge from the hospital you may resume your normal preoperative diet. Depending on how you are feeling and whether you have nausea or not, you may wish to stay with a bland diet for the first few days. However, you can advance this as quickly as you feel ready.     2. ACTIVITIES: After discharge from the hospital, you may resume full routine activities. However, there should be no heavy lifting (greater than 15 pounds) and no strenuous activities until after your follow-up visit. Otherwise, routine activities of daily living are acceptable.     3. DRIVING: You may drive whenever you are off pain medications and are able to perform the activities needed to drive, i.e. turning, bending, twisting, etc.     4. BATHING: You may get the wound wet at any time after leaving the hospital. You may shower, but do not submerge in a bath for at least a week.     5. BOWEL FUNCTION: A few patients, after this operation, will develop either frequent or loose stools after meals. This usually corrects itself after a few days, to a few weeks. If this occurs, do not worry; it is not unusual and will resolve. Much more common than loose stools, is constipation. The combination of pain medication and decreased activity level can cause constipation in otherwise normal patients. If you feel this is occurring, take a laxative (Milk of Magnesia, Ex-Lax, Senokot, etc.) until the problem has resolved.     6. PAIN MEDICATION: You will be given a prescription for pain medication at discharge. Please take these as directed. It is important to remember not to take medications on an empty stomach as this may cause nausea.     7.CALL IF YOU HAVE: (1) Fevers to more than 1010 F, (2) Unusual chest or leg pain, (3) Drainage or fluid from incision that may be foul smelling, increased tenderness or soreness at the wound or the wound edges are no longer together, redness or swelling at the incision site. Please do not hesitate to call  with any other questions.     8. APPOINTMENT: Contact our office at [phone] for a follow-up appointment in 1 week following your procedure.     If you have any additional questions, please do not hesitate to call the office and speak to either myself or the physician on call.     Office address:   [address]     Jenniffer Gomez MD   [facility]   [phone]    Discharge Instructions    Discharged to home by car with relative. Discharged via wheelchair, hospital escort: Yes.  Special equipment needed: Not Applicable    Be sure to schedule a follow-up appointment with your primary care doctor or any specialists as instructed.     Discharge Plan:   Influenza Vaccine Indication: Patient Refuses    I understand that a diet low in cholesterol, fat, and sodium is recommended for good health. Unless I have been given specific instructions below for another diet, I accept this instruction as my diet prescription.   Other diet:     Special Instructions: None    · Is patient discharged on Warfarin / Coumadin?   No     Depression / Suicide Risk    As you are discharged from this [facility] facility, it is important to learn how to keep safe from harming yourself.    Recognize the warning signs:  · Abrupt changes in personality, positive or negative- including increase in energy   · Giving away possessions  · Change in eating patterns- significant weight changes-  positive or negative  · Change in sleeping patterns- unable to sleep or sleeping all the time   · Unwillingness or inability to communicate  · Depression  · Unusual sadness, discouragement and loneliness  · Talk of wanting to die  · Neglect of personal appearance   · Rebelliousness- reckless behavior  · Withdrawal from people/activities they love  · Confusion- inability to concentrate     If you or a loved one observes any of these behaviors or has concerns about self-harm, here's what you can do:  · Talk about it- your feelings  and reasons for harming yourself  · Remove any means that you might use to hurt yourself (examples: pills, rope, extension cords, firearm)  · Get professional help from the community (Mental Health, Substance Abuse, psychological counseling)  · Do not be alone:Call your Safe Contact- someone whom you trust who will be there for you.  · Call your local CRISIS HOTLINE 954-7100 or 054-151-6697  · Call your local Children's Mobile Crisis Response Team [place] (450) 830-4346 or www.[employer]  · Call the toll free National Suicide Prevention Hotlines   · National Suicide Prevention Lifeline 209-135-RDOE (3957)  · National Hope Line Network 800-SUICIDE (564-6120)

## 2019-09-20 NOTE — CARE PLAN
Problem: Pain Management  Goal: Pain level will decrease to patient's comfort goal  Outcome: PROGRESSING AS EXPECTED  Note:   Oxycodone prn and effective     Problem: Communication  Goal: The ability to communicate needs accurately and effectively will improve  Outcome: PROGRESSING AS EXPECTED  Note:   Family at bedside to help express needs     Problem: Safety  Goal: Will remain free from injury  Outcome: PROGRESSING AS EXPECTED

## 2019-09-20 NOTE — TELEPHONE ENCOUNTER
NEW PATIENT VISIT PRE-VISIT PLANNING    1.  [facility] Patient is checked in Patient Demographics? YES    2.  Immunizations were updated in Epic using WebIZ?: Epic matches WebIZ       •  Web Iz Recommendations: FLU, PREVNAR (PCV13) , TDAP and SHINGRIX (Shingles)    3.  Is this appointment scheduled as a Hospital Follow-Up? Yes, visit was at [facility].     4.  Patient is due for the following Health Maintenance Topics:   Health Maintenance Due   Topic Date Due   • Annual Wellness Visit  1950   • HEPATITIS C SCREENING  1950   • DIABETES MONOFILAMENT / LE EXAM  1950   • RETINAL SCREENING  02/11/1966   • IMM HEP B VACCINE (1 of 3 - Risk 3-dose series) 02/11/1967   • IMM DTaP/Tdap/Td Vaccine (1 - Tdap) 02/11/1967   • COLONOSCOPY  02/11/1998   • IMM ZOSTER VACCINES (1 of 2) 02/11/1998   • BONE DENSITY  02/11/2013   • MAMMOGRAM  12/05/2018   • IMM INFLUENZA (1) 09/01/2019   • FASTING LIPID PROFILE  09/07/2019   • URINE ACR / MICROALBUMIN  09/07/2019   • A1C SCREENING  10/08/2019       - Patient has completed Immunizations: PNEUMOVAX (PPSV23) and HMR Letter faxed to:  no HMR letter, no previous PCP.    5. Orders for overdue Health Maintenance topics pended in Pre-Charting? NO    6.  Reviewed/Updated the following with patient:   •   Preferred Pharmacy? NO       •   Preferred Lab? NO       •   Preferred Communication? NO       •   Allergies? NO       •   Medications? NO       •   Social History? NO       •   Family History (document living status of immediate family members and if + hx of cancer, diabetes, hypertension, hyperlipidemia, heart attack, stroke) NO    7.  Updated Care Team?       •   DME Company (gait device, O2, CPAP, etc.) NO       •   Other Specialists (eye doctor, derm, GYN, cardiology, endo, etc): NO    8.  Patient was NOT informed to arrive 15 min prior to their   scheduled appointment and bring in their medication bottles.    Patient is still admitted at this point.

## 2019-09-23 ENCOUNTER — PATIENT OUTREACH (OUTPATIENT)
Dept: HEALTH INFORMATION MANAGEMENT | Facility: OTHER | Age: 69
End: 2019-09-23

## 2019-09-24 ENCOUNTER — PATIENT OUTREACH (OUTPATIENT)
Dept: HEALTH INFORMATION MANAGEMENT | Facility: OTHER | Age: 69
End: 2019-09-24

## 2019-09-25 ENCOUNTER — HOSPITAL ENCOUNTER (OUTPATIENT)
Dept: LAB | Facility: MEDICAL CENTER | Age: 69
End: 2019-09-25
Attending: SURGERY
Payer: MEDICARE

## 2019-09-25 LAB
APPEARANCE UR: CLEAR
BACTERIA #/AREA URNS HPF: NEGATIVE /HPF
BILIRUB UR QL STRIP.AUTO: NEGATIVE
CAOX CRY #/AREA URNS HPF: ABNORMAL /HPF
COLOR UR: ABNORMAL
EPI CELLS #/AREA URNS HPF: ABNORMAL /HPF
GLUCOSE UR STRIP.AUTO-MCNC: NEGATIVE MG/DL
HYALINE CASTS #/AREA URNS LPF: ABNORMAL /LPF
KETONES UR STRIP.AUTO-MCNC: NEGATIVE MG/DL
LEUKOCYTE ESTERASE UR QL STRIP.AUTO: ABNORMAL
MICRO URNS: ABNORMAL
NITRITE UR QL STRIP.AUTO: NEGATIVE
PH UR STRIP.AUTO: 6 [PH] (ref 5–8)
PROT UR QL STRIP: 100 MG/DL
RBC # URNS HPF: ABNORMAL /HPF
RBC UR QL AUTO: NEGATIVE
SP GR UR STRIP.AUTO: 1.02
UROBILINOGEN UR STRIP.AUTO-MCNC: 0.2 MG/DL
WBC #/AREA URNS HPF: ABNORMAL /HPF

## 2019-09-25 PROCEDURE — 81001 URINALYSIS AUTO W/SCOPE: CPT

## 2019-09-26 ENCOUNTER — HOSPITAL ENCOUNTER (OUTPATIENT)
Facility: MEDICAL CENTER | Age: 69
End: 2019-09-26
Attending: SURGERY
Payer: MEDICARE

## 2019-09-26 ENCOUNTER — OFFICE VISIT (OUTPATIENT)
Dept: MEDICAL GROUP | Facility: MEDICAL CENTER | Age: 69
End: 2019-09-26
Payer: MEDICARE

## 2019-09-26 VITALS
SYSTOLIC BLOOD PRESSURE: 84 MMHG | HEART RATE: 84 BPM | TEMPERATURE: 98.8 F | WEIGHT: 198.4 LBS | OXYGEN SATURATION: 97 % | HEIGHT: 62 IN | BODY MASS INDEX: 36.51 KG/M2 | DIASTOLIC BLOOD PRESSURE: 52 MMHG

## 2019-09-26 DIAGNOSIS — I10 ESSENTIAL HYPERTENSION: ICD-10-CM

## 2019-09-26 DIAGNOSIS — E86.1 HYPOTENSION DUE TO HYPOVOLEMIA: ICD-10-CM

## 2019-09-26 DIAGNOSIS — R19.7 DIARRHEA, UNSPECIFIED TYPE: ICD-10-CM

## 2019-09-26 DIAGNOSIS — E11.8 TYPE 2 DIABETES MELLITUS WITH COMPLICATION, WITHOUT LONG-TERM CURRENT USE OF INSULIN (HCC): ICD-10-CM

## 2019-09-26 DIAGNOSIS — I95.89 HYPOTENSION DUE TO HYPOVOLEMIA: ICD-10-CM

## 2019-09-26 LAB
C DIFF DNA SPEC QL NAA+PROBE: NEGATIVE
C DIFF TOX GENS STL QL NAA+PROBE: NEGATIVE

## 2019-09-26 PROCEDURE — 87493 C DIFF AMPLIFIED PROBE: CPT

## 2019-09-26 PROCEDURE — 99205 OFFICE O/P NEW HI 60 MIN: CPT | Performed by: INTERNAL MEDICINE

## 2019-09-26 NOTE — PATIENT INSTRUCTIONS
1 Hold your lisinopril (blood pressure) medicine for the next few days as you are dehydrated from the frequent diarrhea.   2. Hold your metformin (diabetes) medicine for the next few days as you are dehydrated and your kidneys might be a little irritated.   3. You can check your blood sugar once or twice daily over the next few days to make sure it is not climbing.

## 2019-09-26 NOTE — ASSESSMENT & PLAN NOTE
This is a new exacerbated problem.  She is having diarrhea up to 6 episodes daily.  Likely multifactorial from postop constipation overtreated with senna, diarrhea secondary to antibiotic use, ongoing use of metformin, and as a result of her cholecystectomy.  We will have her hold the lisinopril metformin at this time.  She can continue to use Imodium to try to slow down the symptoms.  She is going to work to stay well-hydrated. She will let me know if symptoms do not improve in the coming days.

## 2019-09-26 NOTE — ASSESSMENT & PLAN NOTE
This is a new and exacerbated problem.  Her hypotension is secondary to hypovolemia from ongoing frequent low volume diarrhea.  She also continue to use her lisinopril and metformin postoperatively which is likely contributing.  She is also continuing on 2 different antibiotics which may be making diarrhea worse.  I advised her to hold her lisinopril and metformin at this time and encouraged oral hydration as much she can tolerate.  She can continue the Imodium as needed slow down the diarrhea.  She will be following up with her general surgeon tomorrow for a postop visit.

## 2019-09-26 NOTE — ASSESSMENT & PLAN NOTE
This is a chronic and stable problem. We will have her hold the metformin until her diarrhea subsides and she may resume it next week. She will return in 1 month and we will check A1c, urine microalbumin, do monofilament testing, and discuss retinal screening at that time. She has several acute issues today that we focused on instead. I would like to request records from her prior PCP (Indu Medellin) but unfortunately she was private practice and closed up her office and it looks like all records have also gone with her.

## 2019-09-26 NOTE — LETTER
[employer] [facility] [facility]  Monika Mike D.O.  [address]  [place] [address]  Fax: [phone]   Authorization for Release/Disclosure of   Protected Health Information   Name: JONI SCHMITZ : 1950 SSN: [id number]   [facility] [address] Phone:    [phone] (home)    I authorize the entity listed below to release/disclose the PHI below to:   [employer] [facility]/Monika Mike D.O. and Monika Mike D.O.   Provider or Entity Name:  [facility]   Address   City, State, [facility]   Phone:      Fax:     Reason for request: continuity of care   Information to be released:    [  ] LAST COLONOSCOPY,  including any PATH REPORT and follow-up  [  ] LAST FIT/COLOGUARD RESULT [  ] LAST DEXA  [  ] LAST MAMMOGRAM  [  ] LAST PAP  [  ] LAST LABS [  ] RETINA EXAM REPORT  [  ] IMMUNIZATION RECORDS  [ x ] Release all info      [  ] Check here and initial the line next to each item to release ALL health information INCLUDING  _____ Care and treatment for drug and / or alcohol abuse  _____ HIV testing, infection status, or AIDS  _____ Genetic Testing    DATES OF SERVICE OR TIME PERIOD TO BE DISCLOSED: _____________  I understand and acknowledge that:  * This Authorization may be revoked at any time by you in writing, except if your health information has already been used or disclosed.  * Your health information that will be used or disclosed as a result of you signing this authorization could be re-disclosed by the recipient. If this occurs, your re-disclosed health information may no longer be protected by State or Federal laws.  * You may refuse to sign this Authorization. Your refusal will not affect your ability to obtain treatment.  * This Authorization becomes effective upon signing and will  on (date) __________.      If no date is indicated, this Authorization will  one (1) year from the signature date.    Name: Joni Schmitz    Signature:   Date:          9/26/2019       PLEASE FAX REQUESTED RECORDS BACK TO: [phone]

## 2019-09-26 NOTE — ASSESSMENT & PLAN NOTE
This is a chronic and stable problem, however due to recent hypotension we are going to hold her lisinopril for the next week until she is better recovered from her cholecystectomy.

## 2019-09-26 NOTE — PROGRESS NOTES
Subjective:     CC:  Diagnoses of Essential hypertension, Type 2 diabetes mellitus with complication, without long-term current use of insulin (HCC), Diarrhea, unspecified type, and Hypotension due to hypovolemia were pertinent to this visit.    HISTORY OF THE PRESENT ILLNESS: Patient is a 69 y.o. female. This pleasant Hebrew speaking patient is here today to establish care and discuss the below stated chronic medical conditions. She is accompanied by her  and her daughter-in-law.    Problem   Diabetes Mellitus (Hcc)    She has a history of well controlled diabetes for which she uses metformin 500 mg twice daily. Last A1c in April was 6.6. She checks her blood sugars every couple of days and denies any recent hyper or hypoglycemia. She has not been to a general physician in about 2 years and will need to be caught up on all of her regular screenings.     Hypertension    She has a history of hypertension maintained on Lisinopril 10 mg daily. She has tolerated this medication with no problems. Denies ACEI cough. Denies difficult to control blood pressure.     Diarrhea    She reports diarrhea starting about 3 days ago.  She has multiple small-volume liquidy/emanuel brown to dark-colored stools daily.  This morning she reports already 5-6 times of going to the bathroom with some stool.  She has not had a formed stool many days and proceeding this diarrhea she was actually constipated.  She had taken some senna but then stopped it and the loose stool started.  She is on 2 antibiotics at this time which she completes in the next day.  She was evaluated with a urinalysis yesterday that showed a small amount of leukocyte esterase.  She has started Imodium but the diarrhea is ongoing.       Hypotension Due to Hypovolemia    She has a low blood pressure in office today of 84/52 with a baseline in the 100s to 120s systolic.  This is likely due to her frequent episodes of diarrhea exacerbated by ongoing use of lisinopril.  " Yesterday, she felt lightheaded and dizzy but did not pass out or have a fall.  She is having challenges of what is appropriate to eat as she had recent pelvic radiation as well as a cholecystectomy both last week.  She has been given conflicting information on what is best to eat due to her cat having been recently radiated as well as the lack of gallbladder.  She is trying to focus on bland foods such as rice and staying hydrated.          Allergies: Bloodless; Ibuprofen; and Penicillins    Current Outpatient Medications Ordered in Epic   Medication Sig Dispense Refill   • acetaminophen (TYLENOL) 325 MG Tab Take 2 Tabs by mouth every 6 hours as needed (Mild Pain; (Pain scale 1-3); Temp greater than 100.5 F). 30 Tab 0   • ciprofloxacin (CIPRO) 500 MG Tab Take 1 Tab by mouth 2 times a day for 11 doses. Start taking 9/20/2019 11 Tab 0   • metroNIDAZOLE (FLAGYL) 500 MG Tab Take 1 Tab by mouth every 8 hours for 17 doses. Start taking 9/20/2019 17 Tab 0   • metFORMIN (GLUCOPHAGE) 500 MG Tab Take 500 mg by mouth 2 times a day, with meals.  4   • lisinopril (PRINIVIL) 10 MG Tab Take 10 mg by mouth every morning.     • docusate sodium (COLACE) 100 MG Cap Take 100 mg by mouth every day.       No current [facility]-ordered facility-administered medications on file.        Past Medical History:   Diagnosis Date   • Arthritis     osteo, finger/shoulders   • Back pain    • Blood clotting disorder (HCC) 2004    leg   • Bronchitis    • Chickenpox    • Dental disorder     upper/lower dentures   • Diabetes     oral meds   • Endometrial cancer (HCC)    • Heart valve disease     \"murmur\"   • High cholesterol    • Hyperlipidemia    • Hypertension    • Mumps    • Nasal drainage    • Sleep apnea     CPAP   • Snoring    • Urinary incontinence        Past Surgical History:   Procedure Laterality Date   • GABY BY LAPAROSCOPY  9/19/2019    Procedure: CHOLECYSTECTOMY, LAPAROSCOPIC;  Surgeon: Jenniffer Johnson M.D.;  Location: SURGERY [facility]" "[facility];  Service: General   • HYSTERECTOMY ROBOTIC XI  6/27/2019    Procedure: HYSTERECTOMY, ROBOT-ASSISTED, USING DA YO XI;  Surgeon: Alexandr Mancia M.D.;  Location: SURGERY [facility];  Service: Gynecology   • SALPINGO OOPHORECTOMY Bilateral 6/27/2019    Procedure: SALPINGO-OOPHORECTOMY;  Surgeon: Alexandr Mancia M.D.;  Location: SURGERY [facility];  Service: Gynecology   • NODE BIOPSY SENTINEL  6/27/2019    Procedure: BIOPSY, LYMPH NODE, SENTINEL;  Surgeon: Alexandr Mancia M.D.;  Location: SURGERY [facility];  Service: Gynecology   • HYSTEROSCOPY WITH MYOSURE N/A 4/17/2019    Procedure: HYSTEROSCOPY, WITH TISSUE REMOVAL, USING HYSTEROSCOPIC ROTATING CUTTER BLADE - DIAGNOSTIC;  Surgeon: Racquel Gatica M.D.;  Location: SURGERY SAME DAY [facility];  Service: Gynecology   • DILATION AND CURETTAGE N/A 4/17/2019    Procedure: DILATION AND CURETTAGE;  Surgeon: Racquel Gatica M.D.;  Location: SURGERY SAME DAY [facility];  Service: Gynecology   • PRIMARY C SECTION  1972/1974/1977    x 3       Social History     Tobacco Use   • Smoking status: Never Smoker   • Smokeless tobacco: Never Used   Substance Use Topics   • Alcohol use: No   • Drug use: No       Social History     Social History Narrative    She is Romanian speaking. Madelyn has been  to her  for 50 years. Her daughter-in-law, Delores, participates in her medical care and assists with translation.       Family History   Problem Relation Age of Onset   • Heart Disease Father    • Asthma Brother    • Asthma Brother    • Cancer Maternal Aunt         gyn cancer   • Sleep Apnea Neg Hx        Health Maintenance: Deferred until our next visit due her acute issues.    ROS:   As above in the HPI All Others Reviewed and Negative  Objective:     Exam: BP (!) 84/52 (BP Location: Left arm, Patient Position: Sitting, BP Cuff Size: Large adult long)   Pulse 84   Temp 37.1 °C (98.8 °F) (Temporal)   Ht 1.575 m (5' 2\")   Wt 90 kg (198 lb " 6.4 oz)   SpO2 97%  Body mass index is 36.29 kg/m².    General: Tired appearing. No distress. Appears stated age.  EENT: Eyes conjunctiva clear lids without ptosis, extraocular movements intact, ears normal shape and contour, canals are clear bilaterally, tympanic membranes are benign; dry oral mucosa; oropharynx is without erythema, edema or exudates. Upper and lower dentures.  Neck: Supple without JVD. Thyroid is not enlarged.  Pulmonary: Clear to ausculation.  Normal effort. No rales, ronchi, or wheezing. No cough.  Cardiovascular: Regular rate and rhythm without murmur. No lower extremity edema bilaterally but she has prominent bilateral varicose veins.  Abdomen: Four healing laparoscopic incisions appear clean, dry and intact with scabbed blood. Prior laparoscopic incisions also appear well healed. Obese abdomen. soft, nondistended. Tender to palpation just above umbilicus and in right lower quadrant. No rigidity or guarding.  Neurologic: No tremor, increased rigidity, or increased tone.  Lymph: No cervical or supraclavicular lymph nodes are palpable  Skin: Warm and dry.  No obvious lesions. 4 healing laparoscopic incisions as above.  Musculoskeletal: Normal gait. Patient ambulates independently and without a gait aid. Active ROM grossly normal in large muscle groups in bilateral upper and lower extremities.  Psych: Normal mood and affect. Judgment and insight is normal. She smiles and laughs even despite her recent run in of difficult medical problems.    Assessment & Plan:   69 y.o. female with the following -    Problem List Items Addressed This Visit     Hypertension     This is a chronic and stable problem, however due to recent hypotension we are going to hold her lisinopril for the next week until she is better recovered from her cholecystectomy.         Diabetes mellitus (HCC)     This is a chronic and stable problem. We will have her hold the metformin until her diarrhea subsides and she may resume it  next week. She will return in 1 month and we will check A1c, urine microalbumin, do monofilament testing, and discuss retinal screening at that time. She has several acute issues today that we focused on instead. I would like to request records from her prior PCP ([facility] Medellin) but unfortunately she was private practice and closed up her office and it looks like all records have also gone with her.          Diarrhea     This is a new exacerbated problem.  She is having diarrhea up to 6 episodes daily.  Likely multifactorial from postop constipation overtreated with senna, diarrhea secondary to antibiotic use, ongoing use of metformin, and as a result of her cholecystectomy.  We will have her hold the lisinopril metformin at this time.  She can continue to use Imodium to try to slow down the symptoms.  She is going to work to stay well-hydrated. She will let me know if symptoms do not improve in the coming days.          Hypotension due to hypovolemia     This is a new and exacerbated problem.  Her hypotension is secondary to hypovolemia from ongoing frequent low volume diarrhea.  She also continue to use her lisinopril and metformin postoperatively which is likely contributing.  She is also continuing on 2 different antibiotics which may be making diarrhea worse.  I advised her to hold her lisinopril and metformin at this time and encouraged oral hydration as much she can tolerate.  She can continue the Imodium as needed slow down the diarrhea.  She will be following up with her general surgeon tomorrow for a postop visit.              Greater than 50% of this visit was spent providing counseling for the patient and her family including how to manage her acute hypotension and diarrhea including what medications to hold and when they would be safe to resume.  Face-to-face time: 13:10-14:10.  Return in about 1 month (around 10/26/2019).    Please note that this dictation was created using voice recognition software.  I have made every reasonable attempt to correct obvious errors, but I expect that there are errors of grammar and possibly content that I did not discover before finalizing the note.

## 2019-10-08 ENCOUNTER — HOSPITAL ENCOUNTER (OUTPATIENT)
Dept: LAB | Facility: MEDICAL CENTER | Age: 69
End: 2019-10-08
Attending: RADIOLOGY
Payer: MEDICARE

## 2019-10-08 ENCOUNTER — HOSPITAL ENCOUNTER (OUTPATIENT)
Dept: RADIATION ONCOLOGY | Facility: MEDICAL CENTER | Age: 69
End: 2019-10-31
Attending: RADIOLOGY
Payer: MEDICARE

## 2019-10-08 VITALS
DIASTOLIC BLOOD PRESSURE: 72 MMHG | OXYGEN SATURATION: 98 % | HEART RATE: 90 BPM | TEMPERATURE: 98.1 F | SYSTOLIC BLOOD PRESSURE: 131 MMHG | WEIGHT: 196.02 LBS | BODY MASS INDEX: 35.85 KG/M2

## 2019-10-08 DIAGNOSIS — C54.1 ENDOMETRIAL CANCER (HCC): ICD-10-CM

## 2019-10-08 DIAGNOSIS — R30.0 DYSURIA: ICD-10-CM

## 2019-10-08 PROCEDURE — 99212 OFFICE O/P EST SF 10 MIN: CPT | Performed by: RADIOLOGY

## 2019-10-08 RX ORDER — CHOLESTYRAMINE 4 G/9G
4 POWDER, FOR SUSPENSION ORAL DAILY
COMMUNITY
End: 2019-11-06

## 2019-10-08 ASSESSMENT — PAIN SCALES - GENERAL: PAINLEVEL: NO PAIN

## 2019-10-08 NOTE — PROGRESS NOTES
RADIATION ONCOLOGY FOLLOW-UP    DATE OF SERVICE: 10/8/2019    IDENTIFICATION:   A 69 y.o. female with endometrial cancer (HCC).    Visit Diagnoses     ICD-10-CM   1. Dysuria R30.0   2. Endometrial cancer (HCC) C54.1     Endometrial cancer (HCC)  Staging form: Corpus Uteri - Carcinoma and Carcinosarcoma, AJCC 8th Edition  - Pathologic stage from 8/1/2019: FIGO Stage IB (pT1b, pN0, cM0) - Signed by Galdino RIDLEY M.D. on 8/1/2019  Histologic grade (G): G2  Histologic grading system: 3 grade system  Lymph-vascular invasion (LVI): LVI not present (absent)/not identified  Residual tumor (R): R0 - None  Histopathologic type: Endometrioid adenocarcinoma, NOS  Diagnostic confirmation: Positive histology  Specimen type: Excision  Staged by: Managing physician  Lymph node metastasis: Absent  Morcellation performed: No      RADIATION SUMMARY:  Radiation Therapy Episodes     Radiation Therapy: IMRT (8/12/2019)      Radiation Treatments     Plan Most Recent Treatment Date Elapsed Course Treatment Days Fractions Treated Prescribed Fraction Dose (cGy) Prescribed Total Dose (cGy)    Pelvis 9/20/2019 37 @ [id number] 27 of 27 180 4,860       Reference Point Most Recent Treatment Date Elapsed Course Treatment Days Most Recent Session Dose (cGy) Total Dose (cGy)    Pelvis 9/20/2019 37 @ [id number] - 4,860    Pelvis CP 9/20/2019 37 @ [id number] - 4,971          HISTORY OF PRESENT ILLNESS:   Approximately 3 weeks from completion of pelvic radiotherapy for high intermediate risk endometrial cancer.  Immediately post completion of therapy she began to experience right upper quadrant pain and required a cholecystectomy.  Patient states the pain has resolved post surgery.  Pathology demonstrated gallbladder to demonstrate acute and chronic cholecystitis with areas of mucosal erosion, cholesterolosis and cholelithiasis.    Her new complaint is urinary frequency, urgency, dysuria, and incontinence.  She states this is been ongoing  since completion of radiotherapy.  She is just recently completed a course of Cipro for similar urinary complaints.  Unfortunately the complaints appear to be persistent.  She denies fevers.  Denies vaginal bleeding or discharge.    PROBLEM LIST:  Patient Active Problem List   Diagnosis   • Sleep apnea   • BMI 37.0-37.9, adult   • Hypertension   • Diabetes 1.5, managed as type 2 (HCC)   • Endometrial cancer (HCC)   • Dysuria   • Acute cholecystitis   • Diabetes mellitus (HCC)   • Diarrhea   • Hypotension due to hypovolemia       CURRENT MEDICATIONS:  Current Outpatient Medications   Medication Sig Dispense Refill   • cholestyramine (QUESTRAN) 4 GM/DOSE powder Take 4 g by mouth every day.     • Cranberry 125 MG Tab Take  by mouth.     • acetaminophen (TYLENOL) 325 MG Tab Take 2 Tabs by mouth every 6 hours as needed (Mild Pain; (Pain scale 1-3); Temp greater than 100.5 F). 30 Tab 0   • metFORMIN (GLUCOPHAGE) 500 MG Tab Take 500 mg by mouth 2 times a day, with meals.  4   • docusate sodium (COLACE) 100 MG Cap Take 100 mg by mouth every day.     • lisinopril (PRINIVIL) 10 MG Tab Take 10 mg by mouth every morning.       No current facility-administered medications for this encounter.        ALLERGIES:  Bloodless; Ibuprofen; and Penicillins    REVIEW OF SYSTEMS:  A review of systems for today's date of service was reviewed and uploaded into the electronic medical record.    PHYSICAL EXAM:  PERFORMANCE STATUS:  90, Able to carry on normal activity; minor signs or symptoms of disease (ECOG equivalent 0)  /72   Pulse 90   Temp 36.7 °C (98.1 °F)   Wt 88.9 kg (196 lb 0.3 oz)   LMP  (LMP Unknown)   SpO2 98%   BMI 35.85 kg/m²      Pain Scale: 0     Physical Exam   Constitutional: She is oriented to person, place, and time. She appears well-developed and well-nourished. No distress.   HENT:   Head: Normocephalic.   Eyes: Pupils are equal, round, and reactive to light. Conjunctivae and EOM are normal.   Neck: Normal  range of motion. Neck supple.   Cardiovascular: Normal rate, regular rhythm and normal heart sounds.   Pulmonary/Chest: Effort normal and breath sounds normal.   Abdominal: Soft. Bowel sounds are normal.   Genitourinary: Vagina normal.   Genitourinary Comments: Vaginal cuff intact.  No visible or palpable abnormality.   Musculoskeletal: Normal range of motion. She exhibits no edema, tenderness or deformity.   Lymphadenopathy:     She has no cervical adenopathy.   Neurological: She is alert and oriented to person, place, and time. No cranial nerve deficit. Coordination normal.   Skin: Skin is warm and dry. No erythema.   Psychiatric: She has a normal mood and affect. Her behavior is normal. Judgment and thought content normal.   Nursing note and vitals reviewed.      LABORATORY DATA:   Lab Results   Component Value Date/Time    WBC 6.3 09/20/2019 0514    WBC 4.3 (L) 09/19/2019 0453    WBC 6.7 09/18/2019 1517    HEMOGLOBIN 10.9 (L) 09/20/2019 0514    HEMOGLOBIN 11.6 (L) 09/19/2019 0453    HEMOGLOBIN 12.7 09/18/2019 1517    HEMATOCRIT 35.9 (L) 09/20/2019 0514    HEMATOCRIT 36.3 (L) 09/19/2019 0453    HEMATOCRIT 39.3 09/18/2019 1517    MCV 97.3 09/20/2019 0514    MCV 94.8 09/19/2019 0453    MCV 94.2 09/18/2019 1517    PLATELETCT 168 09/20/2019 0514    PLATELETCT 184 09/19/2019 0453    PLATELETCT 220 09/18/2019 1517    NEUTS 5.43 09/20/2019 0514    NEUTS 3.17 09/19/2019 0453    NEUTS 5.53 09/18/2019 1517      Lab Results   Component Value Date/Time    SODIUM 139 09/20/2019 0514    SODIUM 141 09/19/2019 0453    SODIUM 137 09/18/2019 1517    POTASSIUM 4.0 09/20/2019 0514    POTASSIUM 4.0 09/19/2019 0453    POTASSIUM 4.0 09/18/2019 1517    BUN 9 09/20/2019 0514    BUN 9 09/19/2019 0453    BUN 12 09/18/2019 1517    CREATININE 0.59 09/20/2019 0514    CREATININE 0.55 09/19/2019 0453    CREATININE 0.55 09/18/2019 1517    CALCIUM 8.2 (L) 09/20/2019 0514    CALCIUM 8.8 09/19/2019 0453    CALCIUM 9.2 09/18/2019 1517    ALBUMIN 3.3  09/20/2019 0514    ALBUMIN 3.7 09/19/2019 0453    ALBUMIN 4.1 09/18/2019 1517    ASTSGOT 29 09/20/2019 0514    ASTSGOT 13 09/19/2019 0453    ASTSGOT 13 09/18/2019 1517    ALKPHOSPHAT 46 09/20/2019 0514    ALKPHOSPHAT 55 09/19/2019 0453    ALKPHOSPHAT 58 09/18/2019 1517    IFNOTAFR >60 09/20/2019 0514    IFNOTAFR >60 09/19/2019 0453    IFNOTAFR >60 09/18/2019 1517       RADIOLOGY DATA:  Ct-abdomen-pelvis With    Result Date: 9/18/2019 9/18/2019 6:49 PM HISTORY/REASON FOR EXAM:  Abdominal pain. History of endometrial cancer status post hysterectomy and radiation therapy. TECHNIQUE/EXAM DESCRIPTION:   Contiguous axial images were obtained from the diaphragmatic domes to the pubic symphysis following intravenous contrast administration. Coronal and sagittal reformats were generated and reviewed. 80 mL of Omnipaque 350 nonionic contrast was administered without complication. Low dose optimization technique was utilized for this CT exam including automated exposure control and adjustment of the mA and/or kV according to patient size. COMPARISON: 6/12/2019 FINDINGS: Lower chest: Tiny calcified granulomas. Coronary calcifications. Liver: No mass. No intrahepatic biliary dilatation. Gallbladder: Distended. Borderline thickened gallbladder wall measures 3 mm. Small amount of pericholecystic fluid. No radiopaque gallstones. Common bile duct: Nondilated. Pancreas: Unremarkable. Spleen: No mass. Adrenals: No mass. Kidneys: No mass. No hydronephrosis. Stomach, small bowel, colon: Small hiatal hernia. No bowel wall thickening or obstruction. Normal appendix. Colonic diverticulosis. Peritoneal cavity: No ascites. Lymph nodes: No enlarged nodes by size criteria. Aorta: No aneurysm. Atherosclerosis. Pelvic organs: Prior hysterectomy. Normal bladder. Musculoskeletal structures: No acute fracture or destructive lesion. Spondylosis. Small fat-containing umbilical hernia.     1.  Distended gallbladder with mildly thickened wall and  small amount of pericholecystic fluid, suggestive of early acute cholecystitis. Consider ultrasound to evaluate for gallstones. 2.  Hysterectomy. No metastatic disease in the abdomen or pelvis. 3.  Colonic diverticulosis. 4.  Small hiatal hernia.    Dx-chest-portable (1 View)    Result Date: 9/18/2019 9/18/2019 6:24 PM HISTORY/REASON FOR EXAM:  Chest pain and abdominal pain. History of endometrial cancer with radiation therapy finishing today. TECHNIQUE/EXAM DESCRIPTION AND NUMBER OF VIEWS: Single portable view of the chest. COMPARISON: 6/24/2019 FINDINGS: The mediastinal and cardiac silhouette is unremarkable. The pulmonary vascularity is within normal limits. There is minimal opacity in the left CP angle region. There is no significant pleural effusion. There is no visible pneumothorax. There are no acute bony abnormalities.     1.  There is minimal opacity at the left CP angle which could be due to a small amount of pleural fluid or atelectasis.    Us-ruq    Result Date: 9/18/2019 9/18/2019 7:52 PM HISTORY/REASON FOR EXAM:  Pain Abdominal pain TECHNIQUE/EXAM DESCRIPTION AND NUMBER OF VIEWS:  Real-time sonography of the liver and biliary tree. COMPARISON: None FINDINGS: The liver is normal in contour. There is no evidence of solid mass lesion. The liver measures 19.06 cm. The liver is echogenic. Gallbladder is distended. Gallbladder sludge is seen. Gallbladder wall thickness measures an approximately 4 mm. There is no evidence of cholelithiasis. The common duct measures 0.58 cm. The visualized pancreas is unremarkable. The visualized aorta is normal in caliber. Intrahepatic IVC is patent. The portal vein is patent with hepatopetal flow. The right kidney measures 10.4. There is no ascites. Mild atherosclerotic plaque is noted in the abdominal aorta.     Distended gallbladder with mild pericholecystic fluid. The gallbladder thickness measures an approximately 4 mm. These findings are concerning for acute  cholecystitis.      IMPRESSION:    A 69 y.o. with   Visit Diagnoses     ICD-10-CM   1. Dysuria R30.0   2. Endometrial cancer (HCC) C54.1     Endometrial cancer (HCC)  Staging form: Corpus Uteri - Carcinoma and Carcinosarcoma, AJCC 8th Edition  - Pathologic stage from 8/1/2019: FIGO Stage IB (pT1b, pN0, cM0) - Signed by Galdino RIDLEY M.D. on 8/1/2019  Histologic grade (G): G2  Histologic grading system: 3 grade system  Lymph-vascular invasion (LVI): LVI not present (absent)/not identified  Residual tumor (R): R0 - None  Histopathologic type: Endometrioid adenocarcinoma, NOS  Diagnostic confirmation: Positive histology  Specimen type: Excision  Staged by: Managing physician  Lymph node metastasis: Absent  Morcellation performed: No      CANCER STATUS:  No Evidence of Disease    RECOMMENDATIONS:   Recommended repeat urinalysis with culture if indicated.  If urinalysis demonstrates no evidence of infection then this is probably acute radiation cystitis.  Which should resolve over the next 2 to 4 weeks.  She will return for follow-up in 4 weeks.      Thank you for the opportunity to participate in her care.  If any questions or comments, please do not hesitate in calling.    Orders Placed This Encounter   • URINALYSIS,CULTURE IF INDICATED   • cholestyramine (QUESTRAN) 4 GM/DOSE powder   • Cranberry 125 MG Tab

## 2019-10-08 NOTE — NON-PROVIDER
Patient was seen today in clinic with Dr. Bustillos for follow up.  Vitals signs and weight were obtained and pain assessment was completed.  Allergies and medications were reviewed with the patient.  Review of systems completed.     Vitals/Pain:  Vitals:    10/08/19 1439   BP: 131/72   Pulse: 90   Temp: 36.7 °C (98.1 °F)   SpO2: 98%   Weight: 88.9 kg (196 lb 0.3 oz)   Pain Score: No pain        Allergies:   Bloodless; Ibuprofen; and Penicillins    Current Medications:  Current Outpatient Medications   Medication Sig Dispense Refill   • cholestyramine (QUESTRAN) 4 GM/DOSE powder Take 4 g by mouth every day.     • Cranberry 125 MG Tab Take  by mouth.     • acetaminophen (TYLENOL) 325 MG Tab Take 2 Tabs by mouth every 6 hours as needed (Mild Pain; (Pain scale 1-3); Temp greater than 100.5 F). 30 Tab 0   • metFORMIN (GLUCOPHAGE) 500 MG Tab Take 500 mg by mouth 2 times a day, with meals.  4   • docusate sodium (COLACE) 100 MG Cap Take 100 mg by mouth every day.     • lisinopril (PRINIVIL) 10 MG Tab Take 10 mg by mouth every morning.       No current facility-administered medications for this encounter.          PCP:  Cee Cabrera, Med Ass't

## 2019-10-09 ENCOUNTER — HOSPITAL ENCOUNTER (OUTPATIENT)
Facility: MEDICAL CENTER | Age: 69
End: 2019-10-09
Attending: RADIOLOGY
Payer: MEDICARE

## 2019-10-09 LAB
APPEARANCE UR: ABNORMAL
BACTERIA #/AREA URNS HPF: NEGATIVE /HPF
BILIRUB UR QL STRIP.AUTO: NEGATIVE
CAOX CRY #/AREA URNS HPF: ABNORMAL /HPF
COLOR UR: YELLOW
EPI CELLS #/AREA URNS HPF: ABNORMAL /HPF
GLUCOSE UR STRIP.AUTO-MCNC: NEGATIVE MG/DL
HYALINE CASTS #/AREA URNS LPF: ABNORMAL /LPF
KETONES UR STRIP.AUTO-MCNC: ABNORMAL MG/DL
LEUKOCYTE ESTERASE UR QL STRIP.AUTO: ABNORMAL
MICRO URNS: ABNORMAL
NITRITE UR QL STRIP.AUTO: NEGATIVE
PH UR STRIP.AUTO: 6 [PH] (ref 5–8)
PROT UR QL STRIP: 100 MG/DL
RBC # URNS HPF: ABNORMAL /HPF
RBC UR QL AUTO: ABNORMAL
SP GR UR STRIP.AUTO: 1.02
UROBILINOGEN UR STRIP.AUTO-MCNC: 0.2 MG/DL
WBC #/AREA URNS HPF: ABNORMAL /HPF

## 2019-10-09 PROCEDURE — 81001 URINALYSIS AUTO W/SCOPE: CPT

## 2019-10-09 PROCEDURE — 87086 URINE CULTURE/COLONY COUNT: CPT

## 2019-10-11 LAB
BACTERIA UR CULT: NORMAL
SIGNIFICANT IND 70042: NORMAL
SITE SITE: NORMAL
SOURCE SOURCE: NORMAL

## 2019-10-25 ENCOUNTER — PATIENT OUTREACH (OUTPATIENT)
Dept: HEALTH INFORMATION MANAGEMENT | Facility: OTHER | Age: 69
End: 2019-10-25

## 2019-11-01 NOTE — PROGRESS NOTES
A 69-year-old female was an emergent admission to [facility] from 9/18/2019 to 9/20/2019 to treat Acute cholecystitis. [facility] visited the patient bedside. The patient was discharged Home. The patient's medical condition included: Digestive System Disorder. The patient was not under clinical case management.     The patient was ordered to start/continue to take the following medications: oxyCODONE immediate-release (ROXICODONE), Metronidazole (Flagyl), Ciprofloxacin, and Acetaminophen. The patient successfully filled all medications.     The patient was ordered to follow-up with PCP and Specialist. The patient had the following appointments:     1) 9/25/2019 @ 10:15 [facility] Services , laboratory - CONFIRMED AS KEPT     2) 9/26/2019 @ 1:00 Monika Mike, internal medicine - CONFIRMED AS KEPT     3) 9/27/2019 @ 4:20 Jenniffer Johnson, general surgery - CONFIRMED AS KEPT  The patient has future appointments scheduled for:     1) 10/30/2019 @ 2:30 Alexandr Mancia , gynecological oncology - SCHEDULED     2) 11/6/2019 @ 3:40 Monika Mike, internal medicine - SCHEDULED     3) 11/26/2019 @ 1:00 Chula Greene, pulmonology - SCHEDULED     4) 12/10/2019 @ 8:40 Anirudh Pappas, cardiology - SCHEDULED      [facility] communicated with the patient/caregiver via phone throughout the case and collaborated with stakeholders on behalf of the patient regarding coordination of care. [facility] identified that the patient had Healthcare Access barriers. No interventions were provided for this barrier. [facility] scheduled the patient with PCP Dr. Cee Frank.    In summary. patient adhered with Discharge Orders and Patient attended follow up appointments. PPS Screening was conducted and patient scored above 50%.

## 2019-11-06 ENCOUNTER — OFFICE VISIT (OUTPATIENT)
Dept: MEDICAL GROUP | Facility: MEDICAL CENTER | Age: 69
End: 2019-11-06
Payer: MEDICARE

## 2019-11-06 ENCOUNTER — HOSPITAL ENCOUNTER (OUTPATIENT)
Facility: MEDICAL CENTER | Age: 69
End: 2019-11-06
Attending: INTERNAL MEDICINE
Payer: MEDICARE

## 2019-11-06 VITALS
TEMPERATURE: 97.4 F | HEART RATE: 82 BPM | DIASTOLIC BLOOD PRESSURE: 60 MMHG | HEIGHT: 62 IN | SYSTOLIC BLOOD PRESSURE: 100 MMHG | BODY MASS INDEX: 36.63 KG/M2 | WEIGHT: 199.08 LBS | OXYGEN SATURATION: 95 %

## 2019-11-06 DIAGNOSIS — Z78.0 MENOPAUSE: ICD-10-CM

## 2019-11-06 DIAGNOSIS — E11.59 HYPERTENSION ASSOCIATED WITH DIABETES (HCC): ICD-10-CM

## 2019-11-06 DIAGNOSIS — R53.83 FATIGUE, UNSPECIFIED TYPE: ICD-10-CM

## 2019-11-06 DIAGNOSIS — Z23 NEED FOR DIPHTHERIA-TETANUS-PERTUSSIS (TDAP) VACCINE: ICD-10-CM

## 2019-11-06 DIAGNOSIS — Z12.31 ENCOUNTER FOR SCREENING MAMMOGRAM FOR BREAST CANCER: ICD-10-CM

## 2019-11-06 DIAGNOSIS — E11.69 HYPERLIPIDEMIA ASSOCIATED WITH TYPE 2 DIABETES MELLITUS (HCC): ICD-10-CM

## 2019-11-06 DIAGNOSIS — E78.5 HYPERLIPIDEMIA ASSOCIATED WITH TYPE 2 DIABETES MELLITUS (HCC): ICD-10-CM

## 2019-11-06 DIAGNOSIS — I15.2 HYPERTENSION ASSOCIATED WITH DIABETES (HCC): ICD-10-CM

## 2019-11-06 DIAGNOSIS — Z11.59 ENCOUNTER FOR HEPATITIS C SCREENING TEST FOR LOW RISK PATIENT: ICD-10-CM

## 2019-11-06 DIAGNOSIS — E11.9 TYPE 2 DIABETES MELLITUS WITHOUT COMPLICATION, WITHOUT LONG-TERM CURRENT USE OF INSULIN (HCC): ICD-10-CM

## 2019-11-06 PROBLEM — K81.0 ACUTE CHOLECYSTITIS: Status: RESOLVED | Noted: 2019-09-19 | Resolved: 2019-11-06

## 2019-11-06 PROBLEM — I95.89 HYPOTENSION DUE TO HYPOVOLEMIA: Status: RESOLVED | Noted: 2019-09-26 | Resolved: 2019-11-06

## 2019-11-06 PROBLEM — E86.1 HYPOTENSION DUE TO HYPOVOLEMIA: Status: RESOLVED | Noted: 2019-09-26 | Resolved: 2019-11-06

## 2019-11-06 PROBLEM — R19.7 DIARRHEA: Status: RESOLVED | Noted: 2019-09-26 | Resolved: 2019-11-06

## 2019-11-06 PROBLEM — R30.0 DYSURIA: Status: RESOLVED | Noted: 2019-09-11 | Resolved: 2019-11-06

## 2019-11-06 LAB
HBA1C MFR BLD: 5.7 % (ref 0–5.6)
INT CON NEG: NEGATIVE
INT CON POS: POSITIVE

## 2019-11-06 PROCEDURE — 90715 TDAP VACCINE 7 YRS/> IM: CPT | Performed by: INTERNAL MEDICINE

## 2019-11-06 PROCEDURE — 82043 UR ALBUMIN QUANTITATIVE: CPT

## 2019-11-06 PROCEDURE — 82570 ASSAY OF URINE CREATININE: CPT

## 2019-11-06 PROCEDURE — 99214 OFFICE O/P EST MOD 30 MIN: CPT | Mod: 25 | Performed by: INTERNAL MEDICINE

## 2019-11-06 PROCEDURE — 83036 HEMOGLOBIN GLYCOSYLATED A1C: CPT | Performed by: INTERNAL MEDICINE

## 2019-11-06 PROCEDURE — 90471 IMMUNIZATION ADMIN: CPT | Performed by: INTERNAL MEDICINE

## 2019-11-06 RX ORDER — CHOLESTYRAMINE 4 G/9G
POWDER, FOR SUSPENSION ORAL
Refills: 4 | COMMUNITY
Start: 2019-09-27 | End: 2021-11-04

## 2019-11-06 RX ORDER — LISINOPRIL 10 MG/1
5 TABLET ORAL DAILY
Qty: 90 TAB | Refills: 3 | Status: SHIPPED | OUTPATIENT
Start: 2019-11-06 | End: 2020-04-23 | Stop reason: SDUPTHER

## 2019-11-06 RX ORDER — ATORVASTATIN CALCIUM 10 MG/1
10 TABLET, FILM COATED ORAL DAILY
Qty: 90 TAB | Refills: 3 | Status: SHIPPED
Start: 2019-11-06 | End: 2020-03-11

## 2019-11-06 ASSESSMENT — PATIENT HEALTH QUESTIONNAIRE - PHQ9: CLINICAL INTERPRETATION OF PHQ2 SCORE: 0

## 2019-11-06 NOTE — LETTER
[employer]  Monika Mike D.O.  [address]  [place] [address]  Fax: [phone]   Authorization for Release/Disclosure of   Protected Health Information   Name: JONI SCHMITZ : 1950 SSN: [id number]   Address: [facility] [place] [address] Phone:    [phone] (home)    I authorize the entity listed below to release/disclose the PHI below to:   [employer]/Monika Mike D.O. and Monika Mike D.O.   Provider or Entity Name:  [facility]   Address   City, State, Zip   Phone:      Fax:     Reason for request: continuity of care   Information to be released:    [ x ] LAST COLONOSCOPY,  including any PATH REPORT and follow-up  [  ] LAST FIT/COLOGUARD RESULT [  ] LAST DEXA  [  ] LAST MAMMOGRAM  [  ] LAST PAP  [  ] LAST LABS [  ] RETINA EXAM REPORT  [  ] IMMUNIZATION RECORDS  [  ] Release all info      [  ] Check here and initial the line next to each item to release ALL health information INCLUDING  _____ Care and treatment for drug and / or alcohol abuse  _____ HIV testing, infection status, or AIDS  _____ Genetic Testing    DATES OF SERVICE OR TIME PERIOD TO BE DISCLOSED: _____________  I understand and acknowledge that:  * This Authorization may be revoked at any time by you in writing, except if your health information has already been used or disclosed.  * Your health information that will be used or disclosed as a result of you signing this authorization could be re-disclosed by the recipient. If this occurs, your re-disclosed health information may no longer be protected by State or Federal laws.  * You may refuse to sign this Authorization. Your refusal will not affect your ability to obtain treatment.  * This Authorization becomes effective upon signing and will  on (date) __________.      If no date is indicated, this Authorization will  one (1) year from the signature date.    Name: Joni Schmitz    Signature:   Date:          11/6/2019       PLEASE FAX REQUESTED RECORDS BACK TO: [phone]

## 2019-11-06 NOTE — PROGRESS NOTES
Subjective:     CC:  Diagnoses of Encounter for hepatitis C screening test for low risk patient, Type 2 diabetes mellitus without complication, without long-term current use of insulin (HCC), Hyperlipidemia associated with type 2 diabetes mellitus (HCC), Menopause, Encounter for screening mammogram for breast cancer, Fatigue, unspecified type, Need for diphtheria-tetanus-pertussis (Tdap) vaccine, and Hypertension associated with diabetes (HCC) were pertinent to this visit.    HISTORY OF THE PRESENT ILLNESS: Patient is a 69 y.o. female. This pleasant patient is here today to establish care and discuss the below stated chronic medical conditions. She is accompanied by her daughter-in-law, Delores.    Problem   Diabetes Mellitus (Hcc)    When I first met her in mid September she shared that she had well-controlled diabetes and was using metformin 500 mg twice daily.  A1c in April 2019 was 6.6, A1c obtained in clinic on 11/6 was 5.7.  She has not received regular medical care in the past 2 years and is overdue for some of her screenings.  We attempted a retinal screen in the clinic but this is unsuccessful so we referred her to optometry.  She is also due for urine microalbumin which we will collect in clinic today.  Normal monofilament exam and no history of neuropathy in the feet however she does have very thickened toenails and some trouble with clipping these herself.  No hyper or hypoglycemia noted.     Hypertension Associated With Diabetes (Hcc)    She has a history of hypertension maintained on Lisinopril 10 mg daily. She has tolerated this medication with no problems. Denies ACEI cough. Denies difficult to control blood pressure.  We actually held this medicine when I first met her in mid September 2019 as she was having hypotension following her postoperative course after she had a cholecystectomy shortly after her hysterectomy.  She was able to resume this medicine and has continued on it.  Her blood pressures  continue to remain on the low end at approximately 100/60.  She would be interested in trying to cut back on her medications.     Hyperlipidemia Associated With Type 2 Diabetes Mellitus (Hcc)    Lab Results   Component Value Date/Time    CHOLSTRLTOT 214 (H) 09/07/2018 08:25 AM     (H) 09/07/2018 08:25 AM    HDL 44 09/07/2018 08:25 AM    TRIGLYCERIDE 124 09/07/2018 08:25 AM     Lab Results   Component Value Date/Time    ALKPHOSPHAT 46 09/20/2019 05:14 AM    ASTSGOT 29 09/20/2019 05:14 AM    ALTSGPT 20 09/20/2019 05:14 AM    TBILIRUBIN 0.4 09/20/2019 05:14 AM      She reports prior trials of lipid-lowering medications.  She does not think she ever had side effects with them but was told to stop taking them because her bad for the heart.  Perhaps on the last attempt she experience a little bit of chest pain unclear to me if this is related to the medication.  She is willing to retrial the medication at this time.     Menopause    She has a history of menopause and thinks she may have had one bone density in the past that was okay but I do not have access to these records.  She denies any prior fragility fractures.  She is not currently taking any calcium or vitamin D.     Fatigue    Since her hysterectomy and cholecystectomy this past summer and fall she has had some trouble bouncing back to her usual self and feels a bit deconditioned.  She wonders if the fatigue is all related to postop changes or she might also has underlying thyroid disease.  She is sleeping well and using a CPAP every night.          Allergies: Bloodless; Ibuprofen; and Penicillins    Current Outpatient Medications Ordered in Epic   Medication Sig Dispense Refill   • atorvastatin (LIPITOR) 10 MG Tab Take 1 Tab by mouth every day. 90 Tab 3   • lisinopril (PRINIVIL) 10 MG Tab Take 0.5 Tabs by mouth every day. Reduced 11/6/2019  Indications: High Blood Pressure Disorder 90 Tab 3   • metFORMIN (GLUCOPHAGE) 500 MG Tab Take 1 Tab by mouth every  "day. 90 Tab 3   • acetaminophen (TYLENOL) 325 MG Tab Take 2 Tabs by mouth every 6 hours as needed (Mild Pain; (Pain scale 1-3); Temp greater than 100.5 F). 30 Tab 0   • docusate sodium (COLACE) 100 MG Cap Take 100 mg by mouth every day.     • cholestyramine (QUESTRAN) 4 g packet MIX AND DRINK 1 PACKET OF POWDER BY MOUTH AS DIRECTED THREE TIMES DAILY WITH MEALS AND ONCE BEFORE BEDTIME FOR DIARRHEA  4   • Cranberry 125 MG Tab Take  by mouth.       No current Epic-ordered facility-administered medications on file.        Past Medical History:   Diagnosis Date   • Acute cholecystitis 9/19/2019   • Arthritis     osteo, finger/shoulders   • Back pain    • Blood clotting disorder (HCC) 2004    leg   • Bronchitis    • Chickenpox    • Dental disorder     upper/lower dentures   • Diabetes     oral meds   • Diarrhea 9/26/2019    She reports diarrhea starting about 3 days ago.  She has multiple small-volume liquidy/emanuel brown to dark-colored stools daily.  This morning she reports already 5-6 times of going to the bathroom with some stool.  She has not had a formed stool many days and proceeding this diarrhea she was actually constipated.  She had taken some senna but then stopped it and the loose stool started.  She is o   • Dysuria 9/11/2019   • Endometrial cancer (HCC)    • Heart valve disease     \"murmur\"   • High cholesterol    • Hyperlipidemia    • Hypertension    • Hypotension due to hypovolemia 9/26/2019    She has a low blood pressure in office today of 84/52 with a baseline in the 100s to 120s systolic.  This is likely due to her frequent episodes of diarrhea exacerbated by ongoing use of lisinopril.  Yesterday, she felt lightheaded and dizzy but did not pass out or have a fall.  She is having challenges of what is appropriate to eat as she had recent pelvic radiation as well as a cholecystectomy b   • Mumps    • Nasal drainage    • Sleep apnea     CPAP   • Snoring    • Urinary incontinence        Past Surgical " History:   Procedure Laterality Date   • GABY BY LAPAROSCOPY  9/19/2019    Procedure: CHOLECYSTECTOMY, LAPAROSCOPIC;  Surgeon: Jenniffer Johnson M.D.;  Location: SURGERY [facility];  Service: General   • HYSTERECTOMY ROBOTIC XI  6/27/2019    Procedure: HYSTERECTOMY, ROBOT-ASSISTED, USING DA YO XI;  Surgeon: Alexandr Mancia M.D.;  Location: SURGERY [facility];  Service: Gynecology   • SALPINGO OOPHORECTOMY Bilateral 6/27/2019    Procedure: SALPINGO-OOPHORECTOMY;  Surgeon: Alexandr Mancia M.D.;  Location: SURGERY [facility];  Service: Gynecology   • NODE BIOPSY SENTINEL  6/27/2019    Procedure: BIOPSY, LYMPH NODE, SENTINEL;  Surgeon: Alexandr Mancia M.D.;  Location: SURGERY [facility];  Service: Gynecology   • HYSTEROSCOPY WITH MYOSURE N/A 4/17/2019    Procedure: HYSTEROSCOPY, WITH TISSUE REMOVAL, USING HYSTEROSCOPIC ROTATING CUTTER BLADE - DIAGNOSTIC;  Surgeon: Racquel Gatica M.D.;  Location: SURGERY [facility] DAY [facility];  Service: Gynecology   • DILATION AND CURETTAGE N/A 4/17/2019    Procedure: DILATION AND CURETTAGE;  Surgeon: Racquel Gatica M.D.;  Location: SURGERY SAME DAY [facility];  Service: Gynecology   • PRIMARY C SECTION  1972/1974/1977    x 3       Social History     Tobacco Use   • Smoking status: Never Smoker   • Smokeless tobacco: Never Used   Substance Use Topics   • Alcohol use: No   • Drug use: No       Social History     Patient does not qualify to have social determinant information on file (likely too young).   Social History Narrative    She is [place] speaking. Madelyn has been  to her  for 50 years. Her daughter-in-law, Delores, participates in her medical care and assists with translation.       Family History   Problem Relation Age of Onset   • Heart Disease Father    • Asthma Brother    • Asthma Brother    • Cancer Maternal Aunt         gyn cancer   • Sleep Apnea Neg Hx        Health Maintenance: Completed.  She thinks last colonoscopy was just 2  "or 3 years ago, [facility].    ROS:   As above in the HPI All Others Reviewed and Negative  Objective:     Exam: /60 (BP Location: Left arm, Patient Position: Sitting, BP Cuff Size: Large adult)   Pulse 82   Temp 36.3 °C (97.4 °F) (Temporal)   Ht 1.575 m (5' 2\")   Wt 90.3 kg (199 lb 1.2 oz)   SpO2 95%  Body mass index is 36.41 kg/m².    General: Normal appearing. No distress, obese  female. Appears stated age.  EENT: Eyes conjunctiva clear lids without ptosis, extraocular movements intact, ears normal shape and contour, canals are clear bilaterally, tympanic membranes are benign, oropharynx is without erythema, edema or exudates.  She has upper dentures in place and is edentulous on the bottom.  Neck: Supple without JVD. Thyroid is not enlarged.  Pulmonary: Clear to ausculation.  Normal effort. No rales, ronchi, or wheezing. No cough.  Cardiovascular: Regular rate and rhythm with grade 3 out of 6 systolic murmur. No lower extremity edema bilaterally.  Abdomen: Soft, nontender, nondistended. Normal bowel sounds.  Well-healed laparoscopic incisions, they appear clean dry and intact.  Neurologic: No resting tremor, no increased tone or rigidity.  Lymph: No cervical or supraclavicular lymph nodes are palpable  Skin: Warm and dry.  No obvious lesions on skin exposed areas.  However she did have some pallor changes to her distal fingers that appear consistent with rainouts phenomenon.  Musculoskeletal: Normal gait. No extremity cyanosis, clubbing, or edema. Patient ambulates independently and without a gait aid.  Psych: Normal mood and affect. Alert and oriented x3. Judgment and insight is normal.  Diabetic Foot Exam: No ulcers/laceration/blisters present on bilateral feet, gross anatomy demonstrates bilateral bunions on the medial aspects of her feet but no other abnormal curvatures or arch deformities, no toe deformities, she has toenail thickness throughout consistent with " onychomycosis, no ingrown toenails, no increase in skin temperature bilaterally, no skin erythema to bilateral feet, bilateral dorsalis pedis and posterior tibial pulses 2+ and equal, Refill less than 2 seconds bilaterally, Melvin 10 g monofilament testing normal in bilateral great toes, bilateral balls of feet at medial/lateral/mid ball of foot    Assessment & Plan:   69 y.o. female with the following -    Problem List Items Addressed This Visit     Hypertension associated with diabetes (HCC)     Chronic problem that requires additional medication titration.  We will cut down her lisinopril to 5 mg daily more for the renal protection in light of her diabetes more so than blood pressure control.  She is agreeable.         Relevant Medications    cholestyramine (QUESTRAN) 4 g packet    atorvastatin (LIPITOR) 10 MG Tab    lisinopril (PRINIVIL) 10 MG Tab    metFORMIN (GLUCOPHAGE) 500 MG Tab    Diabetes mellitus (HCC)     Chronic and improving problem.  We will cut her metformin down to 500 mg daily as her A1c is down to 5.7.  She did have some anemia on her last blood count which could make this lab a little bit false so we will update her CBC in December and then if her hemoglobin is back to normal then I can trust the most recent A1c and would likely discontinue her metformin altogether.  She no longer needs to check her blood sugar unless she is feeling badly.  Keep an eye out for urine microalbumin results to ensure there is no nephropathy.  We will refer her to podiatry for general toenail and foot care every 3 months.  We will also refer to optometry since we are unable to complete retinal screening clinic today.         Relevant Medications    metFORMIN (GLUCOPHAGE) 500 MG Tab    Other Relevant Orders    POCT A1C (Completed)    POCT Retinal Eye Exam    Diabetic Monofilament Lower Extremity Exam (Completed)    MICROALBUMIN CREAT RATIO URINE (CLINIC COLLECT)    CBC WITH DIFFERENTIAL    Comp Metabolic Panel     REFERRAL TO PODIATRY    REFERRAL TO OPTOMETRY    Hyperlipidemia associated with type 2 diabetes mellitus (HCC)     Chronic problem that requires medication adjustment.  We will have her start back on the atorvastatin at 10 mg due to her history of type 2 diabetes and hyperlipidemia to help prevent cardiovascular and cerebrovascular disease.  She is agreeable.  She or her daughter-in-law will let me know if she develops any side effects.         Relevant Medications    atorvastatin (LIPITOR) 10 MG Tab    metFORMIN (GLUCOPHAGE) 500 MG Tab    Other Relevant Orders    Lipid Profile    CBC WITH DIFFERENTIAL    Comp Metabolic Panel    Menopause     Chronic problem that requires additional evaluation.  She is due for a bone density we will get this obtained so we can determine if she has any osteopenia or osteoporosis.  She is agreeable.         Relevant Orders    DS-BONE DENSITY STUDY (DEXA)    Fatigue     New problem that requires additional evaluation.  We will update her lab work next month including thyroid studies, blood counts, kidney liver and electrolytes.         Relevant Orders    CBC WITH DIFFERENTIAL    TSH WITH REFLEX TO FT4      Other Visit Diagnoses     Encounter for hepatitis C screening test for low risk patient        Relevant Orders    HEP C VIRUS ANTIBODY    Encounter for screening mammogram for breast cancer        Relevant Orders    MA-SCREENING MAMMO BILAT W/TOMOSYNTHESIS W/CAD    Need for diphtheria-tetanus-pertussis (Tdap) vaccine        Relevant Orders    Tdap =>6yo IM (Completed)         Return in about 6 months (around 5/6/2020).    Please note that this dictation was created using voice recognition software. I have made every reasonable attempt to correct obvious errors, but I expect that there are errors of grammar and possibly content that I did not discover before finalizing the note.

## 2019-11-07 DIAGNOSIS — E11.9 TYPE 2 DIABETES MELLITUS WITHOUT COMPLICATION, WITHOUT LONG-TERM CURRENT USE OF INSULIN (HCC): ICD-10-CM

## 2019-11-07 LAB
CREAT UR-MCNC: 115.2 MG/DL
MICROALBUMIN UR-MCNC: 19.2 MG/DL
MICROALBUMIN/CREAT UR: 167 MG/G (ref 0–30)

## 2019-11-07 NOTE — ASSESSMENT & PLAN NOTE
Chronic problem that requires medication adjustment.  We will have her start back on the atorvastatin at 10 mg due to her history of type 2 diabetes and hyperlipidemia to help prevent cardiovascular and cerebrovascular disease.  She is agreeable.  She or her daughter-in-law will let me know if she develops any side effects.

## 2019-11-07 NOTE — PATIENT INSTRUCTIONS
Let me know if you need anything in the meantime.    Cut the Lisinopril in half from 10 mg to 5 mg  Cut the Metformin down to only once daily from twice daily, still 500 mg for the dose

## 2019-11-07 NOTE — ASSESSMENT & PLAN NOTE
Chronic problem that requires additional evaluation.  She is due for a bone density we will get this obtained so we can determine if she has any osteopenia or osteoporosis.  She is agreeable.

## 2019-11-07 NOTE — ASSESSMENT & PLAN NOTE
New problem that requires additional evaluation.  We will update her lab work next month including thyroid studies, blood counts, kidney liver and electrolytes.

## 2019-11-07 NOTE — ASSESSMENT & PLAN NOTE
Chronic problem that requires additional medication titration.  We will cut down her lisinopril to 5 mg daily more for the renal protection in light of her diabetes more so than blood pressure control.  She is agreeable.

## 2019-11-07 NOTE — ASSESSMENT & PLAN NOTE
Chronic and improving problem.  We will cut her metformin down to 500 mg daily as her A1c is down to 5.7.  She did have some anemia on her last blood count which could make this lab a little bit false so we will update her CBC in December and then if her hemoglobin is back to normal then I can trust the most recent A1c and would likely discontinue her metformin altogether.  She no longer needs to check her blood sugar unless she is feeling badly.  Keep an eye out for urine microalbumin results to ensure there is no nephropathy.  We will refer her to podiatry for general toenail and foot care every 3 months.  We will also refer to optometry since we are unable to complete retinal screening clinic today.

## 2019-11-11 PROBLEM — K63.5 SESSILE COLONIC POLYP: Status: ACTIVE | Noted: 2019-11-11

## 2019-11-11 NOTE — RESULT ENCOUNTER NOTE
Addi Joshi and Delores,    I wanted to let you know that the urine test does show some protein in the urine which is likely due to her history of diabetes. We will keep her on the Lisinopril to help protect the kidneys and continue to check this every year. Let me know if you have any follow up questions. Enjoy your week,    Dr. Mike

## 2019-12-05 ENCOUNTER — HOSPITAL ENCOUNTER (OUTPATIENT)
Dept: RADIATION ONCOLOGY | Facility: MEDICAL CENTER | Age: 69
End: 2019-12-31
Attending: RADIOLOGY
Payer: MEDICARE

## 2019-12-05 VITALS
OXYGEN SATURATION: 98 % | SYSTOLIC BLOOD PRESSURE: 116 MMHG | BODY MASS INDEX: 36.29 KG/M2 | DIASTOLIC BLOOD PRESSURE: 81 MMHG | TEMPERATURE: 97.5 F | HEART RATE: 80 BPM | WEIGHT: 198.4 LBS

## 2019-12-05 DIAGNOSIS — C54.1 ENDOMETRIAL CANCER (HCC): ICD-10-CM

## 2019-12-05 PROCEDURE — 99212 OFFICE O/P EST SF 10 MIN: CPT | Performed by: RADIOLOGY

## 2019-12-05 ASSESSMENT — PAIN SCALES - GENERAL: PAINLEVEL: NO PAIN

## 2019-12-05 NOTE — PROGRESS NOTES
RADIATION ONCOLOGY FOLLOW-UP    DATE OF SERVICE: 12/5/2019    IDENTIFICATION:   A 69 y.o. female with   Visit Diagnoses     ICD-10-CM   1. Endometrial cancer (HCC) C54.1     Endometrial cancer (HCC)  Staging form: Corpus Uteri - Carcinoma and Carcinosarcoma, AJCC 8th Edition  - Pathologic stage from 8/1/2019: FIGO Stage IB (pT1b, pN0, cM0) - Signed by Galdino RIDLEY M.D. on 8/1/2019  Histologic grade (G): G2  Histologic grading system: 3 grade system  Lymph-vascular invasion (LVI): LVI not present (absent)/not identified  Residual tumor (R): R0 - None  Histopathologic type: Endometrioid adenocarcinoma, NOS  Diagnostic confirmation: Positive histology  Specimen type: Excision  Staged by: Managing physician  Lymph node metastasis: Absent  Morcellation performed: No      RADIATION SUMMARY:  [facility]ria Treatment Information        Some values may be hidden. Unless noted otherwise, only the newest values recorded on each date are displayed.         Aria Treatment Summary 9/20/19   Pelvis Plan from Course [email or web address]_[email or web address]   Fraction 27 of 27   Elapsed Course Days 37 @ [id number]   Prescribed Fraction Dose 180 cGy   Prescribed Total Dose 4,860 cGy   Pelvis Reference Point from Course [email or web address]_pelvis   Elapsed Course Days 37 @ [id number]   Session Dose -   Total Dose 4,860 cGy   Pelvis CP Reference Point from Course [email or web address]_pelvis   Elapsed Course Days 37 @ [id number]   Session Dose -   Total Dose 4,971 cGy             HISTORY OF PRESENT ILLNESS:   Previous follow-up 10/8/2019 patient was complaining of increased urinary frequency, urgency and dysuria.  Urinalysis demonstrated no evidence of infection.  She was reassured that her symptoms would improve with time of the radiotherapy related.  This is a follow-up visit to assess her urinary function.    Patient states that since her last follow-up that she has had a significant improvement in her urinary symptoms.  Her only complaint is nocturia 3 times per night and occasional  incontinence.  She is not experiencing the dysuria and the urgency that she was experiencing previously.    PROBLEM LIST:  Patient Active Problem List   Diagnosis   • Sleep apnea   • BMI 37.0-37.9, adult   • Hypertension associated with diabetes (HCC)   • Endometrial cancer (HCC)   • Diabetes mellitus (HCC)   • Hyperlipidemia associated with type 2 diabetes mellitus (HCC)   • Menopause   • Fatigue   • Sessile colonic polyp       CURRENT MEDICATIONS:  Current Outpatient Medications   Medication Sig Dispense Refill   • cholestyramine (QUESTRAN) 4 g packet MIX AND DRINK 1 PACKET OF POWDER BY MOUTH AS DIRECTED THREE TIMES DAILY WITH MEALS AND ONCE BEFORE BEDTIME FOR DIARRHEA  4   • lisinopril (PRINIVIL) 10 MG Tab Take 0.5 Tabs by mouth every day. Reduced 11/6/2019  Indications: High Blood Pressure Disorder 90 Tab 3   • metFORMIN (GLUCOPHAGE) 500 MG Tab Take 1 Tab by mouth every day. 90 Tab 3   • acetaminophen (TYLENOL) 325 MG Tab Take 2 Tabs by mouth every 6 hours as needed (Mild Pain; (Pain scale 1-3); Temp greater than 100.5 F). 30 Tab 0   • atorvastatin (LIPITOR) 10 MG Tab Take 1 Tab by mouth every day. (Patient not taking: Reported on 12/5/2019) 90 Tab 3   • Cranberry 125 MG Tab Take  by mouth.     • docusate sodium (COLACE) 100 MG Cap Take 100 mg by mouth every day.       No current facility-administered medications for this encounter.        ALLERGIES:  Bloodless; Ibuprofen; and Penicillins    REVIEW OF SYSTEMS:  A review of systems for today's date of service was reviewed and uploaded into the electronic medical record.    PHYSICAL EXAM:  PERFORMANCE STATUS:  ECOG Performance Review 12/5/2019 9/18/2019 9/11/2019 9/4/2019 8/28/2019 8/14/2019   ECOG Performance Status Restricted in physically strenuous activity but ambulatory and able to carry out work of a light or sedentary nature, e.g., light house work, office work Restricted in physically strenuous activity but ambulatory and able to carry out work of a light  or sedentary nature, e.g., light house work, office work Restricted in physically strenuous activity but ambulatory and able to carry out work of a light or sedentary nature, e.g., light house work, office work Restricted in physically strenuous activity but ambulatory and able to carry out work of a light or sedentary nature, e.g., light house work, office work Restricted in physically strenuous activity but ambulatory and able to carry out work of a light or sedentary nature, e.g., light house work, office work Restricted in physically strenuous activity but ambulatory and able to carry out work of a light or sedentary nature, e.g., light house work, office work   Some recent data might be hidden     Karnofsky Score 12/5/2019 8/28/2019   Karnofsky Score 100 90   Some recent data might be hidden     /81   Pulse 80   Temp 36.4 °C (97.5 °F)   Wt 90 kg (198 lb 6.4 oz)   LMP  (LMP Unknown)   SpO2 98%   BMI 36.29 kg/m²   Physical Exam  Vitals signs and nursing note reviewed.   Constitutional:       Appearance: She is well-developed.   HENT:      Head: Normocephalic.   Skin:     General: Skin is warm and dry.      Findings: No erythema.   Neurological:      Mental Status: She is alert and oriented to person, place, and time.   Psychiatric:         Behavior: Behavior normal.         Thought Content: Thought content normal.         Judgment: Judgment normal.         LABORATORY DATA:   Lab Results   Component Value Date/Time    WBC 6.3 09/20/2019 0514    WBC 4.3 (L) 09/19/2019 0453    WBC 6.7 09/18/2019 1517    HEMOGLOBIN 10.9 (L) 09/20/2019 0514    HEMOGLOBIN 11.6 (L) 09/19/2019 0453    HEMOGLOBIN 12.7 09/18/2019 1517    HEMATOCRIT 35.9 (L) 09/20/2019 0514    HEMATOCRIT 36.3 (L) 09/19/2019 0453    HEMATOCRIT 39.3 09/18/2019 1517    MCV 97.3 09/20/2019 0514    MCV 94.8 09/19/2019 0453    MCV 94.2 09/18/2019 1517    PLATELETCT 168 09/20/2019 0514    PLATELETCT 184 09/19/2019 0453    PLATELETCT 220 09/18/2019 1517     NEUTS 5.43 09/20/2019 0514    NEUTS 3.17 09/19/2019 0453    NEUTS 5.53 09/18/2019 1517      Lab Results   Component Value Date/Time    SODIUM 139 09/20/2019 0514    SODIUM 141 09/19/2019 0453    SODIUM 137 09/18/2019 1517    POTASSIUM 4.0 09/20/2019 0514    POTASSIUM 4.0 09/19/2019 0453    POTASSIUM 4.0 09/18/2019 1517    BUN 9 09/20/2019 0514    BUN 9 09/19/2019 0453    BUN 12 09/18/2019 1517    CREATININE 0.59 09/20/2019 0514    CREATININE 0.55 09/19/2019 0453    CREATININE 0.55 09/18/2019 1517    CALCIUM 8.2 (L) 09/20/2019 0514    CALCIUM 8.8 09/19/2019 0453    CALCIUM 9.2 09/18/2019 1517    ALBUMIN 3.3 09/20/2019 0514    ALBUMIN 3.7 09/19/2019 0453    ALBUMIN 4.1 09/18/2019 1517    ASTSGOT 29 09/20/2019 0514    ASTSGOT 13 09/19/2019 0453    ASTSGOT 13 09/18/2019 1517    ALKPHOSPHAT 46 09/20/2019 0514    ALKPHOSPHAT 55 09/19/2019 0453    ALKPHOSPHAT 58 09/18/2019 1517    IFNOTAFR >60 09/20/2019 0514    IFNOTAFR >60 09/19/2019 0453    IFNOTAFR >60 09/18/2019 1517       RADIOLOGY DATA:  No results found.    IMPRESSION:    A 69 y.o. with   Visit Diagnoses     ICD-10-CM   1. Endometrial cancer (HCC) C54.1     Endometrial cancer (HCC)  Staging form: Corpus Uteri - Carcinoma and Carcinosarcoma, AJCC 8th Edition  - Pathologic stage from 8/1/2019: FIGO Stage IB (pT1b, pN0, cM0) - Signed by Galdino RIDLEY M.D. on 8/1/2019  Histologic grade (G): G2  Histologic grading system: 3 grade system  Lymph-vascular invasion (LVI): LVI not present (absent)/not identified  Residual tumor (R): R0 - None  Histopathologic type: Endometrioid adenocarcinoma, NOS  Diagnostic confirmation: Positive histology  Specimen type: Excision  Staged by: Managing physician  Lymph node metastasis: Absent  Morcellation performed: No      CANCER STATUS:  No Evidence of Disease    RECOMMENDATIONS:   Reassured her.  Urinary symptoms can continue to improve over the next 4 to 6 weeks.  At this point I am turning over routine follow-up care to   Gavino and will see her back on an as-needed basis.    Thank you for the opportunity to participate in her care.  If any questions or comments, please do not hesitate in calling.    No orders of the defined types were placed in this encounter.

## 2019-12-05 NOTE — NON-PROVIDER
Patient was seen today in clinic with Dr. Bustillos for follow up.  Vitals signs and weight were obtained and pain assessment was completed.  Allergies and medications were reviewed with the patient.  Review of systems completed.     Vitals/Pain:  Vitals:    12/05/19 1517   BP: 116/81   Pulse: 80   Temp: 36.4 °C (97.5 °F)   SpO2: 98%   Weight: 90 kg (198 lb 6.4 oz)   Pain Score: No pain        Allergies:   Bloodless; Ibuprofen; and Penicillins    Current Medications:  Current Outpatient Medications   Medication Sig Dispense Refill   • cholestyramine (QUESTRAN) 4 g packet MIX AND DRINK 1 PACKET OF POWDER BY MOUTH AS DIRECTED THREE TIMES DAILY WITH MEALS AND ONCE BEFORE BEDTIME FOR DIARRHEA  4   • lisinopril (PRINIVIL) 10 MG Tab Take 0.5 Tabs by mouth every day. Reduced 11/6/2019  Indications: High Blood Pressure Disorder 90 Tab 3   • metFORMIN (GLUCOPHAGE) 500 MG Tab Take 1 Tab by mouth every day. 90 Tab 3   • acetaminophen (TYLENOL) 325 MG Tab Take 2 Tabs by mouth every 6 hours as needed (Mild Pain; (Pain scale 1-3); Temp greater than 100.5 F). 30 Tab 0   • atorvastatin (LIPITOR) 10 MG Tab Take 1 Tab by mouth every day. (Patient not taking: Reported on 12/5/2019) 90 Tab 3   • Cranberry 125 MG Tab Take  by mouth.     • docusate sodium (COLACE) 100 MG Cap Take 100 mg by mouth every day.       No current facility-administered medications for this encounter.          PCP:  Richard Arce Ass't

## 2019-12-10 ENCOUNTER — OFFICE VISIT (OUTPATIENT)
Dept: CARDIOLOGY | Facility: MEDICAL CENTER | Age: 69
End: 2019-12-10
Payer: MEDICARE

## 2019-12-10 VITALS
DIASTOLIC BLOOD PRESSURE: 78 MMHG | BODY MASS INDEX: 36.25 KG/M2 | SYSTOLIC BLOOD PRESSURE: 130 MMHG | HEIGHT: 62 IN | OXYGEN SATURATION: 98 % | HEART RATE: 82 BPM | WEIGHT: 197 LBS

## 2019-12-10 DIAGNOSIS — E78.00 PURE HYPERCHOLESTEROLEMIA: ICD-10-CM

## 2019-12-10 DIAGNOSIS — I35.0 NONRHEUMATIC AORTIC (VALVE) STENOSIS: ICD-10-CM

## 2019-12-10 DIAGNOSIS — I10 ESSENTIAL HYPERTENSION, BENIGN: ICD-10-CM

## 2019-12-10 PROCEDURE — 99214 OFFICE O/P EST MOD 30 MIN: CPT | Performed by: INTERNAL MEDICINE

## 2019-12-10 ASSESSMENT — ENCOUNTER SYMPTOMS
VOMITING: 0
INSOMNIA: 0
PALPITATIONS: 0
DIZZINESS: 0
SHORTNESS OF BREATH: 0
NAUSEA: 0

## 2019-12-10 NOTE — PROGRESS NOTES
"Chief Complaint   Patient presents with   • Aortic stenosis     follow up   HTN  Dyslipidemia    Subjective:   Madelyn Schmitz is a 69 y.o. female who presents today for f/u above issue    Underwent uneventful hysterectomy in June  Needed cholecystectomy the end of September as well.  Still a little weak but otherwise doing well from cardiac standpoint.   She denies any chest pain, palpitation or heart failure type symptoms.  Denies any side effect from medications.    Past Medical History:   Diagnosis Date   • Acute cholecystitis 9/19/2019   • Arthritis     osteo, finger/shoulders   • Back pain    • Blood clotting disorder (HCC) 2004    leg   • Bronchitis    • Chickenpox    • Dental disorder     upper/lower dentures   • Diabetes     oral meds   • Diarrhea 9/26/2019    She reports diarrhea starting about 3 days ago.  She has multiple small-volume liquidy/emanuel brown to dark-colored stools daily.  This morning she reports already 5-6 times of going to the bathroom with some stool.  She has not had a formed stool many days and proceeding this diarrhea she was actually constipated.  She had taken some senna but then stopped it and the loose stool started.  She is o   • Dysuria 9/11/2019   • Endometrial cancer (HCC)    • Heart valve disease     \"murmur\"   • High cholesterol    • Hyperlipidemia    • Hypertension    • Hypotension due to hypovolemia 9/26/2019    She has a low blood pressure in office today of 84/52 with a baseline in the 100s to 120s systolic.  This is likely due to her frequent episodes of diarrhea exacerbated by ongoing use of lisinopril.  Yesterday, she felt lightheaded and dizzy but did not pass out or have a fall.  She is having challenges of what is appropriate to eat as she had recent pelvic radiation as well as a cholecystectomy b   • Mumps    • Nasal drainage    • Sleep apnea     CPAP   • Snoring    • Urinary incontinence      Past Surgical History:   Procedure Laterality Date   • GABY BY " LAPAROSCOPY  9/19/2019    Procedure: CHOLECYSTECTOMY, LAPAROSCOPIC;  Surgeon: Jenniffer Johnson M.D.;  Location: SURGERY [facility];  Service: General   • HYSTERECTOMY ROBOTIC XI  6/27/2019    Procedure: HYSTERECTOMY, ROBOT-ASSISTED, USING DA YO XI;  Surgeon: Alexandr Mancia M.D.;  Location: SURGERY [facility];  Service: Gynecology   • SALPINGO OOPHORECTOMY Bilateral 6/27/2019    Procedure: SALPINGO-OOPHORECTOMY;  Surgeon: Alexandr Mancia M.D.;  Location: SURGERY [facility];  Service: Gynecology   • NODE BIOPSY SENTINEL  6/27/2019    Procedure: BIOPSY, LYMPH NODE, SENTINEL;  Surgeon: Alexandr Mancia M.D.;  Location: SURGERY [facility];  Service: Gynecology   • HYSTEROSCOPY WITH MYOSURE N/A 4/17/2019    Procedure: HYSTEROSCOPY, WITH TISSUE REMOVAL, USING HYSTEROSCOPIC ROTATING CUTTER BLADE - DIAGNOSTIC;  Surgeon: Racquel Gatica M.D.;  [facility]: [facility];  Service: Gynecology   • DILATION AND CURETTAGE N/A 4/17/2019    Procedure: DILATION AND CURETTAGE;  Surgeon: Racquel Gatica M.D.;  Location: [facility]ERY [facility];  Service: Gynecology   • PRIMARY C SECTION  1972/1974/1977    x 3     Family History   Problem Relation Age of Onset   • Heart Disease Father    • Asthma Brother    • Asthma Brother    • Cancer Maternal Aunt         gyn cancer   • Sleep Apnea Neg Hx      Social History     Socioeconomic History   • Marital status:      Spouse name: Not on file   • Number of children: Not on file   • Years of education: Not on file   • Highest education level: Not on file   Occupational History   • Not on file   Social Needs   • Financial resource strain: Not on file   • Food insecurity:     Worry: Not on file     Inability: Not on file   • Transportation needs:     Medical: Not on file     Non-medical: Not on file   Tobacco Use   • Smoking status: Never Smoker   • Smokeless tobacco: Never Used   Substance and Sexual Activity   • Alcohol use: No   • Drug use:  No   • Sexual activity: Not Currently     Partners: Male     Birth control/protection: Post-Menopausal   Lifestyle   • Physical activity:     Days per week: Not on file     Minutes per session: Not on file   • Stress: Not on file   Relationships   • Social connections:     Talks on phone: Not on file     Gets together: Not on file     Attends Gnosticist service: Not on file     Active member of club or organization: Not on file     Attends meetings of clubs or organizations: Not on file     Relationship status: Not on file   • Intimate partner violence:     Fear of current or ex partner: Not on file     Emotionally abused: Not on file     Physically abused: Not on file     Forced sexual activity: Not on file   Other Topics Concern   • Not on file   Social History Narrative    She is Mexican speaking. Madelyn has been  to her  for 50 years. Her daughter-in-law, Delores, participates in her medical care and assists with translation.     Allergies   Allergen Reactions   • Bloodless      Congregation   • Ibuprofen Rash and Swelling     .   • Penicillins Rash and Swelling     .     Outpatient Encounter Medications as of 12/10/2019   Medication Sig Dispense Refill   • multivitamin (THERAGRAN) Tab Take 1 Tab by mouth every day.     • MAGNESIUM CITRATE PO Take  by mouth.     • cholestyramine (QUESTRAN) 4 g packet MIX AND DRINK 1 PACKET OF POWDER BY MOUTH AS DIRECTED THREE TIMES DAILY WITH MEALS AND ONCE BEFORE BEDTIME FOR DIARRHEA  4   • lisinopril (PRINIVIL) 10 MG Tab Take 0.5 Tabs by mouth every day. Reduced 11/6/2019  Indications: High Blood Pressure Disorder 90 Tab 3   • metFORMIN (GLUCOPHAGE) 500 MG Tab Take 1 Tab by mouth every day. 90 Tab 3   • acetaminophen (TYLENOL) 325 MG Tab Take 2 Tabs by mouth every 6 hours as needed (Mild Pain; (Pain scale 1-3); Temp greater than 100.5 F). 30 Tab 0   • atorvastatin (LIPITOR) 10 MG Tab Take 1 Tab by mouth every day. (Patient not taking: Reported on 12/5/2019) 90  "Tab 3   • Cranberry 125 MG Tab Take  by mouth.     • docusate sodium (COLACE) 100 MG Cap Take 100 mg by mouth every day.       No facility-administered encounter medications on file as of 12/10/2019.      Review of Systems   Constitutional: Positive for malaise/fatigue.   HENT: Negative for congestion.    Respiratory: Negative for shortness of breath.    Cardiovascular: Negative for chest pain and palpitations.   Gastrointestinal: Negative for nausea and vomiting.   Genitourinary: Negative for hematuria.   Musculoskeletal: Positive for joint pain.   Neurological: Negative for dizziness.   Psychiatric/Behavioral: The patient does not have insomnia.         Objective:   /78 (BP Location: Left arm, Patient Position: Sitting)   Pulse 82   Ht 1.575 m (5' 2\")   Wt 89.4 kg (197 lb)   LMP  (LMP Unknown)   SpO2 98%   BMI 36.03 kg/m²     Physical Exam   Constitutional: She is oriented to person, place, and time. No distress.   HENT:   Head: Atraumatic.   Eyes: Left eye exhibits no discharge.   Neck: No JVD present.   Cardiovascular: Normal rate.   Murmur heard.   Systolic murmur is present with a grade of 2/6.  Rt and LUPSB  S2 present   Pulmonary/Chest: Effort normal.   Abdominal: Soft.   Musculoskeletal:         General: No edema.   Neurological: She is alert and oriented to person, place, and time.   Skin: Skin is warm.   Psychiatric: She has a normal mood and affect. Her behavior is normal.       Assessment:     1. Nonrheumatic aortic (valve) stenosis      moderate AS on echo 6/2019   2. Essential hypertension, benign     3. Pure hypercholesterolemia         Medical Decision Making:  Today's Assessment / Status / Plan:     The patient's above cardiovascular conditions are stable. Will have lipid check for PCP soon.  Will continue current medications and have the patient return for a followup in 1 year.   Will repeat ECHO next year or sooner as needed.  Will be happy to see the patient sooner as needed.   Thank " you for allowing me to participate in the caring of this patient.

## 2020-01-03 ENCOUNTER — HOSPITAL ENCOUNTER (OUTPATIENT)
Dept: RADIOLOGY | Facility: MEDICAL CENTER | Age: 70
End: 2020-01-03
Attending: INTERNAL MEDICINE
Payer: MEDICARE

## 2020-01-03 ENCOUNTER — HOSPITAL ENCOUNTER (OUTPATIENT)
Dept: LAB | Facility: MEDICAL CENTER | Age: 70
End: 2020-01-03
Attending: INTERNAL MEDICINE
Payer: MEDICARE

## 2020-01-03 DIAGNOSIS — Z11.59 ENCOUNTER FOR HEPATITIS C SCREENING TEST FOR LOW RISK PATIENT: ICD-10-CM

## 2020-01-03 DIAGNOSIS — Z78.0 MENOPAUSE: ICD-10-CM

## 2020-01-03 DIAGNOSIS — E78.5 HYPERLIPIDEMIA ASSOCIATED WITH TYPE 2 DIABETES MELLITUS (HCC): ICD-10-CM

## 2020-01-03 DIAGNOSIS — R53.83 FATIGUE, UNSPECIFIED TYPE: ICD-10-CM

## 2020-01-03 DIAGNOSIS — E11.69 HYPERLIPIDEMIA ASSOCIATED WITH TYPE 2 DIABETES MELLITUS (HCC): ICD-10-CM

## 2020-01-03 DIAGNOSIS — E11.9 TYPE 2 DIABETES MELLITUS WITHOUT COMPLICATION, WITHOUT LONG-TERM CURRENT USE OF INSULIN (HCC): ICD-10-CM

## 2020-01-03 DIAGNOSIS — Z12.31 ENCOUNTER FOR SCREENING MAMMOGRAM FOR BREAST CANCER: ICD-10-CM

## 2020-01-03 LAB
ALBUMIN SERPL BCP-MCNC: 3.7 G/DL (ref 3.2–4.9)
ALBUMIN/GLOB SERPL: 1 G/DL
ALP SERPL-CCNC: 64 U/L (ref 30–99)
ALT SERPL-CCNC: 10 U/L (ref 2–50)
ANION GAP SERPL CALC-SCNC: 8 MMOL/L (ref 0–11.9)
AST SERPL-CCNC: 16 U/L (ref 12–45)
BASOPHILS # BLD AUTO: 0.4 % (ref 0–1.8)
BASOPHILS # BLD: 0.02 K/UL (ref 0–0.12)
BILIRUB SERPL-MCNC: 0.4 MG/DL (ref 0.1–1.5)
BUN SERPL-MCNC: 15 MG/DL (ref 8–22)
CALCIUM SERPL-MCNC: 9.3 MG/DL (ref 8.5–10.5)
CHLORIDE SERPL-SCNC: 105 MMOL/L (ref 96–112)
CHOLEST SERPL-MCNC: 191 MG/DL (ref 100–199)
CO2 SERPL-SCNC: 27 MMOL/L (ref 20–33)
CREAT SERPL-MCNC: 0.7 MG/DL (ref 0.5–1.4)
EOSINOPHIL # BLD AUTO: 0.29 K/UL (ref 0–0.51)
EOSINOPHIL NFR BLD: 5.8 % (ref 0–6.9)
ERYTHROCYTE [DISTWIDTH] IN BLOOD BY AUTOMATED COUNT: 55 FL (ref 35.9–50)
GLOBULIN SER CALC-MCNC: 3.7 G/DL (ref 1.9–3.5)
GLUCOSE SERPL-MCNC: 108 MG/DL (ref 65–99)
HCT VFR BLD AUTO: 40.5 % (ref 37–47)
HCV AB SER QL: NEGATIVE
HDLC SERPL-MCNC: 40 MG/DL
HGB BLD-MCNC: 12.5 G/DL (ref 12–16)
IMM GRANULOCYTES # BLD AUTO: 0.01 K/UL (ref 0–0.11)
IMM GRANULOCYTES NFR BLD AUTO: 0.2 % (ref 0–0.9)
LDLC SERPL CALC-MCNC: 123 MG/DL
LYMPHOCYTES # BLD AUTO: 1.14 K/UL (ref 1–4.8)
LYMPHOCYTES NFR BLD: 22.9 % (ref 22–41)
MCH RBC QN AUTO: 30.8 PG (ref 27–33)
MCHC RBC AUTO-ENTMCNC: 30.9 G/DL (ref 33.6–35)
MCV RBC AUTO: 99.8 FL (ref 81.4–97.8)
MONOCYTES # BLD AUTO: 0.4 K/UL (ref 0–0.85)
MONOCYTES NFR BLD AUTO: 8 % (ref 0–13.4)
NEUTROPHILS # BLD AUTO: 3.11 K/UL (ref 2–7.15)
NEUTROPHILS NFR BLD: 62.7 % (ref 44–72)
NRBC # BLD AUTO: 0 K/UL
NRBC BLD-RTO: 0 /100 WBC
PLATELET # BLD AUTO: 228 K/UL (ref 164–446)
PMV BLD AUTO: 9.5 FL (ref 9–12.9)
POTASSIUM SERPL-SCNC: 4 MMOL/L (ref 3.6–5.5)
PROT SERPL-MCNC: 7.4 G/DL (ref 6–8.2)
RBC # BLD AUTO: 4.06 M/UL (ref 4.2–5.4)
SODIUM SERPL-SCNC: 140 MMOL/L (ref 135–145)
TRIGL SERPL-MCNC: 138 MG/DL (ref 0–149)
TSH SERPL DL<=0.005 MIU/L-ACNC: 3.66 UIU/ML (ref 0.38–5.33)
WBC # BLD AUTO: 5 K/UL (ref 4.8–10.8)

## 2020-01-03 PROCEDURE — 84443 ASSAY THYROID STIM HORMONE: CPT

## 2020-01-03 PROCEDURE — 80061 LIPID PANEL: CPT

## 2020-01-03 PROCEDURE — 77080 DXA BONE DENSITY AXIAL: CPT

## 2020-01-03 PROCEDURE — 80053 COMPREHEN METABOLIC PANEL: CPT

## 2020-01-03 PROCEDURE — 36415 COLL VENOUS BLD VENIPUNCTURE: CPT

## 2020-01-03 PROCEDURE — 77067 SCR MAMMO BI INCL CAD: CPT

## 2020-01-03 PROCEDURE — 86803 HEPATITIS C AB TEST: CPT

## 2020-01-03 PROCEDURE — 85025 COMPLETE CBC W/AUTO DIFF WBC: CPT

## 2020-01-07 DIAGNOSIS — D89.2 PARAPROTEINEMIA: ICD-10-CM

## 2020-01-07 NOTE — RESULT ENCOUNTER NOTE
"I have the lab and bone density results to review.    Bone Density looks good overall. You just barely meet criteria for osteopenia, so we typically recommend calcium 1200 mg daily and vitamin D 2,000 international units daily to help maintain bone strength. This is over the counter and usually can be found as a combination pill. Calcium can be a little constipating so let me know if that causes any trouble. We recheck the bone density test every 2 years once someone is found to have osteopenia.    Regarding the labs, normal thyroid lab. Negative hepatitis C antibody, no prior infection or exposure to this virus. Kidneys, liver, and electrolytes are stable. Cholesterol is improved on the new medicine, but if she can tolerate it we should try to go up to 20 mg to see if we can get the \"bad\" LDL cholesterol below 100. Blood counts are improved with no more anemia.    If she would like to go off of the metformin at this time then that would be fine. My only other thought is that some of the protein levels are slowly increasing in the blood, this is a non specific finding. We should recheck again before our next visit and if they are still elevated or increasing then I can talk about what the next steps in evaluation would be. I will order a repeat metabolic panel to have completed before our March 12th appointment. Let me know if there are any follow up questions.     Thanks,    Dr. Mike"

## 2020-01-09 NOTE — RESULT ENCOUNTER NOTE
Please call patient's daughter-in-law, Delores, to make sure they received the letter indicating the asymmetry of her mammogram and have them call [phone] to schedule a diagnostic mammogram to follow up on this finding. Radiology mailed them a letter with this information. Thanks, KT.

## 2020-01-13 ENCOUNTER — TELEPHONE (OUTPATIENT)
Dept: MEDICAL GROUP | Facility: MEDICAL CENTER | Age: 70
End: 2020-01-13

## 2020-01-13 ENCOUNTER — HOSPITAL ENCOUNTER (OUTPATIENT)
Dept: RADIOLOGY | Facility: MEDICAL CENTER | Age: 70
End: 2020-01-13
Attending: INTERNAL MEDICINE
Payer: MEDICARE

## 2020-01-13 DIAGNOSIS — R92.8 ABNORMAL MAMMOGRAM: ICD-10-CM

## 2020-01-13 DIAGNOSIS — R77.9 ELEVATED BLOOD PROTEIN: ICD-10-CM

## 2020-01-13 PROCEDURE — 77065 DX MAMMO INCL CAD UNI: CPT | Mod: LT

## 2020-01-13 NOTE — TELEPHONE ENCOUNTER
1. Caller Name: Delores                                        Call Back Number: [phone]      Patient approves a detailed voicemail message: yes    Called daughter in law to ask her if her mother in law had gotten the request to do a diagnostic mamogram.  Gave her the notes from Dr. Mike and we scheduled an appointment before they leave for [place].

## 2020-01-15 DIAGNOSIS — R92.0 MICROCALCIFICATION OF LEFT BREAST ON MAMMOGRAM: ICD-10-CM

## 2020-01-15 NOTE — RESULT ENCOUNTER NOTE
Called to patient's DIL and results also given to patient at time of appointment. 6 month follow up diagnostic left mammogram ordered.

## 2020-03-06 ENCOUNTER — HOSPITAL ENCOUNTER (OUTPATIENT)
Dept: LAB | Facility: MEDICAL CENTER | Age: 70
End: 2020-03-06
Attending: INTERNAL MEDICINE
Payer: MEDICARE

## 2020-03-06 DIAGNOSIS — R77.9 ELEVATED BLOOD PROTEIN: ICD-10-CM

## 2020-03-06 DIAGNOSIS — D89.2 PARAPROTEINEMIA: ICD-10-CM

## 2020-03-06 LAB
ALBUMIN SERPL BCP-MCNC: 3.7 G/DL (ref 3.2–4.9)
ALBUMIN/GLOB SERPL: 1 G/DL
ALP SERPL-CCNC: 65 U/L (ref 30–99)
ALT SERPL-CCNC: 6 U/L (ref 2–50)
ANION GAP SERPL CALC-SCNC: 7 MMOL/L (ref 0–11.9)
AST SERPL-CCNC: 13 U/L (ref 12–45)
BILIRUB SERPL-MCNC: 0.6 MG/DL (ref 0.1–1.5)
BUN SERPL-MCNC: 16 MG/DL (ref 8–22)
CALCIUM SERPL-MCNC: 9 MG/DL (ref 8.5–10.5)
CHLORIDE SERPL-SCNC: 105 MMOL/L (ref 96–112)
CO2 SERPL-SCNC: 27 MMOL/L (ref 20–33)
CREAT SERPL-MCNC: 0.65 MG/DL (ref 0.5–1.4)
GLOBULIN SER CALC-MCNC: 3.8 G/DL (ref 1.9–3.5)
GLUCOSE SERPL-MCNC: 116 MG/DL (ref 65–99)
POTASSIUM SERPL-SCNC: 4.2 MMOL/L (ref 3.6–5.5)
PROT SERPL-MCNC: 7.5 G/DL (ref 6–8.2)
SODIUM SERPL-SCNC: 139 MMOL/L (ref 135–145)

## 2020-03-06 PROCEDURE — 84165 PROTEIN E-PHORESIS SERUM: CPT

## 2020-03-06 PROCEDURE — 36415 COLL VENOUS BLD VENIPUNCTURE: CPT

## 2020-03-06 PROCEDURE — 84155 ASSAY OF PROTEIN SERUM: CPT | Mod: XU

## 2020-03-06 PROCEDURE — 80053 COMPREHEN METABOLIC PANEL: CPT

## 2020-03-10 LAB
ALBUMIN SERPL ELPH-MCNC: 3.76 G/DL (ref 3.75–5.01)
ALPHA1 GLOB SERPL ELPH-MCNC: 0.29 G/DL (ref 0.19–0.46)
ALPHA2 GLOB SERPL ELPH-MCNC: 0.92 G/DL (ref 0.48–1.05)
B-GLOBULIN SERPL ELPH-MCNC: 0.94 G/DL (ref 0.48–1.1)
GAMMA GLOB SERPL ELPH-MCNC: 1.19 G/DL (ref 0.62–1.51)
INTERPRETATION SERPL IFE-IMP: NORMAL
MONOCLON BAND OBS SERPL: NORMAL
PATHOLOGY STUDY: NORMAL
PROT SERPL-MCNC: 7.1 G/DL (ref 6.3–8.2)

## 2020-03-11 ENCOUNTER — OFFICE VISIT (OUTPATIENT)
Dept: MEDICAL GROUP | Facility: MEDICAL CENTER | Age: 70
End: 2020-03-11
Payer: MEDICARE

## 2020-03-11 VITALS
HEART RATE: 79 BPM | SYSTOLIC BLOOD PRESSURE: 112 MMHG | BODY MASS INDEX: 37.73 KG/M2 | OXYGEN SATURATION: 93 % | WEIGHT: 205.03 LBS | HEIGHT: 62 IN | DIASTOLIC BLOOD PRESSURE: 62 MMHG | RESPIRATION RATE: 14 BRPM | TEMPERATURE: 97.7 F

## 2020-03-11 DIAGNOSIS — G47.30 SLEEP APNEA, UNSPECIFIED TYPE: ICD-10-CM

## 2020-03-11 DIAGNOSIS — R92.8 ABNORMAL MAMMOGRAM: ICD-10-CM

## 2020-03-11 DIAGNOSIS — R80.9 TYPE 2 DIABETES MELLITUS WITH MICROALBUMINURIA, WITHOUT LONG-TERM CURRENT USE OF INSULIN (HCC): ICD-10-CM

## 2020-03-11 DIAGNOSIS — C54.1 ENDOMETRIAL CANCER (HCC): ICD-10-CM

## 2020-03-11 DIAGNOSIS — E11.29 TYPE 2 DIABETES MELLITUS WITH MICROALBUMINURIA, WITHOUT LONG-TERM CURRENT USE OF INSULIN (HCC): ICD-10-CM

## 2020-03-11 DIAGNOSIS — E11.59 HYPERTENSION ASSOCIATED WITH DIABETES (HCC): ICD-10-CM

## 2020-03-11 DIAGNOSIS — I15.2 HYPERTENSION ASSOCIATED WITH DIABETES (HCC): ICD-10-CM

## 2020-03-11 DIAGNOSIS — E11.69 HYPERLIPIDEMIA ASSOCIATED WITH TYPE 2 DIABETES MELLITUS (HCC): ICD-10-CM

## 2020-03-11 DIAGNOSIS — M85.88 OSTEOPENIA OF LUMBAR SPINE: ICD-10-CM

## 2020-03-11 DIAGNOSIS — E78.5 HYPERLIPIDEMIA ASSOCIATED WITH TYPE 2 DIABETES MELLITUS (HCC): ICD-10-CM

## 2020-03-11 DIAGNOSIS — I70.0 AORTIC ATHEROSCLEROSIS (HCC): ICD-10-CM

## 2020-03-11 PROBLEM — E11.9 DIABETES MELLITUS (HCC): Status: RESOLVED | Noted: 2019-09-19 | Resolved: 2020-03-11

## 2020-03-11 PROCEDURE — 8041 PR SCP AHA: Performed by: INTERNAL MEDICINE

## 2020-03-11 PROCEDURE — 99214 OFFICE O/P EST MOD 30 MIN: CPT | Performed by: INTERNAL MEDICINE

## 2020-03-11 RX ORDER — PRAVASTATIN SODIUM 10 MG
10 TABLET ORAL DAILY
Qty: 30 TAB | Refills: 11 | Status: SHIPPED | OUTPATIENT
Start: 2020-03-11 | End: 2021-02-23 | Stop reason: SDUPTHER

## 2020-03-11 ASSESSMENT — PATIENT HEALTH QUESTIONNAIRE - PHQ9: CLINICAL INTERPRETATION OF PHQ2 SCORE: 0

## 2020-03-11 ASSESSMENT — FIBROSIS 4 INDEX: FIB4 SCORE: 1.68

## 2020-03-11 NOTE — PATIENT INSTRUCTIONS
Calcium 1200 mg daily-- can be calcium carbonate (TUMS) or calcium citrate  Vitamin D 2,000 international units daily at minimum

## 2020-03-12 ENCOUNTER — APPOINTMENT (OUTPATIENT)
Dept: SLEEP MEDICINE | Facility: MEDICAL CENTER | Age: 70
End: 2020-03-12

## 2020-03-12 NOTE — ASSESSMENT & PLAN NOTE
New problem by my assessment.  Recommend she take calcium 1200 mg and vitamin D 2000 international units daily due to osteopenia.  Can reimage bone density in January 2022.

## 2020-03-12 NOTE — ASSESSMENT & PLAN NOTE
Chronic and ongoing problem.  Continue wearing CPAP and follow-up with sleep medicine as recommended.

## 2020-03-12 NOTE — ASSESSMENT & PLAN NOTE
New problem by my assessment.  Incidental finding.  She did not tolerate atorvastatin so we will try her on pravastatin.

## 2020-03-12 NOTE — ASSESSMENT & PLAN NOTE
Chronic and stable problem.  Appropriate to continue low-dose lisinopril 5 mg daily for renal protection in the setting of diabetes, her hypertension is very well controlled.

## 2020-03-12 NOTE — ASSESSMENT & PLAN NOTE
Chronic and stable problem.  Her A1c had improved dramatically so we are currently holding her metformin.  We will recheck urine microalbumin and hemoglobin A1c at her next follow-up in 6 months.

## 2020-03-12 NOTE — ASSESSMENT & PLAN NOTE
Chronic problem that requires medication adjustment.  She did not tolerate atorvastatin 10 mg daily so we will discontinue this and try pravastatin 10 mg daily in its place.  She will let me know if she develops dizziness or any other side effects with this medicine.

## 2020-03-12 NOTE — ASSESSMENT & PLAN NOTE
Previous problem, stable.  She has completed surgical resection of the uterus as well as radiation therapy continue follow-up with gynecology and radiation oncology as recommended.

## 2020-03-12 NOTE — PROGRESS NOTES
" Subjective:   Chief Complaint/History of Present Illness:  Madelyn Schmitz is a 70 y.o. female established patient who presents today to discuss medical problems as listed below. She is accompanied by her daughter in law, Delores.    Problem   Type 2 Diabetes Mellitus With Microalbuminuria (Hcc)       Ref. Range 4/8/2019 13:20 11/6/2019 16:47   Glycohemoglobin Latest Ref Range: 0.0 - 5.6 % 6.6 (H) 5.7 (A)   Estim. Avg Glu Latest Units: mg/dL 143         Ref. Range 11/6/2019 16:35   Micro Alb Creat Ratio Latest Ref Range: 0 - 30 mg/g 167 (H)       She has history of well-controlled type 2 diabetes.  She is previously taking metformin 500 mg twice daily however her A1c on recheck was down to 5.7 so we discontinue this medication.  No known history of retinopathy during eye exam 11/2019.  She does have microalbuminuria and is on ACE inhibitor.  Normal monofilament exam and no history of neuropathy in the feet however she does have very thickened toenails and some trouble with clipping these herself.  No hyper or hypoglycemia noted.     Sleep Apnea    Last saw sleep medicine (Chula Greene) in 8/2019:   \"PSG from 3/2018 indicated an AHI of 103.6 and low oxygenation of 72%.  She is here for first compliance.  Currently she is being treated with CPAP @ 9cmH20. Compliance download over the past 30 days indicates 93 % compliance, average use of 2 hours 3 minutes per night, AHI of 1. Patient reports she is acclimating to therapy.  Prior compliance showed only 38 minutes of use per night.  She still feels pressures are slightly too high.  She is using nasal pillows which are comfortable.  She feels restricted by the tubing and is unable to move around.  She we will get up and go to the bathroom and does not put her machine back on after that time.  She does however feel she is getting a deeper sleep for those 2 hours and has more energy during the day.\"           Hypertension Associated With Diabetes (Hcc)    She has " a history of hypertension maintained on Lisinopril 10 mg daily. She has tolerated this medication with no problems. Denies ACEI cough. Denies difficult to control blood pressure.  We actually held this medicine when I first met her in mid September 2019 as she was having hypotension following her postoperative course after she had a cholecystectomy shortly after her hysterectomy.  She was able to resume this medicine and has continued on it.  Her blood pressures in clinic has ranged 100-130/60-78 over the past 6 months.  She would be interested in trying to cut back on her medications that we decreased it to 5 mg daily which she continues on.  Appropriate to be on at least some ACE inhibitor with her history of diabetes.     Aortic Atherosclerosis (Hcc)    CT chest/abdomen/pelvis (6/2019): There is aortic atherosclerosis without aneurysm.    Incidental finding on imaging during work-up of her endometrial cancer.  On statin therapy.     Osteopenia of Lumbar Spine    Bone Density (1/2020):  lumbar spine T score of -1.1   proximal left femur T score of 0.0     10-year Probability of Fracture:  Major Osteoporotic     4.5%  Hip     0.5%    Screening bone density found mild osteopenia of the lumbar spine.  Patient not previously taking any calcium or vitamin D.  No prior history of bloody fractures. Not previously taking calcium or vitamin D but is willing to start.     Abnormal Mammogram    Mammogram [diagnostic] (1/2020):    4 additional views of the left breast including magnification views demonstrate faint microcalcifications in the medial retroareolar portion of the breast. No residual area of parenchymal asymmetry is identified. No spiculation is present.     IMPRESSION:  1.  Faint microcalcifications in the medial retroareolar portion of the left breast.  2.  No residual area of parenchymal asymmetry.  3.  No definite radiographic evidence of malignancy in the left breast.  4.  Six-month follow-up left mammography  "is recommended for short term surveillance of these calcifications.    R3 - Category 3:  Probably Benign Finding - Short Interval Follow-Up Suggested      Routine screening mammogram found faint microcalcifications in the left breast.  Six-month follow-up with repeat mammogram recommended, due July 2020.        Hyperlipidemia Associated With Type 2 Diabetes Mellitus (Hcc)       Ref. Range 1/3/2020 08:29   Cholesterol,Tot Latest Ref Range: 100 - 199 mg/dL 191   Triglycerides Latest Ref Range: 0 - 149 mg/dL 138   HDL Latest Ref Range: >=40 mg/dL 40   LDL Latest Ref Range: <100 mg/dL 123 (H)        She reports prior trials of lipid-lowering medications.  She does not think she ever had side effects with them but was told to stop taking them because they're \"bad for the heart\".  Perhaps on the last attempt she experience a little bit of chest pain unclear to me if this is related to the medication.  We attempted starting atorvastatin 10 mg daily but she had dizziness when taking this medication that went away when she stopped it.  She would be willing to try another medicine, we started pravastatin 10 mg daily in March 2020.     Endometrial Cancer (Hcc)    She was diagnosed with endometrial cancer in June 2019.  She completed hysterectomy and radiation therapy, no chemotherapy indicated.  Tolerated treatment well and follows up with her gynecologist.  She does endorse urinary urgency overnight since her surgery.          Additional History:   Allergies:    Bloodless; Ibuprofen; and Penicillins     Current Medications:     Current Outpatient Medications   Medication Sig Dispense Refill   • pravastatin (PRAVACHOL) 10 MG Tab Take 1 Tab by mouth every day. 30 Tab 11   • multivitamin (THERAGRAN) Tab Take 1 Tab by mouth every day.     • MAGNESIUM CITRATE PO Take  by mouth.     • cholestyramine (QUESTRAN) 4 g packet MIX AND DRINK 1 PACKET OF POWDER BY MOUTH AS DIRECTED THREE TIMES DAILY WITH MEALS AND ONCE BEFORE BEDTIME FOR " "DIARRHEA  4   • lisinopril (PRINIVIL) 10 MG Tab Take 0.5 Tabs by mouth every day. Reduced 11/6/2019  Indications: High Blood Pressure Disorder 90 Tab 3   • Cranberry 125 MG Tab Take  by mouth.     • acetaminophen (TYLENOL) 325 MG Tab Take 2 Tabs by mouth every 6 hours as needed (Mild Pain; (Pain scale 1-3); Temp greater than 100.5 F). 30 Tab 0   • docusate sodium (COLACE) 100 MG Cap Take 100 mg by mouth every day.       No current facility-administered medications for this visit.         Social History:     Social History     Tobacco Use   • Smoking status: Never Smoker   • Smokeless tobacco: Never Used   Substance Use Topics   • Alcohol use: No   • Drug use: No       ROS: As above in the HPI      Objective:   Physical Exam:    Vitals: /62 (BP Location: Left arm, Patient Position: Sitting, BP Cuff Size: Adult)   Pulse 79   Temp 36.5 °C (97.7 °F)   Resp 14   Ht 1.575 m (5' 2\")   Wt 93 kg (205 lb 0.4 oz)   SpO2 93%    BMI: Body mass index is 37.5 kg/m².   General/Constitutional: Vitals as above, Well nourished, well developed Bangladeshi speaking female in no acute distress   Head/Eyes: Head is grossly normal & atraumatic, bilateral conjunctivae clear and not injected, bilateral EOMI, bilateral PERRLA   ENT: Bilateral external ears grossly normal in appearance, Hearing grossly intact, External nares normal in appearance and without discharge/bleeding, dentures in place   Respiratory: No respiratory distress, bilateral lungs are clear to ausculation in all lung fields, no wheezing/rhonchi/rales   Cardiovascular: Regular rate and rhythm without murmur/rubs, distal pulses are intact and equal bilaterally (radial), trace bilateral lower extremity edema   MSK: Gait grossly normal & not antalgic   Integumentary: No apparent rashes on skin exposed areas   Psych: Judgment grossly appropriate, no apparent depression/anxiety    Health Maintenance:     - Completed    Assessment and Plan:     Problem List Items Addressed " This Visit     Sleep apnea     Chronic and ongoing problem.  Continue wearing CPAP and follow-up with sleep medicine as recommended.         Hypertension associated with diabetes (HCC)     Chronic and stable problem.  Appropriate to continue low-dose lisinopril 5 mg daily for renal protection in the setting of diabetes, her hypertension is very well controlled.         Relevant Medications    pravastatin (PRAVACHOL) 10 MG Tab    Type 2 diabetes mellitus with microalbuminuria (HCC)     Chronic and stable problem.  Her A1c had improved dramatically so we are currently holding her metformin.  We will recheck urine microalbumin and hemoglobin A1c at her next follow-up in 6 months.         Endometrial cancer (HCC)     Previous problem, stable.  She has completed surgical resection of the uterus as well as radiation therapy continue follow-up with gynecology and radiation oncology as recommended.         Hyperlipidemia associated with type 2 diabetes mellitus (HCC)     Chronic problem that requires medication adjustment.  She did not tolerate atorvastatin 10 mg daily so we will discontinue this and try pravastatin 10 mg daily in its place.  She will let me know if she develops dizziness or any other side effects with this medicine.         Relevant Medications    pravastatin (PRAVACHOL) 10 MG Tab    Aortic atherosclerosis (HCC)     New problem by my assessment.  Incidental finding.  She did not tolerate atorvastatin so we will try her on pravastatin.         Relevant Medications    pravastatin (PRAVACHOL) 10 MG Tab    Osteopenia of lumbar spine     New problem by my assessment.  Recommend she take calcium 1200 mg and vitamin D 2000 international units daily due to osteopenia.  Can reimage bone density in January 2022.         Abnormal mammogram     New problem by my assessment.  Patient will follow-up with repeat mammogram in July 2020.              RTC: 6 months.    PLEASE NOTE: This dictation was created using voice  recognition software. I have made every reasonable attempt to correct obvious errors, but I expect that there are errors of grammar and possibly content that I did not discover before finalizing the note.

## 2020-04-23 DIAGNOSIS — E11.59 HYPERTENSION ASSOCIATED WITH DIABETES (HCC): ICD-10-CM

## 2020-04-23 DIAGNOSIS — I15.2 HYPERTENSION ASSOCIATED WITH DIABETES (HCC): ICD-10-CM

## 2020-04-23 RX ORDER — LISINOPRIL 10 MG/1
5 TABLET ORAL DAILY
Qty: 50 TAB | Refills: 3 | Status: SHIPPED | OUTPATIENT
Start: 2020-04-23 | End: 2021-02-23 | Stop reason: SDUPTHER

## 2020-06-29 ENCOUNTER — HOSPITAL ENCOUNTER (OUTPATIENT)
Dept: RADIOLOGY | Facility: MEDICAL CENTER | Age: 70
End: 2020-06-29
Attending: INTERNAL MEDICINE
Payer: MEDICARE

## 2020-06-29 DIAGNOSIS — R92.0 MICROCALCIFICATION OF LEFT BREAST ON MAMMOGRAM: ICD-10-CM

## 2020-06-29 PROCEDURE — G0279 TOMOSYNTHESIS, MAMMO: HCPCS | Mod: LT

## 2020-09-09 ENCOUNTER — APPOINTMENT (OUTPATIENT)
Dept: MEDICAL GROUP | Facility: MEDICAL CENTER | Age: 70
End: 2020-09-09
Payer: MEDICARE

## 2020-09-10 ENCOUNTER — HOSPITAL ENCOUNTER (OUTPATIENT)
Dept: RADIOLOGY | Facility: MEDICAL CENTER | Age: 70
End: 2020-09-10
Attending: SPECIALIST
Payer: MEDICARE

## 2020-09-10 DIAGNOSIS — C54.1 MALIGNANT NEOPLASM OF ENDOMETRIUM (HCC): ICD-10-CM

## 2020-09-10 PROCEDURE — 71046 X-RAY EXAM CHEST 2 VIEWS: CPT

## 2020-09-22 ENCOUNTER — OFFICE VISIT (OUTPATIENT)
Dept: MEDICAL GROUP | Facility: MEDICAL CENTER | Age: 70
End: 2020-09-22
Payer: MEDICARE

## 2020-09-22 VITALS
SYSTOLIC BLOOD PRESSURE: 110 MMHG | OXYGEN SATURATION: 95 % | DIASTOLIC BLOOD PRESSURE: 64 MMHG | WEIGHT: 217.37 LBS | HEIGHT: 62 IN | HEART RATE: 86 BPM | BODY MASS INDEX: 40 KG/M2 | TEMPERATURE: 98 F

## 2020-09-22 DIAGNOSIS — R80.9 TYPE 2 DIABETES MELLITUS WITH MICROALBUMINURIA, WITHOUT LONG-TERM CURRENT USE OF INSULIN (HCC): ICD-10-CM

## 2020-09-22 DIAGNOSIS — E78.5 HYPERLIPIDEMIA ASSOCIATED WITH TYPE 2 DIABETES MELLITUS (HCC): ICD-10-CM

## 2020-09-22 DIAGNOSIS — E55.9 VITAMIN D DEFICIENCY: ICD-10-CM

## 2020-09-22 DIAGNOSIS — E11.69 HYPERLIPIDEMIA ASSOCIATED WITH TYPE 2 DIABETES MELLITUS (HCC): ICD-10-CM

## 2020-09-22 DIAGNOSIS — R92.8 ABNORMAL MAMMOGRAM: ICD-10-CM

## 2020-09-22 DIAGNOSIS — I15.2 HYPERTENSION ASSOCIATED WITH DIABETES (HCC): ICD-10-CM

## 2020-09-22 DIAGNOSIS — E11.59 HYPERTENSION ASSOCIATED WITH DIABETES (HCC): ICD-10-CM

## 2020-09-22 DIAGNOSIS — E11.29 TYPE 2 DIABETES MELLITUS WITH MICROALBUMINURIA, WITHOUT LONG-TERM CURRENT USE OF INSULIN (HCC): ICD-10-CM

## 2020-09-22 LAB
HBA1C MFR BLD: 6.3 % (ref 0–5.6)
INT CON NEG: ABNORMAL
INT CON POS: ABNORMAL

## 2020-09-22 PROCEDURE — 83036 HEMOGLOBIN GLYCOSYLATED A1C: CPT | Performed by: INTERNAL MEDICINE

## 2020-09-22 PROCEDURE — 99214 OFFICE O/P EST MOD 30 MIN: CPT | Performed by: INTERNAL MEDICINE

## 2020-09-22 ASSESSMENT — FIBROSIS 4 INDEX: FIB4 SCORE: 1.68

## 2020-09-22 NOTE — ASSESSMENT & PLAN NOTE
Chronic and stable problem.  A1c is slightly worsened and she is also had some weight gain.  She continues to stay in the prediabetic range off pharmacotherapy.  She stopped taking her statin and her lisinopril as she felt that both were causing side effects.  She is not had any recent lab work.  She does have a history of microalbuminuria.  We will update her lab work to see where she stands with her cholesterol and renal function and then decide about adding back low doses of alternative medicines that she may better tolerate.

## 2020-09-22 NOTE — PROGRESS NOTES
Subjective:   Chief Complaint/History of Present Illness:  Madelyn Schmitz is a 70 y.o. female established patient who presents today to discuss medical problems as listed below. She is accompanied by her .    Problem   Type 2 Diabetes Mellitus With Microalbuminuria ([facility])       Ref. Range 4/8/2019 11/6/2019 9/22/2020   Glycohemoglobin Latest Ref Range: 0.0 - 5.6 % 6.6 (H) 5.7 (A) 6.3 (A)   Estim. Avg Glu Latest Units: mg/dL 143          Ref. Range 11/6/2019 16:35   Micro Alb Creat Ratio Latest Ref Range: 0 - 30 mg/g 167 (H)       She has history of well-controlled type 2 diabetes.  She is previously taking metformin 500 mg twice daily however her A1c on recheck was down to 5.7 so we discontinue this medication in fall 2019.  No known history of retinopathy during eye exam 11/2019.  She does have microalbuminuria and was previously on ACE inhibitor which she stopped around May 2020 due to orthostasis.  Normal monofilament exam and no history of neuropathy in the feet however she does have very thickened toenails and some trouble with clipping these herself.  No hyper or hypoglycemia noted.     Hypertension Associated With Diabetes ([facility])       Ref. Range 3/6/2020 08:55   Sodium Latest Ref Range: 135 - 145 mmol/L 139   Potassium Latest Ref Range: 3.6 - 5.5 mmol/L 4.2   Chloride Latest Ref Range: 96 - 112 mmol/L 105   Bun Latest Ref Range: 8 - 22 mg/dL 16   Creatinine Latest Ref Range: 0.50 - 1.40 mg/dL 0.65   GFR If Non  Latest Ref Range: >60 mL/min/1.73 m 2 >60     She has history of hypertension previously treated with lisinopril 10 mg daily.  This was on hold after her hysterectomy and gallbladder surgery in the fall 2019.  She resumed the medicine but then felt like her blood pressure was running too low so she stopped it on her own around May 2020.  No ACE inhibitor cough in the past.  She does have history of proteinuria.    Current regimen: No pharmacotherapy  Previous regimen:  "Lisinopril 10 mg daily     Abnormal Mammogram    Mammogram [diagnostic] (1/2020):    4 additional views of the left breast including magnification views demonstrate faint microcalcifications in the medial retroareolar portion of the breast. No residual area of parenchymal asymmetry is identified. No spiculation is present.     IMPRESSION:  1.  Faint microcalcifications in the medial retroareolar portion of the left breast.  2.  No residual area of parenchymal asymmetry.  3.  No definite radiographic evidence of malignancy in the left breast.  4.  Six-month follow-up left mammography is recommended for short term surveillance of these calcifications.    R3 - Category 3:  Probably Benign Finding - Short Interval Follow-Up Suggested      Routine screening mammogram found faint microcalcifications in the left breast.  Six-month follow-up with repeat mammogram completed in late June 2020 and was stable.  Follow-up mammogram recommended in January 2020 at the time of her normal surveillance.        Hyperlipidemia Associated With Type 2 Diabetes Mellitus (Hcc)       Ref. Range 1/3/2020 08:29   Cholesterol,Tot Latest Ref Range: 100 - 199 mg/dL 191   Triglycerides Latest Ref Range: 0 - 149 mg/dL 138   HDL Latest Ref Range: >=40 mg/dL 40   LDL Latest Ref Range: <100 mg/dL 123 (H)        She reports prior trials of lipid-lowering medications.  She does not think she ever had side effects with them but was told to stop taking them because they're \"bad for the heart\".     Current medicine: None  Previous medicines: Atorvastatin (side effect of dizziness), pravastatin (side effect of feeling bad)          Additional History:   Allergies:    Bloodless, Ibuprofen, and Penicillins     Current Medications:     Current Outpatient Medications   Medication Sig Dispense Refill   • multivitamin (THERAGRAN) Tab Take 1 Tab by mouth every day.     • MAGNESIUM CITRATE PO Take  by mouth.     • Cranberry 125 MG Tab Take  by mouth.     • " "acetaminophen (TYLENOL) 325 MG Tab Take 2 Tabs by mouth every 6 hours as needed (Mild Pain; (Pain scale 1-3); Temp greater than 100.5 F). 30 Tab 0   • docusate sodium (COLACE) 100 MG Cap Take 100 mg by mouth every day.     • lisinopril (PRINIVIL) 10 MG Tab Take 0.5 Tabs by mouth every day. Reduced 11/6/2019  Indications: High Blood Pressure Disorder (Patient not taking: Reported on 9/22/2020) 50 Tab 3   • pravastatin (PRAVACHOL) 10 MG Tab Take 1 Tab by mouth every day. (Patient not taking: Reported on 9/22/2020) 30 Tab 11   • cholestyramine (QUESTRAN) 4 g packet MIX AND DRINK 1 PACKET OF POWDER BY MOUTH AS DIRECTED THREE TIMES DAILY WITH MEALS AND ONCE BEFORE BEDTIME FOR DIARRHEA  4     No current facility-administered medications for this visit.         Social History:     Social History     Tobacco Use   • Smoking status: Never Smoker   • Smokeless tobacco: Never Used   Substance Use Topics   • Alcohol use: No   • Drug use: No       ROS: As above in the HPI      Objective:   Physical Exam:    Vitals: /64   Pulse 86   Temp 36.7 °C (98 °F) (Temporal)   Ht 1.575 m (5' 2\")   Wt 98.6 kg (217 lb 6 oz)   SpO2 95%    BMI: Body mass index is 39.76 kg/m².   General/Constitutional: Vitals as above, Well nourished, well developed female in no acute distress   Head/Eyes: Head is grossly normal & atraumatic, bilateral conjunctivae clear and not injected, bilateral EOMI   ENT: Bilateral external ears grossly normal in appearance, Hearing grossly intact, External nares normal in appearance and without discharge/bleeding   Respiratory: No respiratory distress, bilateral lungs are clear to ausculation in all lung fields, no wheezing/rhonchi/rales   Cardiovascular: Regular rate and rhythm with systolic murmur/rubs, distal pulses are intact and equal bilaterally (radial), no bilateral lower extremity edema   MSK: Gait grossly normal & not antalgic   Integumentary: No apparent rashes on skin exposed areas   Psych: Judgment " grossly appropriate, no apparent depression/anxiety    Health Maintenance:     - Completed      Assessment and Plan:     Problem List Items Addressed This Visit     Hypertension associated with diabetes (HCC)     Previous problem that requires follow-up.  Her blood pressure is at goal today in clinic off pharmacotherapy.  We will update her urine microalbumin, electrolytes, renal function to decide if we need to add back low-dose of lisinopril for renal protection.  Continue observing all pharmacotherapy at this time.         Relevant Orders    CBC WITH DIFFERENTIAL    Type 2 diabetes mellitus with microalbuminuria (HCC)     Chronic and stable problem.  A1c is slightly worsened and she is also had some weight gain.  She continues to stay in the prediabetic range off pharmacotherapy.  She stopped taking her statin and her lisinopril as she felt that both were causing side effects.  She is not had any recent lab work.  She does have a history of microalbuminuria.  We will update her lab work to see where she stands with her cholesterol and renal function and then decide about adding back low doses of alternative medicines that she may better tolerate.         Relevant Orders    POCT A1C (Completed)    Comp Metabolic Panel    MICROALBUMIN CREAT RATIO URINE    Hyperlipidemia associated with type 2 diabetes mellitus (HCC)     She has history of type 2 diabetes and hyperlipidemia.  She has been trialed on 2 statins with intolerable side effect so she stopped taking the medicines.  She has been off pharmacotherapy for the past several months so we will get her lipid panel updated to see where it stands and try to find her medicine she can tolerate.  Perhaps we would have to consider red yeast rice extract 1200 mg twice daily with coenzyme Q 10.  I do not think she qualify for Repatha at this time.         Relevant Orders    Comp Metabolic Panel    Lipid Profile    Abnormal mammogram     Previous problem, requires  follow-up.  She will have her routine surveillance mammogram completed in January 2020 to follow-up on prior history of benign calcifications and for regular screening.           Other Visit Diagnoses     Vitamin D deficiency        Relevant Orders    VITAMIN D,25 HYDROXY             RTC: 5 months.    PLEASE NOTE: This dictation was created using voice recognition software. I have made every reasonable attempt to correct obvious errors, but I expect that there are errors of grammar and possibly content that I did not discover before finalizing the note.

## 2020-09-22 NOTE — ASSESSMENT & PLAN NOTE
Previous problem, requires follow-up.  She will have her routine surveillance mammogram completed in January 2020 to follow-up on prior history of benign calcifications and for regular screening.

## 2020-09-22 NOTE — ASSESSMENT & PLAN NOTE
Previous problem that requires follow-up.  Her blood pressure is at goal today in clinic off pharmacotherapy.  We will update her urine microalbumin, electrolytes, renal function to decide if we need to add back low-dose of lisinopril for renal protection.  Continue observing all pharmacotherapy at this time.

## 2020-09-22 NOTE — ASSESSMENT & PLAN NOTE
She has history of type 2 diabetes and hyperlipidemia.  She has been trialed on 2 statins with intolerable side effect so she stopped taking the medicines.  She has been off pharmacotherapy for the past several months so we will get her lipid panel updated to see where it stands and try to find her medicine she can tolerate.  Perhaps we would have to consider red yeast rice extract 1200 mg twice daily with coenzyme Q 10.  I do not think she qualify for Repatha at this time.

## 2020-09-25 ENCOUNTER — HOSPITAL ENCOUNTER (OUTPATIENT)
Dept: LAB | Facility: MEDICAL CENTER | Age: 70
End: 2020-09-25
Attending: INTERNAL MEDICINE
Payer: MEDICARE

## 2020-09-25 DIAGNOSIS — E55.9 VITAMIN D DEFICIENCY: ICD-10-CM

## 2020-09-25 DIAGNOSIS — R80.9 TYPE 2 DIABETES MELLITUS WITH MICROALBUMINURIA, WITHOUT LONG-TERM CURRENT USE OF INSULIN (HCC): ICD-10-CM

## 2020-09-25 DIAGNOSIS — E11.69 HYPERLIPIDEMIA ASSOCIATED WITH TYPE 2 DIABETES MELLITUS (HCC): ICD-10-CM

## 2020-09-25 DIAGNOSIS — E11.59 HYPERTENSION ASSOCIATED WITH DIABETES (HCC): ICD-10-CM

## 2020-09-25 DIAGNOSIS — E11.29 TYPE 2 DIABETES MELLITUS WITH MICROALBUMINURIA, WITHOUT LONG-TERM CURRENT USE OF INSULIN (HCC): ICD-10-CM

## 2020-09-25 DIAGNOSIS — E78.5 HYPERLIPIDEMIA ASSOCIATED WITH TYPE 2 DIABETES MELLITUS (HCC): ICD-10-CM

## 2020-09-25 DIAGNOSIS — I15.2 HYPERTENSION ASSOCIATED WITH DIABETES (HCC): ICD-10-CM

## 2020-09-25 LAB
25(OH)D3 SERPL-MCNC: 40 NG/ML (ref 30–100)
ALBUMIN SERPL BCP-MCNC: 3.8 G/DL (ref 3.2–4.9)
ALBUMIN/GLOB SERPL: 1.2 G/DL
ALP SERPL-CCNC: 69 U/L (ref 30–99)
ALT SERPL-CCNC: 7 U/L (ref 2–50)
ANION GAP SERPL CALC-SCNC: 9 MMOL/L (ref 7–16)
AST SERPL-CCNC: 12 U/L (ref 12–45)
BASOPHILS # BLD AUTO: 0.6 % (ref 0–1.8)
BASOPHILS # BLD: 0.03 K/UL (ref 0–0.12)
BILIRUB SERPL-MCNC: 0.4 MG/DL (ref 0.1–1.5)
BUN SERPL-MCNC: 15 MG/DL (ref 8–22)
CALCIUM SERPL-MCNC: 9.1 MG/DL (ref 8.5–10.5)
CHLORIDE SERPL-SCNC: 103 MMOL/L (ref 96–112)
CHOLEST SERPL-MCNC: 222 MG/DL (ref 100–199)
CO2 SERPL-SCNC: 27 MMOL/L (ref 20–33)
CREAT SERPL-MCNC: 0.59 MG/DL (ref 0.5–1.4)
CREAT UR-MCNC: 74.5 MG/DL
EOSINOPHIL # BLD AUTO: 0.24 K/UL (ref 0–0.51)
EOSINOPHIL NFR BLD: 4.8 % (ref 0–6.9)
ERYTHROCYTE [DISTWIDTH] IN BLOOD BY AUTOMATED COUNT: 54.7 FL (ref 35.9–50)
FASTING STATUS PATIENT QL REPORTED: NORMAL
GLOBULIN SER CALC-MCNC: 3.1 G/DL (ref 1.9–3.5)
GLUCOSE SERPL-MCNC: 128 MG/DL (ref 65–99)
HCT VFR BLD AUTO: 40.3 % (ref 37–47)
HDLC SERPL-MCNC: 43 MG/DL
HGB BLD-MCNC: 12.5 G/DL (ref 12–16)
IMM GRANULOCYTES # BLD AUTO: 0.03 K/UL (ref 0–0.11)
IMM GRANULOCYTES NFR BLD AUTO: 0.6 % (ref 0–0.9)
LDLC SERPL CALC-MCNC: 153 MG/DL
LYMPHOCYTES # BLD AUTO: 1.3 K/UL (ref 1–4.8)
LYMPHOCYTES NFR BLD: 25.8 % (ref 22–41)
MCH RBC QN AUTO: 30 PG (ref 27–33)
MCHC RBC AUTO-ENTMCNC: 31 G/DL (ref 33.6–35)
MCV RBC AUTO: 96.9 FL (ref 81.4–97.8)
MICROALBUMIN UR-MCNC: <1.2 MG/DL
MICROALBUMIN/CREAT UR: NORMAL MG/G (ref 0–30)
MONOCYTES # BLD AUTO: 0.4 K/UL (ref 0–0.85)
MONOCYTES NFR BLD AUTO: 8 % (ref 0–13.4)
NEUTROPHILS # BLD AUTO: 3.03 K/UL (ref 2–7.15)
NEUTROPHILS NFR BLD: 60.2 % (ref 44–72)
NRBC # BLD AUTO: 0 K/UL
NRBC BLD-RTO: 0 /100 WBC
PLATELET # BLD AUTO: 239 K/UL (ref 164–446)
PMV BLD AUTO: 9.7 FL (ref 9–12.9)
POTASSIUM SERPL-SCNC: 4.4 MMOL/L (ref 3.6–5.5)
PROT SERPL-MCNC: 6.9 G/DL (ref 6–8.2)
RBC # BLD AUTO: 4.16 M/UL (ref 4.2–5.4)
SODIUM SERPL-SCNC: 139 MMOL/L (ref 135–145)
TRIGL SERPL-MCNC: 130 MG/DL (ref 0–149)
WBC # BLD AUTO: 5 K/UL (ref 4.8–10.8)

## 2020-09-25 PROCEDURE — 80053 COMPREHEN METABOLIC PANEL: CPT

## 2020-09-25 PROCEDURE — 82306 VITAMIN D 25 HYDROXY: CPT

## 2020-09-25 PROCEDURE — 80061 LIPID PANEL: CPT

## 2020-09-25 PROCEDURE — 82570 ASSAY OF URINE CREATININE: CPT

## 2020-09-25 PROCEDURE — 85025 COMPLETE CBC W/AUTO DIFF WBC: CPT

## 2020-09-25 PROCEDURE — 36415 COLL VENOUS BLD VENIPUNCTURE: CPT

## 2020-09-25 PROCEDURE — 82043 UR ALBUMIN QUANTITATIVE: CPT

## 2020-10-07 ENCOUNTER — TELEPHONE (OUTPATIENT)
Dept: MEDICAL GROUP | Facility: MEDICAL CENTER | Age: 70
End: 2020-10-07

## 2020-10-08 NOTE — TELEPHONE ENCOUNTER
----- Message from Monika Mike D.O. sent at 9/28/2020  7:08 AM PDT -----  Please notify Madelyn's family regarding her lab results (she is not fluent in English)--    No protein in the urine.   Blood counts are stable, no anemia or bleeding.   Cholesterol has worsened by about 30 points, if she is willing to go back on cholesterol medicine I would recommend that. She had side effects on atorvastatin and pravastatin so we would need to try a less potent one like lovastatin.   Electrolytes, kidneys, and liver are normal.   Vitamin D level is normal.     Thanks, ESTHER

## 2020-10-08 NOTE — TELEPHONE ENCOUNTER
1. Caller Name: Madelyn Schmitz                          Call Back Number: [phone] (home)         How would the patient prefer to be contacted with a response: Phone call OK to leave a detailed message    Called and left a message with Aowyn.

## 2021-01-04 ENCOUNTER — HOSPITAL ENCOUNTER (OUTPATIENT)
Dept: RADIOLOGY | Facility: MEDICAL CENTER | Age: 71
End: 2021-01-04
Attending: INTERNAL MEDICINE
Payer: MEDICARE

## 2021-01-04 DIAGNOSIS — R92.8 ABNORMAL FINDING ON BREAST IMAGING: ICD-10-CM

## 2021-01-04 PROCEDURE — G0279 TOMOSYNTHESIS, MAMMO: HCPCS

## 2021-01-04 NOTE — RESULT ENCOUNTER NOTE
Patient given results at time of imaging, findings appear benign at this time. Next mammogram recommended in 1/2022.

## 2021-01-15 DIAGNOSIS — Z23 NEED FOR VACCINATION: ICD-10-CM

## 2021-02-16 ENCOUNTER — TELEPHONE (OUTPATIENT)
Dept: MEDICAL GROUP | Facility: PHYSICIAN GROUP | Age: 71
End: 2021-02-16

## 2021-02-16 NOTE — TELEPHONE ENCOUNTER
ESTABLISHED PATIENT PRE-VISIT PLANNING     Patient was NOT contacted to complete PVP.     Note: Patient will not be contacted if there is no indication to call.     1.  Reviewed notes from the last few office visits within the medical group: Yes    2.  If any orders were placed at last visit or intended to be done for this visit (i.e. 6 mos follow-up), do we have Results/Consult Notes?         •  Labs - Labs ordered, completed on 09/25/20 and results are in chart.  Note: If patient appointment is for lab review and patient did not complete labs, check with provider if OK to reschedule patient until labs completed.       •  Imaging - Imaging was not ordered at last office visit.       •  Referrals - No referrals were ordered at last office visit.    3. Is this appointment scheduled as a Hospital Follow-Up? No    4.  Immunizations were updated in Epic using Reconcile Outside Information activity? Yes    5.  Patient is due for the following Health Maintenance Topics:   Health Maintenance Due   Topic Date Due   • Annual Wellness Visit  1950   • COVID-19 Vaccine (1 of 2) 02/11/1966   • IMM ZOSTER VACCINES (1 of 2) 02/11/1998   • IMM INFLUENZA (1) 09/01/2020   • PAP SMEAR  11/01/2020   • DIABETES MONOFILAMENT / LE EXAM  11/06/2020   • RETINAL SCREENING  11/22/2020       - Patient plans to schedule appointment for Annual Wellness Visit (AWV), Immunizations: FLU and SHINGRIX (Shingles), Pap and Retinal Eye Exam.    6.  AHA (Pulse[employer]) form printed for Provider? Email sent to [facility] requesting form

## 2021-02-23 ENCOUNTER — OFFICE VISIT (OUTPATIENT)
Dept: MEDICAL GROUP | Facility: PHYSICIAN GROUP | Age: 71
End: 2021-02-23
Payer: MEDICARE

## 2021-02-23 ENCOUNTER — HOSPITAL ENCOUNTER (OUTPATIENT)
Facility: MEDICAL CENTER | Age: 71
End: 2021-02-23
Attending: INTERNAL MEDICINE
Payer: MEDICARE

## 2021-02-23 VITALS
DIASTOLIC BLOOD PRESSURE: 60 MMHG | TEMPERATURE: 97.7 F | HEART RATE: 83 BPM | SYSTOLIC BLOOD PRESSURE: 128 MMHG | WEIGHT: 222.2 LBS | BODY MASS INDEX: 40.89 KG/M2 | OXYGEN SATURATION: 97 % | HEIGHT: 62 IN

## 2021-02-23 DIAGNOSIS — I15.2 HYPERTENSION ASSOCIATED WITH DIABETES (HCC): ICD-10-CM

## 2021-02-23 DIAGNOSIS — E11.59 HYPERTENSION ASSOCIATED WITH DIABETES (HCC): ICD-10-CM

## 2021-02-23 DIAGNOSIS — E11.69 HYPERLIPIDEMIA ASSOCIATED WITH TYPE 2 DIABETES MELLITUS (HCC): ICD-10-CM

## 2021-02-23 DIAGNOSIS — R10.2 SUPRAPUBIC PAIN, ACUTE: ICD-10-CM

## 2021-02-23 DIAGNOSIS — R92.8 ABNORMAL MAMMOGRAM: ICD-10-CM

## 2021-02-23 DIAGNOSIS — R80.9 TYPE 2 DIABETES MELLITUS WITH MICROALBUMINURIA, WITHOUT LONG-TERM CURRENT USE OF INSULIN (HCC): ICD-10-CM

## 2021-02-23 DIAGNOSIS — E11.29 TYPE 2 DIABETES MELLITUS WITH MICROALBUMINURIA, WITHOUT LONG-TERM CURRENT USE OF INSULIN (HCC): ICD-10-CM

## 2021-02-23 DIAGNOSIS — I70.0 ATHEROSCLEROSIS OF AORTA (HCC): ICD-10-CM

## 2021-02-23 DIAGNOSIS — E78.5 HYPERLIPIDEMIA ASSOCIATED WITH TYPE 2 DIABETES MELLITUS (HCC): ICD-10-CM

## 2021-02-23 LAB
AMBIGUOUS DTTM AMBI4: NORMAL
APPEARANCE UR: CLEAR
BACTERIA #/AREA URNS HPF: NEGATIVE /HPF
BILIRUB UR QL STRIP.AUTO: NEGATIVE
CAOX CRY #/AREA URNS HPF: ABNORMAL /HPF
COLOR UR: ABNORMAL
EPI CELLS #/AREA URNS HPF: ABNORMAL /HPF
GLUCOSE UR STRIP.AUTO-MCNC: NEGATIVE MG/DL
HYALINE CASTS #/AREA URNS LPF: ABNORMAL /LPF
KETONES UR STRIP.AUTO-MCNC: ABNORMAL MG/DL
LEUKOCYTE ESTERASE UR QL STRIP.AUTO: ABNORMAL
MICRO URNS: ABNORMAL
MUCOUS THREADS #/AREA URNS HPF: ABNORMAL /HPF
NITRITE UR QL STRIP.AUTO: NEGATIVE
PH UR STRIP.AUTO: 7 [PH] (ref 5–8)
PROT UR QL STRIP: NEGATIVE MG/DL
RBC # URNS HPF: ABNORMAL /HPF
RBC UR QL AUTO: NEGATIVE
SP GR UR STRIP.AUTO: 1.03
UROBILINOGEN UR STRIP.AUTO-MCNC: 0.2 MG/DL
WBC #/AREA URNS HPF: ABNORMAL /HPF

## 2021-02-23 PROCEDURE — 99215 OFFICE O/P EST HI 40 MIN: CPT | Performed by: INTERNAL MEDICINE

## 2021-02-23 PROCEDURE — 87086 URINE CULTURE/COLONY COUNT: CPT

## 2021-02-23 PROCEDURE — 81001 URINALYSIS AUTO W/SCOPE: CPT

## 2021-02-23 RX ORDER — LISINOPRIL 10 MG/1
10 TABLET ORAL DAILY
Qty: 100 TABLET | Refills: 3 | Status: SHIPPED | OUTPATIENT
Start: 2021-02-23 | End: 2021-07-01 | Stop reason: SDUPTHER

## 2021-02-23 RX ORDER — PRAVASTATIN SODIUM 10 MG
10 TABLET ORAL DAILY
Qty: 100 TABLET | Refills: 3 | Status: SHIPPED | OUTPATIENT
Start: 2021-02-23 | End: 2021-03-31

## 2021-02-23 ASSESSMENT — PATIENT HEALTH QUESTIONNAIRE - PHQ9: CLINICAL INTERPRETATION OF PHQ2 SCORE: 0

## 2021-02-23 ASSESSMENT — FIBROSIS 4 INDEX: FIB4 SCORE: 1.39

## 2021-02-23 NOTE — PROGRESS NOTES
Subjective:   Chief Complaint/History of Present Illness:  Madelyn Schmitz is a 71 y.o. female established patient who presents today to discuss medical problems as listed below. Madelyn is accompanied by her , Luca.    Problem   Type 2 Diabetes Mellitus With Microalbuminuria ([facility])       Ref. Range 4/8/2019 11/6/2019 9/22/2020   Glycohemoglobin Latest Ref Range: 0.0 - 5.6 % 6.6 (H) 5.7 (A) 6.3 (A)   Estim. Avg Glu Latest Units: mg/dL 143          Ref. Range 11/6/2019 16:35   Micro Alb Creat Ratio Latest Ref Range: 0 - 30 mg/g 167 (H)       She has a history of well-controlled type 2 diabetes.  She was previously taking metformin 500 mg twice daily however her A1c on recheck was down to 5.7 so we discontinue this medication in Fall 2019.      No known history of retinopathy during eye exam 11/2019.      She has microalbuminuria and is taking an ACEI.     Normal monofilament exam and no history of neuropathy in the feet however she does have very thickened toenails and some trouble with clipping these herself. She would be interested in establishing with podiatry.     Prior regimen: metformin 500 mg BID     Hypertension Associated With Diabetes ([facility])       Ref. Range 9/25/2020 08:25   Sodium Latest Ref Range: 135 - 145 mmol/L 139   Potassium Latest Ref Range: 3.6 - 5.5 mmol/L 4.4   Chloride Latest Ref Range: 96 - 112 mmol/L 103   Bun Latest Ref Range: 8 - 22 mg/dL 15   Creatinine Latest Ref Range: 0.50 - 1.40 mg/dL 0.59   GFR If Non  Latest Ref Range: >60 mL/min/1.73 m 2 >60     She has a history of hypertension previously treated with lisinopril 10 mg daily.  This was on hold after her hysterectomy and gallbladder surgery in the fall 2019.  She resumed the medicine but then felt like her blood pressure was running too low so she stopped it on her own around May 2020.  No ACE inhibitor cough in the past.  She does have history of proteinuria.    Current regimen: Lisinopril 10 mg  "daily    Blood pressure in clinic ranges 110-128/60-64     Suprapubic Pain, Acute    She reports an episode on February 22 in the evening of suprapubic pain.  It occurred after she ate a heavy meal with potatoes.  The pain lasted approximately 3 hours.  She describes it as painful and cannot give me any other qualifying features.  It was in the lower abdomen/bladder area with very subtle radiation to the right lower quadrant.  No radiation to the upper abdomen, back, or down the legs.  No associated diarrhea, rectal bleeding, or vaginal bleeding.  She did send some dysuria.  She has had regular bowel movements, usually once daily.  No associated fever, chills, nausea/vomiting.  She perceives the potato intake as the underlying cause.  No prior occurrence.  She had hysterectomy for uterine cancer and cholecystectomy within a few months of each other in 2019, no known history of adhesions.  No return of pain since the episode last night.     Aortic atherosclerosis (HCC)    CT chest/abdomen/pelvis (6/2019): There is aortic atherosclerosis without aneurysm.    Incidental finding on imaging during work-up of her endometrial cancer.  On pravastatin, did not tolerate atorvastatin.     Abnormal Mammogram    Routine screening mammogram found faint microcalcifications in the left breast.  Six-month (6/2020) and 12-month (1/2021) follow-up mammograms were stable.        Hyperlipidemia Associated With Type 2 Diabetes Mellitus (Hcc)       Ref. Range 1/3/2020 08:29 9/25/2020 08:25   Cholesterol,Tot Latest Ref Range: 100 - 199 mg/dL 191 222 (H)   Triglycerides Latest Ref Range: 0 - 149 mg/dL 138 130   HDL Latest Ref Range: >=40 mg/dL 40 43   LDL Latest Ref Range: <100 mg/dL 123 (H) 153 (H)     She reports prior trials of lipid-lowering medications.  She does not think she ever had side effects with them but was told to stop taking them because they're \"bad for the heart\".     Current medicine: pravastatin 10 mg daily  Previous " "medicines: Atorvastatin (side effect of dizziness)          Current Medications:  Current Outpatient Medications Ordered in Epic   Medication Sig Dispense Refill   • lisinopril (PRINIVIL) 10 MG Tab Take 1 tablet by mouth every day. Indications: High Blood Pressure Disorder 100 tablet 3   • pravastatin (PRAVACHOL) 10 MG Tab Take 1 tablet by mouth every day. 100 tablet 3   • multivitamin (THERAGRAN) Tab Take 1 Tab by mouth every day.     • MAGNESIUM CITRATE PO Take  by mouth.     • cholestyramine (QUESTRAN) 4 g packet MIX AND DRINK 1 PACKET OF POWDER BY MOUTH AS DIRECTED THREE TIMES DAILY WITH MEALS AND ONCE BEFORE BEDTIME FOR DIARRHEA  4   • Cranberry 125 MG Tab Take  by mouth.     • acetaminophen (TYLENOL) 325 MG Tab Take 2 Tabs by mouth every 6 hours as needed (Mild Pain; (Pain scale 1-3); Temp greater than 100.5 F). 30 Tab 0   • docusate sodium (COLACE) 100 MG Cap Take 100 mg by mouth every day.       No current [facility]-ordered facility-administered medications on file.          Objective:   Physical Exam:    Vitals: /60 (BP Location: Right arm, Patient Position: Sitting, BP Cuff Size: Large adult)   Pulse 83   Temp 36.5 °C (97.7 °F) (Temporal)   Ht 1.575 m (5' 2\")   Wt 101 kg (222 lb 3.2 oz)   SpO2 97%    BMI: Body mass index is 40.64 kg/m².  Physical Exam   Constitutional: She is well-developed, well-nourished, and in no distress. No distress.   Abdominal: Soft. She exhibits no distension. There is no abdominal tenderness. There is no rebound and no guarding.   Diabetic Foot Exam: No ulcers/laceration/blisters present on bilateral feet, gross anatomy demonstrates bilateral bunions on the medial aspects of her feet but no other abnormal curvatures or arch deformities, no toe deformities, she has toenail thickness throughout consistent with onychomycosis, no ingrown toenails, no increase in skin temperature bilaterally, no skin erythema to bilateral feet, bilateral dorsalis pedis pulses 1+ and equal, " Refill less than 2 seconds bilaterally, Gaastra 10 g monofilament testing normal in bilateral great toes, bilateral balls of feet at medial/lateral/mid ball of foot         Assessment and Plan:   Madelyn is a 71 y.o. female with the following:  Problem List Items Addressed This Visit     Hypertension associated with diabetes (HCC)     Chronic and ongoing problem.  Continue taking lisinopril 10 mg daily.  Will update electrolytes and kidney function to ensure ongoing stability on this medicine.         Relevant Medications    lisinopril (PRINIVIL) 10 MG Tab    pravastatin (PRAVACHOL) 10 MG Tab    Other Relevant Orders    CBC WITH DIFFERENTIAL    Comp Metabolic Panel    Type 2 diabetes mellitus with microalbuminuria (HCC)     Chronic problem that requires additional evaluation.  Will refer her to podiatry to establish care for nail trimming and foot care.  Will recheck hemoglobin A1c to ensure her diabetes is under good control as her weight has gone up approximately 17 pounds in the past year.  Continue monitoring off pharmacotherapy at this time.         Relevant Orders    HEMOGLOBIN A1C    REFERRAL TO PODIATRY    Diabetic Monofilament Lower Extremity Exam (Completed)    Hyperlipidemia associated with type 2 diabetes mellitus (HCC)     Chronic and ongoing problem.  She goes on and off of her medicines is on how she is feeling, unclear if she was taking them consistently in recent time.  Encouraged her to continue her pravastatin 10 mg daily and will order repeat lab follow-up to check on compliance.         Relevant Medications    pravastatin (PRAVACHOL) 10 MG Tab    Other Relevant Orders    Lipid Profile    Aortic atherosclerosis (HCC)     Chronic and stable problem, incidental finding, continue pravastatin 10 mg daily.  She did not tolerate atorvastatin in the past.         Relevant Medications    lisinopril (PRINIVIL) 10 MG Tab    pravastatin (PRAVACHOL) 10 MG Tab    Abnormal mammogram     Previous problem,  stable.  To follow-up mammograms returned as normal.  Continue with regular interval surveillance on an annual basis.  Next will be due in January 2022.         Suprapubic pain, acute     New problem, currently resolved.  Her description and location of the pain have a suspicious for potential bladder infection or spasm.  Will obtain urinalysis and send to the lab to evaluate.  She will continue to monitor for recurrence of pain and we could consider imaging at that time due to her cancer history and recent surgical history.         Relevant Orders    URINALYSIS,CULTURE IF INDICATED           Annual Health Assessment Questions:    1.  Are you currently engaging in any exercise or physical activity? NO    2.  How would you describe your mood or emotional well-being today? GOOD    3.  Have you had any falls in the last year? NO    4.  Have you noticed any problems with your balance or had difficulty walking? NO    5.  In the last six months have you experienced any leakage of urine? YES    6. DPA/Advanced Directive:  NO      RTC: Return in about 1 month (around 3/23/2021) for Rosibel Black to establish.    I spent a total of 40 minutes with record review, exam, communication with the patient, communication with other providers, and documentation of this encounter.    PLEASE NOTE: This dictation was created using voice recognition software. I have made every reasonable attempt to correct obvious errors, but I expect that there are errors of grammar and possibly content that I did not discover before finalizing the note.      Monika Mike, DO  Geriatric and Internal Medicine  [facility]

## 2021-02-24 NOTE — ASSESSMENT & PLAN NOTE
Chronic and ongoing problem.  Continue taking lisinopril 10 mg daily.  Will update electrolytes and kidney function to ensure ongoing stability on this medicine.

## 2021-02-24 NOTE — ASSESSMENT & PLAN NOTE
Chronic problem that requires additional evaluation.  Will refer her to podiatry to establish care for nail trimming and foot care.  Will recheck hemoglobin A1c to ensure her diabetes is under good control as her weight has gone up approximately 17 pounds in the past year.  Continue monitoring off pharmacotherapy at this time.

## 2021-02-24 NOTE — ASSESSMENT & PLAN NOTE
New problem, currently resolved.  Her description and location of the pain have a suspicious for potential bladder infection or spasm.  Will obtain urinalysis and send to the lab to evaluate.  She will continue to monitor for recurrence of pain and we could consider imaging at that time due to her cancer history and recent surgical history.

## 2021-02-24 NOTE — ASSESSMENT & PLAN NOTE
Chronic and stable problem, incidental finding, continue pravastatin 10 mg daily.  She did not tolerate atorvastatin in the past.

## 2021-02-24 NOTE — ASSESSMENT & PLAN NOTE
Chronic and ongoing problem.  She goes on and off of her medicines is on how she is feeling, unclear if she was taking them consistently in recent time.  Encouraged her to continue her pravastatin 10 mg daily and will order repeat lab follow-up to check on compliance.

## 2021-02-24 NOTE — ASSESSMENT & PLAN NOTE
Previous problem, stable.  To follow-up mammograms returned as normal.  Continue with regular interval surveillance on an annual basis.  Next will be due in January 2022.

## 2021-02-25 ENCOUNTER — TELEPHONE (OUTPATIENT)
Dept: MEDICAL GROUP | Facility: PHYSICIAN GROUP | Age: 71
End: 2021-02-25

## 2021-02-25 LAB
BACTERIA UR CULT: NORMAL
SIGNIFICANT IND 70042: NORMAL
SITE SITE: NORMAL
SOURCE SOURCE: NORMAL

## 2021-02-25 NOTE — TELEPHONE ENCOUNTER
Phone Number Called: [phone] (home)     Call outcome: Spoke to patient regarding message below.     Message: Asked patient if she still has pain in suprapubic area or pain with urination, patient responded no.   Patient would like to know if Dr. Mike sent over her Blood Pressure Medication to her pharmacy.

## 2021-02-25 NOTE — TELEPHONE ENCOUNTER
----- Message from Monika Mike D.O. sent at 2/25/2021  9:33 AM PST -----  Urine culture now back and negative. Thanks! KT

## 2021-03-24 ENCOUNTER — HOSPITAL ENCOUNTER (OUTPATIENT)
Dept: LAB | Facility: MEDICAL CENTER | Age: 71
End: 2021-03-24
Attending: INTERNAL MEDICINE
Payer: MEDICARE

## 2021-03-24 DIAGNOSIS — E78.5 HYPERLIPIDEMIA ASSOCIATED WITH TYPE 2 DIABETES MELLITUS (HCC): ICD-10-CM

## 2021-03-24 DIAGNOSIS — E11.59 HYPERTENSION ASSOCIATED WITH DIABETES (HCC): ICD-10-CM

## 2021-03-24 DIAGNOSIS — I15.2 HYPERTENSION ASSOCIATED WITH DIABETES (HCC): ICD-10-CM

## 2021-03-24 DIAGNOSIS — E11.69 HYPERLIPIDEMIA ASSOCIATED WITH TYPE 2 DIABETES MELLITUS (HCC): ICD-10-CM

## 2021-03-24 DIAGNOSIS — R80.9 TYPE 2 DIABETES MELLITUS WITH MICROALBUMINURIA, WITHOUT LONG-TERM CURRENT USE OF INSULIN (HCC): ICD-10-CM

## 2021-03-24 DIAGNOSIS — E11.29 TYPE 2 DIABETES MELLITUS WITH MICROALBUMINURIA, WITHOUT LONG-TERM CURRENT USE OF INSULIN (HCC): ICD-10-CM

## 2021-03-24 LAB
ALBUMIN SERPL BCP-MCNC: 3.7 G/DL (ref 3.2–4.9)
ALBUMIN/GLOB SERPL: 0.9 G/DL
ALP SERPL-CCNC: 80 U/L (ref 30–99)
ALT SERPL-CCNC: 10 U/L (ref 2–50)
ANION GAP SERPL CALC-SCNC: 8 MMOL/L (ref 7–16)
AST SERPL-CCNC: 17 U/L (ref 12–45)
BASOPHILS # BLD AUTO: 0.5 % (ref 0–1.8)
BASOPHILS # BLD: 0.03 K/UL (ref 0–0.12)
BILIRUB SERPL-MCNC: 0.3 MG/DL (ref 0.1–1.5)
BUN SERPL-MCNC: 12 MG/DL (ref 8–22)
CALCIUM SERPL-MCNC: 9.1 MG/DL (ref 8.5–10.5)
CHLORIDE SERPL-SCNC: 105 MMOL/L (ref 96–112)
CHOLEST SERPL-MCNC: 187 MG/DL (ref 100–199)
CO2 SERPL-SCNC: 26 MMOL/L (ref 20–33)
CREAT SERPL-MCNC: 0.62 MG/DL (ref 0.5–1.4)
EOSINOPHIL # BLD AUTO: 0.55 K/UL (ref 0–0.51)
EOSINOPHIL NFR BLD: 9.5 % (ref 0–6.9)
ERYTHROCYTE [DISTWIDTH] IN BLOOD BY AUTOMATED COUNT: 55.2 FL (ref 35.9–50)
EST. AVERAGE GLUCOSE BLD GHB EST-MCNC: 160 MG/DL
FASTING STATUS PATIENT QL REPORTED: NORMAL
GLOBULIN SER CALC-MCNC: 3.9 G/DL (ref 1.9–3.5)
GLUCOSE SERPL-MCNC: 129 MG/DL (ref 65–99)
HBA1C MFR BLD: 7.2 % (ref 4–5.6)
HCT VFR BLD AUTO: 43 % (ref 37–47)
HDLC SERPL-MCNC: 45 MG/DL
HGB BLD-MCNC: 13.2 G/DL (ref 12–16)
IMM GRANULOCYTES # BLD AUTO: 0.01 K/UL (ref 0–0.11)
IMM GRANULOCYTES NFR BLD AUTO: 0.2 % (ref 0–0.9)
LDLC SERPL CALC-MCNC: 119 MG/DL
LYMPHOCYTES # BLD AUTO: 1.76 K/UL (ref 1–4.8)
LYMPHOCYTES NFR BLD: 30.5 % (ref 22–41)
MCH RBC QN AUTO: 30.3 PG (ref 27–33)
MCHC RBC AUTO-ENTMCNC: 30.7 G/DL (ref 33.6–35)
MCV RBC AUTO: 98.9 FL (ref 81.4–97.8)
MONOCYTES # BLD AUTO: 0.51 K/UL (ref 0–0.85)
MONOCYTES NFR BLD AUTO: 8.8 % (ref 0–13.4)
NEUTROPHILS # BLD AUTO: 2.91 K/UL (ref 2–7.15)
NEUTROPHILS NFR BLD: 50.5 % (ref 44–72)
NRBC # BLD AUTO: 0 K/UL
NRBC BLD-RTO: 0 /100 WBC
PLATELET # BLD AUTO: 251 K/UL (ref 164–446)
PMV BLD AUTO: 10.4 FL (ref 9–12.9)
POTASSIUM SERPL-SCNC: 4.4 MMOL/L (ref 3.6–5.5)
PROT SERPL-MCNC: 7.6 G/DL (ref 6–8.2)
RBC # BLD AUTO: 4.35 M/UL (ref 4.2–5.4)
SODIUM SERPL-SCNC: 139 MMOL/L (ref 135–145)
TRIGL SERPL-MCNC: 115 MG/DL (ref 0–149)
WBC # BLD AUTO: 5.8 K/UL (ref 4.8–10.8)

## 2021-03-24 PROCEDURE — 36415 COLL VENOUS BLD VENIPUNCTURE: CPT

## 2021-03-24 PROCEDURE — 83036 HEMOGLOBIN GLYCOSYLATED A1C: CPT

## 2021-03-24 PROCEDURE — 85025 COMPLETE CBC W/AUTO DIFF WBC: CPT

## 2021-03-24 PROCEDURE — 80061 LIPID PANEL: CPT

## 2021-03-24 PROCEDURE — 80053 COMPREHEN METABOLIC PANEL: CPT

## 2021-03-30 ENCOUNTER — TELEPHONE (OUTPATIENT)
Dept: MEDICAL GROUP | Facility: PHYSICIAN GROUP | Age: 71
End: 2021-03-30

## 2021-03-30 NOTE — TELEPHONE ENCOUNTER
NEW TO YOU ESTABLISHED PATIENT PRE-VISIT PLANNING     Patient WAS contacted to complete PVP. Left voicemail for a call back.     Note: Patient will not be contacted if there is no indication to call.     1.  Reviewed notes from the last few office visits within the medical group: Yes    2.  If any orders were placed at last visit or intended to be done for this visit (i.e. 6 mos follow-up), do we have Results/Consult Notes?         •  Labs - Labs ordered, completed on 03/24/2021 and results are in chart.  Note: If patient appointment is for lab review and patient did not complete labs, check with provider if OK to reschedule patient until labs completed.       •  Imaging - Imaging was not ordered at last office visit.       •  Referrals - Left VM. Called pt to advise that her referral was ready to be scheduled.    3. Is this appointment scheduled as a Hospital Follow-Up? No    4.  Immunizations were updated in Epic using Reconcile Outside Information activity? Yes    5.  Patient is due for the following Health Maintenance Topics:   Health Maintenance Due   Topic Date Due   • Annual Wellness Visit  Never done   • IMM ZOSTER VACCINES (1 of 2) Never done   • IMM INFLUENZA (1) Never done   • PAP SMEAR  11/01/2020   • RETINAL SCREENING  11/22/2020       6.  AHA ([employer]) form printed for Provider? Email sent to [facility] requesting form

## 2021-03-31 ENCOUNTER — OFFICE VISIT (OUTPATIENT)
Dept: MEDICAL GROUP | Facility: PHYSICIAN GROUP | Age: 71
End: 2021-03-31
Payer: MEDICARE

## 2021-03-31 VITALS
OXYGEN SATURATION: 96 % | BODY MASS INDEX: 38.88 KG/M2 | HEART RATE: 71 BPM | WEIGHT: 219.4 LBS | DIASTOLIC BLOOD PRESSURE: 70 MMHG | SYSTOLIC BLOOD PRESSURE: 130 MMHG | RESPIRATION RATE: 17 BRPM | HEIGHT: 63 IN | TEMPERATURE: 97 F

## 2021-03-31 DIAGNOSIS — R80.9 TYPE 2 DIABETES MELLITUS WITH MICROALBUMINURIA, WITHOUT LONG-TERM CURRENT USE OF INSULIN (HCC): ICD-10-CM

## 2021-03-31 DIAGNOSIS — I35.8 NONRHEUMATIC AORTIC VALVE SCLEROSIS: ICD-10-CM

## 2021-03-31 DIAGNOSIS — M79.605 LEFT LEG PAIN: ICD-10-CM

## 2021-03-31 DIAGNOSIS — R79.9 ABNORMAL FINDING OF BLOOD CHEMISTRY, UNSPECIFIED: ICD-10-CM

## 2021-03-31 DIAGNOSIS — R01.1 CARDIAC MURMUR: ICD-10-CM

## 2021-03-31 DIAGNOSIS — G47.30 SLEEP APNEA, UNSPECIFIED TYPE: ICD-10-CM

## 2021-03-31 DIAGNOSIS — F43.20 ADJUSTMENT DISORDER, UNSPECIFIED TYPE: ICD-10-CM

## 2021-03-31 DIAGNOSIS — E11.29 TYPE 2 DIABETES MELLITUS WITH MICROALBUMINURIA, WITHOUT LONG-TERM CURRENT USE OF INSULIN (HCC): ICD-10-CM

## 2021-03-31 DIAGNOSIS — D75.89 MACROCYTOSIS: ICD-10-CM

## 2021-03-31 PROCEDURE — 99215 OFFICE O/P EST HI 40 MIN: CPT | Performed by: FAMILY MEDICINE

## 2021-03-31 RX ORDER — FLUOXETINE 10 MG/1
10 CAPSULE ORAL DAILY
Qty: 60 CAPSULE | Refills: 3 | Status: SHIPPED | OUTPATIENT
Start: 2021-03-31 | End: 2021-07-01

## 2021-03-31 RX ORDER — METFORMIN HYDROCHLORIDE 500 MG/1
1000 TABLET, EXTENDED RELEASE ORAL DAILY
Qty: 180 TABLET | Refills: 2 | Status: SHIPPED | OUTPATIENT
Start: 2021-03-31 | End: 2021-04-13 | Stop reason: HOSPADM

## 2021-03-31 RX ORDER — ATORVASTATIN CALCIUM 10 MG/1
10 TABLET, FILM COATED ORAL
Qty: 100 TABLET | Refills: 2 | Status: SHIPPED | OUTPATIENT
Start: 2021-03-31 | End: 2021-09-30 | Stop reason: SDUPTHER

## 2021-03-31 ASSESSMENT — FIBROSIS 4 INDEX: FIB4 SCORE: 1.56

## 2021-03-31 NOTE — PROGRESS NOTES
"Subjective:     CC:    Chief Complaint   Patient presents with   • Establish Care       HISTORY OF THE PRESENT ILLNESS: Patient is a 71 y.o. female. This pleasant patient is here today to establish care and discuss her low energy as well as her chronic medical problems.  Her prior PCP was Dr. Mike, she is changing due to the language barrier as she is Lebanese-speaking and her daughter-in-law who is a  is no longer available.        Problem   Type 2 Diabetes Mellitus With Microalbuminuria (Hcc)       Ref. Range 4/8/2019 11/6/2019 9/22/2020   Glycohemoglobin Latest Ref Range: 0.0 - 5.6 % 6.6 (H) 5.7 (A) 6.3 (A)   Estim. Avg Glu Latest Units: mg/dL 143          Ref. Range 11/6/2019 16:35   Micro Alb Creat Ratio Latest Ref Range: 0 - 30 mg/g 167 (H)       She has a history of well-controlled type 2 diabetes.  She was previously taking metformin 500 mg twice daily however her A1c on recheck was down to 5.7 so we discontinue this medication in Fall 2019.      No known history of retinopathy during eye exam 11/2019.      She has microalbuminuria and is taking an ACEI.     Normal monofilament exam and no history of neuropathy in the feet however she does have very thickened toenails and some trouble with clipping these herself. She would be interested in establishing with podiatry.     Prior regimen: metformin 500 mg BID    3/21 a1c 7.2 and fbs 129.  She does not do any regular exercises, walks a little in the house, doesn't like the cold. Legs tire quickly w/ exercise.      Sleep Apnea    Last saw sleep medicine (Chula Greene) in 8/2019:   \"PSG from 3/2018 indicated an AHI of 103.6 and low oxygenation of 72%.  She is here for first compliance.  Currently she is being treated with CPAP @ 9cmH20. Compliance download over the past 30 days indicates 93 % compliance, average use of 2 hours 3 minutes per night, AHI of 1. Patient reports she is acclimating to therapy.  Prior compliance showed only 38 minutes " "of use per night.  She still feels pressures are slightly too high.  She is using nasal pillows which are comfortable.  She feels restricted by the tubing and is unable to move around. She we will get up and go to the bathroom and does not put her machine back on after that time.  She does however feel she is getting a deeper sleep for those 2 hours and has more energy during the day.\"    03/31/21 states that she wakes sometimes feeling tired w/o energy to do things and with a mild HA. She defers a referral back to the sleep clinic.            Nonrheumatic Aortic Valve Sclerosis    Last saw cardiology 12/19 for moderate AS noted on 6/19 echo, plan was to repeat echo and f/u 12/20.  Had a cardiac w/u prior to her DES.  03/31/21 she defers a repeat echo or f/u cardiology.          Left Leg Pain    Had a fall about 9/20, has had discomfort in her left upper lateral leg since then. Affects her gait a little. Defers referral to PT 03/31/21       Adjustment Disorder    Had 2 surgeries in a period of about 3 mo for her endometrial cancer. Changed her some. Notes less energy and some depressed feelings w/ that. Radiation might have affected her too.  concerned re: her decreased \"animo.\" she seems to have limited interests and motivation to do things and less enjoyment from what she does. She denies any SI. Will add prozac low dose. Warn re: risks/benefits. F/u 3m, sooner prn.        Cardiac Murmur    Hx aortic sclerosis, had had f/u with cardiology.     Fatigue    Since her hysterectomy and cholecystectomy she has had some trouble bouncing back to her usual self and feels a bit deconditioned. I suspect a trial with prozac will help her energy. She agrees to add a short daily walk with her  to assist with deconditioning.          Allergies: Bloodless, Ibuprofen, and Penicillins    Current Outpatient Medications Ordered in Epic   Medication Sig Dispense Refill   • atorvastatin (LIPITOR) 10 MG Tab Take 1 tablet " by mouth every bedtime. 100 tablet 2   • metFORMIN ER (GLUCOPHAGE XR) 500 MG TABLET SR 24 HR Take 2 Tablets by mouth every day. 180 tablet 2   • FLUoxetine (PROZAC) 10 MG Cap Take 1 capsule by mouth every day. After 1 week increase to 20mg qam. 60 capsule 3   • lisinopril (PRINIVIL) 10 MG Tab Take 1 tablet by mouth every day. Indications: High Blood Pressure Disorder 100 tablet 3   • multivitamin (THERAGRAN) Tab Take 1 Tab by mouth every day.     • MAGNESIUM CITRATE PO Take  by mouth.     • cholestyramine (QUESTRAN) 4 g packet MIX AND DRINK 1 PACKET OF POWDER BY MOUTH AS DIRECTED THREE TIMES DAILY WITH MEALS AND ONCE BEFORE BEDTIME FOR DIARRHEA  4   • Cranberry 125 MG Tab Take  by mouth.     • acetaminophen (TYLENOL) 325 MG Tab Take 2 Tabs by mouth every 6 hours as needed (Mild Pain; (Pain scale 1-3); Temp greater than 100.5 F). 30 Tab 0   • docusate sodium (COLACE) 100 MG Cap Take 100 mg by mouth every day.       No current [facility]-ordered facility-administered medications on file.       Past Medical History:   Diagnosis Date   • Acute cholecystitis 9/19/2019   • Arthritis     osteo, finger/shoulders   • Back pain    • Blood clotting disorder ([facility]) 2004    leg   • Bronchitis    • Chickenpox    • Dental disorder     upper/lower dentures   • Diabetes     oral meds   • Diabetes mellitus (HCC) 9/19/2019      Ref. Range 4/8/2019 13:20 11/6/2019 16:47 Glycohemoglobin Latest Ref Range: 0.0 - 5.6 % 6.6 (H) 5.7 (A) Estim. Avg Glu Latest Units: mg/dL 143     Ref. Range 11/6/2019 16:35 Micro Alb Creat Ratio Latest Ref Range: 0 - 30 mg/g 167 (H)   She has history of well-controlled type 2 diabetes.  She is previously taking metformin 500 mg twice daily however her A1c on recheck was down to 5.7 so we discon   • Diarrhea 9/26/2019    She reports diarrhea starting about 3 days ago.  She has multiple small-volume liquidy/emanuel brown to dark-colored stools daily.  This morning she reports already 5-6 times of going to the bathroom  "with some stool.  She has not had a formed stool many days and proceeding this diarrhea she was actually constipated.  She had taken some senna but then stopped it and the loose stool started.  She is o   • Dysuria 9/11/2019   • Endometrial cancer (HCC)    • Heart valve disease     \"murmur\"   • High cholesterol    • Hyperlipidemia    • Hypertension    • Hypotension due to hypovolemia 9/26/2019    She has a low blood pressure in office today of 84/52 with a baseline in the 100s to 120s systolic.  This is likely due to her frequent episodes of diarrhea exacerbated by ongoing use of lisinopril.  Yesterday, she felt lightheaded and dizzy but did not pass out or have a fall.  She is having challenges of what is appropriate to eat as she had recent pelvic radiation as well as a cholecystectomy b   • Mumps    • Nasal drainage    • Sleep apnea     CPAP   • Snoring    • Urinary incontinence        Past Surgical History:   Procedure Laterality Date   • GABY BY LAPAROSCOPY  9/19/2019    Procedure: CHOLECYSTECTOMY, LAPAROSCOPIC;  Surgeon: Jenniffer Johnson M.D.;  Location: SURGERY [facility];  Service: General   • HYSTERECTOMY ROBOTIC XI  6/27/2019    Procedure: HYSTERECTOMY, ROBOT-ASSISTED, USING DA YO XI;  Surgeon: Alexandr Mancia M.D.;  Location: [facility];  Service: Gynecology   • SALPINGO OOPHORECTOMY Bilateral 6/27/2019    Procedure: SALPINGO-OOPHORECTOMY;  Surgeon: Alexandr Mancia M.D.;  Location: [facility];  Service: Gynecology   • NODE BIOPSY SENTINEL  6/27/2019    Procedure: BIOPSY, LYMPH NODE, SENTINEL;  Surgeon: Alexandr Mancia M.D.;  Location: [facility];  Service: Gynecology   • HYSTEROSCOPY WITH MYOSURE N/A 4/17/2019    Procedure: HYSTEROSCOPY, WITH TISSUE REMOVAL, USING HYSTEROSCOPIC ROTATING CUTTER BLADE - DIAGNOSTIC;  Surgeon: Racquel Gatica M.D.;  Location: SURGERY SAME DAY [facility];  Service: Gynecology   • DILATION AND CURETTAGE N/A 4/17/2019    " "Procedure: DILATION AND CURETTAGE;  Surgeon: Racquel Gatica M.D.;  Location: SURGERY SAME DAY [facility];  Service: Gynecology   • PRIMARY C SECTION  1972/1974/1977    x 3       Social History     Tobacco Use   • Smoking status: Never Smoker   • Smokeless tobacco: Never Used   Substance Use Topics   • Alcohol use: No   • Drug use: No       Social History     Social History Narrative    She is Albanian speaking. Madelyn has been  to her  for 50 years. Her daughter-in-law, Delores, participates in her medical care and assists with translation. She has three grown sons. '72, '74, '77. Has 8 grandchildren. 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 Was born in [place], Came to the [place] in [place]79. She is happy with her life and her sons. She enjoys reading, watch tv shows, talk with her friends on the phone. No tob/etoh/drugs.        Family History   Problem Relation Age of Onset   • Heart Disease Father    • Asthma Brother    • Asthma Brother    • Cancer Maternal Aunt         gyn cancer   • Sleep Apnea Neg Hx        Health Maintenance: Completed    ROS:   See HPI      Objective:     Exam:   /70 (BP Location: Right arm, Patient Position: Sitting, BP Cuff Size: Adult)   Pulse 71   Temp 36.1 °C (97 °F) (Temporal)   Resp 17   Ht 1.6 m (5' 3\")   Wt 99.5 kg (219 lb 6.4 oz)   LMP  (LMP Unknown)   SpO2 96%   BMI 38.86 kg/m²   Body mass index is 38.86 kg/m².  Wt Readings from Last 4 Encounters:   03/31/21 99.5 kg (219 lb 6.4 oz)   02/23/21 101 kg (222 lb 3.2 oz)   09/22/20 98.6 kg (217 lb 6 oz)   03/11/20 93 kg (205 lb 0.4 oz)     General: Normal appearing. No distress. Appropriately groomed.  HEENT: Normocephalic. Eyes conjunctiva clear lids without ptosis, pupils equal and reactive to light accommodation, ears normal shape and contour, canals are clear bilaterally, tympanic membranes are benign, nasal mucosa benign, oropharynx is without erythema, edema or " exudates.   Neck: Supple without JVD or bruit. Thyroid is not enlarged.   Pulmonary: Clear to ausculation.  Normal effort. No rales, ronchi, or wheezing.  Cardiovascular: Regular rate and rhythm with 2/6 syst murmur. Carotid and radial pulses are intact and equal bilaterally.  Abdomen: Soft, nontender, nondistended. Normal bowel sounds. Liver and spleen are not palpable  Neurologic: Grossly nonfocal  Lymph: No cervical or supraclavicular lymph nodes are palpable  Skin: Warm and dry.  No obvious lesions. Toenail fungus  Musculoskeletal: Normal gait. No extremity cyanosis, clubbing, or edema.  Psych:  flat affect. Alert and oriented x3. Judgment and insight is normal.      Assessment & Plan:   71 y.o. female with the following -  Today, overall I think the biggest issue for the patient is she is still adjusting to changes since her treatment for endometrial cancer.  I am hopeful that a low-dose of Prozac will make enough of a difference to help with motivation and energy.  She does not seem to think that her sleep apnea causing her symptoms.  She has deferred follow-up with cardiology or a repeat echocardiogram at this time.  The cost of referrals is an issue.  She has regular f/u with oncology.  F/u with me in .   Change pravasatin to lipitor and add metformin +  Daily walk    1. Sleep apnea, unspecified type    2. Type 2 diabetes mellitus with microalbuminuria, without long-term current use of insulin (HCC)  - atorvastatin (LIPITOR) 10 MG Tab; Take 1 tablet by mouth every bedtime.  Dispense: 100 tablet; Refill: 2  - metFORMIN ER (GLUCOPHAGE XR) 500 MG TABLET SR 24 HR; Take 2 Tablets by mouth every day.  Dispense: 180 tablet; Refill: 2  - HEMOGLOBIN A1C; Future  - Lipid Profile; Future    3. Nonrheumatic aortic valve sclerosis    4. Left leg pain    5. Adjustment disorder, unspecified type  - FLUoxetine (PROZAC) 10 MG Cap; Take 1 capsule by mouth every day. After 1 week increase to 20mg qam.  Dispense: 60 capsule;  Refill: 3    6. Macrocytosis  - VITAMIN B12; Future  - FOLATE; Future  - IRON/TOTAL IRON BIND; Future    7. Abnormal finding of blood chemistry, unspecified   - IRON/TOTAL IRON BIND; Future    8. Cardiac murmur     54min w/ patient'    Return in about 3 months (around 6/30/2021).    Could have updated shingles vaccine, will wait for now.     Please note that this dictation was created using voice recognition software. I have made every reasonable attempt to correct obvious errors, but I expect that there are errors of grammar and possibly content that I did not discover before finalizing the note.

## 2021-04-09 ENCOUNTER — TELEPHONE (OUTPATIENT)
Dept: MEDICAL GROUP | Facility: PHYSICIAN GROUP | Age: 71
End: 2021-04-09

## 2021-04-09 NOTE — TELEPHONE ENCOUNTER
1. Caller Name: [phone] (home)                         Call Back Number: [phone] (home)       How would the patient prefer to be contacted with a response: Phone call OK to leave a detailed message    Metformin caused her stomach to ache. Pt stopped taking it.

## 2021-04-13 RX ORDER — PIOGLITAZONEHYDROCHLORIDE 15 MG/1
15 TABLET ORAL DAILY
Qty: 100 TABLET | Refills: 1 | Status: SHIPPED | OUTPATIENT
Start: 2021-04-13 | End: 2021-09-30 | Stop reason: SINTOL

## 2021-04-13 NOTE — TELEPHONE ENCOUNTER
Phone Number Called: [phone] (home)     Call outcome: Spoke to patient regarding message below.    Message: Patient informed

## 2021-04-13 NOTE — TELEPHONE ENCOUNTER
Could try rx to help with sugars  Will also help w/ fatty liver which was noted on a CT 6/12/19  rx will be faxed.

## 2021-06-18 ENCOUNTER — HOSPITAL ENCOUNTER (OUTPATIENT)
Dept: LAB | Facility: MEDICAL CENTER | Age: 71
End: 2021-06-18
Attending: FAMILY MEDICINE
Payer: MEDICARE

## 2021-06-18 DIAGNOSIS — R80.9 TYPE 2 DIABETES MELLITUS WITH MICROALBUMINURIA, WITHOUT LONG-TERM CURRENT USE OF INSULIN (HCC): ICD-10-CM

## 2021-06-18 DIAGNOSIS — R79.9 ABNORMAL FINDING OF BLOOD CHEMISTRY, UNSPECIFIED: ICD-10-CM

## 2021-06-18 DIAGNOSIS — E11.29 TYPE 2 DIABETES MELLITUS WITH MICROALBUMINURIA, WITHOUT LONG-TERM CURRENT USE OF INSULIN (HCC): ICD-10-CM

## 2021-06-18 DIAGNOSIS — D75.89 MACROCYTOSIS: ICD-10-CM

## 2021-06-18 LAB
CHOLEST SERPL-MCNC: 129 MG/DL (ref 100–199)
EST. AVERAGE GLUCOSE BLD GHB EST-MCNC: 146 MG/DL
FASTING STATUS PATIENT QL REPORTED: NORMAL
FOLATE SERPL-MCNC: 8.6 NG/ML
HBA1C MFR BLD: 6.7 % (ref 4–5.6)
HDLC SERPL-MCNC: 44 MG/DL
IRON SATN MFR SERPL: 23 % (ref 15–55)
IRON SERPL-MCNC: 55 UG/DL (ref 40–170)
LDLC SERPL CALC-MCNC: 67 MG/DL
TIBC SERPL-MCNC: 239 UG/DL (ref 250–450)
TRIGL SERPL-MCNC: 89 MG/DL (ref 0–149)
UIBC SERPL-MCNC: 184 UG/DL (ref 110–370)
VIT B12 SERPL-MCNC: 534 PG/ML (ref 211–911)

## 2021-06-18 PROCEDURE — 36415 COLL VENOUS BLD VENIPUNCTURE: CPT

## 2021-06-18 PROCEDURE — 80061 LIPID PANEL: CPT

## 2021-06-18 PROCEDURE — 82607 VITAMIN B-12: CPT

## 2021-06-18 PROCEDURE — 83036 HEMOGLOBIN GLYCOSYLATED A1C: CPT

## 2021-06-18 PROCEDURE — 83540 ASSAY OF IRON: CPT

## 2021-06-18 PROCEDURE — 83550 IRON BINDING TEST: CPT

## 2021-06-18 PROCEDURE — 82746 ASSAY OF FOLIC ACID SERUM: CPT

## 2021-07-01 ENCOUNTER — OFFICE VISIT (OUTPATIENT)
Dept: MEDICAL GROUP | Facility: PHYSICIAN GROUP | Age: 71
End: 2021-07-01
Payer: MEDICARE

## 2021-07-01 VITALS
OXYGEN SATURATION: 96 % | TEMPERATURE: 98.9 F | DIASTOLIC BLOOD PRESSURE: 58 MMHG | SYSTOLIC BLOOD PRESSURE: 110 MMHG | BODY MASS INDEX: 39.28 KG/M2 | HEIGHT: 63 IN | WEIGHT: 221.7 LBS | RESPIRATION RATE: 16 BRPM | HEART RATE: 85 BPM

## 2021-07-01 DIAGNOSIS — Z23 NEED FOR VACCINATION: ICD-10-CM

## 2021-07-01 DIAGNOSIS — I15.2 HYPERTENSION ASSOCIATED WITH DIABETES (HCC): ICD-10-CM

## 2021-07-01 DIAGNOSIS — R80.9 TYPE 2 DIABETES MELLITUS WITH MICROALBUMINURIA, WITHOUT LONG-TERM CURRENT USE OF INSULIN (HCC): ICD-10-CM

## 2021-07-01 DIAGNOSIS — E11.59 HYPERTENSION ASSOCIATED WITH DIABETES (HCC): ICD-10-CM

## 2021-07-01 DIAGNOSIS — C54.1 ENDOMETRIAL CANCER (HCC): ICD-10-CM

## 2021-07-01 DIAGNOSIS — E11.29 TYPE 2 DIABETES MELLITUS WITH MICROALBUMINURIA, WITHOUT LONG-TERM CURRENT USE OF INSULIN (HCC): ICD-10-CM

## 2021-07-01 DIAGNOSIS — I35.8 NONRHEUMATIC AORTIC VALVE SCLEROSIS: ICD-10-CM

## 2021-07-01 PROBLEM — F43.20 ADJUSTMENT DISORDER: Status: RESOLVED | Noted: 2021-03-31 | Resolved: 2021-07-01

## 2021-07-01 LAB — RETINAL SCREEN: NEGATIVE

## 2021-07-01 PROCEDURE — 92250 FUNDUS PHOTOGRAPHY W/I&R: CPT | Performed by: FAMILY MEDICINE

## 2021-07-01 PROCEDURE — 99214 OFFICE O/P EST MOD 30 MIN: CPT | Mod: 25 | Performed by: FAMILY MEDICINE

## 2021-07-01 PROCEDURE — 90471 IMMUNIZATION ADMIN: CPT | Performed by: FAMILY MEDICINE

## 2021-07-01 PROCEDURE — 90750 HZV VACC RECOMBINANT IM: CPT | Performed by: FAMILY MEDICINE

## 2021-07-01 RX ORDER — LISINOPRIL 10 MG/1
10 TABLET ORAL DAILY
Qty: 100 TABLET | Refills: 3 | Status: SHIPPED | OUTPATIENT
Start: 2021-07-01 | End: 2021-09-30 | Stop reason: SDUPTHER

## 2021-07-01 ASSESSMENT — FIBROSIS 4 INDEX: FIB4 SCORE: 1.56

## 2021-07-01 NOTE — PROGRESS NOTES
"Subjective:     CC:   Chief Complaint   Patient presents with   • Follow-Up     Lab work          HPI:   Madelyn presents today with f/u labs, with her .  She arrived 7 minutes late for her appointment today. I asked her to try to arrive 5min early for her appts.  She last saw me March 31 to establish.  At that time I prescribed a low-dose of Prozac to help with the adjustments she was noting since treatment for endometrial cancer.  She has deferred follow-up with cardiology or a repeat echocardiogram in part due to cost.  I also changed her pravastatin to Lipitor and added Metformin plus a daily walk.  She did call a week later to inform us the Metformin upset her stomach and so she stopped taking it.  Her A1c June 18 was 6.7 (was 7.2) and her LDL was 67 (downf rom 119).  Has begun walking - wasn't walking prior and now 3d/wk, is also trying to eat more vegetables. She likes to sleep until 7am, feels awake by 8am.   2d feels a little light headed is drinkng lots of water.     Problem   Adjustment Disorder (Resolved)    Had 2 surgeries in a period of about 3 mo for her endometrial cancer. Changed her some. Notes less energy and some depressed feelings w/ that. Radiation might have affected her too.  concerned re: her decreased \"animo.\" she seems to have limited interests and motivation to do things and less enjoyment from what she does. She denies any SI. Will add prozac low dose. Warn re: risks/benefits. F/u 3m, sooner prn.            Current Outpatient Medications Ordered in Epic   Medication Sig Dispense Refill   • lisinopril (PRINIVIL) 10 MG Tab Take 1 tablet by mouth every day. Indications: High Blood Pressure Disorder 100 tablet 3   • pioglitazone (ACTOS) 15 MG Tab Take 1 tablet by mouth every day. 100 tablet 1   • atorvastatin (LIPITOR) 10 MG Tab Take 1 tablet by mouth every bedtime. 100 tablet 2   • MAGNESIUM CITRATE PO Take  by mouth.     • cholestyramine (QUESTRAN) 4 g packet MIX AND DRINK 1 " "PACKET OF POWDER BY MOUTH AS DIRECTED THREE TIMES DAILY WITH MEALS AND ONCE BEFORE BEDTIME FOR DIARRHEA  4   • Cranberry 125 MG Tab Take  by mouth.     • acetaminophen (TYLENOL) 325 MG Tab Take 2 Tabs by mouth every 6 hours as needed (Mild Pain; (Pain scale 1-3); Temp greater than 100.5 F). 30 Tab 0   • docusate sodium (COLACE) 100 MG Cap Take 100 mg by mouth every day.       No current [facility]-ordered facility-administered medications on file.       Past Medical History:   Diagnosis Date   • Acute cholecystitis 9/19/2019   • Arthritis     osteo, finger/shoulders   • Back pain    • Blood clotting disorder ([facility]) 2004    leg   • Bronchitis    • Chickenpox    • Dental disorder     upper/lower dentures   • Diabetes     oral meds   • Diabetes mellitus ([facility]) 9/19/2019      Ref. Range 4/8/2019 13:20 11/6/2019 16:47 Glycohemoglobin Latest Ref Range: 0.0 - 5.6 % 6.6 (H) 5.7 (A) Estim. Avg Glu Latest Units: mg/dL 143     Ref. Range 11/6/2019 16:35 Micro Alb Creat Ratio Latest Ref Range: 0 - 30 mg/g 167 (H)   She has history of well-controlled type 2 diabetes.  She is previously taking metformin 500 mg twice daily however her A1c on recheck was down to 5.7 so we discon   • Diarrhea 9/26/2019    She reports diarrhea starting about 3 days ago.  She has multiple small-volume liquidy/emanuel brown to dark-colored stools daily.  This morning she reports already 5-6 times of going to the bathroom with some stool.  She has not had a formed stool many days and proceeding this diarrhea she was actually constipated.  She had taken some senna but then stopped it and the loose stool started.  She is o   • Dysuria 9/11/2019   • Endometrial cancer ([facility])    • Heart valve disease     \"murmur\"   • High cholesterol    • Hyperlipidemia    • Hypertension    • Hypotension due to hypovolemia 9/26/2019    She has a low blood pressure in office today of 84/52 with a baseline in the 100s to 120s systolic.  This is likely due to her frequent episodes of " "diarrhea exacerbated by ongoing use of lisinopril.  Yesterday, she felt lightheaded and dizzy but did not pass out or have a fall.  She is having challenges of what is appropriate to eat as she had recent pelvic radiation as well as a cholecystectomy b   • Mumps    • Nasal drainage    • Sleep apnea     CPAP   • Snoring    • Urinary incontinence        Social History     Tobacco Use   • Smoking status: Never Smoker   • Smokeless tobacco: Never Used   Vaping Use   • Vaping Use: Never used   Substance Use Topics   • Alcohol use: No   • Drug use: No       Allergies:  Bloodless, Ibuprofen, and Penicillins    Health Maintenance: Completed    ROS:  Per HPI      Objective:       Exam:  /58 (BP Location: Left arm)   Pulse 85   Temp 37.2 °C (98.9 °F) (Temporal)   Resp 16   Ht 1.6 m (5' 3\")   Wt 101 kg (221 lb 11.2 oz)   LMP  (LMP Unknown)   SpO2 96%   BMI 39.27 kg/m²   Body mass index is 39.27 kg/m².  Wt Readings from Last 4 Encounters:   07/01/21 101 kg (221 lb 11.2 oz)   03/31/21 99.5 kg (219 lb 6.4 oz)   02/23/21 101 kg (222 lb 3.2 oz)   09/22/20 98.6 kg (217 lb 6 oz)       Gen: Alert and oriented, No apparent distress. Appropriately groomed.  Neck: Neck is supple without lymphadenopathy.No thyromegaly.   Lungs: Normal effort, CTA bilaterally, no wheezes, rhonchi, or rales.  CV: Regular rate and rhythm. 2/6 LUSB murmur, no rubs, or gallops.  ABD:  Soft, nontender, nondistended, NABSx4, no HSM or RBT or guarding or masses.  Ext: No clubbing, cyanosis, edema.  Skin: No rash noted.      Labs:   Results for orders placed or performed during the hospital encounter of 06/18/21   FOLATE   Result Value Ref Range    Folate -Folic Acid 8.6 >4.0 ng/mL   IRON/TOTAL IRON BIND   Result Value Ref Range    Iron 55 40 - 170 ug/dL    Total Iron Binding 239 (L) 250 - 450 ug/dL    Unsat Iron Binding 184 110 - 370 ug/dL    % Saturation 23 15 - 55 %   VITAMIN B12   Result Value Ref Range    Vitamin B12 -True Cobalamin 534 211 - " 911 pg/mL   Lipid Profile   Result Value Ref Range    Cholesterol,Tot 129 100 - 199 mg/dL    Triglycerides 89 0 - 149 mg/dL    HDL 44 >=40 mg/dL    LDL 67 <100 mg/dL   HEMOGLOBIN A1C   Result Value Ref Range    Glycohemoglobin 6.7 (H) 4.0 - 5.6 %    Est Avg Glucose 146 mg/dL   FASTING STATUS   Result Value Ref Range    Fasting Status Fasting          Assessment & Plan:     71 y.o. female with the following -   rec more water, more walking.  She defers seeing cardiology but is agreeable to check an echo at this time.  1. Need for vaccination  - Shingles Vaccine (Shingrix)    2. Type 2 diabetes mellitus with microalbuminuria, without long-term current use of insulin (HCC)  - POCT Retinal Eye Exam    3. Endometrial cancer (HCC)    4. Hypertension associated with diabetes (HCC)  - lisinopril (PRINIVIL) 10 MG Tab; Take 1 tablet by mouth every day. Indications: High Blood Pressure Disorder  Dispense: 100 tablet; Refill: 3    5. Nonrheumatic aortic valve sclerosis  - EC-ECHOCARDIOGRAM COMPLETE W/O CONT; Future      Return in about 3 months (around 10/1/2021) for a1c in office.    Please note that this dictation was created using voice recognition software. I have made every reasonable attempt to correct obvious errors, but I expect that there are errors of grammar and possibly content that I did not discover before finalizing the note.

## 2021-08-03 ENCOUNTER — TELEPHONE (OUTPATIENT)
Dept: MEDICAL GROUP | Facility: PHYSICIAN GROUP | Age: 71
End: 2021-08-03

## 2021-08-03 NOTE — TELEPHONE ENCOUNTER
Phone Number Called: [phone] (home) Madelyn Schmitz      Call outcome: Left detailed message for patient. Informed to call back with any additional questions.    Message: Pt's  left message to call back ASAP. Did not state reason for calling. Called pt back, no response, left VM to call back.

## 2021-08-03 NOTE — TELEPHONE ENCOUNTER
VOICEMAIL  1. Caller Name: Madelyn Schmitz                      Call Back Number: [phone] (home)     2. Message:  called to return voicemail. Needs questions answered. Did not specify.    3. Patient approves office to leave a detailed voicemail/MyChart message: yes

## 2021-08-03 NOTE — TELEPHONE ENCOUNTER
VOICEMAIL  1. Caller Name: Madelyn Schmitz                        Call Back Number: [phone] (home)       2. Message:Called and lvm in Nauruan for pt.     3. Patient approves office to leave a detailed voicemail/MyChart message: N\A

## 2021-08-04 NOTE — TELEPHONE ENCOUNTER
VOICEMAIL  1. Caller Name: Madelyn Schmitz                        Call Back Number: [phone] (home)       2. Message: Left message in Kyrgyz with my direct line to give me a call back. [phone]     3. Patient approves office to leave a detailed voicemail/MyChart message: N\A

## 2021-08-05 NOTE — TELEPHONE ENCOUNTER
Phone Number Called: [phone] (home)       Call outcome: Spoke to patient regarding message below.    Message: Spoke to patient in Turkish. Patient said she needed a referral to [facility]. Patient already had an apt and they canceled because she didn't have a referral. Then the patient received a call back from [facility] informing her she does not need a referral and can be seen because her issuance changed. Patient would like help calling to confirm she does not need a referral or if she does.       [facility] [phone] p:[phone]

## 2021-08-05 NOTE — TELEPHONE ENCOUNTER
Phone Number Called: [phone] [facility]     Called and spoke to Lilly and confirmed pt does not need any authorization to be seen on 09/22 @1400.       Phone Number Called: [phone] (home)       Call outcome: Spoke to patient regarding message below.    Message: Informed her of the message above.

## 2021-08-06 ENCOUNTER — PATIENT MESSAGE (OUTPATIENT)
Dept: HEALTH INFORMATION MANAGEMENT | Facility: OTHER | Age: 71
End: 2021-08-06

## 2021-09-28 ENCOUNTER — HOSPITAL ENCOUNTER (OUTPATIENT)
Dept: RADIOLOGY | Facility: MEDICAL CENTER | Age: 71
End: 2021-09-28
Attending: STUDENT IN AN ORGANIZED HEALTH CARE EDUCATION/TRAINING PROGRAM
Payer: MEDICARE

## 2021-09-28 DIAGNOSIS — R92.8 OTHER ABNORMAL AND INCONCLUSIVE FINDINGS ON DIAGNOSTIC IMAGING OF BREAST: ICD-10-CM

## 2021-09-28 PROCEDURE — 71046 X-RAY EXAM CHEST 2 VIEWS: CPT

## 2021-09-29 ENCOUNTER — TELEPHONE (OUTPATIENT)
Dept: MEDICAL GROUP | Facility: PHYSICIAN GROUP | Age: 71
End: 2021-09-29

## 2021-09-29 NOTE — TELEPHONE ENCOUNTER
ESTABLISHED PATIENT PRE-VISIT PLANNING     Patient was NOT contacted to complete PVP.     Note: Patient will not be contacted if there is no indication to call.     1.  Reviewed notes from the last few office visits within the medical group: Yes    2.  If any orders were placed at last visit or intended to be done for this visit (i.e. 6 mos follow-up), do we have Results/Consult Notes?         •  Labs - Labs ordered, completed on 7/1/2021 and results are in chart.  Note: If patient appointment is for lab review and patient did not complete labs, check with provider if OK to reschedule patient until labs completed.       •  Imaging - Imaging ordered, NOT completed. Patient advised to complete prior to next appointment.       •  Referrals - No referrals were ordered at last office visit.    3. Is this appointment scheduled as a Hospital Follow-Up? No    4.  Immunizations were updated in Epic using Reconcile Outside Information activity? Yes    5.  Patient is due for the following Health Maintenance Topics:   Health Maintenance Due   Topic Date Due   • Annual Wellness Visit  Never done   • IMM ZOSTER VACCINES (2 of 2) 08/26/2021   • IMM INFLUENZA (1) Never done   • URINE ACR / MICROALBUMIN  09/25/2021       6.  AHA (Pulse8) form printed for Provider? No, already completed

## 2021-09-30 ENCOUNTER — OFFICE VISIT (OUTPATIENT)
Dept: MEDICAL GROUP | Facility: PHYSICIAN GROUP | Age: 71
End: 2021-09-30
Payer: MEDICARE

## 2021-09-30 VITALS
BODY MASS INDEX: 39.76 KG/M2 | WEIGHT: 224.4 LBS | TEMPERATURE: 96.9 F | OXYGEN SATURATION: 94 % | HEART RATE: 80 BPM | RESPIRATION RATE: 16 BRPM | HEIGHT: 63 IN

## 2021-09-30 DIAGNOSIS — E78.5 HYPERLIPIDEMIA ASSOCIATED WITH TYPE 2 DIABETES MELLITUS (HCC): ICD-10-CM

## 2021-09-30 DIAGNOSIS — K21.9 GASTROESOPHAGEAL REFLUX DISEASE WITHOUT ESOPHAGITIS: ICD-10-CM

## 2021-09-30 DIAGNOSIS — I15.2 HYPERTENSION ASSOCIATED WITH DIABETES (HCC): ICD-10-CM

## 2021-09-30 DIAGNOSIS — E11.69 HYPERLIPIDEMIA ASSOCIATED WITH TYPE 2 DIABETES MELLITUS (HCC): ICD-10-CM

## 2021-09-30 DIAGNOSIS — R80.9 TYPE 2 DIABETES MELLITUS WITH MICROALBUMINURIA, WITHOUT LONG-TERM CURRENT USE OF INSULIN (HCC): ICD-10-CM

## 2021-09-30 DIAGNOSIS — E11.59 HYPERTENSION ASSOCIATED WITH DIABETES (HCC): ICD-10-CM

## 2021-09-30 DIAGNOSIS — E11.29 TYPE 2 DIABETES MELLITUS WITH MICROALBUMINURIA, WITHOUT LONG-TERM CURRENT USE OF INSULIN (HCC): ICD-10-CM

## 2021-09-30 LAB
HBA1C MFR BLD: 7.1 % (ref 0–5.6)
INT CON NEG: NEGATIVE
INT CON POS: POSITIVE

## 2021-09-30 PROCEDURE — 83036 HEMOGLOBIN GLYCOSYLATED A1C: CPT | Performed by: FAMILY MEDICINE

## 2021-09-30 PROCEDURE — 99214 OFFICE O/P EST MOD 30 MIN: CPT | Performed by: FAMILY MEDICINE

## 2021-09-30 RX ORDER — ATORVASTATIN CALCIUM 10 MG/1
10 TABLET, FILM COATED ORAL
Qty: 100 TABLET | Refills: 2 | Status: SHIPPED | OUTPATIENT
Start: 2021-09-30 | End: 2022-04-20 | Stop reason: SINTOL

## 2021-09-30 RX ORDER — FAMOTIDINE 20 MG/1
20 TABLET, FILM COATED ORAL 2 TIMES DAILY
Qty: 60 TABLET | Refills: 5 | Status: SHIPPED | OUTPATIENT
Start: 2021-09-30 | End: 2022-04-20

## 2021-09-30 RX ORDER — LISINOPRIL 10 MG/1
10 TABLET ORAL DAILY
Qty: 100 TABLET | Refills: 3 | Status: SHIPPED | OUTPATIENT
Start: 2021-09-30 | End: 2021-11-04 | Stop reason: SDUPTHER

## 2021-09-30 RX ORDER — NYSTATIN 100000 [USP'U]/G
POWDER TOPICAL
Status: ON HOLD | COMMUNITY
Start: 2021-08-29 | End: 2022-09-09

## 2021-09-30 ASSESSMENT — FIBROSIS 4 INDEX: FIB4 SCORE: 1.56

## 2021-09-30 NOTE — PATIENT INSTRUCTIONS
Enfermedad de reflujo gastroesofágico en los adultos  Gastroesophageal Reflux Disease, Adult  El reflujo gastroesofágico (RGE) ocurre cuando el ácido del estómago sube por el tubo que conecta la boca con el estómago (esófago). Normalmente, la comida baja por el esófago y se mantiene en el estómago, donde se la digiere. Sin embargo, cuando konstantin persona tiene ERGE, los alimentos y el ácido estomacal suelen volver al esófago. Si esto se vuelve un problema más grave, a la persona se le puede diagnosticar konstantin enfermedad llamada enfermedad de reflujo gastroesofágico (ERGE). La ERGE ocurre cuando el reflujo:  · Sucede a menudo.  · Causa síntomas frecuentes o graves.  · Causa problemas tales elio daño en el esófago.  Cuando el ácido del estómago entra en contacto con el esófago, el ácido puede provocar dolor (inflamación) en el esófago. Con el tiempo, pueden formarse pequeños agujeros (úlceras) en el revestimiento del esófago.  ¿Cuáles son las causas?  Esta afección se debe a un problema en el músculo que se encuentra entre el esófago y el estómago (esfínter esofágico inferior, o EEI). Normalmente, el EEI se felicity konstantin vez que la comida pasa a través del esófago hasta el estómago. Cuando el EEI se encuentra debilitado o tiene alguna anomalía, no se felicity por completo, y eso permite que tanto la comida elio el jugo gástrico, que es ácido, vuelvan a subir por el esófago.  El EEI puede debilitarse a causa de ciertas sustancias alimenticias, medicamentos y afecciones, que incluyen:  · El consumo de tabaco.  · Embarazo.  · Tener konstantin hernia de hiato.  · Consumo de alcohol.  · Ciertos alimentos y bebidas, elio café, chocolate, cebollas y menta.  ¿Qué incrementa el riesgo?  Es más probable que tenga esta afección si:  · Tiene un aumento del peso corporal.  · Tiene un trastorno del tejido conjuntivo.  · Usa antiinflamatorios no esteroideos (CAREY).  ¿Cuáles son los signos o los síntomas?  Los síntomas de esta afección  incluyen:  · Acidez estomacal.  · Dificultad o dolor al tragar.  · Sensación de tener un bulto en la garganta.  · Sabor amargo en la boca.  · Mal aliento.  · Gran cantidad de saliva.  · Estómago inflamado o con malestar.  · Eructos.  · Dolor en el pecho. El dolor de pecho puede deberse a distintas afecciones. Es importante que consulte al médico si tiene dolor de pecho.  · Dificultad para respirar o sibilancias.  · Tos marcos (crónica) o tos nocturna.  · Desgaste del esmalte dental.  · Pérdida de peso.  ¿Cómo se diagnostica?  El médico le hará konstantin historia clínica y un examen físico. Para determinar si tiene ERGE leve o grave, el médico también puede controlar cómo usted reacciona al tratamiento. También pueden hacerle estudios, que incluyen los siguientes:  · Un estudio para examinarle el estómago y el esófago con konstantin cámara pequeña (endoscopía).  · Konstantin prueba para medir el jaqui de acidez en el esófago.  · Konstantin prueba para medir cuánta presión hay en el esófago.  · Un estudio de deglución con bario común o modificado para bert la forma, el tamaño y el funcionamiento del esófago.  ¿Cómo se trata?  El objetivo del tratamiento es ayudar a aliviar los síntomas y evitar las complicaciones. El tratamiento de esta afección puede variar según la gravedad de los síntomas. El médico puede recomendarle lo siguiente:  · Cambios en la dieta.  · Medicamentos.  · Konstantin cirugía.  Siga estas indicaciones en roberts casa:  Comida y bebida    · Siga la dieta recomendada por el médico. [place] puede incluir evitar ciertos alimentos y bebidas, por ejemplo:  ? Café y té (con o sin cafeína).  ? Bebidas que contengan alcohol.  ? Bebidas energéticas y deportivas.  ? Bebidas gaseosas o refrescos.  ? Chocolate y cacao.  ? Menta y esencia de menta.  ? [place] y cebolla.  ? Rábano picante.  ? Alimentos condimentados, picantes y ácidos, por ejemplo, todos los tipos de pimientas, chile en polvo, briseno en polvo, vinagre, salsas picantes y salsa  barbacoa.  ? Cítricos y javed jugos, por ejemplo, naranjas, scar y kiel.  ? Alimentos a base de tomate, elio salsa de tomate, chile, salsa picante y pizza con salsa de tomate.  ? Alimentos fritos y grasos, elio donas, royal fritas y aderezos ricos en grasas.  ? Joellen con alto contenido de grasa, elio salchichas, y rocha de joellen taveras y jennifer con mucha grasa, por ejemplo, chuletas o costillas, embutidos, jamón y tocino.  ? Productos lácteos ricos en grasas, elio leche entera, manteca y queso crema.  · Omaira comidas pequeñas y frecuentes tanya el día en lugar de comidas abundantes.  · Evite beber grandes cantidades de líquidos con las comidas.  · Evite comer 2 o 3 horas antes de acostarse.  · Evite recostarse inmediatamente después de comer.  · No omaira ejercicios enseguida después de comer.  Estilo de jeovanny    · No consuma ningún producto que contenga nicotina o tabaco, elio cigarrillos, cigarrillos electrónicos y tabaco de mascar. Si necesita ayuda para dejar de fumar, consulte al médico.  · Trate de reducir el estrés con métodos elio el yoga o la meditación. Si necesita ayuda para reducir el nivel de estrés, consulte al médico.  · Si tiene sobrepeso, baje hasta llegar a un peso saludable para usted. [place] consejos al médico para bajar de peso de manera fletcher.  Indicaciones generales  · Esté atento a cualquier cambio en los síntomas.  · [place] los medicamentos de venta master y los recetados solamente elio se lo haya indicado el médico. No tome aspirina, ibuprofeno ni otros CAREY a menos que el médico se lo indique.  · Use ropa holgada. No use nada apretado alrededor de la cintura que omaira presión sobre el abdomen.  · Levante (eleve) la cabecera de la cama aproximadamente 6 pulgadas (15 cm).  · Evite inclinarse si al hacerlo empeoran los síntomas.  · Concurra a todas las visitas de seguimiento elio se lo haya indicado el médico. [place] es importante.  Comuníquese con un médico si:  · Tiene los siguientes  síntomas:  ? Síntomas nuevos.  ? Pérdida de peso sin causa aparente.  ? Dificultad o dolor al tragar.  ? Sibilancias o konstantin tos persistente.  ? Voz ronca.  · Los síntomas no mejoran con el tratamiento.  Solicite ayuda inmediatamente si:  · Siente dolor en los brazos, el benigno, la mandíbula, los dientes o la espalda.  · Se siente transpirado, mareado o tiene konstantin sensación de desvanecimiento.  · Siente dolor intenso en el pecho o le falta el aire.  · Vomita y el vómito tiene un aspecto similar a la vicky o a los posos de café.  · Se desmaya.  · Tiene heces sanguinolentas o negras.  · No puede tragar, beber o comer.  Resumen  · El reflujo gastroesofágico ocurre cuando el ácido del estómago sube al esófago. La ERGE es konstantin enfermedad en la que el reflujo ocurre con frecuencia, causa síntomas frecuentes o graves, o causa problemas tales elio daño en el esófago.  · El tratamiento de esta afección puede variar según la gravedad de los síntomas. El médico puede indicarle que siga konstantin dieta y omaira cambios en roberts estilo de jeovanny, tome medicamentos o se someta a konstantin cirugía.  · Comuníquese con un médico si tiene síntomas nuevos o los síntomas empeoran.  · [place] los medicamentos de venta master y los recetados solamente elio se lo haya indicado el médico. No tome aspirina, ibuprofeno ni otros CAREY a menos que el médico se lo indique.  · Concurra a todas las visitas de seguimiento elio se lo haya indicado el médico. [place] es importante.  Esta información no tiene elio fin reemplazar el consejo del médico. Asegúrese de hacerle al médico cualquier pregunta que tenga.  Document Released: 09/27/2006 Document Revised: 08/01/2019 Document Reviewed: 08/01/2019  Elsevier Patient Education © 2020 Elsevier Inc[employer]    Opciones de alimentos para pacientes adultos con enfermedad de reflujo gastroesofágico  Food Choices for Gastroesophageal Reflux Disease, Adult  Si tiene enfermedad de reflujo gastroesofágico (ERGE), los alimentos que consume y los  hábitos de alimentación son muy importantes. Elegir los alimentos adecuados puede ayudar a aliviar las molestias. Piense en consultar a un especialista en nutrición (nutricionista) para que lo ayude a hacer buenas elecciones.  Consejos para seguir mukesh plan    Comidas  · Elija alimentos saludables con bajo contenido de grasa, elio frutas, verduras, cereales integrales, productos lácteos descremados y carne magra de jesusita, de pescado y de ave.  · [place] comidas pequeñas tanya el día en lugar de 3 comidas abundantes. Coma lentamente y en un lugar donde esté distendido. Evite agacharse o recostarse hasta 2 o 3 horas después de gloria comido.  · Evite comer 2 a 3 horas antes de ir a acostarse.  · Evite beber grandes cantidades de líquidos con las comidas.  · Evite freír los alimentos a la hora de la cocción. Puede hornear, grillar o asar a la ashok.  · Evite o limite la cantidad de:  ? Chocolate.  ? Menta y mentol.  ? Alcohol.  ? Nisha.  ? Café vanesa y descafeinado.  ? Té vanesa y descafeinado.  ? Bebidas con gas (gaseosas).  ? Bebidas energizantes y refrescos que contengan cafeína.  · Limite los alimentos con alto contenido de grasas, por ejemplo:  ? Joellen grasas o alimentos fritos.  ? Leche entera, crema, manteca o helado.  ? [place] y mantequillas de nikolai secos.  ? Pastelería, donas y dulces hechos con manteca o margarina.  · Evite los alimentos que le ocasionen síntomas. Estos pueden ser distintos para cada persona. Los alimentos que suelen causan síntomas son los siguientes:  ? Tomates.  ? Naranjas, scar y kiel.  ? Pimientos.  ? Comidas condimentadas.  ? Cebolla y ajo.  ? Vinagre.  Estilo de jeovanny  · Mantenga un peso saludable. Pregúntele a roberts médico cuál es el peso saludable para usted. Si necesita perder peso, hable con roberts médico para hacerlo de manera fletcher.  · Realice actividad física tanya, al menos, 30 minutos 5 días por semana o más, o según lo indicado por roberts médico.  · Use ropa suelta.  · No  fume. Si necesita ayuda para dejar de fumar, consulte al médico.  · [place]a con la cabecera de la cama más elevada que los pies. Use konstantin cuña debajo del colchón o bloques debajo del armazón de la cama para mantener la cabecera de la cama elevada.  Resumen  · Si tiene enfermedad de reflujo gastroesofágico (ERGE), las elecciones de alimentos y el estilo de jeovanny son muy importantes para ayudar a aliviar los síntomas.  · [place] comidas pequeñas tanya el día en lugar de 3 comidas abundantes. Coma lentamente y en un lugar donde esté distendido.  · Limite los alimentos con alto contenido graso elio la carne grasa o los alimentos fritos.  · Evite agacharse o recostarse hasta 2 o 3 horas después de gloria comido.  · Evite la menta y hierba buena, la cafeína, el alcohol y el chocolate.  Esta información no tiene elio fin reemplazar el consejo del médico. Asegúrese de hacerle al médico cualquier pregunta que tenga.  Document Released: 06/18/2013 Document Revised: 07/24/2018 Document Reviewed: 07/24/2018  Elsevier Patient Education © 2020 Elsevier Inc.

## 2021-09-30 NOTE — PROGRESS NOTES
Subjective:     CC:   Chief Complaint   Patient presents with   • Follow-Up     A1C Last 6.7%         HPI:   Madelyn presents today with :  Has had a few headaches over the past few months, not too bothersome.  Also w/ occn pain in R mid abd and feels like has a ball on R side of umbilicus. Feels like her stomach suffocates her when she eats eg avocado, not related to fatty foods (already had cholecystectomy in past). Lasts 1/2h. Takes aleve or giselle seltzer which helps.   Says she mentioned this pain to her oncologist in the past week or so who said maybe it's related to gallstones - patient isn't sure. Doesn't appear her onc thought it would be related to prior em cancer  7.1 a1c - felt more abd pain on actos, stopped and improved - not on any meds for dm, trying to eat more veggies.   Neg cxr 9/28  (her her onc for her hx em cancer 6/19)  When she saw me 7/1 I rx'd lipitor 10mg but she isnt sure if she is taking it, she doesn't think she is taking any chol med.     Problem   Gerd (Gastroesophageal Reflux Disease)       Current Outpatient Medications Ordered in Epic   Medication Sig Dispense Refill   • NYSTOP powder APPLY TO AFFECTED AREA     • atorvastatin (LIPITOR) 10 MG Tab Take 1 Tablet by mouth at bedtime. 100 Tablet 2   • lisinopril (PRINIVIL) 10 MG Tab Take 1 Tablet by mouth every day. Indications: High Blood Pressure Disorder 100 Tablet 3   • famotidine (PEPCID) 20 MG Tab Take 1 Tablet by mouth 2 times a day. For abdominal pain 60 Tablet 5   • MAGNESIUM CITRATE PO Take  by mouth.     • cholestyramine (QUESTRAN) 4 g packet MIX AND DRINK 1 PACKET OF POWDER BY MOUTH AS DIRECTED THREE TIMES DAILY WITH MEALS AND ONCE BEFORE BEDTIME FOR DIARRHEA  4   • acetaminophen (TYLENOL) 325 MG Tab Take 2 Tabs by mouth every 6 hours as needed (Mild Pain; (Pain scale 1-3); Temp greater than 100.5 F). 30 Tab 0     No current Epic-ordered facility-administered medications on file.       Past Medical History:   Diagnosis Date   •  "Acute cholecystitis 9/19/2019   • Arthritis     osteo, finger/shoulders   • Back pain    • Blood clotting disorder ([facility]) 2004    leg   • Bronchitis    • Chickenpox    • Dental disorder     upper/lower dentures   • Diabetes     oral meds   • Diabetes mellitus ([facility]) 9/19/2019      Ref. Range 4/8/2019 13:20 11/6/2019 16:47 Glycohemoglobin Latest Ref Range: 0.0 - 5.6 % 6.6 (H) 5.7 (A) Estim. Avg Glu Latest Units: mg/dL 143     Ref. Range 11/6/2019 16:35 Micro Alb Creat Ratio Latest Ref Range: 0 - 30 mg/g 167 (H)   She has history of well-controlled type 2 diabetes.  She is previously taking metformin 500 mg twice daily however her A1c on recheck was down to 5.7 so we discon   • Diarrhea 9/26/2019    She reports diarrhea starting about 3 days ago.  She has multiple small-volume liquidy/emanuel brown to dark-colored stools daily.  This morning she reports already 5-6 times of going to the bathroom with some stool.  She has not had a formed stool many days and proceeding this diarrhea she was actually constipated.  She had taken some senna but then stopped it and the loose stool started.  She is o   • Dysuria 9/11/2019   • Endometrial cancer ([facility])    • Heart valve disease     \"murmur\"   • High cholesterol    • Hyperlipidemia    • Hypertension    • Hypotension due to hypovolemia 9/26/2019    She has a low blood pressure in office today of 84/52 with a baseline in the 100s to 120s systolic.  This is likely due to her frequent episodes of diarrhea exacerbated by ongoing use of lisinopril.  Yesterday, she felt lightheaded and dizzy but did not pass out or have a fall.  She is having challenges of what is appropriate to eat as she had recent pelvic radiation as well as a cholecystectomy b   • Mumps    • Nasal drainage    • Sleep apnea     CPAP   • Snoring    • Urinary incontinence        Social History     Tobacco Use   • Smoking status: Never Smoker   • Smokeless tobacco: Never Used   Vaping Use   • Vaping Use: Never used " "  Substance Use Topics   • Alcohol use: No   • Drug use: No       Allergies:  Bloodless, Ibuprofen, and Penicillins    Health Maintenance: Completed    ROS:  Per HPI      Objective:       Exam:  Pulse 80   Temp 36.1 °C (96.9 °F) (Temporal)   Resp 16   Ht 1.6 m (5' 3\")   Wt 102 kg (224 lb 6.4 oz)   LMP  (LMP Unknown)   SpO2 94%   BMI 39.75 kg/m²   Body mass index is 39.75 kg/m².  Wt Readings from Last 4 Encounters:   09/30/21 102 kg (224 lb 6.4 oz)   07/01/21 101 kg (221 lb 11.2 oz)   03/31/21 99.5 kg (219 lb 6.4 oz)   02/23/21 101 kg (222 lb 3.2 oz)       Gen: Alert and oriented, No apparent distress. Appropriately groomed.  Neck: Neck is supple without lymphadenopathy.No thyromegaly.   Lungs: Normal effort, CTA bilaterally, no wheezes, rhonchi, or rales.  CV: Regular rate and rhythm. No murmurs, rubs, or gallops.  ABD:  Soft, +tender R mid abd, nondistended, NABSx4, no HSM or RBT or guarding or masses.  Ext: No clubbing, cyanosis, edema.  Skin: No rash noted.        Assessment & Plan:     71 y.o. female with the following -   isnt on metformin, will hold off on rx'ing for now  Resume lipitor, refill lisinopril  She defers echo (I ordered 7/1), will consider.   Due for labs in   Add pepcid, if not better next mo check ct abd/pelv  1. Type 2 diabetes mellitus with microalbuminuria, without long-term current use of insulin (HCC)  - POCT Hemoglobin A1C  - atorvastatin (LIPITOR) 10 MG Tab; Take 1 Tablet by mouth at bedtime.  Dispense: 100 Tablet; Refill: 2  - HEMOGLOBIN A1C; Future  - Lipid Profile; Future  - Comp Metabolic Panel; Future  - CREATINE KINASE; Future  - TSH; Future  - CBC WITH DIFFERENTIAL; Future    2. Hypertension associated with diabetes (HCC)  - lisinopril (PRINIVIL) 10 MG Tab; Take 1 Tablet by mouth every day. Indications: High Blood Pressure Disorder  Dispense: 100 Tablet; Refill: 3    3. Hyperlipidemia associated with type 2 diabetes mellitus (HCC)    4. Gastroesophageal reflux disease " without esophagitis    Other orders  - NYSTOP powder; APPLY TO AFFECTED AREA  - famotidine (PEPCID) 20 MG Tab; Take 1 Tablet by mouth 2 times a day. For abdominal pain  Dispense: 60 Tablet; Refill: 5        Return in about 3 months (around 12/30/2021) for labs just prior - also appt in 1 month for gerd. .    Please note that this dictation was created using voice recognition software. I have made every reasonable attempt to correct obvious errors, but I expect that there are errors of grammar and possibly content that I did not discover before finalizing the note.

## 2021-10-01 ENCOUNTER — APPOINTMENT (OUTPATIENT)
Dept: MEDICAL GROUP | Facility: PHYSICIAN GROUP | Age: 71
End: 2021-10-01
Payer: MEDICARE

## 2021-10-06 ENCOUNTER — TELEPHONE (OUTPATIENT)
Dept: MEDICAL GROUP | Facility: PHYSICIAN GROUP | Age: 71
End: 2021-10-06

## 2021-10-06 NOTE — TELEPHONE ENCOUNTER
Phone Number Called: [phone] (home)       Call outcome: Spoke to patient regarding message below.    Message: Patient mentioned taking famotidine for stomach px she was having. Patient took it 10/04 and had really bad diarrhea 10/05 all day. Pt did not take rx anymore. Pt mentioned at the end of the day yesterday she thinks it was more blood in the diarrhea. Pt denied fever or chills. Offered pt an apt 10/07/2021 and she refused. Pt wanted to inform pcp. Please Advise.

## 2021-10-06 NOTE — TELEPHONE ENCOUNTER
VOICEMAIL  1. Caller Name: Madelyn Schmitz                      Call Back Number: [phone] (home)     2. Message: Pt called needing advise on medication, did not specify which on. Pt more comfortable speaking [place]. Please advise.    3. Patient approves office to leave a detailed voicemail/MyChart message: yes

## 2021-10-06 NOTE — TELEPHONE ENCOUNTER
At her last appointment I gave her Lipitor.  Perhaps this is the prescription she is referring to.  Regardless she should stay off of it.  If she would like to do a stool card for blood I can order that, just let me know.

## 2021-10-07 NOTE — TELEPHONE ENCOUNTER
Phone Number Called: [phone] (home)       Call outcome: Spoke to patient regarding message below.    Message: pt said she is feeling better and does not want to do a stool test. GIRMA

## 2021-11-01 ENCOUNTER — HOSPITAL ENCOUNTER (OUTPATIENT)
Dept: LAB | Facility: MEDICAL CENTER | Age: 71
End: 2021-11-01
Attending: FAMILY MEDICINE
Payer: MEDICARE

## 2021-11-01 DIAGNOSIS — E11.29 TYPE 2 DIABETES MELLITUS WITH MICROALBUMINURIA, WITHOUT LONG-TERM CURRENT USE OF INSULIN (HCC): ICD-10-CM

## 2021-11-01 DIAGNOSIS — R80.9 TYPE 2 DIABETES MELLITUS WITH MICROALBUMINURIA, WITHOUT LONG-TERM CURRENT USE OF INSULIN (HCC): ICD-10-CM

## 2021-11-01 LAB
ALBUMIN SERPL BCP-MCNC: 3.8 G/DL (ref 3.2–4.9)
ALBUMIN/GLOB SERPL: 1.2 G/DL
ALP SERPL-CCNC: 82 U/L (ref 30–99)
ALT SERPL-CCNC: 6 U/L (ref 2–50)
ANION GAP SERPL CALC-SCNC: 9 MMOL/L (ref 7–16)
AST SERPL-CCNC: 11 U/L (ref 12–45)
BASOPHILS # BLD AUTO: 0.4 % (ref 0–1.8)
BASOPHILS # BLD: 0.02 K/UL (ref 0–0.12)
BILIRUB SERPL-MCNC: 0.4 MG/DL (ref 0.1–1.5)
BUN SERPL-MCNC: 16 MG/DL (ref 8–22)
CALCIUM SERPL-MCNC: 9 MG/DL (ref 8.5–10.5)
CHLORIDE SERPL-SCNC: 105 MMOL/L (ref 96–112)
CHOLEST SERPL-MCNC: 174 MG/DL (ref 100–199)
CK SERPL-CCNC: 33 U/L (ref 0–154)
CO2 SERPL-SCNC: 25 MMOL/L (ref 20–33)
CREAT SERPL-MCNC: 0.71 MG/DL (ref 0.5–1.4)
EOSINOPHIL # BLD AUTO: 0.27 K/UL (ref 0–0.51)
EOSINOPHIL NFR BLD: 5.7 % (ref 0–6.9)
ERYTHROCYTE [DISTWIDTH] IN BLOOD BY AUTOMATED COUNT: 53.7 FL (ref 35.9–50)
EST. AVERAGE GLUCOSE BLD GHB EST-MCNC: 148 MG/DL
FASTING STATUS PATIENT QL REPORTED: NORMAL
GLOBULIN SER CALC-MCNC: 3.3 G/DL (ref 1.9–3.5)
GLUCOSE SERPL-MCNC: 133 MG/DL (ref 65–99)
HBA1C MFR BLD: 6.8 % (ref 4–5.6)
HCT VFR BLD AUTO: 39.3 % (ref 37–47)
HDLC SERPL-MCNC: 41 MG/DL
HGB BLD-MCNC: 12.4 G/DL (ref 12–16)
IMM GRANULOCYTES # BLD AUTO: 0.01 K/UL (ref 0–0.11)
IMM GRANULOCYTES NFR BLD AUTO: 0.2 % (ref 0–0.9)
LDLC SERPL CALC-MCNC: 112 MG/DL
LYMPHOCYTES # BLD AUTO: 1.71 K/UL (ref 1–4.8)
LYMPHOCYTES NFR BLD: 36 % (ref 22–41)
MCH RBC QN AUTO: 30.7 PG (ref 27–33)
MCHC RBC AUTO-ENTMCNC: 31.6 G/DL (ref 33.6–35)
MCV RBC AUTO: 97.3 FL (ref 81.4–97.8)
MONOCYTES # BLD AUTO: 0.4 K/UL (ref 0–0.85)
MONOCYTES NFR BLD AUTO: 8.4 % (ref 0–13.4)
NEUTROPHILS # BLD AUTO: 2.34 K/UL (ref 2–7.15)
NEUTROPHILS NFR BLD: 49.3 % (ref 44–72)
NRBC # BLD AUTO: 0 K/UL
NRBC BLD-RTO: 0 /100 WBC
PLATELET # BLD AUTO: 254 K/UL (ref 164–446)
PMV BLD AUTO: 10 FL (ref 9–12.9)
POTASSIUM SERPL-SCNC: 4.3 MMOL/L (ref 3.6–5.5)
PROT SERPL-MCNC: 7.1 G/DL (ref 6–8.2)
RBC # BLD AUTO: 4.04 M/UL (ref 4.2–5.4)
SODIUM SERPL-SCNC: 139 MMOL/L (ref 135–145)
TRIGL SERPL-MCNC: 104 MG/DL (ref 0–149)
TSH SERPL DL<=0.005 MIU/L-ACNC: 3.5 UIU/ML (ref 0.38–5.33)
WBC # BLD AUTO: 4.8 K/UL (ref 4.8–10.8)

## 2021-11-01 PROCEDURE — 82550 ASSAY OF CK (CPK): CPT

## 2021-11-01 PROCEDURE — 84443 ASSAY THYROID STIM HORMONE: CPT

## 2021-11-01 PROCEDURE — 85025 COMPLETE CBC W/AUTO DIFF WBC: CPT

## 2021-11-01 PROCEDURE — 83036 HEMOGLOBIN GLYCOSYLATED A1C: CPT

## 2021-11-01 PROCEDURE — 36415 COLL VENOUS BLD VENIPUNCTURE: CPT

## 2021-11-01 PROCEDURE — 80061 LIPID PANEL: CPT

## 2021-11-01 PROCEDURE — 80053 COMPREHEN METABOLIC PANEL: CPT

## 2021-11-04 ENCOUNTER — OFFICE VISIT (OUTPATIENT)
Dept: MEDICAL GROUP | Facility: PHYSICIAN GROUP | Age: 71
End: 2021-11-04
Payer: MEDICARE

## 2021-11-04 VITALS
WEIGHT: 228.2 LBS | TEMPERATURE: 97.4 F | OXYGEN SATURATION: 97 % | DIASTOLIC BLOOD PRESSURE: 68 MMHG | SYSTOLIC BLOOD PRESSURE: 114 MMHG | HEIGHT: 63 IN | HEART RATE: 52 BPM | RESPIRATION RATE: 16 BRPM | BODY MASS INDEX: 40.43 KG/M2

## 2021-11-04 DIAGNOSIS — E11.69 HYPERLIPIDEMIA ASSOCIATED WITH TYPE 2 DIABETES MELLITUS (HCC): ICD-10-CM

## 2021-11-04 DIAGNOSIS — K21.9 GASTROESOPHAGEAL REFLUX DISEASE WITHOUT ESOPHAGITIS: ICD-10-CM

## 2021-11-04 DIAGNOSIS — I15.2 HYPERTENSION ASSOCIATED WITH DIABETES (HCC): ICD-10-CM

## 2021-11-04 DIAGNOSIS — Z23 NEED FOR VACCINATION: ICD-10-CM

## 2021-11-04 DIAGNOSIS — E78.5 HYPERLIPIDEMIA ASSOCIATED WITH TYPE 2 DIABETES MELLITUS (HCC): ICD-10-CM

## 2021-11-04 DIAGNOSIS — E11.29 TYPE 2 DIABETES MELLITUS WITH MICROALBUMINURIA, WITHOUT LONG-TERM CURRENT USE OF INSULIN (HCC): ICD-10-CM

## 2021-11-04 DIAGNOSIS — R80.9 TYPE 2 DIABETES MELLITUS WITH MICROALBUMINURIA, WITHOUT LONG-TERM CURRENT USE OF INSULIN (HCC): ICD-10-CM

## 2021-11-04 DIAGNOSIS — E11.59 HYPERTENSION ASSOCIATED WITH DIABETES (HCC): ICD-10-CM

## 2021-11-04 PROCEDURE — 99213 OFFICE O/P EST LOW 20 MIN: CPT | Mod: 25 | Performed by: FAMILY MEDICINE

## 2021-11-04 PROCEDURE — 90750 HZV VACC RECOMBINANT IM: CPT | Performed by: FAMILY MEDICINE

## 2021-11-04 PROCEDURE — 90471 IMMUNIZATION ADMIN: CPT | Performed by: FAMILY MEDICINE

## 2021-11-04 RX ORDER — LISINOPRIL 10 MG/1
10 TABLET ORAL DAILY
Qty: 100 TABLET | Refills: 3 | Status: SHIPPED | OUTPATIENT
Start: 2021-11-04 | End: 2022-10-11 | Stop reason: SDUPTHER

## 2021-11-04 ASSESSMENT — FIBROSIS 4 INDEX: FIB4 SCORE: 1.29

## 2021-11-04 NOTE — PROGRESS NOTES
"Subjective:     CC:   Chief Complaint   Patient presents with   • Follow-Up   • Gastrophageal Reflux   • Lab Results     Blood Work - 11/1/21         HPI:   Madelyn presents today with f/u - her  had to leave but I was able to speak with her in Estonian.   -prior GERD sxs have resolved.  -has had lower abd cramps once that felt like a menstrual cramp, took aleve and it resolved.   -at her last appt 9/30/1 she had c/o some vague GI c/o and I rx'd her pepcid. Her prior sxs have resolved. On 9/30 she had described c/o \"occn pain in R mid abd and feels like has a ball on R side of umbilicus. Feels like her stomach suffocates her when she eats eg avocado, not related to fatty foods (already had cholecystectomy in past). Lasts 1/2h. Takes aleve or giselle seltzer which helps.   Says she mentioned this pain to her oncologist in the past week or so who said maybe it's related to gallstones - patient isn't sure. Doesn't appear her onc thought it would be related to prior em cancer  7.1 a1c - felt more abd pain on actos, stopped and improved - not on any meds for dm, trying to eat more veggies.\"    She says someone from the office of the surgeon who did her DES for hx em cancer called and told her she needed to do a CT scan of her \"body\" to see \"how things are.\" Dr. Alexandr Mancia.   -has had occn dizziness - 2d ago she fell when was dizzy, a little sore in her knees but feels fine.   Problem   Hyperlipidemia Associated With Type 2 Diabetes Mellitus (Hcc)    She gets dizziness w/ the lipitor so doesn't take it every day.          Current Outpatient Medications Ordered in Epic   Medication Sig Dispense Refill   • lisinopril (PRINIVIL) 10 MG Tab Take 1 Tablet by mouth every day. Indications: High Blood Pressure Disorder 100 Tablet 3   • NYSTOP powder APPLY TO AFFECTED AREA     • atorvastatin (LIPITOR) 10 MG Tab Take 1 Tablet by mouth at bedtime. 100 Tablet 2   • famotidine (PEPCID) 20 MG Tab Take 1 Tablet by mouth 2 times a day. " "For abdominal pain 60 Tablet 5   • MAGNESIUM CITRATE PO Take  by mouth.     • acetaminophen (TYLENOL) 325 MG Tab Take 2 Tabs by mouth every 6 hours as needed (Mild Pain; (Pain scale 1-3); Temp greater than 100.5 F). 30 Tab 0     No current Epic-ordered facility-administered medications on file.       Past Medical History:   Diagnosis Date   • Acute cholecystitis 9/19/2019   • Arthritis     osteo, finger/shoulders   • Back pain    • Blood clotting disorder ([facility]) 2004    leg   • Bronchitis    • Chickenpox    • Dental disorder     upper/lower dentures   • Diabetes     oral meds   • Diabetes mellitus ([facility]) 9/19/2019      Ref. Range 4/8/2019 13:20 11/6/2019 16:47 Glycohemoglobin Latest Ref Range: 0.0 - 5.6 % 6.6 (H) 5.7 (A) Estim. Avg Glu Latest Units: mg/dL 143     Ref. Range 11/6/2019 16:35 Micro Alb Creat Ratio Latest Ref Range: 0 - 30 mg/g 167 (H)   She has history of well-controlled type 2 diabetes.  She is previously taking metformin 500 mg twice daily however her A1c on recheck was down to 5.7 so we discon   • Diarrhea 9/26/2019    She reports diarrhea starting about 3 days ago.  She has multiple small-volume liquidy/emanuel brown to dark-colored stools daily.  This morning she reports already 5-6 times of going to the bathroom with some stool.  She has not had a formed stool many days and proceeding this diarrhea she was actually constipated.  She had taken some senna but then stopped it and the loose stool started.  She is o   • Dysuria 9/11/2019   • Endometrial cancer ([facility])    • Heart valve disease     \"murmur\"   • High cholesterol    • Hyperlipidemia    • Hypertension    • Hypotension due to hypovolemia 9/26/2019    She has a low blood pressure in office today of 84/52 with a baseline in the 100s to 120s systolic.  This is likely due to her frequent episodes of diarrhea exacerbated by ongoing use of lisinopril.  Yesterday, she felt lightheaded and dizzy but did not pass out or have a fall.  She is having " "challenges of what is appropriate to eat as she had recent pelvic radiation as well as a cholecystectomy b   • Mumps    • Nasal drainage    • Sleep apnea     CPAP   • Snoring    • Urinary incontinence        Social History     Tobacco Use   • Smoking status: Never Smoker   • Smokeless tobacco: Never Used   Vaping Use   • Vaping Use: Never used   Substance Use Topics   • Alcohol use: No   • Drug use: No       Allergies:  Bloodless, Ibuprofen, and Penicillins    Health Maintenance: Completed    ROS:  Per HPI      Objective:       Exam:  /68 (BP Location: Right arm, Patient Position: Sitting, BP Cuff Size: Adult)   Pulse (!) 52   Temp 36.3 °C (97.4 °F) (Temporal)   Resp 16   Ht 1.6 m (5' 3\")   Wt 104 kg (228 lb 3.2 oz)   LMP  (LMP Unknown)   SpO2 97%   BMI 40.42 kg/m²   Body mass index is 40.42 kg/m².  Wt Readings from Last 4 Encounters:   11/04/21 104 kg (228 lb 3.2 oz)   09/30/21 102 kg (224 lb 6.4 oz)   07/01/21 101 kg (221 lb 11.2 oz)   03/31/21 99.5 kg (219 lb 6.4 oz)       Gen: Alert and oriented, No apparent distress. Appropriately groomed.  Neck: Neck is supple without lymphadenopathy.No thyromegaly.   Lungs: Normal effort, CTA bilaterally, no wheezes, rhonchi, or rales.  CV: Regular rate and rhythm. No murmurs, rubs, or gallops.  ABD:  Soft, nontender, nondistended, NABSx4, no HSM or RBT or guarding or masses.  Ext: No clubbing, cyanosis, edema.  Skin: Mild redness under panus on R   Labs:   Results for orders placed or performed during the hospital encounter of 11/01/21   CBC WITH DIFFERENTIAL   Result Value Ref Range    WBC 4.8 4.8 - 10.8 K/uL    RBC 4.04 (L) 4.20 - 5.40 M/uL    Hemoglobin 12.4 12.0 - 16.0 g/dL    Hematocrit 39.3 37.0 - 47.0 %    MCV 97.3 81.4 - 97.8 fL    MCH 30.7 27.0 - 33.0 pg    MCHC 31.6 (L) 33.6 - 35.0 g/dL    RDW 53.7 (H) 35.9 - 50.0 fL    Platelet Count 254 164 - 446 K/uL    MPV 10.0 9.0 - 12.9 fL    Neutrophils-Polys 49.30 44.00 - 72.00 %    Lymphocytes 36.00 22.00 - " 41.00 %    Monocytes 8.40 0.00 - 13.40 %    Eosinophils 5.70 0.00 - 6.90 %    Basophils 0.40 0.00 - 1.80 %    Immature Granulocytes 0.20 0.00 - 0.90 %    Nucleated RBC 0.00 /100 WBC    Neutrophils (Absolute) 2.34 2.00 - 7.15 K/uL    Lymphs (Absolute) 1.71 1.00 - 4.80 K/uL    Monos (Absolute) 0.40 0.00 - 0.85 K/uL    Eos (Absolute) 0.27 0.00 - 0.51 K/uL    Baso (Absolute) 0.02 0.00 - 0.12 K/uL    Immature Granulocytes (abs) 0.01 0.00 - 0.11 K/uL    NRBC (Absolute) 0.00 K/uL   TSH   Result Value Ref Range    TSH 3.500 0.380 - 5.330 uIU/mL   CREATINE KINASE   Result Value Ref Range    CPK Total 33 0 - 154 U/L   Comp Metabolic Panel   Result Value Ref Range    Sodium 139 135 - 145 mmol/L    Potassium 4.3 3.6 - 5.5 mmol/L    Chloride 105 96 - 112 mmol/L    Co2 25 20 - 33 mmol/L    Anion Gap 9.0 7.0 - 16.0    Glucose 133 (H) 65 - 99 mg/dL    Bun 16 8 - 22 mg/dL    Creatinine 0.71 0.50 - 1.40 mg/dL    Calcium 9.0 8.5 - 10.5 mg/dL    AST(SGOT) 11 (L) 12 - 45 U/L    ALT(SGPT) 6 2 - 50 U/L    Alkaline Phosphatase 82 30 - 99 U/L    Total Bilirubin 0.4 0.1 - 1.5 mg/dL    Albumin 3.8 3.2 - 4.9 g/dL    Total Protein 7.1 6.0 - 8.2 g/dL    Globulin 3.3 1.9 - 3.5 g/dL    A-G Ratio 1.2 g/dL   Lipid Profile   Result Value Ref Range    Cholesterol,Tot 174 100 - 199 mg/dL    Triglycerides 104 0 - 149 mg/dL    HDL 41 >=40 mg/dL     (H) <100 mg/dL   HEMOGLOBIN A1C   Result Value Ref Range    Glycohemoglobin 6.8 (H) 4.0 - 5.6 %    Est Avg Glucose 148 mg/dL   FASTING STATUS   Result Value Ref Range    Fasting Status Fasting    ESTIMATED GFR   Result Value Ref Range    GFR If African American >60 >60 mL/min/1.73 m 2    GFR If Non African American >60 >60 mL/min/1.73 m 2         Assessment & Plan:     71 y.o. female with the following -     Continue the pepcid  I asked my MA to call Dr. Mancia's office to help coordinate her gyn f/u care. She was told the only thing needed was a CXR which was done 9/28 and was negative.   Will see about  an uber ride home  Can just take the lipitor at night once she's in bed.   1. Hyperlipidemia associated with type 2 diabetes mellitus (HCC)    2. Type 2 diabetes mellitus with microalbuminuria, without long-term current use of insulin (HCC)    3. Gastroesophageal reflux disease without esophagitis    4. Hypertension associated with diabetes (HCC)  - lisinopril (PRINIVIL) 10 MG Tab; Take 1 Tablet by mouth every day. Indications: High Blood Pressure Disorder  Dispense: 100 Tablet; Refill: 3    5. Need for vaccination  - Shingles Vaccine (Shingrix)      Return in about 3 months (around 2/4/2022) for DM.    Please note that this dictation was created using voice recognition software. I have made every reasonable attempt to correct obvious errors, but I expect that there are errors of grammar and possibly content that I did not discover before finalizing the note.

## 2021-11-04 NOTE — PATIENT INSTRUCTIONS
puede poner LAMISIL crema   Es para hongo - si persista con bran piel in el estomogo    Clarence lipitor en la noche para no tener mareos

## 2021-12-15 ENCOUNTER — TELEPHONE (OUTPATIENT)
Dept: MEDICAL GROUP | Facility: PHYSICIAN GROUP | Age: 71
End: 2021-12-15

## 2021-12-15 NOTE — TELEPHONE ENCOUNTER
ESTABLISHED PATIENT PRE-VISIT PLANNING     Patient was NOT contacted to complete PVP.     Note: Patient will not be contacted if there is no indication to call.     1.  Reviewed notes from the last few office visits within the medical group: Yes    2.  If any orders were placed at last visit or intended to be done for this visit (i.e. 6 mos follow-up), do we have Results/Consult Notes?         •  Labs - Labs were not ordered at last office visit.  Note: If patient appointment is for lab review and patient did not complete labs, check with provider if OK to reschedule patient until labs completed.       •  Imaging - Imaging was not ordered at last office visit.       •  Referrals - No referrals were ordered at last office visit.    3. Is this appointment scheduled as a Hospital Follow-Up? No    4.  Immunizations were updated in Epic using Reconcile Outside Information activity? Yes    5.  Patient is due for the following Health Maintenance Topics:   Health Maintenance Due   Topic Date Due   • Annual Wellness Visit  Never done   • COVID-19 Vaccine (3 - Booster for Moderna series) 09/20/2021   • URINE ACR / MICROALBUMIN  09/25/2021   • MAMMOGRAM  01/04/2022     6.  AHA (Pulse[employer]) form printed for Provider? No, already completed

## 2022-01-05 ENCOUNTER — HOSPITAL ENCOUNTER (OUTPATIENT)
Dept: RADIOLOGY | Facility: MEDICAL CENTER | Age: 72
End: 2022-01-05
Attending: FAMILY MEDICINE
Payer: MEDICARE

## 2022-01-05 ENCOUNTER — OFFICE VISIT (OUTPATIENT)
Dept: MEDICAL GROUP | Facility: PHYSICIAN GROUP | Age: 72
End: 2022-01-05
Payer: MEDICARE

## 2022-01-05 VITALS
WEIGHT: 219.6 LBS | OXYGEN SATURATION: 97 % | TEMPERATURE: 98 F | HEIGHT: 63 IN | BODY MASS INDEX: 38.91 KG/M2 | HEART RATE: 76 BPM | DIASTOLIC BLOOD PRESSURE: 70 MMHG | RESPIRATION RATE: 16 BRPM | SYSTOLIC BLOOD PRESSURE: 128 MMHG

## 2022-01-05 DIAGNOSIS — E11.29 TYPE 2 DIABETES MELLITUS WITH MICROALBUMINURIA, WITHOUT LONG-TERM CURRENT USE OF INSULIN (HCC): ICD-10-CM

## 2022-01-05 DIAGNOSIS — Z12.31 ENCOUNTER FOR MAMMOGRAM TO ESTABLISH BASELINE MAMMOGRAM: ICD-10-CM

## 2022-01-05 DIAGNOSIS — E78.5 HYPERLIPIDEMIA ASSOCIATED WITH TYPE 2 DIABETES MELLITUS (HCC): ICD-10-CM

## 2022-01-05 DIAGNOSIS — I70.0 ATHEROSCLEROSIS OF AORTA (HCC): ICD-10-CM

## 2022-01-05 DIAGNOSIS — C54.1 ENDOMETRIAL CANCER (HCC): ICD-10-CM

## 2022-01-05 DIAGNOSIS — E11.69 HYPERLIPIDEMIA ASSOCIATED WITH TYPE 2 DIABETES MELLITUS (HCC): ICD-10-CM

## 2022-01-05 DIAGNOSIS — R80.9 TYPE 2 DIABETES MELLITUS WITH MICROALBUMINURIA, WITHOUT LONG-TERM CURRENT USE OF INSULIN (HCC): ICD-10-CM

## 2022-01-05 DIAGNOSIS — K21.9 GASTROESOPHAGEAL REFLUX DISEASE WITHOUT ESOPHAGITIS: ICD-10-CM

## 2022-01-05 PROBLEM — R10.2 SUPRAPUBIC PAIN, ACUTE: Status: RESOLVED | Noted: 2021-02-23 | Resolved: 2022-01-05

## 2022-01-05 PROCEDURE — 77063 BREAST TOMOSYNTHESIS BI: CPT

## 2022-01-05 PROCEDURE — 99214 OFFICE O/P EST MOD 30 MIN: CPT | Performed by: FAMILY MEDICINE

## 2022-01-05 RX ORDER — FLUOXETINE HYDROCHLORIDE 20 MG/1
20 CAPSULE ORAL DAILY
Qty: 100 CAPSULE | Refills: 2 | Status: SHIPPED | OUTPATIENT
Start: 2022-01-05 | End: 2022-04-20 | Stop reason: SDUPTHER

## 2022-01-05 ASSESSMENT — FIBROSIS 4 INDEX: FIB4 SCORE: 1.29

## 2022-01-05 NOTE — PROGRESS NOTES
Subjective:     CC:   Chief Complaint   Patient presents with   • Follow-Up     2 months, DMV placard question          HPI:   Madelyn presents today with .    Needs DMV form signed for SOB, EF 75% '19.   Is finished with radiation treatment for her em cancer.   Problem   Gerd (Gastroesophageal Reflux Disease)    Her lower abd pain has resolved w/ pepcid     Aortic atherosclerosis (HCC)    CT chest/abdomen/pelvis (6/2019): There is aortic atherosclerosis without aneurysm.    Incidental finding on imaging during work-up of her endometrial cancer.  On atorvastatin.     Hyperlipidemia Associated With Type 2 Diabetes Mellitus (Hcc)    11/1 , has been trying to take her lipitor 10mg qhs. Has old rx for pravastatin but hasn't been taking it.        Type 2 Diabetes Mellitus With Microalbuminuria (Hcc)    3/21 a1c 7.2 and fbs 129.  She does not do any regular exercises, walks a little in the house, doesn't like the cold. Legs tire quickly w/ exercise.   6.8 a1c 11/1/21 - she loves sweets and is leaving to visit family in [place] soon. Has lost almost 10lb, is trying to be careful about her diet and walking some. No longer takes metformin, affected her stomach.      Endometrial Cancer (Hcc)    She was diagnosed with endometrial cancer in June 2019.  She completed hysterectomy and radiation therapy, no chemotherapy indicated.  Tolerated treatment well and follows up with her gynecologist.  She does endorse urinary urgency overnight since her surgery. She continues to have f/u with her oncologist. Stable, continue current plan of care       Suprapubic Pain, Acute (Resolved)    She reports an episode on February 22 in the evening of suprapubic pain.  It occurred after she ate a heavy meal with potatoes.  The pain lasted approximately 3 hours.  She describes it as painful and cannot give me any other qualifying features.  It was in the lower abdomen/bladder area with very subtle radiation to the right lower quadrant.   No radiation to the upper abdomen, back, or down the legs.  No associated diarrhea, rectal bleeding, or vaginal bleeding.  She did send some dysuria.  She has had regular bowel movements, usually once daily.  No associated fever, chills, nausea/vomiting.  She perceives the potato intake as the underlying cause.  No prior occurrence.  She had hysterectomy for uterine cancer and cholecystectomy within a few months of each other in 2019, no known history of adhesions.  No return of pain since the episode last night.         Current Outpatient Medications Ordered in Epic   Medication Sig Dispense Refill   • FLUoxetine (PROZAC) 20 MG Cap Take 1 Capsule by mouth every day. Para animo 100 Capsule 2   • lisinopril (PRINIVIL) 10 MG Tab Take 1 Tablet by mouth every day. Indications: High Blood Pressure Disorder 100 Tablet 3   • NYSTOP powder APPLY TO AFFECTED AREA     • atorvastatin (LIPITOR) 10 MG Tab Take 1 Tablet by mouth at bedtime. 100 Tablet 2   • famotidine (PEPCID) 20 MG Tab Take 1 Tablet by mouth 2 times a day. For abdominal pain 60 Tablet 5   • MAGNESIUM CITRATE PO Take  by mouth.     • acetaminophen (TYLENOL) 325 MG Tab Take 2 Tabs by mouth every 6 hours as needed (Mild Pain; (Pain scale 1-3); Temp greater than 100.5 F). 30 Tab 0     No current Epic-ordered facility-administered medications on file.       Past Medical History:   Diagnosis Date   • Acute cholecystitis 9/19/2019   • Arthritis     osteo, finger/shoulders   • Back pain    • Blood clotting disorder (HCC) 2004    leg   • Bronchitis    • Chickenpox    • Dental disorder     upper/lower dentures   • Diabetes     oral meds   • Diabetes mellitus (HCC) 9/19/2019      Ref. Range 4/8/2019 13:20 11/6/2019 16:47 Glycohemoglobin Latest Ref Range: 0.0 - 5.6 % 6.6 (H) 5.7 (A) Estim. Avg Glu Latest Units: mg/dL 143     Ref. Range 11/6/2019 16:35 Micro Alb Creat Ratio Latest Ref Range: 0 - 30 mg/g 167 (H)   She has history of well-controlled type 2 diabetes.  She is  "previously taking metformin 500 mg twice daily however her A1c on recheck was down to 5.7 so we discon   • Diarrhea 9/26/2019    She reports diarrhea starting about 3 days ago.  She has multiple small-volume liquidy/emanuel brown to dark-colored stools daily.  This morning she reports already 5-6 times of going to the bathroom with some stool.  She has not had a formed stool many days and proceeding this diarrhea she was actually constipated.  She had taken some senna but then stopped it and the loose stool started.  She is o   • Dysuria 9/11/2019   • Endometrial cancer (HCC)    • Heart valve disease     \"murmur\"   • High cholesterol    • Hyperlipidemia    • Hypertension    • Hypotension due to hypovolemia 9/26/2019    She has a low blood pressure in office today of 84/52 with a baseline in the 100s to 120s systolic.  This is likely due to her frequent episodes of diarrhea exacerbated by ongoing use of lisinopril.  Yesterday, she felt lightheaded and dizzy but did not pass out or have a fall.  She is having challenges of what is appropriate to eat as she had recent pelvic radiation as well as a cholecystectomy b   • Mumps    • Nasal drainage    • Sleep apnea     CPAP   • Snoring    • Urinary incontinence        Social History     Tobacco Use   • Smoking status: Never Smoker   • Smokeless tobacco: Never Used   Vaping Use   • Vaping Use: Never used   Substance Use Topics   • Alcohol use: No   • Drug use: No       Allergies:  Bloodless, Ibuprofen, and Penicillins    Health Maintenance: had her 3rd booster for covid    ROS:  Per HPI      Objective:       Exam:  /70 (BP Location: Right arm, Patient Position: Sitting, BP Cuff Size: Adult)   Pulse 76   Temp 36.7 °C (98 °F) (Temporal)   Resp 16   Ht 1.6 m (5' 3\")   Wt 99.6 kg (219 lb 9.6 oz)   LMP  (LMP Unknown)   SpO2 97%   BMI 38.90 kg/m²   Body mass index is 38.9 kg/m².  Wt Readings from Last 4 Encounters:   01/05/22 99.6 kg (219 lb 9.6 oz)   11/04/21 104 kg " (228 lb 3.2 oz)   09/30/21 102 kg (224 lb 6.4 oz)   07/01/21 101 kg (221 lb 11.2 oz)       Gen: Alert and oriented, No apparent distress. Appropriately groomed.  Neck: Neck is supple without lymphadenopathy.No thyromegaly.   Lungs: Normal effort, CTA bilaterally, no wheezes, rhonchi, or rales.  CV: Regular rate and rhythm. No murmurs, rubs, or gallops.  ABD:  Soft, nontender, nondistended, NABSx4, no HSM or RBT or guarding or masses.  Ext: No clubbing, cyanosis, edema.  Skin: No rash noted.      Assessment & Plan:     71 y.o. female with the following -   Can trial resume of prozac, seemed to help w/ energy when took few mo ago, only stopped bc ran out.   Sign mvd form, permanent - fatigue in large part after her tx for em cancer.   1. Type 2 diabetes mellitus with microalbuminuria, without long-term current use of insulin (HCC)    2. Gastroesophageal reflux disease without esophagitis    3. Hyperlipidemia associated with type 2 diabetes mellitus (HCC)    4. Endometrial cancer (HCC)    5. Aortic atherosclerosis (HCC)    Other orders  - FLUoxetine (PROZAC) 20 MG Cap; Take 1 Capsule by mouth every day. Para animo  Dispense: 100 Capsule; Refill: 2      Return in about 3 months (around 4/5/2022) for med review, DM.    Please note that this dictation was created using voice recognition software. I have made every reasonable attempt to correct obvious errors, but I expect that there are errors of grammar and possibly content that I did not discover before finalizing the note.

## 2022-01-27 ENCOUNTER — TELEPHONE (OUTPATIENT)
Dept: HEALTH INFORMATION MANAGEMENT | Facility: OTHER | Age: 72
End: 2022-01-27

## 2022-04-13 ENCOUNTER — HOSPITAL ENCOUNTER (OUTPATIENT)
Dept: RADIOLOGY | Facility: MEDICAL CENTER | Age: 72
End: 2022-04-13
Attending: CHIROPRACTOR
Payer: MEDICARE

## 2022-04-13 DIAGNOSIS — M25.511 RIGHT SHOULDER PAIN, UNSPECIFIED CHRONICITY: ICD-10-CM

## 2022-04-13 DIAGNOSIS — M54.6 PAIN IN THORACIC SPINE: ICD-10-CM

## 2022-04-13 DIAGNOSIS — M54.2 CERVICALGIA: ICD-10-CM

## 2022-04-13 PROCEDURE — 72072 X-RAY EXAM THORAC SPINE 3VWS: CPT

## 2022-04-13 PROCEDURE — 73030 X-RAY EXAM OF SHOULDER: CPT | Mod: RT

## 2022-04-13 PROCEDURE — 72040 X-RAY EXAM NECK SPINE 2-3 VW: CPT

## 2022-04-20 ENCOUNTER — HOSPITAL ENCOUNTER (OUTPATIENT)
Facility: MEDICAL CENTER | Age: 72
End: 2022-04-20
Attending: FAMILY MEDICINE
Payer: MEDICARE

## 2022-04-20 ENCOUNTER — HOSPITAL ENCOUNTER (OUTPATIENT)
Dept: LAB | Facility: MEDICAL CENTER | Age: 72
End: 2022-04-20
Attending: FAMILY MEDICINE
Payer: MEDICARE

## 2022-04-20 ENCOUNTER — OFFICE VISIT (OUTPATIENT)
Dept: MEDICAL GROUP | Facility: PHYSICIAN GROUP | Age: 72
End: 2022-04-20
Payer: MEDICARE

## 2022-04-20 VITALS
RESPIRATION RATE: 16 BRPM | TEMPERATURE: 96.5 F | HEART RATE: 86 BPM | HEIGHT: 63 IN | DIASTOLIC BLOOD PRESSURE: 82 MMHG | BODY MASS INDEX: 38.9 KG/M2 | SYSTOLIC BLOOD PRESSURE: 122 MMHG | OXYGEN SATURATION: 95 %

## 2022-04-20 DIAGNOSIS — I15.2 HYPERTENSION ASSOCIATED WITH DIABETES (HCC): ICD-10-CM

## 2022-04-20 DIAGNOSIS — K62.5 BRIGHT RED RECTAL BLEEDING: ICD-10-CM

## 2022-04-20 DIAGNOSIS — R80.9 TYPE 2 DIABETES MELLITUS WITH MICROALBUMINURIA, WITHOUT LONG-TERM CURRENT USE OF INSULIN (HCC): ICD-10-CM

## 2022-04-20 DIAGNOSIS — R10.30 LOWER ABDOMINAL PAIN: ICD-10-CM

## 2022-04-20 DIAGNOSIS — E11.59 HYPERTENSION ASSOCIATED WITH DIABETES (HCC): ICD-10-CM

## 2022-04-20 DIAGNOSIS — C54.1 ENDOMETRIAL CANCER (HCC): ICD-10-CM

## 2022-04-20 DIAGNOSIS — E11.29 TYPE 2 DIABETES MELLITUS WITH MICROALBUMINURIA, WITHOUT LONG-TERM CURRENT USE OF INSULIN (HCC): ICD-10-CM

## 2022-04-20 DIAGNOSIS — Z13.89 SCREENING FOR BLOOD OR PROTEIN IN URINE: ICD-10-CM

## 2022-04-20 DIAGNOSIS — I70.0 ATHEROSCLEROSIS OF AORTA (HCC): ICD-10-CM

## 2022-04-20 LAB
ANION GAP SERPL CALC-SCNC: 12 MMOL/L (ref 7–16)
APPEARANCE UR: CLEAR
BASOPHILS # BLD AUTO: 0.4 % (ref 0–1.8)
BASOPHILS # BLD: 0.03 K/UL (ref 0–0.12)
BILIRUB UR STRIP-MCNC: NEGATIVE MG/DL
BUN SERPL-MCNC: 14 MG/DL (ref 8–22)
CALCIUM SERPL-MCNC: 9.3 MG/DL (ref 8.5–10.5)
CHLORIDE SERPL-SCNC: 104 MMOL/L (ref 96–112)
CO2 SERPL-SCNC: 26 MMOL/L (ref 20–33)
COLOR UR AUTO: YELLOW
CREAT SERPL-MCNC: 0.61 MG/DL (ref 0.5–1.4)
EOSINOPHIL # BLD AUTO: 0.29 K/UL (ref 0–0.51)
EOSINOPHIL NFR BLD: 4 % (ref 0–6.9)
ERYTHROCYTE [DISTWIDTH] IN BLOOD BY AUTOMATED COUNT: 56.7 FL (ref 35.9–50)
FERRITIN SERPL-MCNC: 177 NG/ML (ref 10–291)
FOLATE SERPL-MCNC: 14.7 NG/ML
GFR SERPLBLD CREATININE-BSD FMLA CKD-EPI: 94 ML/MIN/1.73 M 2
GLUCOSE SERPL-MCNC: 99 MG/DL (ref 65–99)
GLUCOSE UR STRIP.AUTO-MCNC: NEGATIVE MG/DL
HBA1C MFR BLD: 7 % (ref 0–5.6)
HCT VFR BLD AUTO: 40.9 % (ref 37–47)
HGB BLD-MCNC: 12.8 G/DL (ref 12–16)
IMM GRANULOCYTES # BLD AUTO: 0.03 K/UL (ref 0–0.11)
IMM GRANULOCYTES NFR BLD AUTO: 0.4 % (ref 0–0.9)
INT CON NEG: NEGATIVE
INT CON POS: POSITIVE
IRON SATN MFR SERPL: 19 % (ref 15–55)
IRON SERPL-MCNC: 45 UG/DL (ref 40–170)
KETONES UR STRIP.AUTO-MCNC: NEGATIVE MG/DL
LEUKOCYTE ESTERASE UR QL STRIP.AUTO: NEGATIVE
LYMPHOCYTES # BLD AUTO: 1.86 K/UL (ref 1–4.8)
LYMPHOCYTES NFR BLD: 25.8 % (ref 22–41)
MCH RBC QN AUTO: 30.3 PG (ref 27–33)
MCHC RBC AUTO-ENTMCNC: 31.3 G/DL (ref 33.6–35)
MCV RBC AUTO: 96.7 FL (ref 81.4–97.8)
MONOCYTES # BLD AUTO: 0.52 K/UL (ref 0–0.85)
MONOCYTES NFR BLD AUTO: 7.2 % (ref 0–13.4)
NEUTROPHILS # BLD AUTO: 4.47 K/UL (ref 2–7.15)
NEUTROPHILS NFR BLD: 62.2 % (ref 44–72)
NITRITE UR QL STRIP.AUTO: NEGATIVE
NRBC # BLD AUTO: 0 K/UL
NRBC BLD-RTO: 0 /100 WBC
PH UR STRIP.AUTO: 5.5 [PH] (ref 5–8)
PLATELET # BLD AUTO: 285 K/UL (ref 164–446)
PMV BLD AUTO: 10.1 FL (ref 9–12.9)
POTASSIUM SERPL-SCNC: 4.2 MMOL/L (ref 3.6–5.5)
PROT UR QL STRIP: NEGATIVE MG/DL
RBC # BLD AUTO: 4.23 M/UL (ref 4.2–5.4)
RBC UR QL AUTO: NEGATIVE
SODIUM SERPL-SCNC: 142 MMOL/L (ref 135–145)
SP GR UR STRIP.AUTO: 1.02
TIBC SERPL-MCNC: 241 UG/DL (ref 250–450)
UIBC SERPL-MCNC: 196 UG/DL (ref 110–370)
UROBILINOGEN UR STRIP-MCNC: 0.2 MG/DL
VIT B12 SERPL-MCNC: 835 PG/ML (ref 211–911)
WBC # BLD AUTO: 7.2 K/UL (ref 4.8–10.8)

## 2022-04-20 PROCEDURE — 82570 ASSAY OF URINE CREATININE: CPT

## 2022-04-20 PROCEDURE — 80048 BASIC METABOLIC PNL TOTAL CA: CPT

## 2022-04-20 PROCEDURE — 82043 UR ALBUMIN QUANTITATIVE: CPT

## 2022-04-20 PROCEDURE — 83540 ASSAY OF IRON: CPT

## 2022-04-20 PROCEDURE — 81002 URINALYSIS NONAUTO W/O SCOPE: CPT | Performed by: FAMILY MEDICINE

## 2022-04-20 PROCEDURE — 82728 ASSAY OF FERRITIN: CPT

## 2022-04-20 PROCEDURE — 82607 VITAMIN B-12: CPT

## 2022-04-20 PROCEDURE — 82746 ASSAY OF FOLIC ACID SERUM: CPT

## 2022-04-20 PROCEDURE — 99214 OFFICE O/P EST MOD 30 MIN: CPT | Performed by: FAMILY MEDICINE

## 2022-04-20 PROCEDURE — 85025 COMPLETE CBC W/AUTO DIFF WBC: CPT

## 2022-04-20 PROCEDURE — 36415 COLL VENOUS BLD VENIPUNCTURE: CPT

## 2022-04-20 PROCEDURE — 83550 IRON BINDING TEST: CPT

## 2022-04-20 PROCEDURE — 83036 HEMOGLOBIN GLYCOSYLATED A1C: CPT | Performed by: FAMILY MEDICINE

## 2022-04-20 RX ORDER — FLUOXETINE HYDROCHLORIDE 20 MG/1
20 CAPSULE ORAL DAILY
Qty: 100 CAPSULE | Refills: 2 | Status: ON HOLD | OUTPATIENT
Start: 2022-04-20 | End: 2022-09-09

## 2022-04-20 RX ORDER — COVID-19 ANTIGEN TEST
KIT MISCELLANEOUS
COMMUNITY
End: 2022-04-20

## 2022-04-20 RX ORDER — MULTIVIT-MIN/IRON/FOLIC ACID/K 18-600-40
CAPSULE ORAL
COMMUNITY
End: 2022-09-07

## 2022-04-20 ASSESSMENT — PATIENT HEALTH QUESTIONNAIRE - PHQ9
SUM OF ALL RESPONSES TO PHQ QUESTIONS 1-9: 2
5. POOR APPETITE OR OVEREATING: 0 - NOT AT ALL
CLINICAL INTERPRETATION OF PHQ2 SCORE: 2

## 2022-04-20 NOTE — PROGRESS NOTES
Subjective:     CC:   Chief Complaint   Patient presents with   • Blood in Urine      In [place] stated there was blood in urine, but it was actually in the rectum,    • Diarrhea     At the beginning it was diarrhea, then it was blood in the rectum from hemmorhedge, not having it anymore, would like to know    • Medication Management     Atorvastatin too costly and unable to take medication         HPI:   Madelyn presents today with :  Saw me in January, at that time I suggested she could resume her Prozac since it seemed to help with her energy when she took it prior but stopped when she ran out.  She was in [place] from 1/22 until the end of 3/22. Around 1/17/22 experienced a strong pain in lower abd a/w sensation to urinate but would have large amts of bloody watery diarrhea. Then it was just pure blood. She went to see a doctor urgently, he had a 24h clinic and was given 3 bags of IVF and iron. She does not take blood products bc of her Taoism. Doesn't appear she was given abx. Was given loperamid.   She had a c scope 3-4y ago. Told she had 2 small polyps that weren't worrisome, had them removed. Told to return in 5y. Told by MD in [place] to obtain cscope now. Had CT abd/pelv 6/19, no significant diverticulitis noted then.    2/10/22 h/h 12.9/38.3  She had dizzy sxs w/ lipitor, she even fell in the bathroom bc of her blood in stool/ diarrhea. Hx issues w/ pravastatin in past.   a1c 04/20/22 7.0  ua neg.  No longer on pepcid, stomach no longer bothers her.  No problems updated.    Current Outpatient Medications Ordered in Epic   Medication Sig Dispense Refill   • Ascorbic Acid (VITAMIN C) 500 MG Cap Take  by mouth.     • multivitamin (THERAGRAN) Tab Take 1 Tablet by mouth every day.     • FLUoxetine (PROZAC) 20 MG Cap Take 1 Capsule by mouth every day. Para animo 100 Capsule 2   • lisinopril (PRINIVIL) 10 MG Tab Take 1 Tablet by mouth every day. Indications: High Blood Pressure  "Disorder 100 Tablet 3   • NYSTOP powder APPLY TO AFFECTED AREA     • MAGNESIUM CITRATE PO Take  by mouth.       No current Epic-ordered facility-administered medications on file.       Past Medical History:   Diagnosis Date   • Acute cholecystitis 9/19/2019   • Arthritis     osteo, finger/shoulders   • Back pain    • Blood clotting disorder ([facility]) 2004    leg   • Bronchitis    • Chickenpox    • Dental disorder     upper/lower dentures   • Diabetes     oral meds   • Diabetes mellitus ([facility]) 9/19/2019      Ref. Range 4/8/2019 13:20 11/6/2019 16:47 Glycohemoglobin Latest Ref Range: 0.0 - 5.6 % 6.6 (H) 5.7 (A) Estim. Avg Glu Latest Units: mg/dL 143     Ref. Range 11/6/2019 16:35 Micro Alb Creat Ratio Latest Ref Range: 0 - 30 mg/g 167 (H)   She has history of well-controlled type 2 diabetes.  She is previously taking metformin 500 mg twice daily however her A1c on recheck was down to 5.7 so we discon   • Diarrhea 9/26/2019    She reports diarrhea starting about 3 days ago.  She has multiple small-volume liquidy/emanuel brown to dark-colored stools daily.  This morning she reports already 5-6 times of going to the bathroom with some stool.  She has not had a formed stool many days and proceeding this diarrhea she was actually constipated.  She had taken some senna but then stopped it and the loose stool started.  She is o   • Dysuria 9/11/2019   • Endometrial cancer ([facility])    • Heart valve disease     \"murmur\"   • High cholesterol    • Hyperlipidemia    • Hypertension    • Hypotension due to hypovolemia 9/26/2019    She has a low blood pressure in office today of 84/52 with a baseline in the 100s to 120s systolic.  This is likely due to her frequent episodes of diarrhea exacerbated by ongoing use of lisinopril.  Yesterday, she felt lightheaded and dizzy but did not pass out or have a fall.  She is having challenges of what is appropriate to eat as she had recent pelvic radiation as well as a cholecystectomy b   • Mumps    • " "Nasal drainage    • Sleep apnea     CPAP   • Snoring    • Urinary incontinence        Social History     Tobacco Use   • Smoking status: Never Smoker   • Smokeless tobacco: Never Used   Vaping Use   • Vaping Use: Never used   Substance Use Topics   • Alcohol use: No   • Drug use: No       Allergies:  Bloodless, Ibuprofen, and Penicillins    Health Maintenance: Completed    ROS:  Per HPI      Objective:       Exam:  /82 (BP Location: Right arm, Patient Position: Sitting, BP Cuff Size: Adult)   Pulse 86   Temp 35.8 °C (96.5 °F) (Temporal)   Resp 16   Ht 1.6 m (5' 3\")   LMP  (LMP Unknown)   SpO2 95%   BMI 38.90 kg/m²   Body mass index is 38.9 kg/m².  Wt Readings from Last 4 Encounters:   01/05/22 99.6 kg (219 lb 9.6 oz)   11/04/21 104 kg (228 lb 3.2 oz)   09/30/21 102 kg (224 lb 6.4 oz)   07/01/21 101 kg (221 lb 11.2 oz)       Gen: Alert and oriented, No apparent distress. Appropriately groomed.  Neck: Neck is supple without lymphadenopathy.No thyromegaly.   Lungs: Normal effort, CTA bilaterally, no wheezes, rhonchi, or rales.  CV: Regular rate and rhythm. No murmurs, rubs, or gallops.  ABD:  Soft, + bilat lower abd (mildlly) tender, nondistended, NABSx4, no HSM or RBT or guarding or masses.  Ext: No clubbing, cyanosis, edema.  Skin: No rash noted.  Monofilament testing with a 10 gram force: sensation intact: intact bilaterally  Visual Inspection: Feet without maceration, ulcers, fissures.  Pedal pulses: intact bilaterally        Assessment & Plan:     72 y.o. female with the following -   She defers statin  Resume prozac, incr to the 20mg/d  No nsaid, tyenol prn  Recent xrays reviewed, c/w OA  1. Screening for blood or protein in urine  - POCT Urinalysis    2. Type 2 diabetes mellitus with microalbuminuria, without long-term current use of insulin (HCC)  - POCT Hemoglobin A1C  - Diabetic Monofilament Lower Extremity Exam  - Microalbumin Creat Ratio Urine - Clinic Collect; Future  - Basic Metabolic Panel; " Future  - CBC WITH DIFFERENTIAL; Future  - Referral to Podiatry    3. Bright red rectal bleeding  - Referral to GI for Colonoscopy  - CBC WITH DIFFERENTIAL; Future  - FERRITIN; Future  - FOLATE; Future  - IRON/TOTAL IRON BIND; Future  - VITAMIN B12; Future  - CT ABDOMEN-PELVIS WITH IV CONTRAST; Future    4. Lower abdominal pain  - Referral to GI for Colonoscopy  - CT ABDOMEN-PELVIS WITH IV CONTRAST; Future    5. Endometrial cancer (HCC)  - CT ABDOMEN-PELVIS WITH IV CONTRAST; Future    Other orders  - Ascorbic Acid (VITAMIN C) 500 MG Cap; Take  by mouth.  - multivitamin (THERAGRAN) Tab; Take 1 Tablet by mouth every day.  - FLUoxetine (PROZAC) 20 MG Cap; Take 1 Capsule by mouth every day. Para animo  Dispense: 100 Capsule; Refill: 2          Return for f/u 5-6wk LGIB.    Please note that this dictation was created using voice recognition software. I have made every reasonable attempt to correct obvious errors, but I expect that there are errors of grammar and possibly content that I did not discover before finalizing the note.

## 2022-04-21 LAB
CREAT UR-MCNC: 94.94 MG/DL
MICROALBUMIN UR-MCNC: <1.2 MG/DL
MICROALBUMIN/CREAT UR: NORMAL MG/G (ref 0–30)

## 2022-05-04 ENCOUNTER — OFFICE VISIT (OUTPATIENT)
Dept: MEDICAL GROUP | Facility: PHYSICIAN GROUP | Age: 72
End: 2022-05-04
Payer: MEDICARE

## 2022-05-04 VITALS
RESPIRATION RATE: 16 BRPM | TEMPERATURE: 96.9 F | HEIGHT: 63 IN | OXYGEN SATURATION: 95 % | SYSTOLIC BLOOD PRESSURE: 114 MMHG | WEIGHT: 216.7 LBS | HEART RATE: 87 BPM | BODY MASS INDEX: 38.39 KG/M2 | DIASTOLIC BLOOD PRESSURE: 84 MMHG

## 2022-05-04 DIAGNOSIS — C54.1 ENDOMETRIAL CANCER (HCC): ICD-10-CM

## 2022-05-04 DIAGNOSIS — E11.69 HYPERLIPIDEMIA ASSOCIATED WITH TYPE 2 DIABETES MELLITUS (HCC): ICD-10-CM

## 2022-05-04 DIAGNOSIS — E78.5 HYPERLIPIDEMIA ASSOCIATED WITH TYPE 2 DIABETES MELLITUS (HCC): ICD-10-CM

## 2022-05-04 DIAGNOSIS — I35.8 NONRHEUMATIC AORTIC VALVE SCLEROSIS: ICD-10-CM

## 2022-05-04 DIAGNOSIS — R10.30 LOWER ABDOMINAL PAIN: ICD-10-CM

## 2022-05-04 DIAGNOSIS — Z23 NEED FOR VACCINATION: ICD-10-CM

## 2022-05-04 DIAGNOSIS — R01.1 CARDIAC MURMUR: ICD-10-CM

## 2022-05-04 PROBLEM — R80.9 TYPE 2 DIABETES MELLITUS WITH MICROALBUMINURIA (HCC): Status: RESOLVED | Noted: 2019-09-19 | Resolved: 2022-05-04

## 2022-05-04 PROBLEM — E11.29 TYPE 2 DIABETES MELLITUS WITH MICROALBUMINURIA (HCC): Status: RESOLVED | Noted: 2019-09-19 | Resolved: 2022-05-04

## 2022-05-04 PROCEDURE — 99213 OFFICE O/P EST LOW 20 MIN: CPT | Performed by: FAMILY MEDICINE

## 2022-05-04 ASSESSMENT — FIBROSIS 4 INDEX: FIB4 SCORE: 1.17

## 2022-05-04 NOTE — PROGRESS NOTES
Subjective:     CC:   Chief Complaint   Patient presents with   • Follow-Up   • Lab Results     4/20/22 BW 4/13/22 Imaging         HPI:   Madelyn presents today with follow-up from our last appointment on April 20.  At that time she continued to defer statin as she has had multiple problems with statins.  I did suggest she resume her Prozac and increase to the 20 mg a day.  I also ordered a panel of blood work which was normal.  I also ordered a CT of her abdomen pelvis and referred her to GI due to her history of lower abdominal pain that occurred while in [place] in January 2022.  She did not return until the end of March and was seen at a 24-hour clinic in [place].  Her lower abdominal pain was associated with a sensation to urinate followed by large amounts of bloody watery diarrhea.  Apparently she also had a colonoscopy a few years ago and was told she had 2 small polyps that were not worrisome and did have them removed.  She was told to follow-up in 5 years.  Her CAT scan has not yet been obtained. Has CT 5/5 and sees GI 8/22.   Has not had any recurrence of abd pain. Back on prozac, no issues w/ this.   Has had pcv13 and pcv23    Problem   Nonrheumatic Aortic Valve Sclerosis    Last saw cardiology 12/19 for moderate AS noted on 6/19 echo, plan was to repeat echo and f/u 12/20.  Had a cardiac w/u prior to her DES.  03/31/21 she defers a repeat echo or f/u cardiology.   05/04/22 still with 2/6 systolic murmur. After further d/w her today she agrees to do the echo.         Hyperlipidemia Associated With Type 2 Diabetes Mellitus (Hcc)    11/1 , doesn't tolerate statins. Stable, continue current plan of care with current medications.   Diet controlled.        Endometrial Cancer (Hcc)    She was diagnosed with endometrial cancer in June 2019.  She completed hysterectomy and radiation therapy, no chemotherapy indicated.  Tolerated treatment well and follows up with her gynecologist.  She does endorse urinary  urgency overnight since her surgery. She continues to have f/u with her oncologist.  Stable, continue current plan of care with current medications       Type 2 Diabetes Mellitus With Microalbuminuria (Hcc) (Resolved)    Stable, continue current plan of care with current medications.            Current Outpatient Medications Ordered in Epic   Medication Sig Dispense Refill   • Ascorbic Acid (VITAMIN C) 500 MG Cap Take  by mouth.     • multivitamin (THERAGRAN) Tab Take 1 Tablet by mouth every day.     • FLUoxetine (PROZAC) 20 MG Cap Take 1 Capsule by mouth every day. Para animo 100 Capsule 2   • lisinopril (PRINIVIL) 10 MG Tab Take 1 Tablet by mouth every day. Indications: High Blood Pressure Disorder 100 Tablet 3   • MAGNESIUM CITRATE PO Take  by mouth.     • NYSTOP powder APPLY TO AFFECTED AREA (Patient not taking: Reported on 5/4/2022)       No current [facility]-ordered facility-administered medications on file.       Past Medical History:   Diagnosis Date   • Acute cholecystitis 9/19/2019   • Arthritis     osteo, finger/shoulders   • Back pain    • Blood clotting disorder ([facility]) 2004    leg   • Bronchitis    • Chickenpox    • Dental disorder     upper/lower dentures   • Diabetes     oral meds   • Diabetes mellitus (HCC) 9/19/2019      Ref. Range 4/8/2019 13:20 11/6/2019 16:47 Glycohemoglobin Latest Ref Range: 0.0 - 5.6 % 6.6 (H) 5.7 (A) Estim. Avg Glu Latest Units: mg/dL 143     Ref. Range 11/6/2019 16:35 Micro Alb Creat Ratio Latest Ref Range: 0 - 30 mg/g 167 (H)   She has history of well-controlled type 2 diabetes.  She is previously taking metformin 500 mg twice daily however her A1c on recheck was down to 5.7 so we discon   • Diarrhea 9/26/2019    She reports diarrhea starting about 3 days ago.  She has multiple small-volume liquidy/emanuel brown to dark-colored stools daily.  This morning she reports already 5-6 times of going to the bathroom with some stool.  She has not had a formed stool many days and proceeding  "this diarrhea she was actually constipated.  She had taken some senna but then stopped it and the loose stool started.  She is o   • Dysuria 9/11/2019   • Endometrial cancer (HCC)    • Heart valve disease     \"murmur\"   • High cholesterol    • Hyperlipidemia    • Hypertension    • Hypotension due to hypovolemia 9/26/2019    She has a low blood pressure in office today of 84/52 with a baseline in the 100s to 120s systolic.  This is likely due to her frequent episodes of diarrhea exacerbated by ongoing use of lisinopril.  Yesterday, she felt lightheaded and dizzy but did not pass out or have a fall.  She is having challenges of what is appropriate to eat as she had recent pelvic radiation as well as a cholecystectomy b   • Mumps    • Nasal drainage    • Sleep apnea     CPAP   • Snoring    • Urinary incontinence        Social History     Tobacco Use   • Smoking status: Never Smoker   • Smokeless tobacco: Never Used   Vaping Use   • Vaping Use: Never used   Substance Use Topics   • Alcohol use: No   • Drug use: No       Allergies:  Bloodless, Ibuprofen, and Penicillins    Health Maintenance: Completed    ROS:  Per HPI      Objective:       Exam:  /84 (BP Location: Right arm, Patient Position: Sitting, BP Cuff Size: Large adult)   Pulse 87   Temp 36.1 °C (96.9 °F) (Temporal)   Resp 16   Ht 1.6 m (5' 3\")   Wt 98.3 kg (216 lb 11.2 oz)   LMP  (LMP Unknown)   SpO2 95%   BMI 38.39 kg/m²   Body mass index is 38.39 kg/m².  Wt Readings from Last 4 Encounters:   05/04/22 98.3 kg (216 lb 11.2 oz)   01/05/22 99.6 kg (219 lb 9.6 oz)   11/04/21 104 kg (228 lb 3.2 oz)   09/30/21 102 kg (224 lb 6.4 oz)       Gen: Alert and oriented, No apparent distress. Appropriately groomed.  Neck: Neck is supple without lymphadenopathy.No thyromegaly.   Lungs: Normal effort, CTA bilaterally, no wheezes, rhonchi, or rales.  CV: Regular rate and rhythm. No murmurs, rubs, or gallops.  ABD:  Soft, nontender, nondistended, NABSx4, no HSM or " RBT or guarding or masses.  Ext: No clubbing, cyanosis, edema.  Skin: No rash noted.      Labs: Her April 20 labs showed an A1c of 7.0, within normal labs for CBC, BMP, ferritin, folate, vitamin B12, iron and microalbumin.      Assessment & Plan:     72 y.o. female with the following -   CT tomorrow  GI in 8/22  F/u echo in 1-2m  Continue prozac 20mg/d, doesn't tolerate rx for dm or chol  1. Need for vaccination    2. Hyperlipidemia associated with type 2 diabetes mellitus (HCC)    3. Endometrial cancer (HCC)    4. Lower abdominal pain    5. Nonrheumatic aortic valve sclerosis  - EC-ECHOCARDIOGRAM COMPLETE W/O CONT; Future    6. Cardiac murmur  - EC-ECHOCARDIOGRAM COMPLETE W/O CONT; Future      Return for f/u echo in june/july w/ me.    Please note that this dictation was created using voice recognition software. I have made every reasonable attempt to correct obvious errors, but I expect that there are errors of grammar and possibly content that I did not discover before finalizing the note.

## 2022-05-04 NOTE — PROGRESS NOTES
Annual Health Assessment Questions:    1.  Are you currently engaging in any exercise or physical activity? Yes    2.  How would you describe your mood or emotional well-being today? good    3.  Have you had any falls in the last year? Yes    4.  Have you noticed any problems with your balance or had difficulty walking? No    5.  In the last six months have you experienced any leakage of urine? No    6. DPA/Advanced Directive: Patient has Advanced Directive on file.

## 2022-05-05 ENCOUNTER — HOSPITAL ENCOUNTER (OUTPATIENT)
Dept: RADIOLOGY | Facility: MEDICAL CENTER | Age: 72
End: 2022-05-05
Attending: FAMILY MEDICINE
Payer: MEDICARE

## 2022-05-05 DIAGNOSIS — C54.1 ENDOMETRIAL CANCER (HCC): ICD-10-CM

## 2022-05-05 DIAGNOSIS — R10.30 LOWER ABDOMINAL PAIN: ICD-10-CM

## 2022-05-05 DIAGNOSIS — K62.5 BRIGHT RED RECTAL BLEEDING: ICD-10-CM

## 2022-05-05 PROCEDURE — 700117 HCHG RX CONTRAST REV CODE 255: Performed by: FAMILY MEDICINE

## 2022-05-05 PROCEDURE — 74177 CT ABD & PELVIS W/CONTRAST: CPT | Mod: MG

## 2022-05-05 RX ADMIN — IOHEXOL 100 ML: 350 INJECTION, SOLUTION INTRAVENOUS at 11:15

## 2022-05-05 RX ADMIN — IOHEXOL 25 ML: 240 INJECTION, SOLUTION INTRATHECAL; INTRAVASCULAR; INTRAVENOUS; ORAL at 10:00

## 2022-05-13 ENCOUNTER — TELEPHONE (OUTPATIENT)
Dept: MEDICAL GROUP | Facility: PHYSICIAN GROUP | Age: 72
End: 2022-05-13
Payer: MEDICARE

## 2022-05-13 NOTE — TELEPHONE ENCOUNTER
I called and spoke with the patient and explained to her that her recent CT abdomen and pelvis showed a ventral hernia but nothing worrisome.  She should keep the appointment to schedule with GI for colonoscopy for her episode of blood in the stool when she was in [place].  She expressed good understanding and will proceed with this.  She has an appointment to see me back in July.  No further episodes or complaints of abdominal pain or bleeding.

## 2022-05-24 ENCOUNTER — PATIENT MESSAGE (OUTPATIENT)
Dept: HEALTH INFORMATION MANAGEMENT | Facility: OTHER | Age: 72
End: 2022-05-24

## 2022-07-08 ENCOUNTER — OFFICE VISIT (OUTPATIENT)
Dept: MEDICAL GROUP | Facility: PHYSICIAN GROUP | Age: 72
End: 2022-07-08
Payer: MEDICARE

## 2022-07-08 VITALS
SYSTOLIC BLOOD PRESSURE: 124 MMHG | RESPIRATION RATE: 16 BRPM | WEIGHT: 221 LBS | HEART RATE: 78 BPM | DIASTOLIC BLOOD PRESSURE: 74 MMHG | OXYGEN SATURATION: 98 % | TEMPERATURE: 97.7 F | BODY MASS INDEX: 39.16 KG/M2 | HEIGHT: 63 IN

## 2022-07-08 DIAGNOSIS — K42.9 UMBILICAL HERNIA WITHOUT OBSTRUCTION AND WITHOUT GANGRENE: ICD-10-CM

## 2022-07-08 DIAGNOSIS — R10.30 LOWER ABDOMINAL PAIN: ICD-10-CM

## 2022-07-08 PROCEDURE — 99213 OFFICE O/P EST LOW 20 MIN: CPT | Performed by: FAMILY MEDICINE

## 2022-07-08 RX ORDER — OMEPRAZOLE 20 MG/1
20 CAPSULE, DELAYED RELEASE ORAL
Qty: 30 CAPSULE | Refills: 1 | Status: ON HOLD | OUTPATIENT
Start: 2022-07-08 | End: 2022-09-09

## 2022-07-08 ASSESSMENT — FIBROSIS 4 INDEX: FIB4 SCORE: 1.17

## 2022-07-08 NOTE — PROGRESS NOTES
Subjective:     CC:   Chief Complaint   Patient presents with   • Abdominal Pain     Started Tuesday night         HPI:   Madelyn presents today with follow-up of her abdominal pain.  She developed lower abdominal pain while in [place] around January 2022 and was seen at that time.  It was associated with large amounts of bloody diarrhea.  She did have a colonoscopy a few years ago and had 2 small polyps that were removed and she was told they were not worrisome, she was told to repeat a colonoscopy in 5 years.  She has not had a recurrence of abdominal pain since this episode.  I last saw her May 4 and since then she did have a CT scan.  Her CT scan was performed on May 5 and showed sigmoid diverticulosis, supraumbilical right-sided ventral hernia with mesenteric fat and a portion of the transverse colon as well as a small umbilical hernia.  She has an appointment to have her cscope next month.    She has a history of endometrial cancer in 2019 and completed hysterectomy and radiation therapy.  She has well-controlled diabetes, she is not on medications.    Today she states 3d ago at night she had a strong pain in her lower abdomen, like a burning. Lasted almost 12h but intermittently. Thinks her hernia was bothering her. Is afraid of doing a surgery for her hernia. Denies constipation/diarrhea. Felt like her hernia was was out above her umbilicus.     She has not yet obtained the echocardiogram I asked her to obtain to follow-up her systolic murmur and history of moderate aortic stenosis noted on her June 2019 echo.    Problem   Umbilical Hernia Without Obstruction and Without Gangrene    And ventral hernia. Symptomatic.          Current Outpatient Medications Ordered in Epic   Medication Sig Dispense Refill   • omeprazole (PRILOSEC) 20 MG delayed-release capsule Take 1 Capsule by mouth 1 time a day as needed (abdominal pain). 30 Capsule 1   • Ascorbic Acid (VITAMIN C) 500 MG Cap Take  by mouth.     • multivitamin  "(THERAGRAN) Tab Take 1 Tablet by mouth every day.     • FLUoxetine (PROZAC) 20 MG Cap Take 1 Capsule by mouth every day. Para animo 100 Capsule 2   • lisinopril (PRINIVIL) 10 MG Tab Take 1 Tablet by mouth every day. Indications: High Blood Pressure Disorder 100 Tablet 3   • NYSTOP powder APPLY TO AFFECTED AREA     • MAGNESIUM CITRATE PO Take  by mouth.       No current Epic-ordered facility-administered medications on file.       Past Medical History:   Diagnosis Date   • Acute cholecystitis 9/19/2019   • Arthritis     osteo, finger/shoulders   • Back pain    • Blood clotting disorder ([facility]) 2004    leg   • Bronchitis    • Chickenpox    • Dental disorder     upper/lower dentures   • Diabetes     oral meds   • Diabetes mellitus ([facility]) 9/19/2019      Ref. Range 4/8/2019 13:20 11/6/2019 16:47 Glycohemoglobin Latest Ref Range: 0.0 - 5.6 % 6.6 (H) 5.7 (A) Estim. Avg Glu Latest Units: mg/dL 143     Ref. Range 11/6/2019 16:35 Micro Alb Creat Ratio Latest Ref Range: 0 - 30 mg/g 167 (H)   She has history of well-controlled type 2 diabetes.  She is previously taking metformin 500 mg twice daily however her A1c on recheck was down to 5.7 so we discon   • Diarrhea 9/26/2019    She reports diarrhea starting about 3 days ago.  She has multiple small-volume liquidy/emanuel brown to dark-colored stools daily.  This morning she reports already 5-6 times of going to the bathroom with some stool.  She has not had a formed stool many days and proceeding this diarrhea she was actually constipated.  She had taken some senna but then stopped it and the loose stool started.  She is o   • Dysuria 9/11/2019   • Endometrial cancer ([facility])    • Heart valve disease     \"murmur\"   • High cholesterol    • Hyperlipidemia    • Hypertension    • Hypotension due to hypovolemia 9/26/2019    She has a low blood pressure in office today of 84/52 with a baseline in the 100s to 120s systolic.  This is likely due to her frequent episodes of diarrhea exacerbated " "by ongoing use of lisinopril.  Yesterday, she felt lightheaded and dizzy but did not pass out or have a fall.  She is having challenges of what is appropriate to eat as she had recent pelvic radiation as well as a cholecystectomy b   • Mumps    • Nasal drainage    • Sleep apnea     CPAP   • Snoring    • Urinary incontinence        Social History     Tobacco Use   • Smoking status: Never Smoker   • Smokeless tobacco: Never Used   Vaping Use   • Vaping Use: Never used   Substance Use Topics   • Alcohol use: No   • Drug use: No       Allergies:  Bloodless, Ibuprofen, and Penicillins    Health Maintenance: Completed    ROS:  Per HPI      Objective:       Exam:  /74 (BP Location: Right arm, Patient Position: Sitting, BP Cuff Size: Adult)   Pulse 78   Temp 36.5 °C (97.7 °F) (Temporal)   Resp 16   Ht 1.6 m (5' 3\")   Wt 100 kg (221 lb)   LMP  (LMP Unknown)   SpO2 98%   BMI 39.15 kg/m²   Body mass index is 39.15 kg/m².  Wt Readings from Last 4 Encounters:   07/08/22 100 kg (221 lb)   05/04/22 98.3 kg (216 lb 11.2 oz)   01/05/22 99.6 kg (219 lb 9.6 oz)   11/04/21 104 kg (228 lb 3.2 oz)       Gen: Alert and oriented, No apparent distress. Appropriately groomed.  Neck: Neck is supple without lymphadenopathy.No thyromegaly.   Lungs: Normal effort, CTA bilaterally, no wheezes, rhonchi, or rales.  CV: Regular rate and rhythm. No murmurs, rubs, or gallops.  ABD:  Soft, nontender, nondistended, NABSx4, no HSM or RBT or guarding or masses.  Mild ventral hernia, unable to appreciate umb hernia. Many lower abd scars.   Ext: No clubbing, cyanosis, edema.  Skin: No rash noted.  UA neg.   Assessment & Plan:     72 y.o. female with the following -   Refer to surgery to discuss option of repair of hernia, reviewed worrisome s/sxs of hernia and indications for ER eval.   rec to schedule her echo  1. Umbilical hernia without obstruction and without gangrene  - Referral to General Surgery    2. Lower abdominal pain    Other " orders  - omeprazole (PRILOSEC) 20 MG delayed-release capsule; Take 1 Capsule by mouth 1 time a day as needed (abdominal pain).  Dispense: 30 Capsule; Refill: 1      Return in about 3 months (around 10/8/2022) for with Dr Xiao to establish.    Please note that this dictation was created using voice recognition software. I have made every reasonable attempt to correct obvious errors, but I expect that there are errors of grammar and possibly content that I did not discover before finalizing the note.

## 2022-09-07 ENCOUNTER — PRE-ADMISSION TESTING (OUTPATIENT)
Dept: ADMISSIONS | Facility: MEDICAL CENTER | Age: 72
End: 2022-09-07
Attending: SURGERY
Payer: MEDICARE

## 2022-09-07 DIAGNOSIS — Z01.812 PRE-OPERATIVE LABORATORY EXAMINATION: ICD-10-CM

## 2022-09-07 DIAGNOSIS — Z01.810 PRE-OPERATIVE CARDIOVASCULAR EXAMINATION: ICD-10-CM

## 2022-09-07 LAB
ANION GAP SERPL CALC-SCNC: 9 MMOL/L (ref 7–16)
BUN SERPL-MCNC: 15 MG/DL (ref 8–22)
CALCIUM SERPL-MCNC: 9.1 MG/DL (ref 8.5–10.5)
CHLORIDE SERPL-SCNC: 99 MMOL/L (ref 96–112)
CO2 SERPL-SCNC: 28 MMOL/L (ref 20–33)
CREAT SERPL-MCNC: 0.71 MG/DL (ref 0.5–1.4)
EKG IMPRESSION: NORMAL
ERYTHROCYTE [DISTWIDTH] IN BLOOD BY AUTOMATED COUNT: 49.7 FL (ref 35.9–50)
GFR SERPLBLD CREATININE-BSD FMLA CKD-EPI: 89 ML/MIN/1.73 M 2
GLUCOSE SERPL-MCNC: 128 MG/DL (ref 65–99)
HCT VFR BLD AUTO: 40.3 % (ref 37–47)
HGB BLD-MCNC: 12.9 G/DL (ref 12–16)
MCH RBC QN AUTO: 30.6 PG (ref 27–33)
MCHC RBC AUTO-ENTMCNC: 32 G/DL (ref 33.6–35)
MCV RBC AUTO: 95.5 FL (ref 81.4–97.8)
PLATELET # BLD AUTO: 235 K/UL (ref 164–446)
PMV BLD AUTO: 9.6 FL (ref 9–12.9)
POTASSIUM SERPL-SCNC: 4.1 MMOL/L (ref 3.6–5.5)
RBC # BLD AUTO: 4.22 M/UL (ref 4.2–5.4)
SODIUM SERPL-SCNC: 136 MMOL/L (ref 135–145)
WBC # BLD AUTO: 6 K/UL (ref 4.8–10.8)

## 2022-09-07 PROCEDURE — 36415 COLL VENOUS BLD VENIPUNCTURE: CPT

## 2022-09-07 PROCEDURE — 85027 COMPLETE CBC AUTOMATED: CPT

## 2022-09-07 PROCEDURE — 93010 ELECTROCARDIOGRAM REPORT: CPT | Performed by: INTERNAL MEDICINE

## 2022-09-07 PROCEDURE — 93005 ELECTROCARDIOGRAM TRACING: CPT

## 2022-09-07 PROCEDURE — 80048 BASIC METABOLIC PNL TOTAL CA: CPT

## 2022-09-07 RX ORDER — ATORVASTATIN CALCIUM 20 MG/1
20 TABLET, FILM COATED ORAL NIGHTLY
COMMUNITY
End: 2022-10-11

## 2022-09-07 ASSESSMENT — FIBROSIS 4 INDEX: FIB4 SCORE: 1.17

## 2022-09-09 ENCOUNTER — ANESTHESIA (OUTPATIENT)
Dept: SURGERY | Facility: MEDICAL CENTER | Age: 72
End: 2022-09-09
Payer: MEDICARE

## 2022-09-09 ENCOUNTER — ANESTHESIA EVENT (OUTPATIENT)
Dept: SURGERY | Facility: MEDICAL CENTER | Age: 72
End: 2022-09-09
Payer: MEDICARE

## 2022-09-09 ENCOUNTER — APPOINTMENT (OUTPATIENT)
Dept: RADIOLOGY | Facility: MEDICAL CENTER | Age: 72
End: 2022-09-09
Attending: SURGERY
Payer: MEDICARE

## 2022-09-09 ENCOUNTER — HOSPITAL ENCOUNTER (OUTPATIENT)
Facility: MEDICAL CENTER | Age: 72
End: 2022-09-10
Attending: SURGERY | Admitting: SURGERY
Payer: MEDICARE

## 2022-09-09 DIAGNOSIS — Z87.19 S/P HERNIA REPAIR: ICD-10-CM

## 2022-09-09 DIAGNOSIS — Z98.890 S/P REPAIR OF VENTRAL HERNIA: ICD-10-CM

## 2022-09-09 DIAGNOSIS — Z98.890 S/P HERNIA REPAIR: ICD-10-CM

## 2022-09-09 DIAGNOSIS — Z87.19 S/P REPAIR OF VENTRAL HERNIA: ICD-10-CM

## 2022-09-09 DIAGNOSIS — G89.18 POST-OP PAIN: ICD-10-CM

## 2022-09-09 LAB — GLUCOSE BLD STRIP.AUTO-MCNC: 119 MG/DL (ref 65–99)

## 2022-09-09 PROCEDURE — 160035 HCHG PACU - 1ST 60 MINS PHASE I: Performed by: SURGERY

## 2022-09-09 PROCEDURE — 700111 HCHG RX REV CODE 636 W/ 250 OVERRIDE (IP): Performed by: STUDENT IN AN ORGANIZED HEALTH CARE EDUCATION/TRAINING PROGRAM

## 2022-09-09 PROCEDURE — 700111 HCHG RX REV CODE 636 W/ 250 OVERRIDE (IP): Performed by: ANESTHESIOLOGY

## 2022-09-09 PROCEDURE — 99100 ANES PT EXTEME AGE<1 YR&>70: CPT | Performed by: ANESTHESIOLOGY

## 2022-09-09 PROCEDURE — 160047 HCHG PACU  - EA ADDL 30 MINS PHASE II: Performed by: SURGERY

## 2022-09-09 PROCEDURE — 160039 HCHG SURGERY MINUTES - EA ADDL 1 MIN LEVEL 3: Performed by: SURGERY

## 2022-09-09 PROCEDURE — C1781 MESH (IMPLANTABLE): HCPCS | Performed by: SURGERY

## 2022-09-09 PROCEDURE — 700105 HCHG RX REV CODE 258: Performed by: SURGERY

## 2022-09-09 PROCEDURE — 700101 HCHG RX REV CODE 250: Performed by: ANESTHESIOLOGY

## 2022-09-09 PROCEDURE — 700102 HCHG RX REV CODE 250 W/ 637 OVERRIDE(OP): Performed by: ANESTHESIOLOGY

## 2022-09-09 PROCEDURE — 700101 HCHG RX REV CODE 250: Performed by: STUDENT IN AN ORGANIZED HEALTH CARE EDUCATION/TRAINING PROGRAM

## 2022-09-09 PROCEDURE — C1713 ANCHOR/SCREW BN/BN,TIS/BN: HCPCS | Performed by: SURGERY

## 2022-09-09 PROCEDURE — 160009 HCHG ANES TIME/MIN: Performed by: SURGERY

## 2022-09-09 PROCEDURE — A9270 NON-COVERED ITEM OR SERVICE: HCPCS | Performed by: STUDENT IN AN ORGANIZED HEALTH CARE EDUCATION/TRAINING PROGRAM

## 2022-09-09 PROCEDURE — 160025 RECOVERY II MINUTES (STATS): Performed by: SURGERY

## 2022-09-09 PROCEDURE — 160036 HCHG PACU - EA ADDL 30 MINS PHASE I: Performed by: SURGERY

## 2022-09-09 PROCEDURE — 82962 GLUCOSE BLOOD TEST: CPT

## 2022-09-09 PROCEDURE — G0378 HOSPITAL OBSERVATION PER HR: HCPCS

## 2022-09-09 PROCEDURE — 700111 HCHG RX REV CODE 636 W/ 250 OVERRIDE (IP): Performed by: SURGERY

## 2022-09-09 PROCEDURE — 160028 HCHG SURGERY MINUTES - 1ST 30 MINS LEVEL 3: Performed by: SURGERY

## 2022-09-09 PROCEDURE — 160048 HCHG OR STATISTICAL LEVEL 1-5: Performed by: SURGERY

## 2022-09-09 PROCEDURE — A9270 NON-COVERED ITEM OR SERVICE: HCPCS | Performed by: ANESTHESIOLOGY

## 2022-09-09 PROCEDURE — 700101 HCHG RX REV CODE 250: Performed by: SURGERY

## 2022-09-09 PROCEDURE — 700102 HCHG RX REV CODE 250 W/ 637 OVERRIDE(OP): Performed by: STUDENT IN AN ORGANIZED HEALTH CARE EDUCATION/TRAINING PROGRAM

## 2022-09-09 PROCEDURE — 00832 ANES HERNIA REPAIR VNT&INCAL: CPT | Performed by: ANESTHESIOLOGY

## 2022-09-09 PROCEDURE — 96374 THER/PROPH/DIAG INJ IV PUSH: CPT | Mod: XU

## 2022-09-09 PROCEDURE — 160002 HCHG RECOVERY MINUTES (STAT): Performed by: SURGERY

## 2022-09-09 PROCEDURE — 160046 HCHG PACU - 1ST 60 MINS PHASE II: Performed by: SURGERY

## 2022-09-09 DEVICE — IMPLANTABLE DEVICE: Type: IMPLANTABLE DEVICE | Status: FUNCTIONAL

## 2022-09-09 RX ORDER — ONDANSETRON 4 MG/1
4 TABLET, ORALLY DISINTEGRATING ORAL EVERY 6 HOURS PRN
Qty: 10 TABLET | Refills: 0 | Status: SHIPPED | OUTPATIENT
Start: 2022-09-09 | End: 2022-10-31

## 2022-09-09 RX ORDER — DIPHENHYDRAMINE HYDROCHLORIDE 50 MG/ML
12.5 INJECTION INTRAMUSCULAR; INTRAVENOUS
Status: DISCONTINUED | OUTPATIENT
Start: 2022-09-09 | End: 2022-09-09 | Stop reason: HOSPADM

## 2022-09-09 RX ORDER — BUPIVACAINE HYDROCHLORIDE AND EPINEPHRINE 5; 5 MG/ML; UG/ML
INJECTION, SOLUTION EPIDURAL; INTRACAUDAL; PERINEURAL
Status: DISCONTINUED | OUTPATIENT
Start: 2022-09-09 | End: 2022-09-09 | Stop reason: HOSPADM

## 2022-09-09 RX ORDER — SODIUM CHLORIDE AND POTASSIUM CHLORIDE 150; 900 MG/100ML; MG/100ML
INJECTION, SOLUTION INTRAVENOUS CONTINUOUS
Status: DISCONTINUED | OUTPATIENT
Start: 2022-09-09 | End: 2022-09-10 | Stop reason: HOSPADM

## 2022-09-09 RX ORDER — PROMETHAZINE HYDROCHLORIDE 25 MG/1
25 SUPPOSITORY RECTAL EVERY 4 HOURS PRN
Status: DISCONTINUED | OUTPATIENT
Start: 2022-09-09 | End: 2022-09-10 | Stop reason: HOSPADM

## 2022-09-09 RX ORDER — ONDANSETRON 2 MG/ML
4 INJECTION INTRAMUSCULAR; INTRAVENOUS EVERY 4 HOURS PRN
Status: DISCONTINUED | OUTPATIENT
Start: 2022-09-09 | End: 2022-09-10 | Stop reason: HOSPADM

## 2022-09-09 RX ORDER — TIZANIDINE 4 MG/1
4 TABLET ORAL 3 TIMES DAILY
Qty: 21 TABLET | Refills: 0 | Status: SHIPPED | OUTPATIENT
Start: 2022-09-09 | End: 2022-09-16

## 2022-09-09 RX ORDER — HYDROMORPHONE HYDROCHLORIDE 1 MG/ML
0.5 INJECTION, SOLUTION INTRAMUSCULAR; INTRAVENOUS; SUBCUTANEOUS
Status: DISCONTINUED | OUTPATIENT
Start: 2022-09-09 | End: 2022-09-10 | Stop reason: HOSPADM

## 2022-09-09 RX ORDER — OXYCODONE HCL 5 MG/5 ML
5 SOLUTION, ORAL ORAL
Status: DISCONTINUED | OUTPATIENT
Start: 2022-09-09 | End: 2022-09-10 | Stop reason: HOSPADM

## 2022-09-09 RX ORDER — DEXAMETHASONE SODIUM PHOSPHATE 4 MG/ML
INJECTION, SOLUTION INTRA-ARTICULAR; INTRALESIONAL; INTRAMUSCULAR; INTRAVENOUS; SOFT TISSUE PRN
Status: DISCONTINUED | OUTPATIENT
Start: 2022-09-09 | End: 2022-09-09 | Stop reason: SURG

## 2022-09-09 RX ORDER — DIPHENHYDRAMINE HYDROCHLORIDE 50 MG/ML
25 INJECTION INTRAMUSCULAR; INTRAVENOUS EVERY 6 HOURS PRN
Status: DISCONTINUED | OUTPATIENT
Start: 2022-09-09 | End: 2022-09-10 | Stop reason: HOSPADM

## 2022-09-09 RX ORDER — LORAZEPAM 2 MG/ML
0.5 INJECTION INTRAMUSCULAR ONCE
Status: DISCONTINUED | OUTPATIENT
Start: 2022-09-09 | End: 2022-09-09

## 2022-09-09 RX ORDER — SODIUM CHLORIDE, SODIUM LACTATE, POTASSIUM CHLORIDE, CALCIUM CHLORIDE 600; 310; 30; 20 MG/100ML; MG/100ML; MG/100ML; MG/100ML
INJECTION, SOLUTION INTRAVENOUS CONTINUOUS
Status: DISCONTINUED | OUTPATIENT
Start: 2022-09-09 | End: 2022-09-09 | Stop reason: HOSPADM

## 2022-09-09 RX ORDER — IPRATROPIUM BROMIDE AND ALBUTEROL SULFATE 2.5; .5 MG/3ML; MG/3ML
3 SOLUTION RESPIRATORY (INHALATION)
Status: DISCONTINUED | OUTPATIENT
Start: 2022-09-09 | End: 2022-09-09 | Stop reason: HOSPADM

## 2022-09-09 RX ORDER — HEPARIN SODIUM 5000 [USP'U]/ML
5000 INJECTION, SOLUTION INTRAVENOUS; SUBCUTANEOUS ONCE
Status: COMPLETED | OUTPATIENT
Start: 2022-09-09 | End: 2022-09-09

## 2022-09-09 RX ORDER — ONDANSETRON 2 MG/ML
INJECTION INTRAMUSCULAR; INTRAVENOUS PRN
Status: DISCONTINUED | OUTPATIENT
Start: 2022-09-09 | End: 2022-09-09 | Stop reason: SURG

## 2022-09-09 RX ORDER — LIDOCAINE HYDROCHLORIDE 20 MG/ML
INJECTION, SOLUTION EPIDURAL; INFILTRATION; INTRACAUDAL; PERINEURAL PRN
Status: DISCONTINUED | OUTPATIENT
Start: 2022-09-09 | End: 2022-09-09 | Stop reason: SURG

## 2022-09-09 RX ORDER — ENALAPRILAT 1.25 MG/ML
2.5 INJECTION INTRAVENOUS EVERY 6 HOURS PRN
Status: DISCONTINUED | OUTPATIENT
Start: 2022-09-09 | End: 2022-09-10 | Stop reason: HOSPADM

## 2022-09-09 RX ORDER — DIAZEPAM 5 MG/ML
2.5 INJECTION, SOLUTION INTRAMUSCULAR; INTRAVENOUS ONCE
Status: COMPLETED | OUTPATIENT
Start: 2022-09-09 | End: 2022-09-09

## 2022-09-09 RX ORDER — OXYCODONE HYDROCHLORIDE 5 MG/1
5 TABLET ORAL EVERY 4 HOURS PRN
Qty: 30 TABLET | Refills: 0 | Status: SHIPPED | OUTPATIENT
Start: 2022-09-09 | End: 2022-09-14

## 2022-09-09 RX ORDER — ONDANSETRON 2 MG/ML
4 INJECTION INTRAMUSCULAR; INTRAVENOUS ONCE
Status: COMPLETED | OUTPATIENT
Start: 2022-09-09 | End: 2022-09-09

## 2022-09-09 RX ORDER — MEPERIDINE HYDROCHLORIDE 25 MG/ML
25 INJECTION INTRAMUSCULAR; INTRAVENOUS; SUBCUTANEOUS
Status: DISCONTINUED | OUTPATIENT
Start: 2022-09-09 | End: 2022-09-09 | Stop reason: HOSPADM

## 2022-09-09 RX ORDER — SODIUM CHLORIDE, SODIUM LACTATE, POTASSIUM CHLORIDE, CALCIUM CHLORIDE 600; 310; 30; 20 MG/100ML; MG/100ML; MG/100ML; MG/100ML
INJECTION, SOLUTION INTRAVENOUS CONTINUOUS
Status: ACTIVE | OUTPATIENT
Start: 2022-09-09 | End: 2022-09-09

## 2022-09-09 RX ORDER — KETOROLAC TROMETHAMINE 30 MG/ML
INJECTION, SOLUTION INTRAMUSCULAR; INTRAVENOUS PRN
Status: DISCONTINUED | OUTPATIENT
Start: 2022-09-09 | End: 2022-09-09 | Stop reason: SURG

## 2022-09-09 RX ORDER — HYDROMORPHONE HYDROCHLORIDE 1 MG/ML
0.1 INJECTION, SOLUTION INTRAMUSCULAR; INTRAVENOUS; SUBCUTANEOUS
Status: DISCONTINUED | OUTPATIENT
Start: 2022-09-09 | End: 2022-09-09

## 2022-09-09 RX ORDER — MIDAZOLAM HYDROCHLORIDE 1 MG/ML
1 INJECTION INTRAMUSCULAR; INTRAVENOUS
Status: DISCONTINUED | OUTPATIENT
Start: 2022-09-09 | End: 2022-09-09 | Stop reason: HOSPADM

## 2022-09-09 RX ORDER — ACETAMINOPHEN 10 MG/ML
1000 INJECTION, SOLUTION INTRAVENOUS EVERY 6 HOURS
Status: COMPLETED | OUTPATIENT
Start: 2022-09-09 | End: 2022-09-10

## 2022-09-09 RX ORDER — OXYCODONE HCL 5 MG/5 ML
10 SOLUTION, ORAL ORAL
Status: COMPLETED | OUTPATIENT
Start: 2022-09-09 | End: 2022-09-09

## 2022-09-09 RX ORDER — PHENYLEPHRINE HYDROCHLORIDE 10 MG/ML
INJECTION, SOLUTION INTRAMUSCULAR; INTRAVENOUS; SUBCUTANEOUS PRN
Status: DISCONTINUED | OUTPATIENT
Start: 2022-09-09 | End: 2022-09-09 | Stop reason: SURG

## 2022-09-09 RX ORDER — ACETAMINOPHEN 500 MG
1000 TABLET ORAL EVERY 6 HOURS
Status: DISCONTINUED | OUTPATIENT
Start: 2022-09-10 | End: 2022-09-10 | Stop reason: HOSPADM

## 2022-09-09 RX ORDER — ACETAMINOPHEN 500 MG
1000 TABLET ORAL EVERY 6 HOURS PRN
Status: DISCONTINUED | OUTPATIENT
Start: 2022-09-14 | End: 2022-09-10 | Stop reason: HOSPADM

## 2022-09-09 RX ORDER — CEFAZOLIN SODIUM 1 G/3ML
INJECTION, POWDER, FOR SOLUTION INTRAMUSCULAR; INTRAVENOUS PRN
Status: DISCONTINUED | OUTPATIENT
Start: 2022-09-09 | End: 2022-09-09 | Stop reason: SURG

## 2022-09-09 RX ORDER — ENOXAPARIN SODIUM 100 MG/ML
40 INJECTION SUBCUTANEOUS
Status: DISCONTINUED | OUTPATIENT
Start: 2022-09-10 | End: 2022-09-10 | Stop reason: HOSPADM

## 2022-09-09 RX ORDER — HYDROMORPHONE HYDROCHLORIDE 1 MG/ML
0.2 INJECTION, SOLUTION INTRAMUSCULAR; INTRAVENOUS; SUBCUTANEOUS
Status: DISCONTINUED | OUTPATIENT
Start: 2022-09-09 | End: 2022-09-09

## 2022-09-09 RX ORDER — LISINOPRIL 10 MG/1
10 TABLET ORAL DAILY
Status: DISCONTINUED | OUTPATIENT
Start: 2022-09-10 | End: 2022-09-10 | Stop reason: HOSPADM

## 2022-09-09 RX ORDER — DIPHENHYDRAMINE HCL 25 MG
25 TABLET ORAL EVERY 6 HOURS PRN
Status: DISCONTINUED | OUTPATIENT
Start: 2022-09-09 | End: 2022-09-10 | Stop reason: HOSPADM

## 2022-09-09 RX ORDER — OXYCODONE HCL 5 MG/5 ML
5 SOLUTION, ORAL ORAL
Status: COMPLETED | OUTPATIENT
Start: 2022-09-09 | End: 2022-09-09

## 2022-09-09 RX ORDER — GABAPENTIN 300 MG/1
300 CAPSULE ORAL 3 TIMES DAILY
Status: DISCONTINUED | OUTPATIENT
Start: 2022-09-09 | End: 2022-09-10 | Stop reason: HOSPADM

## 2022-09-09 RX ORDER — OXYCODONE HCL 5 MG/5 ML
10 SOLUTION, ORAL ORAL
Status: DISCONTINUED | OUTPATIENT
Start: 2022-09-09 | End: 2022-09-10 | Stop reason: HOSPADM

## 2022-09-09 RX ORDER — HYDROMORPHONE HYDROCHLORIDE 1 MG/ML
0.5 INJECTION, SOLUTION INTRAMUSCULAR; INTRAVENOUS; SUBCUTANEOUS
Status: DISCONTINUED | OUTPATIENT
Start: 2022-09-09 | End: 2022-09-09

## 2022-09-09 RX ORDER — SODIUM CHLORIDE, SODIUM LACTATE, POTASSIUM CHLORIDE, AND CALCIUM CHLORIDE .6; .31; .03; .02 G/100ML; G/100ML; G/100ML; G/100ML
500 INJECTION, SOLUTION INTRAVENOUS
Status: DISCONTINUED | OUTPATIENT
Start: 2022-09-09 | End: 2022-09-10 | Stop reason: HOSPADM

## 2022-09-09 RX ADMIN — SUGAMMADEX 200 MG: 100 INJECTION, SOLUTION INTRAVENOUS at 10:52

## 2022-09-09 RX ADMIN — POTASSIUM CHLORIDE AND SODIUM CHLORIDE 1000 ML: 900; 150 INJECTION, SOLUTION INTRAVENOUS at 21:32

## 2022-09-09 RX ADMIN — FENTANYL CITRATE 25 MCG: 50 INJECTION, SOLUTION INTRAMUSCULAR; INTRAVENOUS at 11:19

## 2022-09-09 RX ADMIN — DEXAMETHASONE SODIUM PHOSPHATE 8 MG: 4 INJECTION, SOLUTION INTRA-ARTICULAR; INTRALESIONAL; INTRAMUSCULAR; INTRAVENOUS; SOFT TISSUE at 10:05

## 2022-09-09 RX ADMIN — OXYCODONE HYDROCHLORIDE 5 MG: 5 SOLUTION ORAL at 21:33

## 2022-09-09 RX ADMIN — LIDOCAINE HYDROCHLORIDE 40 MG: 20 INJECTION, SOLUTION EPIDURAL; INFILTRATION; INTRACAUDAL at 09:51

## 2022-09-09 RX ADMIN — OXYCODONE HYDROCHLORIDE 10 MG: 5 SOLUTION ORAL at 11:19

## 2022-09-09 RX ADMIN — FENTANYL CITRATE 50 MCG: 50 INJECTION, SOLUTION INTRAMUSCULAR; INTRAVENOUS at 09:51

## 2022-09-09 RX ADMIN — DIAZEPAM 2.5 MG: 5 INJECTION, SOLUTION INTRAMUSCULAR; INTRAVENOUS at 13:55

## 2022-09-09 RX ADMIN — ROCURONIUM BROMIDE 50 MG: 10 INJECTION, SOLUTION INTRAVENOUS at 09:51

## 2022-09-09 RX ADMIN — ONDANSETRON 4 MG: 2 INJECTION INTRAMUSCULAR; INTRAVENOUS at 14:29

## 2022-09-09 RX ADMIN — MIDAZOLAM 2 MG: 1 INJECTION INTRAMUSCULAR; INTRAVENOUS at 09:41

## 2022-09-09 RX ADMIN — ACETAMINOPHEN 1000 MG: 10 INJECTION INTRAVENOUS at 21:32

## 2022-09-09 RX ADMIN — HEPARIN SODIUM 5000 UNITS: 5000 INJECTION, SOLUTION INTRAVENOUS; SUBCUTANEOUS at 08:55

## 2022-09-09 RX ADMIN — CEFAZOLIN 2 G: 330 INJECTION, POWDER, FOR SOLUTION INTRAMUSCULAR; INTRAVENOUS at 09:41

## 2022-09-09 RX ADMIN — OXYCODONE HYDROCHLORIDE 5 MG: 5 SOLUTION ORAL at 17:42

## 2022-09-09 RX ADMIN — PHENYLEPHRINE HYDROCHLORIDE 100 MCG: 10 INJECTION INTRAVENOUS at 10:01

## 2022-09-09 RX ADMIN — SODIUM CHLORIDE, POTASSIUM CHLORIDE, SODIUM LACTATE AND CALCIUM CHLORIDE: 600; 310; 30; 20 INJECTION, SOLUTION INTRAVENOUS at 08:51

## 2022-09-09 RX ADMIN — DIAZEPAM 2.5 MG: 5 INJECTION, SOLUTION INTRAMUSCULAR; INTRAVENOUS at 13:11

## 2022-09-09 RX ADMIN — FENTANYL CITRATE 50 MCG: 50 INJECTION, SOLUTION INTRAMUSCULAR; INTRAVENOUS at 11:52

## 2022-09-09 RX ADMIN — ONDANSETRON 4 MG: 2 INJECTION INTRAMUSCULAR; INTRAVENOUS at 10:05

## 2022-09-09 RX ADMIN — FENTANYL CITRATE 50 MCG: 50 INJECTION, SOLUTION INTRAMUSCULAR; INTRAVENOUS at 10:20

## 2022-09-09 RX ADMIN — KETOROLAC TROMETHAMINE 30 MG: 30 INJECTION, SOLUTION INTRAMUSCULAR at 10:05

## 2022-09-09 RX ADMIN — GABAPENTIN 300 MG: 300 CAPSULE ORAL at 21:32

## 2022-09-09 RX ADMIN — FENTANYL CITRATE 25 MCG: 50 INJECTION, SOLUTION INTRAMUSCULAR; INTRAVENOUS at 11:29

## 2022-09-09 RX ADMIN — PROPOFOL 150 MG: 10 INJECTION, EMULSION INTRAVENOUS at 09:51

## 2022-09-09 ASSESSMENT — PAIN DESCRIPTION - PAIN TYPE
TYPE: SURGICAL PAIN
TYPE: CHRONIC PAIN
TYPE: SURGICAL PAIN

## 2022-09-09 ASSESSMENT — PAIN SCALES - GENERAL: PAIN_LEVEL: 6

## 2022-09-09 ASSESSMENT — COPD QUESTIONNAIRES
HAVE YOU SMOKED AT LEAST 100 CIGARETTES IN YOUR ENTIRE LIFE: NO/DON'T KNOW
COPD SCREENING SCORE: 4
DO YOU EVER COUGH UP ANY MUCUS OR PHLEGM?: NO/ONLY WITH OCCASIONAL COLDS OR INFECTIONS
DURING THE PAST 4 WEEKS HOW MUCH DID YOU FEEL SHORT OF BREATH: SOME OF THE TIME

## 2022-09-09 NOTE — OR NURSING
"1440 Report received from PACU Rn and patient arrived to phase 2.      1445 Pain level 7/10 in abdomen, cold pack in use. Very reluctant to move and transfer to chair.  Stating \"I can't, I can't\".  Was able to transfer with 2 assist and encouragement. No n/v,sipping liquids. Frequent reminders needed to deep breath and us IS. O2 desaturating to 84-88% on RA without reminders and education to take deep breaths. Lap sites c/d/I, abdomen binder in use.     1520 Patient asking to stay the night. O2 placed at 2L to keep saturations above 90%.Family at bedside.  Dr. Anaya notified of need to stay for pain control and assistance and education on deep breathing and mobility.    1537 RTOC notified of need for room placement on floor.   "

## 2022-09-09 NOTE — OP REPORT
[facility]    OPERATIVE REPORT         DATE OF OPERATION: 2022    SURGEON: Sim Anaya MD MPH FACS [facility]    ASSISTANT(S): Ellen Nina PA-C    ANESTHESIOLOGIST(S): Alexandr Matos MD    ANESTHESIA: General endotracheal     PREOPERATIVE DIAGNOSES:   Symptomatic incisional ventral hernia  S/p  via low midline laparotomy    POSTOPERATIVE DIAGNOSES:  Symptomatic incarcerated incisional ventral hernia, containing small bowel and greater omentum  S/p  via low midline laparotomy    OPERATION(S) PERFORMED: Laparoscopic incarcerated incisional ventral hernia repair with mesh    ESTIMATED BLOOD LOSS: 10 ml    URINE OUTPUT: Not recorded    COMPLICATIONS: None    PATHOLOGY: None    DISPOSITION: The patient tolerated the procedure well and was transferred to the Post Anesthesia Care Unit in stable condition.    OPERATIVE FINDINGS: No significant intraperitoneal adhesions; two fascial defects in the low midline location, measuring ~ 4 cm and 3 cm, respectively, in diameter. The defects are  by ~ 2 cm of bridging fascia. The larger defect contained an incarcerated loop of small bowel and greater omentum. There was no evidence of intestinal ischemia or necrosis on closer examination.    INDICATIONS: The patient is a 72 y.o. female, with a symptomatic incisional ventral hernia, s/p  via low midline laparotomy many years ago. The patient is scheduled to undergo a laparoscopic, possible open, incisional ventral hernia repair with mesh and lysis of adhesions, if necessary.     We discussed the risks of surgery including infection, bleeding, need for transfusion, blood clot formation, pulmonary embolus, pneumonia, leak or perforation, recurrence of symptoms, and death. In addition, the risks of general anesthetic were discussed, including MI, CVA, reaction to anesthetic medications, or sudden death. The patient understands all of the above risks and all  questions were answered to her satisfaction.    OPERATIVE PROCEDURE: The patient was brought into the operating room and placed on the table in the supine position. Antibiotics (2g) were given intravenously for surgical prophylaxis. Five thousand units of heparin had been given subcutaneously for deep venous thrombosis prophylaxis in the preoperative holding area. General anesthesia was administered. A time-out was completed verifying correct patient, procedure, site, positioning, and implant(s) and/or special equipment prior to beginning of this procedure. All bony prominences were padded. The abdomen was prepped with Chlorhexidine and draped in the usual sterile fashion.     The procedure was started by entering the abdomen with a Veress needle through the Jamison's point. The saline drop test was negative and the abdomen was insufflated successfully to 15 mmHg. The patient tolerated insufflation well. Next, a skin incision was made using a scalpel and a 5 mm OptiView trocar was placed under direct vision, with assistance of a 5 mm, 0-degree scope. The scope was switched to a 5 mm, 30-degree and the abdomen was inspected - no injuries were noted from the initial trocar placement. Next, two additional bladeless trocars, one 5 mm and one 12 mm, were placed in a similar fashion in the left abdomen.     Upon examination of the abdominal cavity, no significant intraperitoneal adhesions were noted. There were two fascial defects in the low midline location, measuring ~ 4 cm and 3 cm, respectively, in diameter. The defects were  by ~ 2 cm of bridging fascia. The larger defect contained an incarcerated loop of small bowel and greater omentum. There was no evidence of intestinal ischemia or necrosis on closer examination. Lysis of adhesions with electrocautery had to be undertaken in order to free up the loop of bowel and omentum. The bowel was examined again to rule out inadvertent injury and appeared healthy and  intact.     Next, the decision was made to proceed with the intraperitoneal (underlay) mesh hernia repair technique. A 16 x 22 cm piece of Bard Ventralight ST synthetic mesh was used for this repair. It was inserted into the abdominal cavity without any complications and oriented to cover the entire defect with adequate overlap of at least 5 cm on each side. The proper orientation of the mesh was maintained throughout the entire procedure, with the non-adherent side of mesh facing the bowel. After proper positioning was ensured, the mesh was tacked using the Ethicon SecureStrap (absorbable) tacker. The tacks were placed in a circumferential fashion ~0.5 cm from the edge and ~1 cm apart from each other. A few tacks were placed within the inner [place] to improve mesh adherence to the anterior abdominal wall. The abdominal cavity was inspected and the hernia repair appeared satisfactory. Hemostasis was excellent. The fascia at the 12 mm trocar site was closed with 0-Vicryl suture by using a laparoscopic suture passer. The trocars were then removed under direct vision. No bleeding was noted from trocar sites. The laparoscope was withdrawn and the final trocar was removed.    The abdomen was allowed to collapse. The skin was closed with 4-0 Monocryl. Dermabond skin glue was applied. The port sites and transfascial suture sites were infiltrated with 30 ml of local analgesic (50/50 mixture of 1% Lidocaine and 0.25% Marcaine). The patient tolerated the procedure well and was brought to the PACU in stable condition. The sponge and instrument counts were correct x 2.    Dr. Sim Anaya, attending surgeon, was present and scrubbed throughout the entire procedure.

## 2022-09-09 NOTE — OR NURSING
Pt A&OX4. VSS. Pt on room air. Afebrile. Three lap sites to abd with dermabond closure. ABD binder in place. Pt reports abd spasms and pain now tolerable post IV and PO pain medication administration. In addition to IV Valium. Pt with concerned with becoming nauseous on way home, IV Zofran administered. Pt voided total 450 cc in PACU through [facility]. Report called to CHELE Griggs.

## 2022-09-09 NOTE — OR NURSING
Attempted to wean O2 from 2L. Desaturation to 86% on 1L, O2 returned to 2L. Education provided again on IS use.

## 2022-09-09 NOTE — ANESTHESIA PROCEDURE NOTES
Airway    Date/Time: 9/9/2022 9:52 AM  Performed by: Alexandr Matos M.D.  Authorized by: Alexandr Matos M.D.     Location:  OR  Urgency:  Elective  Indications for Airway Management:  Anesthesia      Spontaneous Ventilation: absent    Sedation Level:  Deep  Preoxygenated: Yes    Patient Position:  Sniffing  Final Airway Type:  Endotracheal airway  Final Endotracheal Airway:  ETT  Cuffed: Yes    Technique Used for Successful ETT Placement:  Direct laryngoscopy    Insertion Site:  Oral  Blade Type:  Agnieszka  Laryngoscope Blade/Videolaryngoscope Blade Size:  3  ETT Size (mm):  7.0  Measured from:  Teeth  ETT to Teeth (cm):  22  Placement Verified by: auscultation and capnometry    Cormack-Lehane Classification:  Grade I - full view of glottis  Number of Attempts at Approach:  1   Dentures out pre-op

## 2022-09-09 NOTE — ANESTHESIA TIME REPORT
Anesthesia Start and Stop Event Times     Date Time Event    9/9/2022 0857 Ready for Procedure     0941 Anesthesia Start     1108 Anesthesia Stop        Responsible Staff  09/09/22    Name Role Begin End    Alexandr Matos M.D. Anesth 0941 1108        Overtime Reason:  no overtime (within assigned shift)    Comments:

## 2022-09-09 NOTE — ANESTHESIA PREPROCEDURE EVALUATION
Case: [id number] Date/Time: 09/09/22 0845    Procedure: LAPAROSCOPIC REPAIR OF INCISIONAL HERNIA    Pre-op diagnosis: INCISIONAL HERNIA OF ABDOMINAL WALL    Location: [facility] OR 08 / SURGERY [facility]    Surgeons: Sim Anaya M.D.          Relevant Problems   ANESTHESIA   (positive) Sleep apnea      CARDIAC   (positive) Aortic atherosclerosis (HCC)   (positive) Cardiac murmur   (positive) Hypertension associated with diabetes (HCC)      GI   (positive) GERD (gastroesophageal reflux disease)       Physical Exam    Airway   Mallampati: III  TM distance: >3 FB  Neck ROM: full       Cardiovascular - normal exam  Rhythm: regular  Rate: normal  (-) murmur     Dental - normal exam           Pulmonary - normal exam  Breath sounds clear to auscultation     Abdominal    Neurological - normal exam                 Anesthesia Plan    ASA 3       Plan - general       Airway plan will be ETT          Induction: intravenous    Postoperative Plan: Postoperative administration of opioids is intended.    Pertinent diagnostic labs and testing reviewed    Informed Consent:    Anesthetic plan and risks discussed with patient.    Use of blood products discussed with: patient whom consented to blood products.

## 2022-09-09 NOTE — DISCHARGE INSTRUCTIONS
HOME CARE INSTRUCTIONS    ACTIVITY: Rest and take it easy for the first 24 hours.  A responsible adult is recommended to remain with you during that time.  It is normal to feel sleepy.  We encourage you to not do anything that requires balance, judgment or coordination.    FOR 24 HOURS DO NOT:  Drive, operate machinery or run household appliances.  Drink beer or alcoholic beverages.  Make important decisions or sign legal documents.    SPECIAL INSTRUCTIONS:     Hernia Repair Discharge Instructions:    1. ACTIVITIES: Upon discharge from the hospital, the day of surgery it is requested that you do no significant physical activity and limit mental activities, as you have had sedation. The day after surgery, you may resume activities of daily living, but for 6 weeks, it is recommended that you do no strenuous activities or heavy lifting (greater than 15 pounds).     2. DRIVING: You may drive whenever you are off pain medications and are able to perform the activities needed to drive, i.e. turning, bending, twisting, etc.     3. WOUND: It is not unusual for patients to experience swelling and even bruising at the hernia repair site.     4. ICE: please use ice on the wound to decrease the swelling for the first 24 hours and then discontinue.     5. BATHING: The dressing can be removed two days after surgery. You should leave the steri-strips in place, they will fall off over 5-7 days. You may then allow the wound to get wet in a shower 2 days after surgery, but avoid submersion in water (tub bath) for at least a week.    6. PAIN MEDICATION: You will be given a prescription for pain medication at discharge. Please take these as directed. It is important to remember not to take medications on an empty stomach as this may cause nausea.     7. BOWEL FUNCTION: After hernia repair, it is not uncommon for patients to experience constipation. This is due to decreasing activity levels as well as pain medications. You may wish to  use a stool softener beginning immediately after surgery, and you may or may not need to use a laxative (Miralax, Milk of Magnesia, Senokot, etc.) as well.     8. CALL IF YOU HAVE: (1) Fevers to more than 101.00 F, (2) Unusual chest or leg pain, (3) Drainage or fluid from incision that may be foul smelling, increased tenderness or soreness at the wound or the wound edges are no longer together, redness or swelling at the incision site. Please do not hesitate to call with any other questions.     9. APPOINTMENT: Contact our office at [phone] for a follow-up appointment in 1 to 2 weeks following your procedure.     If you have any additional questions, please do not hesitate to call the office and speak to either a medical assistant or the provider on call.     Office address:  [address] [place] [address], [address] [address]    Dr. Sim Anaya MD  [facility]  [phone]      DIET: To avoid nausea, slowly advance diet as tolerated, avoiding spicy or greasy foods for the first day.  Add more substantial food to your diet according to your physician's instructions.  Babies can be fed formula or breast milk as soon as they are hungry.  INCREASE FLUIDS AND FIBER TO AVOID CONSTIPATION.    MEDICATIONS: Resume taking daily medication.  Take prescribed pain medication with food.  If no medication is prescribed, you may take non-aspirin pain medication if needed.  PAIN MEDICATION CAN BE VERY CONSTIPATING.  Take a stool softener or laxative such as senokot, pericolace, or milk of magnesia if needed.    Prescription given for Oxycodone, Zanaflex.  Last pain medication given at 11:20am oxycodone.    A follow-up appointment should be arranged with your doctor; call to schedule.    You should CALL YOUR PHYSICIAN if you develop:  Fever greater than 101 degrees F.  Pain not relieved by medication, or persistent nausea or vomiting.  Excessive bleeding (blood soaking through dressing) or unexpected drainage from  the wound.  Extreme redness or swelling around the incision site, drainage of pus or foul smelling drainage.  Inability to urinate or empty your bladder within 8 hours.  Problems with breathing or chest pain.    You should call 911 if you develop problems with breathing or chest pain.  If you are unable to contact your doctor or surgical center, you should go to the nearest emergency room or urgent care center.  Physician's telephone #: [phone]    MILD FLU-LIKE SYMPTOMS ARE NORMAL.  YOU MAY EXPERIENCE GENERALIZED MUSCLE ACHES, THROAT IRRITATION, HEADACHE AND/OR SOME NAUSEA.    If any questions arise, call your doctor.  If your doctor is not available, please feel free to call the Surgical Center at [phone].  The Center is open Monday through Friday from 7AM to 7PM.      A registered nurse may call you a few days after your surgery to see how you are doing after your procedure.    You may also receive a survey in the mail within the next two weeks and we ask that you take a few moments to complete the survey and return it to us.  Our goal is to provide you with very good care and we value your comments.     Depression / Suicide Risk    As you are discharged from this [facility] facility, it is important to learn how to keep safe from harming yourself.    Recognize the warning signs:  Abrupt changes in personality, positive or negative- including increase in energy   Giving away possessions  Change in eating patterns- significant weight changes-  positive or negative  Change in sleeping patterns- unable to sleep or sleeping all the time   Unwillingness or inability to communicate  Depression  Unusual sadness, discouragement and loneliness  Talk of wanting to die  Neglect of personal appearance   Rebelliousness- reckless behavior  Withdrawal from people/activities they love  Confusion- inability to concentrate     If you or a loved one observes any of these behaviors or has concerns about self-harm,  here's what you can do:  Talk about it- your feelings and reasons for harming yourself  Remove any means that you might use to hurt yourself (examples: pills, rope, extension cords, firearm)  Get professional help from the community (Mental Health, Substance Abuse, psychological counseling)  Do not be alone:Call your Safe Contact- someone whom you trust who will be there for you.  Call your local CRISIS HOTLINE 705-4497 or 439-158-4927  Call your local Children's Mobile Crisis Response Team [place] (633) 606-7821 or [employer]  Call the toll free National Suicide Prevention Hotlines   National Suicide Prevention Lifeline 536-065-FOQL (8168)  National [employer] Line Network 800-SUICIDE (464-7845)    I acknowledge receipt and understanding of these Home Care instructions.      Instrucciones Para [place]  (Home Care Instructions)    ACTIVIDAD: Descanse y tome todo con mucha calma las primeras 24 horas después de roberts cirugía.  Patti persona adulta responsable debe permanecer con usted tanya hanny periodo de tiempo.  Es normal sentirse sonoliento o sonolienta tanya esas primeras horas.  Le recomendamos que no carol nada que requiera equilibrio, primitivo decisiones a mucha coordinación de roberts parte.    NO CAROL ESTO PURANTE LAS PRIMERAS 24 HORAS:   Manejar o conducir algún vehiculo, operar maquinarias o utilizar electrodomesticos.   Beber cerveza o algún otro tipo de bebida alcohólica.   Primitivo decisiones importantes o firmar documentos legales.    INSTRUCCIONES ESPECIALES:    Instrucciones de torey de reparación de hernia:    1. ACTIVIDADES: Al torey del hospital, el día de la cirugía se solicita que no realice actividad física significativa y limite las actividades mentales, ya que ha estado bajo sedación. El día después de la cirugía, puede reanudar las actividades de la jeovanny diaria, george tanya 6 semanas, se recomienda que no realice actividades extenuantes ni levante objetos pesados (más de 15 libras).    2.  CONDUCCIÓN: puede conducir siempre que no tenga medicamentos para el dolor y pueda realizar las actividades necesarias para conducir, es decir, girar, agacharse, torcerse, etc.    3. HERIDA: No es inusual que los pacientes experimenten hinchazón e incluso hematomas en el sitio de reparación de la hernia.    4. HIELO: use hielo en la herida para disminuir la hinchazón tanya las primeras 24 horas y luego suspenda.    5. BAÑO: El vendaje se puede retirar dos días después de la cirugía. Debe dejar las steri-strips en roberts lugar, se caerán en 5 a 7 días. Luego, puede permitir que la herida se moje en la ducha 2 días después de la cirugía, george evite sumergirlo en agua (baño de monster) tanya al menos konstantin semana.    6. MEDICAMENTOS PARA EL DOLOR: Se le dará konstantin receta para medicamentos para el dolor en el momento del torey. Por favor, tómelos elio se indica. Es importante recordar no allan medicamentos con el estómago vacío ya que esto puede causar náuseas.    7. FUNCIÓN INTESTINAL: Después de la reparación de konstantin hernia, no es raro que los pacientes experimenten estreñimiento. [place] se debe a la disminución de los niveles de actividad, así elio a los analgésicos. Es posible que desee utilizar un ablandador de heces inmediatamente después de la cirugía, y es posible que también necesite o no un laxante (Miralax, leche de magnesia, Senokot, etc.).    8. LLAME SI TIENE: (1) Fiebre de más de 101.00 F, (2) Dolor inusual en el pecho o la pierna, (3) Drenaje o líquido de la incisión que puede tener mal olor, mayor sensibilidad o dolor en la herida o la herida los bordes ya no están juntos, enrojecimiento o hinchazón en el sitio de la incisión. Por favor, no dude en llamar con cualquier otra pregunta.    9. SANA: comuníquese con nuestra oficina al [phone] para konstantin sana de seguimiento en 1 a 2 semanas después de roberts procedimiento.    Si tiene más preguntas, no dude en llamar a la oficina y hablar con un asistente médico o con el  proveedor de kailyn.    Dirección de la oficina:  [address], [address], [place] [address]    Dr. Sim Anaya MD  [facility] [place]  [phone]    DIETA: Para evitar las nauseas, prosiga despacito con bacon dieta a medida que pueda ir tolerándola mejor, evite comidas muy condimentadas o grasosas tanya hanny primer día.  Vaya agregando comidas más substanciadas a bacon dieta a medida que asi lo indique bacon médica.  Los bebés pueden beber leche preparada o formula, ásl elio también leche del seno de la madre a medida que vayan teniendo hambre.  SIGA AGREGANDO LIQUIDOS Y COMIDAS CON FIBRA PARA EVITAR ESTREÑIMIENTO.    MEDICAMENTOS/MEDICINAS:  Vuelva a allan javed medicamentos diarios.  Jacona los medicamentos que se le prescribe con un poco de comida.  Si no le prescribe ningún tipo de medicamento, entonces puede allan medicinas para el dolor que no contienen aspirina, si las necesita.  LAS MEDICINAS PARA EL DOLOR PUEDEN ESTREÑIRLE MUCHO.  Jacona un suavizante para el excremento o materia fecal (stool softener) o un laxativo elio por ejemplo: senokot, pericolase, o leche de magnesia, si lo necesita.    La prescripción la administro Oxycodone, Zanaflex.  La ultima sosis de medicina para el dolor fue administrada 11:20am, oxycodone.     Se debe hacer konstantin consulta medica con el doctor, Líame para hacer la jordy.    Usted debe LIAMAR A BACON MEDICO si tiene los siguientes síntomas:   -   Konstantin fiebre más torey de 101 grados Fahrenheit.   -   Un dolor incesante aún con los medicamentos, o nauseas y vómito persistente.   -   Un sangrado excesivo (vicky que traspasa los vendajes o gasas) o algúln tipo de drenaje inesperado que proviene de la henda.     -   Un color bran exagerado o hinchazón alrededor del área en donde se le hizo incisión o chris, o un drenaje de pus o con olor rose proveniente de la henda.   -    La inhabilidad de orinar o vaciar bacon vejiga en 8 horas.   -    Problemas con a respiración o william en  el pecho.    Usdevin debe llamar al 911 si se presentan problemas con el dolor al respirar o el pecho.  Si no se puede ponnoer en comunicación con un medica o con el centro de cirugía, usted debe ir a la estación de emergencia (emergency room) más cercana o a un centro de atención de urgencia (urgent care center).  El teléfono del medico es: [phone]    LOS SÍNTOMAS DE UN LEVE RESFRIO SON MUY NORMALES.  ADEMÁS USTED PUEDE LLEGAR A SENTIR ZO GENERALES DE MÚSCULOS, IRRITACIÓN EN LA GARGANTA, ZO DE CROW Y/O UN POCO DE NAUSEAS.    Sie tiene alguna pregunta, llame a roberts médico.  Si roberts médico no se encuentra disponible, por favor llame al Centro de Cirugía at [phone].  el Centro está abierto de Lunes a Viernes desde las 7:00 de la manana hasta las 5:00 de la noche.      Mi firma a continuación indica que he recibido y entiendco estas instrucciones acera de los cuidados en la casa (Home Care Instructions)    Spring recibirá konstantin encuesta en la correspondencia en las siguientes semanas y le pedimos que por favor tome un momento para completar pam encuesta y regresaría a hosotros.  Nuestro objetivó es brindarle un cuidado muy manriquez y par lo tanto apreciamos javed coméntanos.  Muchas danae por gloria escogido el Cent[facility] de [facility].

## 2022-09-09 NOTE — ANESTHESIA POSTPROCEDURE EVALUATION
Patient: Madelyn Schmitz    Procedure Summary     Date: 09/09/22 Room / Location: [facility] 08 / SURGERY [facility]    Anesthesia Start: 0941 Anesthesia Stop: 1108    Procedure: LAPAROSCOPIC REPAIR OF INCISIONAL HERNIA (Abdomen) Diagnosis: (INCISIONAL HERNIA OF ABDOMINAL WALL)    Surgeons: Sim Anaya M.D. Responsible Provider: Alexandr Matos M.D.    Anesthesia Type: general ASA Status: 3          Final Anesthesia Type: general  Last vitals  BP   Blood Pressure : (!) 170/81    Temp   35.8 °C (96.5 °F) (warmer placed on pt)    Pulse   78   Resp   15    SpO2   98 %      Anesthesia Post Evaluation    Patient location during evaluation: PACU  Patient participation: complete - patient participated  Level of consciousness: awake and alert  Pain score: 6    Airway patency: patent  Anesthetic complications: no  Cardiovascular status: hemodynamically stable  Respiratory status: acceptable  Hydration status: euvolemic    PONV: none          No notable events documented.     Nurse Pain Score: 7 (NPRS)

## 2022-09-10 VITALS
OXYGEN SATURATION: 92 % | RESPIRATION RATE: 16 BRPM | WEIGHT: 216.93 LBS | BODY MASS INDEX: 38.44 KG/M2 | SYSTOLIC BLOOD PRESSURE: 98 MMHG | TEMPERATURE: 97.7 F | DIASTOLIC BLOOD PRESSURE: 50 MMHG | HEIGHT: 63 IN | HEART RATE: 59 BPM

## 2022-09-10 LAB
ALBUMIN SERPL BCP-MCNC: 3.7 G/DL (ref 3.2–4.9)
ALBUMIN/GLOB SERPL: 1.2 G/DL
ALP SERPL-CCNC: 61 U/L (ref 30–99)
ALT SERPL-CCNC: 6 U/L (ref 2–50)
ANION GAP SERPL CALC-SCNC: 9 MMOL/L (ref 7–16)
AST SERPL-CCNC: 13 U/L (ref 12–45)
BILIRUB SERPL-MCNC: 0.4 MG/DL (ref 0.1–1.5)
BUN SERPL-MCNC: 17 MG/DL (ref 8–22)
CALCIUM SERPL-MCNC: 8.5 MG/DL (ref 8.5–10.5)
CHLORIDE SERPL-SCNC: 103 MMOL/L (ref 96–112)
CO2 SERPL-SCNC: 25 MMOL/L (ref 20–33)
CREAT SERPL-MCNC: 0.68 MG/DL (ref 0.5–1.4)
ERYTHROCYTE [DISTWIDTH] IN BLOOD BY AUTOMATED COUNT: 49.6 FL (ref 35.9–50)
GFR SERPLBLD CREATININE-BSD FMLA CKD-EPI: 91 ML/MIN/1.73 M 2
GLOBULIN SER CALC-MCNC: 3.1 G/DL (ref 1.9–3.5)
GLUCOSE SERPL-MCNC: 173 MG/DL (ref 65–99)
HCT VFR BLD AUTO: 38.2 % (ref 37–47)
HGB BLD-MCNC: 12.1 G/DL (ref 12–16)
MCH RBC QN AUTO: 30.4 PG (ref 27–33)
MCHC RBC AUTO-ENTMCNC: 31.7 G/DL (ref 33.6–35)
MCV RBC AUTO: 96 FL (ref 81.4–97.8)
PLATELET # BLD AUTO: 219 K/UL (ref 164–446)
PMV BLD AUTO: 9.7 FL (ref 9–12.9)
POTASSIUM SERPL-SCNC: 5.1 MMOL/L (ref 3.6–5.5)
PROT SERPL-MCNC: 6.8 G/DL (ref 6–8.2)
RBC # BLD AUTO: 3.98 M/UL (ref 4.2–5.4)
SODIUM SERPL-SCNC: 137 MMOL/L (ref 135–145)
WBC # BLD AUTO: 6.9 K/UL (ref 4.8–10.8)

## 2022-09-10 PROCEDURE — 85027 COMPLETE CBC AUTOMATED: CPT

## 2022-09-10 PROCEDURE — 80053 COMPREHEN METABOLIC PANEL: CPT

## 2022-09-10 PROCEDURE — 96372 THER/PROPH/DIAG INJ SC/IM: CPT

## 2022-09-10 PROCEDURE — 700111 HCHG RX REV CODE 636 W/ 250 OVERRIDE (IP): Performed by: STUDENT IN AN ORGANIZED HEALTH CARE EDUCATION/TRAINING PROGRAM

## 2022-09-10 PROCEDURE — A9270 NON-COVERED ITEM OR SERVICE: HCPCS | Performed by: STUDENT IN AN ORGANIZED HEALTH CARE EDUCATION/TRAINING PROGRAM

## 2022-09-10 PROCEDURE — 700102 HCHG RX REV CODE 250 W/ 637 OVERRIDE(OP): Performed by: STUDENT IN AN ORGANIZED HEALTH CARE EDUCATION/TRAINING PROGRAM

## 2022-09-10 PROCEDURE — G0378 HOSPITAL OBSERVATION PER HR: HCPCS

## 2022-09-10 RX ADMIN — GABAPENTIN 300 MG: 300 CAPSULE ORAL at 06:29

## 2022-09-10 RX ADMIN — ENOXAPARIN SODIUM 40 MG: 40 INJECTION SUBCUTANEOUS at 08:27

## 2022-09-10 RX ADMIN — ACETAMINOPHEN 1000 MG: 500 TABLET ORAL at 12:09

## 2022-09-10 RX ADMIN — OXYCODONE HYDROCHLORIDE 5 MG: 5 SOLUTION ORAL at 11:15

## 2022-09-10 RX ADMIN — GABAPENTIN 300 MG: 300 CAPSULE ORAL at 11:15

## 2022-09-10 RX ADMIN — ACETAMINOPHEN 1000 MG: 500 TABLET ORAL at 06:29

## 2022-09-10 RX ADMIN — LISINOPRIL 10 MG: 10 TABLET ORAL at 06:29

## 2022-09-10 RX ADMIN — ACETAMINOPHEN 1000 MG: 10 INJECTION INTRAVENOUS at 03:12

## 2022-09-10 ASSESSMENT — ENCOUNTER SYMPTOMS
VOMITING: 0
ABDOMINAL PAIN: 1
FEVER: 0
DIARRHEA: 0
COUGH: 0
HEARTBURN: 0
CHILLS: 0
NAUSEA: 0
SHORTNESS OF BREATH: 0
PALPITATIONS: 0

## 2022-09-10 ASSESSMENT — LIFESTYLE VARIABLES
TOTAL SCORE: 0
HAVE PEOPLE ANNOYED YOU BY CRITICIZING YOUR DRINKING: NO
ON A TYPICAL DAY WHEN YOU DRINK ALCOHOL HOW MANY DRINKS DO YOU HAVE: 0
TOTAL SCORE: 0
EVER HAD A DRINK FIRST THING IN THE MORNING TO STEADY YOUR NERVES TO GET RID OF A HANGOVER: NO
TOTAL SCORE: 0
HAVE YOU EVER FELT YOU SHOULD CUT DOWN ON YOUR DRINKING: NO
EVER FELT BAD OR GUILTY ABOUT YOUR DRINKING: NO
ALCOHOL_USE: NO
AVERAGE NUMBER OF DAYS PER WEEK YOU HAVE A DRINK CONTAINING ALCOHOL: 0
CONSUMPTION TOTAL: NEGATIVE
HOW MANY TIMES IN THE PAST YEAR HAVE YOU HAD 5 OR MORE DRINKS IN A DAY: 0

## 2022-09-10 ASSESSMENT — PATIENT HEALTH QUESTIONNAIRE - PHQ9
1. LITTLE INTEREST OR PLEASURE IN DOING THINGS: NOT AT ALL
1. LITTLE INTEREST OR PLEASURE IN DOING THINGS: NOT AT ALL
2. FEELING DOWN, DEPRESSED, IRRITABLE, OR HOPELESS: NOT AT ALL
SUM OF ALL RESPONSES TO PHQ9 QUESTIONS 1 AND 2: 0
SUM OF ALL RESPONSES TO PHQ9 QUESTIONS 1 AND 2: 0
2. FEELING DOWN, DEPRESSED, IRRITABLE, OR HOPELESS: NOT AT ALL

## 2022-09-10 NOTE — FACE TO FACE
Face to Face Supporting Documentation - Home Health    The encounter with this patient was in whole or in part the primary reason for home health admission.    Date of encounter:   Patient:                    MRN:                       YOB: 2024  Madelyn Schmitz  [id number]  1950     Home health to see patient for:  Skilled Nursing care for assessment, interventions & education    Skilled need for:  Surgical Aftercare hernia repair, unsteady gait    Skilled nursing interventions to include:      Homebound status evidenced by:  Need the aid of supportive devices such as crutches, canes, wheelchairs or walkers. Leaving home requires a considerable and taxing effort. There is a normal inability to leave the home.    Community Physician to provide follow up care: Rosibel Black M.D.     Optional Interventions? No      I certify the face to face encounter for this home health care referral meets the CMS requirements and the encounter/clinical assessment with the patient was, in whole, or in part, for the medical condition(s) listed above, which is the primary reason for home health care. Based on my clinical findings: the service(s) are medically necessary, support the need for home health care, and the homebound criteria are met.  I certify that this patient has had a face to face encounter by myself.  Ellen Nina P.A.-C. - NPI: [id number]

## 2022-09-10 NOTE — CARE PLAN
The patient is Stable - Low risk of patient condition declining or worsening    Shift Goals  Clinical Goals: pain mgmt, oxygen monitoring  Patient Goals: pain mgmt    Progress made toward(s) clinical / shift goals:    Problem: Pain - Standard  Goal: Alleviation of pain or a reduction in pain to the patient’s comfort goal  Outcome: Progressing     Problem: Knowledge Deficit - Standard  Goal: Patient and family/care givers will demonstrate understanding of plan of care, disease process/condition, diagnostic tests and medications  Outcome: Progressing       Patient is not progressing towards the following goals:

## 2022-09-10 NOTE — OR NURSING
1810: Patient transferred to hospital bed via ambulation. Gait steady with walker. Patient demonstrates pillow splinting correctly. Family at the bedside.     1934: Telephone report to CHELE Riley

## 2022-09-10 NOTE — DISCHARGE PLANNING
Case Management Discharge Planning    Admission Date: 9/9/2022  GMLOS:    ALOS: 0    6-Clicks ADL Score:    6-Clicks Mobility Score:        Anticipated Discharge Dispo:  Home with family and HH services    DME Needed: Yes    DME Ordered: Yes, FWW ordered    Action(s) Taken: Updated Provider/Nurse on Discharge Plan, Choice obtained, Referral(s) sent, DME Face to Face , and OTHER: Reviewed chart and updated discharge plan. Reviewed treatment and discharge plans, needs, and barriers with medical and nursing teams.    RN TIM received notification of patient being discharged and needing a walker and HH services coordinated for discharge. TCN for SCP obtained choice. RN TIM sent referrals for needed services. Pending acceptance.    RN CM received notification from GAIL Canales for SCP confirming receipt of the choices and referrals being sent.    Escalations Completed: [employer], Bedside RN, and Speciality Provider (HH company)    Medically Clear: Yes    Next Steps: f/u with medical and nursing teams regarding discharge planning needs/barriers and assist as indicated. f/u with patient and family regarding discharge planning needs/barriers and update discharge plan as indicated. F/u with DME and  [employer] for acceptance and delivery of FWW.    Barriers to Discharge: DME    Is the patient up for discharge tomorrow: No, patient set to discharge today (09/10/2022) home with family and  services. FWW to be delivered to home.

## 2022-09-10 NOTE — PROGRESS NOTES
Surgical Progress Note    Author: Ellen Nina P.A.-C. Date & Time created: 9/10/2022   9:26 AM     Interval Events:  72 year old female POD#1 Laparoscopic incarcerated incisional ventral hernia repair with mesh is progressing well since her procedure.   Admitted for obs overnight for low O2 saturation post operatively and abdominal pain at incision sites.   This morning she states she is feeling much better than yesterday.  Oxygen weaned currently 92%RA.  Pain is controlled.  She is ambulating and voiding.    Denies SOB, Cough, congestion or chest pain.      Review of Systems   Constitutional:  Negative for chills and fever.   Respiratory:  Negative for cough and shortness of breath.    Cardiovascular:  Negative for chest pain and palpitations.   Gastrointestinal:  Positive for abdominal pain (incisional). Negative for diarrhea, heartburn, nausea and vomiting.   Genitourinary:  Negative for dysuria.   Hemodynamics:  Temp (24hrs), Av.2 °C (97.2 °F), Min:35.8 °C (96.5 °F), Max:36.5 °C (97.7 °F)  Temperature: 36.5 °C (97.7 °F)  Pulse  Av.7  Min: 51  Max: 78   Blood Pressure : (!) 98/50     Respiratory:    Respiration: 16, Pulse Oximetry: 92 %     Work Of Breathing / Effort: Mild;Shallow     Neuro:  GCS       Fluids:    Intake/Output Summary (Last 24 hours) at 9/10/2022 0926  Last data filed at 2022 1241  Gross per 24 hour   Intake 1400 ml   Output 210 ml   Net 1190 ml        Current Diet Order   Procedures    Diet Order Diet: Regular     Physical Exam  Constitutional:       Appearance: Normal appearance. She is obese.   HENT:      Head: Normocephalic and atraumatic.      Nose: Nose normal.      Mouth/Throat:      Mouth: Mucous membranes are moist.   Eyes:      Conjunctiva/sclera: Conjunctivae normal.   Cardiovascular:      Rate and Rhythm: Normal rate and regular rhythm.   Pulmonary:      Effort: Pulmonary effort is normal. No respiratory distress.   Abdominal:      General: There is distension.       Palpations: Abdomen is soft.      Tenderness: There is abdominal tenderness. There is no guarding or rebound.   Musculoskeletal:         General: Normal range of motion.      Cervical back: Normal range of motion.   Skin:     General: Skin is warm and dry.      Coloration: Skin is not jaundiced.      Findings: No erythema or rash.   Neurological:      Mental Status: She is alert and oriented to person, place, and time.   Psychiatric:         Mood and Affect: Mood normal.     Labs:  Recent Results (from the past 24 hour(s))   CBC without Differential (blood)    Collection Time: 09/10/22  2:16 AM   Result Value Ref Range    WBC 6.9 4.8 - 10.8 K/uL    RBC 3.98 (L) 4.20 - 5.40 M/uL    Hemoglobin 12.1 12.0 - 16.0 g/dL    Hematocrit 38.2 37.0 - 47.0 %    MCV 96.0 81.4 - 97.8 fL    MCH 30.4 27.0 - 33.0 pg    MCHC 31.7 (L) 33.6 - 35.0 g/dL    RDW 49.6 35.9 - 50.0 fL    Platelet Count 219 164 - 446 K/uL    MPV 9.7 9.0 - 12.9 fL   Comp Metabolic Panel (CMP)    Collection Time: 09/10/22  2:16 AM   Result Value Ref Range    Sodium 137 135 - 145 mmol/L    Potassium 5.1 3.6 - 5.5 mmol/L    Chloride 103 96 - 112 mmol/L    Co2 25 20 - 33 mmol/L    Anion Gap 9.0 7.0 - 16.0    Glucose 173 (H) 65 - 99 mg/dL    Bun 17 8 - 22 mg/dL    Creatinine 0.68 0.50 - 1.40 mg/dL    Calcium 8.5 8.5 - 10.5 mg/dL    AST(SGOT) 13 12 - 45 U/L    ALT(SGPT) 6 2 - 50 U/L    Alkaline Phosphatase 61 30 - 99 U/L    Total Bilirubin 0.4 0.1 - 1.5 mg/dL    Albumin 3.7 3.2 - 4.9 g/dL    Total Protein 6.8 6.0 - 8.2 g/dL    Globulin 3.1 1.9 - 3.5 g/dL    A-G Ratio 1.2 g/dL   ESTIMATED GFR    Collection Time: 09/10/22  2:16 AM   Result Value Ref Range    GFR (CKD-EPI) 91 >60 mL/min/1.73 m 2     Medical Decision Making, by Problem:  Active Hospital Problems    Diagnosis     S/P repair of ventral hernia [Z98.890, Z87.19]      Plan:  Pt is alert and oriented, NAD. Breathing unlabored. Tolerating regular diet.  Incisions ok. VS stable. Labs reviewed.  Leukocytosis,  likely reactive. Encouraged ambulation and incentive spirometry.  Wean O2.  Okay for discharge later this afternoon.   Patient case d/w Dr. Anaya    Quality Measures:  Quality-Core Measures   Reviewed items::  Labs reviewed  Watkins catheter::  No Watkins  DVT prophylaxis pharmacological::  Enoxaparin (Lovenox)  DVT prophylaxis - mechanical:  SCDs  Ulcer Prophylaxis::  Yes    Discussed patient condition with Family, RN, Patient, and Dr. Jaclyn BECK.

## 2022-09-10 NOTE — DISCHARGE PLANNING
Thank you for alerting [facility] to this patient. Please place a valid Home Health order so that we can continue processing this referral.   Thank you.

## 2022-09-10 NOTE — FACE TO FACE
Face to Face Note  -  Durable Medical Equipment    Ellen Nina P.A.-C. - NPI: [id number]  I certify that this patient is under my care and that they had a durable medical equipment(DME)face to face encounter by myself that meets the physician DME face-to-face encounter requirements with this patient on:    Date of encounter:   Patient:                    MRN:                       YOB: 2024  Madelyn Schmitz  [id number]  1950     The encounter with the patient was in whole, or in part, for the following medical condition, which is the primary reason for durable medical equipment:  Post-Op Surgery    I certify that, based on my findings, the following durable medical equipment is medically necessary:    Walkers.    My Clinical findings support the need for the above equipment due to:   unsteady gait

## 2022-09-10 NOTE — PROGRESS NOTES
Patient discharged to home with family.    AVS, discharge instructions, and education reviewed with patient and family. All questions answered.    IV removed.    AVS, discharge instructions, and belongings taken with patient.    Patient taken to family member's car via wheelchair by CRAIG Rosenberg.

## 2022-09-10 NOTE — CARE PLAN
Assumed care of patient at shift change.  Patient AAO x 4, pleasant, on RA with O2 sats in mid-90s. Patient reports no pain at this time. Pain occurs with movement.  IVF infusing.  Patient given Incentive Spirometer. Patient reached 500 mL, educated on use.  New ice bag given to patient.  No further needs verbalized at this time.  Call bell and belongings within reach. Family at bedside.  Will continue to monitor.          The patient is Stable - Low risk of patient condition declining or worsening    Shift Goals  Clinical Goals: Pain control, oxygen monitoring, IS  Patient Goals: Pain control  Family Goals: Pain control, oxygen improvement, go home    Progress made toward(s) clinical / shift goals:    Problem: Pain - Standard  Goal: Alleviation of pain or a reduction in pain to the patient’s comfort goal  Outcome: Progressing     Problem: Knowledge Deficit - Standard  Goal: Patient and family/care givers will demonstrate understanding of plan of care, disease process/condition, diagnostic tests and medications  Outcome: Progressing       Patient is not progressing towards the following goals:

## 2022-09-10 NOTE — DISCHARGE PLANNING
HTH/SCP TCN chart review completed. Collaborated with TIM Tapia.The most current review of medical record, knowledge of pt's PLOF and social support, LACE+ score of 49 and no 6 clicks available.    Pt seen at bedside with spouse present. Introduced TCN program. Provided education regarding post acute levels of care. Discussed HTH/SCP plan benefits (Meds to Beds, medical uber and [facility] transitional care). Pt verbalizes understanding. Agreeable to [facility] and sent referral via email.    Noted per review, pt was steady with FWW though pt reports she does not have FWW at home. She and spouse are also interested in HH follow up as able as well. Choice proactively obtained for HH and DME, faxed to DPA and noted MD placed FTF/order for FWW and HH. Noted referrals were sent by CM to [facility] and [facility] though pt dc'd to home prior to receiving acceptance for HH or FWW being delivered. Sent follow up email to [facility] to follow up/monitoring for delivery of FWW at home and HH acceptance as well. TCN will continue to follow and collaborate with discharge planning team as additional post acute needs arise. Thank you.     Completed today:  Choice obtained: HH, DME (for FWW)  [facility] referral sent

## 2022-09-11 NOTE — DISCHARGE PLANNING
1103  DPA unable to send HH referral to [facility] . Due to no HH order has been placed yet. DPA informed RN CM covering CDU.

## 2022-09-12 NOTE — DISCHARGE PLANNING
Pt discharged on 9/10/22.  Physician did not put in a home health order and it has been over 24 hours.  Home health order cannot be entered.  Pt will have to contact their PCP for a home health referral.

## 2022-09-12 NOTE — DISCHARGE PLANNING
ATTN: Case Management  RE: Referral for Home Health    Reason for referral denial: Patient DC'd without HH order               Unfortunately, we are not able to accept this referral for the reason listed above. If further clarity is needed, our Transitional Care Specialists are available to discuss any barriers to service at [phone].      We look forward to collaborating with you in the future,  [facility] Team

## 2022-09-15 PROBLEM — I10 HTN (HYPERTENSION): Status: ACTIVE | Noted: 2022-09-15

## 2022-09-15 PROBLEM — K59.03 DRUG-INDUCED CONSTIPATION: Status: ACTIVE | Noted: 2022-09-15

## 2022-09-15 PROBLEM — E66.01 MORBID (SEVERE) OBESITY DUE TO EXCESS CALORIES (HCC): Status: ACTIVE | Noted: 2022-09-15

## 2022-09-23 PROBLEM — R26.81 GAIT INSTABILITY: Status: ACTIVE | Noted: 2022-09-23

## 2022-09-30 ENCOUNTER — HOME HEALTH ADMISSION (OUTPATIENT)
Dept: HOME HEALTH SERVICES | Facility: HOME HEALTHCARE | Age: 72
End: 2022-09-30
Payer: MEDICARE

## 2022-09-30 ENCOUNTER — DOCUMENTATION (OUTPATIENT)
Dept: HEALTH INFORMATION MANAGEMENT | Facility: OTHER | Age: 72
End: 2022-09-30
Payer: MEDICARE

## 2022-10-05 ENCOUNTER — HOME CARE VISIT (OUTPATIENT)
Dept: HOME HEALTH SERVICES | Facility: HOME HEALTHCARE | Age: 72
End: 2022-10-05

## 2022-10-05 NOTE — Clinical Note
Patient was not started on service with [facility] because she does not have a skilled need.

## 2022-10-05 NOTE — CASE COMMUNICATION
I agree with these changes.  ----- Message -----  From: Skye Rankin R.N.  Sent: 10/5/2022  11:29 AM PDT  To: Shakira Canchola, PT, Susan Jacques, *      Patient was [facility] because she does not have a skilled need.

## 2022-10-11 ENCOUNTER — OFFICE VISIT (OUTPATIENT)
Dept: MEDICAL GROUP | Facility: PHYSICIAN GROUP | Age: 72
End: 2022-10-11
Payer: MEDICARE

## 2022-10-11 VITALS
DIASTOLIC BLOOD PRESSURE: 60 MMHG | RESPIRATION RATE: 16 BRPM | BODY MASS INDEX: 37.34 KG/M2 | HEART RATE: 89 BPM | OXYGEN SATURATION: 96 % | WEIGHT: 210.8 LBS | TEMPERATURE: 97.9 F | SYSTOLIC BLOOD PRESSURE: 100 MMHG

## 2022-10-11 DIAGNOSIS — I35.8 NONRHEUMATIC AORTIC VALVE SCLEROSIS: ICD-10-CM

## 2022-10-11 DIAGNOSIS — G47.30 SLEEP APNEA, UNSPECIFIED TYPE: ICD-10-CM

## 2022-10-11 DIAGNOSIS — E78.5 HYPERLIPIDEMIA ASSOCIATED WITH TYPE 2 DIABETES MELLITUS (HCC): ICD-10-CM

## 2022-10-11 DIAGNOSIS — R80.9 TYPE 2 DIABETES MELLITUS WITH MICROALBUMINURIA, WITHOUT LONG-TERM CURRENT USE OF INSULIN (HCC): ICD-10-CM

## 2022-10-11 DIAGNOSIS — Z76.89 ESTABLISHING CARE WITH NEW DOCTOR, ENCOUNTER FOR: ICD-10-CM

## 2022-10-11 DIAGNOSIS — E11.69 HYPERLIPIDEMIA ASSOCIATED WITH TYPE 2 DIABETES MELLITUS (HCC): ICD-10-CM

## 2022-10-11 DIAGNOSIS — I15.2 HYPERTENSION ASSOCIATED WITH DIABETES (HCC): ICD-10-CM

## 2022-10-11 DIAGNOSIS — E11.59 HYPERTENSION ASSOCIATED WITH DIABETES (HCC): ICD-10-CM

## 2022-10-11 DIAGNOSIS — E11.29 TYPE 2 DIABETES MELLITUS WITH MICROALBUMINURIA, WITHOUT LONG-TERM CURRENT USE OF INSULIN (HCC): ICD-10-CM

## 2022-10-11 DIAGNOSIS — R01.1 CARDIAC MURMUR: ICD-10-CM

## 2022-10-11 LAB
HBA1C MFR BLD: 7 % (ref 0–5.6)
INT CON NEG: ABNORMAL
INT CON POS: ABNORMAL
RETINAL SCREEN: NOT DETECTED

## 2022-10-11 PROCEDURE — 92250 FUNDUS PHOTOGRAPHY W/I&R: CPT | Performed by: FAMILY MEDICINE

## 2022-10-11 PROCEDURE — 83036 HEMOGLOBIN GLYCOSYLATED A1C: CPT | Performed by: FAMILY MEDICINE

## 2022-10-11 PROCEDURE — 99214 OFFICE O/P EST MOD 30 MIN: CPT | Performed by: FAMILY MEDICINE

## 2022-10-11 RX ORDER — PRAVASTATIN SODIUM 10 MG
10 TABLET ORAL NIGHTLY
Qty: 100 TABLET | Refills: 3 | Status: SHIPPED | OUTPATIENT
Start: 2022-10-11 | End: 2023-08-30

## 2022-10-11 RX ORDER — PRAVASTATIN SODIUM 10 MG
10 TABLET ORAL NIGHTLY
COMMUNITY
End: 2022-10-11 | Stop reason: SDUPTHER

## 2022-10-11 RX ORDER — LISINOPRIL 10 MG/1
10 TABLET ORAL DAILY
Qty: 100 TABLET | Refills: 3 | Status: SHIPPED | OUTPATIENT
Start: 2022-10-11 | End: 2023-08-03 | Stop reason: SDUPTHER

## 2022-10-11 ASSESSMENT — FIBROSIS 4 INDEX: FIB4 SCORE: 1.79

## 2022-10-11 NOTE — PROGRESS NOTES
CC:    Chief Complaint   Patient presents with    Establish Care       HISTORY OF THE PRESENT ILLNESS: Patient is a 72 y.o. female. This pleasant patient is here today to establish care. Her prior PCP was Rosibel Black.    Sleep apnea  Chronic.  Uses cpap 2-3 hours night.      Hypertension associated with diabetes (HCC)  Chronic. Continues to take lisinopril 10mg daily.   bp today in office is 100/60. Has a bp monitor at home.   Denies any chest pain or headaches.    Nonrheumatic aortic valve sclerosis  Chronic. Last seen by cards, Dr Pappas, in December 2019. Denies any SOB or chest pain.  Scheduled for echo this week.    Hyperlipidemia associated with type 2 diabetes mellitus (HCC)  Chronic medical condition.  Currently taking pravastatin 10mg. .    Tolerating the medication well without any side effects.  Patient denies chest pain or lower extremity muscle cramps.  Last set of labs from Nov 2021 were elevated.     Type 2 diabetes mellitus with microalbuminuria, without long-term current use of insulin (HCC)  Chronic. States that she was previously taking metformin 500mg daily and has been off of it x 1 year.   Previous PCP d/c'd it.  A1c checked today in the office was at 7%.       Allergies: Bloodless, Ibuprofen, and Penicillins    Current Outpatient Medications Ordered in Epic   Medication Sig Dispense Refill    lisinopril (PRINIVIL) 10 MG Tab Take 1 Tablet by mouth every day. Indications: High Blood Pressure Disorder 100 Tablet 3    pravastatin (PRAVACHOL) 10 MG Tab Take 1 Tablet by mouth every evening. 100 Tablet 3    ondansetron (ZOFRAN ODT) 4 MG TABLET DISPERSIBLE Take 1 Tablet by mouth every 6 hours as needed for Nausea. 10 Tablet 0     No current Epic-ordered facility-administered medications on file.       Past Medical History:   Diagnosis Date    Acute cholecystitis 09/19/2019    Arthritis     osteo, finger/shoulders    Back pain     Blood clotting disorder (HCC) 2004    leg    Body mass  "index (BMI) 38.0-38.9, adult 9/15/2022    Breath shortness 09/07/2022    with exertion    Bronchitis     Cancer (HCC) 09/07/2022    endometrial cancer    Chickenpox     Dental disorder     upper/lower dentures    Diabetes     oral meds    Diabetes mellitus (HCC) 09/19/2019      Ref. Range 4/8/2019 13:20 11/6/2019 16:47 Glycohemoglobin Latest Ref Range: 0.0 - 5.6 % 6.6 (H) 5.7 (A) Estim. Avg Glu Latest Units: mg/dL 143     Ref. Range 11/6/2019 16:35 Micro Alb Creat Ratio Latest Ref Range: 0 - 30 mg/g 167 (H)   She has history of well-controlled type 2 diabetes.  She is previously taking metformin 500 mg twice daily however her A1c on recheck was down to 5.7 so we discon    Dysuria 09/11/2019    Fatigue 11/6/2019    Since her hysterectomy and cholecystectomy she has had some trouble bouncing back to her usual self and feels a bit deconditioned. I suspect a trial with prozac will help her energy. She agrees to add a short daily walk with her  to assist with deconditioning.     Heart murmur 09/07/2022    Heart valve disease     \"murmur\"    High cholesterol     HTN (hypertension) 9/15/2022    Hyperlipidemia     Hypertension 09/07/2022    medicated    Hypotension due to hypovolemia 09/26/2019    She has a low blood pressure in office today of 84/52 with a baseline in the 100s to 120s systolic.  This is likely due to her frequent episodes of diarrhea exacerbated by ongoing use of lisinopril.  Yesterday, she felt lightheaded and dizzy but did not pass out or have a fall.  She is having challenges of what is appropriate to eat as she had recent pelvic radiation as well as a cholecystectomy b    Mumps     Nasal drainage     Pneumonia 09/07/2022    12 years ago    Psychiatric problem 09/07/2022    medicated at time    Sleep apnea 09/07/2022    CPAP    Snoring     Urinary incontinence 09/07/2022    at times       Past Surgical History:   Procedure Laterality Date    LAPAROSCOPIC INCISIONAL HERNIA REPAIR N/A 9/9/2022    " Procedure: LAPAROSCOPIC REPAIR OF INCISIONAL HERNIA;  Surgeon: Sim Anaya M.D.;  Location: SURGERY [facility];  Service: General    GABY BY LAPAROSCOPY  9/19/2019    Procedure: CHOLECYSTECTOMY, LAPAROSCOPIC;  Surgeon: Jenniffer Johnson M.D.;  Location: SURGERY [facility];  Service: General    HYSTERECTOMY ROBOTIC XI  6/27/2019    Procedure: HYSTERECTOMY, ROBOT-ASSISTED, USING DA YO XI;  Surgeon: Alexandr Mancia M.D.;  Location: SURGERY [facility];  Service: Gynecology    SALPINGO OOPHORECTOMY Bilateral 6/27/2019    Procedure: SALPINGO-OOPHORECTOMY;  Surgeon: Alexandr Mancia M.D.;  Location: SURGERY [facility];  Service: Gynecology    NODE BIOPSY SENTINEL  6/27/2019    Procedure: BIOPSY, LYMPH NODE, SENTINEL;  Surgeon: Alexandr Mancia M.D.;  Location: SURGERY [facility];  Service: Gynecology    HYSTEROSCOPY WITH MYOSURE N/A 4/17/2019    Procedure: HYSTEROSCOPY, WITH TISSUE REMOVAL, USING HYSTEROSCOPIC ROTATING CUTTER BLADE - DIAGNOSTIC;  Surgeon: Racquel Gatica M.D.;  Location: SURGERY SAME DAY [facility];  Service: Gynecology    DILATION AND CURETTAGE N/A 4/17/2019    Procedure: DILATION AND CURETTAGE;  Surgeon: Racquel Gatica M.D.;  Location: SURGERY SAME DAY [facility];  Service: Gynecology    PRIMARY C SECTION  1972/1974/1977    x 3       Social History     Tobacco Use    Smoking status: Never    Smokeless tobacco: Never   Vaping Use    Vaping Use: Never used   Substance Use Topics    Alcohol use: No    Drug use: No       Social History     Social History Narrative    She is Polish speaking. Madelyn has been  to her  for 50 years. Her daughter-in-law, Delores, participates in her medical care and assists with translation. She has three grown sons. '72, '74, '77. Has 8 grandchildren. 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 Was born in [facility], Came to the [place] in [place]79. She is happy with her life and her sons. She  enjoys reading, watch tv shows, talk with her friends on the phone. No tob/etoh/drugs.        Family History   Problem Relation Age of Onset    Heart Disease Father     Asthma Brother     Asthma Brother     Cancer Maternal Aunt         gyn cancer    Sleep Apnea Neg Hx        Health Maintenance:   Declining flu vaccine- never receives vaccine.      Objective:     Exam:   /60 (BP Location: Left arm, Patient Position: Sitting, BP Cuff Size: Adult)   Pulse 89   Temp 36.6 °C (97.9 °F) (Temporal)   Resp 16   Wt 95.6 kg (210 lb 12.8 oz)   LMP  (LMP Unknown)   SpO2 96%   BMI 37.34 kg/m²   Body mass index is 37.34 kg/m².  Wt Readings from Last 4 Encounters:   10/11/22 95.6 kg (210 lb 12.8 oz)   09/07/22 98.4 kg (216 lb 14.9 oz)   07/08/22 100 kg (221 lb)   05/04/22 98.3 kg (216 lb 11.2 oz)     General: Normal appearing. No distress. Appropriately groomed.  HEENT: Normocephalic. Eyes conjunctiva clear lids without ptosis,   Neck: Supple, Thyroid is not enlarged.   Pulmonary: Clear to ausculation.  Normal effort. No rales, ronchi, or wheezing.  Cardiovascular: murmur, Regular rate and rhythm, no lower extremity edema  Neurologic: Grossly nonfocal  Skin: Warm and dry.  No obvious lesions.  Musculoskeletal: Normal gait. No extremity cyanosis, clubbing, or edema.  Psych: Normal mood and affect. Alert and oriented x3. Judgment and insight is normal.      Assessment & Plan:   72 y.o. female with the following -    1. Establishing care with new doctor    2. Type 2 diabetes mellitus with microalbuminuria, without long-term current use of insulin (HCC)  Chronic. Stable. Has been off metformin x 1 year.   Declining restarting metformin. Will continue to monitor  - POCT Hemoglobin A1C  - POCT Retinal Eye Exam    3. Cardiac murmur  4 Nonrheumatic aortic valve sclerosis  -chronic issue. Stable. Needs to f/u cards. Last visit almost 3 years ago.   Echo scheduled for this week   REFERRAL TO CARDIOLOGY    5 Sleep apnea,  unspecified type  Chronic. Stable. C/w cpap. Encouraged to use cpap at least 4 hours a night    6. Hypertension associated with diabetes (HCC)  -chronic. Stable.  C/w lisinopril 10mg  lisinopril (PRINIVIL) 10 MG Tab; Take 1 Tablet by mouth every day. Indications: High Blood Pressure Disorder  Dispense: 100 Tablet; Refill: 3    7. Hyperlipidemia associated with type 2 diabetes mellitus (HCC)  -chronic. Not well controlled. Labs from last year with elevated lipid panel. Recheck labs and c/w pravastatin.   pravastatin (PRAVACHOL) 10 MG Tab; Take 1 Tablet by mouth every evening.  Dispense: 100 Tablet; Refill: 3  - Lipid Profile; Future     Return in about 4 weeks (around 11/8/2022) for Diabetes.    Please note that this dictation was created using voice recognition software. I have made every reasonable attempt to correct obvious errors, but I expect that there are errors of grammar and possibly content that I did not discover before finalizing the note.

## 2022-10-12 ENCOUNTER — PATIENT OUTREACH (OUTPATIENT)
Dept: HEALTH INFORMATION MANAGEMENT | Facility: OTHER | Age: 72
End: 2022-10-12
Payer: MEDICARE

## 2022-10-12 DIAGNOSIS — E11.59 HYPERTENSION ASSOCIATED WITH DIABETES (HCC): ICD-10-CM

## 2022-10-12 DIAGNOSIS — R80.9 TYPE 2 DIABETES MELLITUS WITH MICROALBUMINURIA, WITHOUT LONG-TERM CURRENT USE OF INSULIN (HCC): ICD-10-CM

## 2022-10-12 DIAGNOSIS — I15.2 HYPERTENSION ASSOCIATED WITH DIABETES (HCC): ICD-10-CM

## 2022-10-12 DIAGNOSIS — E11.29 TYPE 2 DIABETES MELLITUS WITH MICROALBUMINURIA, WITHOUT LONG-TERM CURRENT USE OF INSULIN (HCC): ICD-10-CM

## 2022-10-12 PROBLEM — R53.83 FATIGUE: Status: RESOLVED | Noted: 2019-11-06 | Resolved: 2022-10-12

## 2022-10-12 PROBLEM — I10 HTN (HYPERTENSION): Status: RESOLVED | Noted: 2022-09-15 | Resolved: 2022-10-12

## 2022-10-12 NOTE — ASSESSMENT & PLAN NOTE
Chronic. Continues to take lisinopril 10mg daily.   bp today in office is 100/60. Has a bp monitor at home.   Denies any chest pain or headaches.

## 2022-10-12 NOTE — ASSESSMENT & PLAN NOTE
Chronic medical condition.  Currently taking pravastatin 10mg. .    Tolerating the medication well without any side effects.  Patient denies chest pain or lower extremity muscle cramps.  Last set of labs from Nov 2021 were elevated.

## 2022-10-12 NOTE — PROGRESS NOTES
[place] [facility] [place]. Nurse attempted to contact patient. VM left with CM contact number.     10/12/22 4:30pm:  Nurse TIM second outreach attempt utilizing  [phone]. Patient answered telephone. Nurse provided information on CCM program. Patient reports she is managing her care. States she doesn't feel she needs to participate in program at this time. Nurse will not actively follow patient. If patient decides she would like to participate in the future, pt can notify her PCP.

## 2022-10-12 NOTE — ASSESSMENT & PLAN NOTE
Chronic. Last seen by cards, Dr Pappas, in December 2019. Denies any SOB or chest pain.  Scheduled for echo this week.

## 2022-10-12 NOTE — ASSESSMENT & PLAN NOTE
Chronic. States that she was previously taking metformin 500mg daily and has been off of it x 1 year.   Previous PCP d/c'd it.  A1c checked today in the office was at 7%.

## 2022-10-31 ENCOUNTER — OFFICE VISIT (OUTPATIENT)
Dept: CARDIOLOGY | Facility: MEDICAL CENTER | Age: 72
End: 2022-10-31
Attending: FAMILY MEDICINE
Payer: MEDICARE

## 2022-10-31 VITALS
HEIGHT: 63 IN | HEART RATE: 78 BPM | BODY MASS INDEX: 37.56 KG/M2 | SYSTOLIC BLOOD PRESSURE: 112 MMHG | RESPIRATION RATE: 16 BRPM | OXYGEN SATURATION: 98 % | WEIGHT: 212 LBS | DIASTOLIC BLOOD PRESSURE: 58 MMHG

## 2022-10-31 DIAGNOSIS — I35.8 NONRHEUMATIC AORTIC VALVE SCLEROSIS: ICD-10-CM

## 2022-10-31 DIAGNOSIS — E78.5 HYPERLIPIDEMIA ASSOCIATED WITH TYPE 2 DIABETES MELLITUS (HCC): ICD-10-CM

## 2022-10-31 DIAGNOSIS — E11.59 HYPERTENSION ASSOCIATED WITH DIABETES (HCC): ICD-10-CM

## 2022-10-31 DIAGNOSIS — I15.2 HYPERTENSION ASSOCIATED WITH DIABETES (HCC): ICD-10-CM

## 2022-10-31 DIAGNOSIS — E11.69 HYPERLIPIDEMIA ASSOCIATED WITH TYPE 2 DIABETES MELLITUS (HCC): ICD-10-CM

## 2022-10-31 DIAGNOSIS — I35.0 AORTIC VALVE STENOSIS, ETIOLOGY OF CARDIAC VALVE DISEASE UNSPECIFIED: ICD-10-CM

## 2022-10-31 PROCEDURE — 99214 OFFICE O/P EST MOD 30 MIN: CPT | Performed by: INTERNAL MEDICINE

## 2022-10-31 ASSESSMENT — ENCOUNTER SYMPTOMS
LOSS OF CONSCIOUSNESS: 0
MYALGIAS: 0
PALPITATIONS: 0
DIZZINESS: 0
SHORTNESS OF BREATH: 0
COUGH: 0

## 2022-10-31 ASSESSMENT — FIBROSIS 4 INDEX: FIB4 SCORE: 1.79

## 2022-10-31 NOTE — PROGRESS NOTES
Chief Complaint   Patient presents with    Heart Murmur     Follow up  Interp id # [id number] Alex         Walker Schmitz is a 72 y.o. female who presents today follow-up cardiac care.     was used for the visit,  understands and speaks English.    The patient has medical problems including aortic stenosis, hypertension, dyslipidemia, DM, obesity, CANDIDA on CPAP, prior DVT 2004, recurrent hernia repairs    Previously followed by Anirudh Grossman, last seen 12/10/2019 since that appointment she has had no specific cardiac problems, currently recovering from her latest hernia repair in 9/2022, preoperative EKG was unremarkable and there were no perioperative cardiac problems    Past Medical History:   Diagnosis Date    Acute cholecystitis 09/19/2019    Arthritis     osteo, finger/shoulders    Back pain     Blood clotting disorder ([facility]) 2004    leg    Body mass index (BMI) 38.0-38.9, adult 9/15/2022    Breath shortness 09/07/2022    with exertion    Bronchitis     Cancer ([facility]) 09/07/2022    endometrial cancer    Chickenpox     Dental disorder     upper/lower dentures    Diabetes     oral meds    Diabetes mellitus ([facility]) 09/19/2019      Ref. Range 4/8/2019 13:20 11/6/2019 16:47 Glycohemoglobin Latest Ref Range: 0.0 - 5.6 % 6.6 (H) 5.7 (A) Estim. Avg Glu Latest Units: mg/dL 143     Ref. Range 11/6/2019 16:35 Micro Alb Creat Ratio Latest Ref Range: 0 - 30 mg/g 167 (H)   She has history of well-controlled type 2 diabetes.  She is previously taking metformin 500 mg twice daily however her A1c on recheck was down to 5.7 so we discon    Dysuria 09/11/2019    Fatigue 11/6/2019    Since her hysterectomy and cholecystectomy she has had some trouble bouncing back to her usual self and feels a bit deconditioned. I suspect a trial with prozac will help her energy. She agrees to add a short daily walk with her  to assist with deconditioning.     Heart murmur 09/07/2022     "Heart valve disease     \"murmur\"    High cholesterol     HTN (hypertension) 9/15/2022    Hyperlipidemia     Hypertension 09/07/2022    medicated    Hypotension due to hypovolemia 09/26/2019    She has a low blood pressure in office today of 84/52 with a baseline in the 100s to 120s systolic.  This is likely due to her frequent episodes of diarrhea exacerbated by ongoing use of lisinopril.  Yesterday, she felt lightheaded and dizzy but did not pass out or have a fall.  She is having challenges of what is appropriate to eat as she had recent pelvic radiation as well as a cholecystectomy b    Mumps     Nasal drainage     Pneumonia 09/07/2022    12 years ago    Psychiatric problem 09/07/2022    medicated at time    Sleep apnea 09/07/2022    CPAP    Snoring     Urinary incontinence 09/07/2022    at times     Past Surgical History:   Procedure Laterality Date    LAPAROSCOPIC INCISIONAL HERNIA REPAIR N/A 9/9/2022    Procedure: LAPAROSCOPIC REPAIR OF INCISIONAL HERNIA;  Surgeon: Sim Anaya M.D.;  [facility];  Service: General    GABY BY LAPAROSCOPY  9/19/2019    Procedure: CHOLECYSTECTOMY, LAPAROSCOPIC;  Surgeon: Jenniffer Johnson M.D.;  Location: [facility];  Service: General    HYSTERECTOMY ROBOTIC XI  6/27/2019    Procedure: HYSTERECTOMY, ROBOT-ASSISTED, USING DA YO XI;  Surgeon: Alexandr Mancia M.D.;  Location: [facility];  Service: Gynecology    SALPINGO OOPHORECTOMY Bilateral 6/27/2019    Procedure: SALPINGO-OOPHORECTOMY;  Surgeon: Alexandr Mancia M.D.;  Location: [facility];  Service: Gynecology    NODE BIOPSY SENTINEL  6/27/2019    Procedure: BIOPSY, LYMPH NODE, SENTINEL;  Surgeon: Alexandr Mancia M.D.;  Location: [facility];  Service: Gynecology    HYSTEROSCOPY WITH MYOSURE N/A 4/17/2019    Procedure: HYSTEROSCOPY, WITH TISSUE REMOVAL, USING HYSTEROSCOPIC ROTATING CUTTER BLADE - DIAGNOSTIC;  Surgeon: Racquel Gatica M.D.;  Location: " SURGERY SAME DAY [facility];  Service: Gynecology    DILATION AND CURETTAGE N/A 4/17/2019    Procedure: DILATION AND CURETTAGE;  Surgeon: Racquel Gatica M.D.;  Location: SURGERY SAME DAY [facility];  Service: Gynecology    PRIMARY C SECTION  1972/1974/1977    x 3     Family History   Problem Relation Age of Onset    Heart Disease Father     Asthma Brother     Asthma Brother     Cancer Maternal Aunt         gyn cancer    Sleep Apnea Neg Hx      Social History     Socioeconomic History    Marital status:      Spouse name: Not on file    Number of children: Not on file    Years of education: Not on file    Highest education level: Not on file   Occupational History    Not on file   Tobacco Use    Smoking status: Never    Smokeless tobacco: Never   Vaping Use    Vaping Use: Never used   Substance and Sexual Activity    Alcohol use: No    Drug use: No    Sexual activity: Not Currently     Partners: Male     Birth control/protection: Post-Menopausal   Other Topics Concern    Not on file   Social History Narrative    She is [place]an speaking. Madelyn has been  to her  for 50 years. Her daughter-in-law, Delores, participates in her medical care and assists with translation. She has three grown sons. '72, '74, '77. Has 8 grandchildren. 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 Was born in [facility], Came to the [place] in '79. She is happy with her life and her sons. She enjoys reading, watch tv shows, talk with her friends on the phone. No tob/etoh/drugs.      Social Determinants of Health     Financial Resource Strain: Not on file   Food Insecurity: Not on file   Transportation Needs: Not on file   Physical Activity: Not on file   Stress: Not on file   Social Connections: Not on file   Intimate Partner Violence: Not on file   Housing Stability: Not on file     Allergies   Allergen Reactions    Bloodless      Yarsani    Ibuprofen Rash and Swelling      ".    Penicillins Rash and Swelling     .     Outpatient Encounter Medications as of 10/31/2022   Medication Sig Dispense Refill    Multiple Vitamin (MULTI-VITAMIN DAILY PO) Take  by mouth.      lisinopril (PRINIVIL) 10 MG Tab Take 1 Tablet by mouth every day. Indications: High Blood Pressure Disorder 100 Tablet 3    pravastatin (PRAVACHOL) 10 MG Tab Take 1 Tablet by mouth every evening. 100 Tablet 3    [DISCONTINUED] ondansetron (ZOFRAN ODT) 4 MG TABLET DISPERSIBLE Take 1 Tablet by mouth every 6 hours as needed for Nausea. (Patient not taking: Reported on 10/31/2022) 10 Tablet 0     No facility-administered encounter medications on file as of 10/31/2022.     Review of Systems   Constitutional:  Positive for malaise/fatigue.   Respiratory:  Negative for cough and shortness of breath.    Cardiovascular:  Negative for chest pain and palpitations.   Musculoskeletal:  Negative for myalgias.   Neurological:  Negative for dizziness and loss of consciousness.            Objective     /58 (BP Location: Left arm, Patient Position: Sitting)   Pulse 78   Resp 16   Ht 1.6 m (5' 3\")   Wt 96.2 kg (212 lb)   LMP  (LMP Unknown)   SpO2 98%   BMI 37.55 kg/m²     Physical Exam  Constitutional:       Appearance: She is well-developed.   Eyes:      Conjunctiva/sclera: Conjunctivae normal.      Pupils: Pupils are equal, round, and reactive to light.   Neck:      Vascular: No JVD.   Cardiovascular:      Rate and Rhythm: Normal rate and regular rhythm.      Heart sounds: Murmur heard.      Comments: Diminished A2  Pulmonary:      Effort: Pulmonary effort is normal. No accessory muscle usage or respiratory distress.      Breath sounds: Normal breath sounds. No wheezing or rales.   Musculoskeletal:      Cervical back: Normal range of motion and neck supple.   Skin:     General: Skin is warm and dry.      Findings: No rash.      Nails: There is no clubbing.   Neurological:      Mental Status: She is alert and oriented to person, " place, and time.   Psychiatric:         Behavior: Behavior normal.            ECHOCARDIOGRAM 6/19/2019  Mild to moderate aortic stenosis.  Transvalvular gradients are - Peak: 33 mmHg, Mean: 20 mmHg.  Mild concentric left ventricular hypertrophy.  Normal left ventricular systolic function.  Left ventricular ejection fraction is visually estimated to be 75%.  Normal regional wall motion.  Mildly dilated left atrium.  Mild tricuspid regurgitation.  Normal inferior vena cava size and inspiratory collapse.  Estimated right ventricular systolic pressure  is 30 mmHg.  Normal right ventricular size and systolic function.  Normal pericardium without effusion.  Compared to the images of the prior study done 10/31/2017 -  there has   been no significant change.    EKG 9/7/2022 sinus rhythm, rate 77, within normal limits, personally reviewed    Assessment & Plan     1. Aortic valve stenosis, etiology of cardiac valve disease unspecified  EC-ECHOCARDIOGRAM COMPLETE W/O CONT      2. Nonrheumatic aortic valve sclerosis        3. Hypertension associated with diabetes (HCC)        4. Hyperlipidemia associated with type 2 diabetes mellitus (HCC)            Medical Decision Making: Today's Assessment/Status/Plan:   1.  Aortic stenosis, asymptomatic, follow-up echocardiogram  2.  Hypertension, BP normal, at goal, continue lisinopril  3.  Dyslipidemia, on pravastatin, , optimal less than 100 for primary prevention  4.  RTC 6 months

## 2022-11-04 ENCOUNTER — HOSPITAL ENCOUNTER (OUTPATIENT)
Dept: LAB | Facility: MEDICAL CENTER | Age: 72
End: 2022-11-04
Attending: FAMILY MEDICINE
Payer: MEDICARE

## 2022-11-04 DIAGNOSIS — E78.5 HYPERLIPIDEMIA ASSOCIATED WITH TYPE 2 DIABETES MELLITUS (HCC): ICD-10-CM

## 2022-11-04 DIAGNOSIS — E11.69 HYPERLIPIDEMIA ASSOCIATED WITH TYPE 2 DIABETES MELLITUS (HCC): ICD-10-CM

## 2022-11-04 LAB
CHOLEST SERPL-MCNC: 132 MG/DL (ref 100–199)
FASTING STATUS PATIENT QL REPORTED: NORMAL
HDLC SERPL-MCNC: 45 MG/DL
LDLC SERPL CALC-MCNC: 70 MG/DL
TRIGL SERPL-MCNC: 87 MG/DL (ref 0–149)

## 2022-11-04 PROCEDURE — 36415 COLL VENOUS BLD VENIPUNCTURE: CPT

## 2022-11-04 PROCEDURE — 80061 LIPID PANEL: CPT

## 2022-11-07 NOTE — RESULT ENCOUNTER NOTE
Please call patient  and let patient  know that i've reviewed  recent lab results . They look stable and we can discuss these results further in detail at the upcoming appt with me  on 11/15/22.    Thanks,  Miriam Xiao M.D.

## 2022-11-17 ENCOUNTER — OFFICE VISIT (OUTPATIENT)
Dept: MEDICAL GROUP | Facility: PHYSICIAN GROUP | Age: 72
End: 2022-11-17
Payer: MEDICARE

## 2022-11-17 VITALS
HEART RATE: 78 BPM | RESPIRATION RATE: 12 BRPM | OXYGEN SATURATION: 97 % | BODY MASS INDEX: 37.21 KG/M2 | DIASTOLIC BLOOD PRESSURE: 68 MMHG | WEIGHT: 210 LBS | SYSTOLIC BLOOD PRESSURE: 112 MMHG | HEIGHT: 63 IN | TEMPERATURE: 97.5 F

## 2022-11-17 DIAGNOSIS — E78.5 HYPERLIPIDEMIA ASSOCIATED WITH TYPE 2 DIABETES MELLITUS (HCC): ICD-10-CM

## 2022-11-17 DIAGNOSIS — E11.59 HYPERTENSION ASSOCIATED WITH DIABETES (HCC): ICD-10-CM

## 2022-11-17 DIAGNOSIS — I15.2 HYPERTENSION ASSOCIATED WITH DIABETES (HCC): ICD-10-CM

## 2022-11-17 DIAGNOSIS — E11.69 HYPERLIPIDEMIA ASSOCIATED WITH TYPE 2 DIABETES MELLITUS (HCC): ICD-10-CM

## 2022-11-17 DIAGNOSIS — E11.29 TYPE 2 DIABETES MELLITUS WITH MICROALBUMINURIA, WITHOUT LONG-TERM CURRENT USE OF INSULIN (HCC): ICD-10-CM

## 2022-11-17 DIAGNOSIS — R80.9 TYPE 2 DIABETES MELLITUS WITH MICROALBUMINURIA, WITHOUT LONG-TERM CURRENT USE OF INSULIN (HCC): ICD-10-CM

## 2022-11-17 PROBLEM — I35.0 NONRHEUMATIC AORTIC VALVE STENOSIS: Status: ACTIVE | Noted: 2021-03-31

## 2022-11-17 PROCEDURE — 99214 OFFICE O/P EST MOD 30 MIN: CPT | Performed by: FAMILY MEDICINE

## 2022-11-17 ASSESSMENT — FIBROSIS 4 INDEX: FIB4 SCORE: 1.79

## 2022-11-17 NOTE — ASSESSMENT & PLAN NOTE
Chronic. Home bps in the reange of 129-160/60-90s.  Continues to take lisinopril 10mg daily in the evenings. bp today is 112/68.   Had a headache x 2 days which improved with alleve x 2. Denies any chest pain.

## 2022-11-17 NOTE — ASSESSMENT & PLAN NOTE
Chronic medical diagnosis.  She was seen last month for an establishing care appointment.  At that time she had mentioned that she had been off of her metformin for a year.  Had declined restarting it.  Hemoglobin A1c at that time was 7%.

## 2022-11-17 NOTE — ASSESSMENT & PLAN NOTE
Chronic medical diagnosis.  She had been taking pravastatin 10 mg daily.  Lipid panel was not in control at that time.  Recent labs have LDL at 70.  Cardiology consultation notes were reviewed.  Recommendations were to have LDL less than 100.   Latest Reference Range & Units 11/04/22 09:09   Cholesterol,Tot 100 - 199 mg/dL 132   Triglycerides 0 - 149 mg/dL 87   HDL >=40 mg/dL 45   LDL <100 mg/dL 70

## 2022-11-17 NOTE — PROGRESS NOTES
CC:   Chief Complaint   Patient presents with    Follow-Up     1 month     Lab Results         HPI:   Madelyn presents today for a f/u visit with .  Last seen by me on October 11 for establishing care appointment..    Since our last appointment, has been seen by:    Seen by cardiology last month on October 31.  Echocardiogram for further evaluation of her aortic stenosis was ordered.  Recommended that she continue with her lisinopril and that her LDL goal is less than 100.  Recommendations were to follow-up in 6 months.      Hypertension associated with diabetes (HCC)  Chronic. Home bps in the reange of 129-160/60-90s.  Continues to take lisinopril 10mg daily in the evenings. bp today is 112/68.   Had a headache x 2 days which improved with alleve x 2. Denies any chest pain.      Type 2 diabetes mellitus with microalbuminuria, without long-term current use of insulin (HCC)  Chronic medical diagnosis.  She was seen last month for an establishing care appointment.  At that time she had mentioned that she had been off of her metformin for a year.  Had declined restarting it.  Hemoglobin A1c at that time was 7%.    Hyperlipidemia associated with type 2 diabetes mellitus (HCC)  Chronic medical diagnosis.  She had been taking pravastatin 10 mg daily.  Lipid panel was not in control at that time.  Recent labs have LDL at 70.  Cardiology consultation notes were reviewed.  Recommendations were to have LDL less than 100.   Latest Reference Range & Units 11/04/22 09:09   Cholesterol,Tot 100 - 199 mg/dL 132   Triglycerides 0 - 149 mg/dL 87   HDL >=40 mg/dL 45   LDL <100 mg/dL 70       Current Outpatient Medications Ordered in Epic   Medication Sig Dispense Refill    Multiple Vitamin (MULTI-VITAMIN DAILY PO) Take  by mouth.      lisinopril (PRINIVIL) 10 MG Tab Take 1 Tablet by mouth every day. Indications: High Blood Pressure Disorder 100 Tablet 3    pravastatin (PRAVACHOL) 10 MG Tab Take 1 Tablet by mouth every evening.  "100 Tablet 3     No current Epic-ordered facility-administered medications on file.       Past Medical History:   Diagnosis Date    Acute cholecystitis 09/19/2019    Arthritis     osteo, finger/shoulders    Back pain     Blood clotting disorder ([facility]) 2004    leg    Body mass index (BMI) 38.0-38.9, adult 9/15/2022    Breath shortness 09/07/2022    with exertion    Bronchitis     Cancer ([facility]) 09/07/2022    endometrial cancer    Chickenpox     Dental disorder     upper/lower dentures    Diabetes     oral meds    Diabetes mellitus ([facility]) 09/19/2019      Ref. Range 4/8/2019 13:20 11/6/2019 16:47 Glycohemoglobin Latest Ref Range: 0.0 - 5.6 % 6.6 (H) 5.7 (A) Estim. Avg Glu Latest Units: mg/dL 143     Ref. Range 11/6/2019 16:35 Micro Alb Creat Ratio Latest Ref Range: 0 - 30 mg/g 167 (H)   She has history of well-controlled type 2 diabetes.  She is previously taking metformin 500 mg twice daily however her A1c on recheck was down to 5.7 so we discon    Dysuria 09/11/2019    Fatigue 11/6/2019    Since her hysterectomy and cholecystectomy she has had some trouble bouncing back to her usual self and feels a bit deconditioned. I suspect a trial with prozac will help her energy. She agrees to add a short daily walk with her  to assist with deconditioning.     Heart murmur 09/07/2022    Heart valve disease     \"murmur\"    High cholesterol     HTN (hypertension) 9/15/2022    Hyperlipidemia     Hypertension 09/07/2022    medicated    Hypotension due to hypovolemia 09/26/2019    She has a low blood pressure in office today of 84/52 with a baseline in the 100s to 120s systolic.  This is likely due to her frequent episodes of diarrhea exacerbated by ongoing use of lisinopril.  Yesterday, she felt lightheaded and dizzy but did not pass out or have a fall.  She is having challenges of what is appropriate to eat as she had recent pelvic radiation as well as a cholecystectomy b    Mumps     Nasal drainage     Pneumonia 09/07/2022    " "12 years ago    Psychiatric problem 09/07/2022    medicated at time    Sleep apnea 09/07/2022    CPAP    Snoring     Urinary incontinence 09/07/2022    at times       Social History     Tobacco Use    Smoking status: Never    Smokeless tobacco: Never   Vaping Use    Vaping Use: Never used   Substance Use Topics    Alcohol use: No    Drug use: No       Allergies:  Bloodless, Ibuprofen, and Penicillins    Health Maintenance:   States that she has received the prevnar 13 in  2015.  Has received4 covid vaccines      Objective:       Exam:  /68   Pulse 78   Temp 36.4 °C (97.5 °F) (Temporal)   Resp 12   Ht 1.6 m (5' 3\")   Wt 95.3 kg (210 lb)   LMP  (LMP Unknown)   SpO2 97%   BMI 37.20 kg/m²   Body mass index is 37.2 kg/m².  Wt Readings from Last 4 Encounters:   11/17/22 95.3 kg (210 lb)   10/31/22 96.2 kg (212 lb)   10/11/22 95.6 kg (210 lb 12.8 oz)   09/07/22 98.4 kg (216 lb 14.9 oz)       Gen: Alert and oriented, No apparent distress. Appropriately groomed.  Neck: Neck is supple without lymphadenopathy.No thyromegaly.   Lungs: Normal effort, CTA bilaterally, no wheezes, rhonchi, or rales  CV: Regular rate and rhythm. No Lower extremity edema  Skin: No rash noted.      Assessment & Plan:     72 y.o. female with the following -     1. Type 2 diabetes mellitus with microalbuminuria, without long-term current use of insulin (HCC)  Chronic medical diagnosis.  Hemoglobin A1c has been stable at 7.  She has declined restarting her metformin.  We will have her follow-up with me in January and plan on checking A1c in the office at that time.    2. Hyperlipidemia associated with type 2 diabetes mellitus (HCC)  Chronic medical diagnosis.  Improving.  Continue with pravastatin 10 mg nightly.    3. Hypertension associated with diabetes (HCC)  Chronic medical diagnosis.  Stable.  Continue with lisinopril 10 mg daily.        Return in about 2 months (around 1/17/2023), or january 11--check A1c urine microalb in " office..    Please note that this dictation was created using voice recognition software. I have made every reasonable attempt to correct obvious errors, but I expect that there are errors of grammar and possibly content that I did not discover before finalizing the note.

## 2022-12-09 ENCOUNTER — HOSPITAL ENCOUNTER (OUTPATIENT)
Dept: CARDIOLOGY | Facility: MEDICAL CENTER | Age: 72
End: 2022-12-09
Attending: INTERNAL MEDICINE
Payer: MEDICARE

## 2022-12-09 DIAGNOSIS — I35.0 AORTIC VALVE STENOSIS, ETIOLOGY OF CARDIAC VALVE DISEASE UNSPECIFIED: ICD-10-CM

## 2022-12-09 LAB
LV EJECT FRACT  99904: 70
LV EJECT FRACT MOD 2C 99903: 65.66
LV EJECT FRACT MOD 4C 99902: 67.58
LV EJECT FRACT MOD BP 99901: 65.69

## 2022-12-09 PROCEDURE — 93306 TTE W/DOPPLER COMPLETE: CPT | Mod: 26 | Performed by: INTERNAL MEDICINE

## 2022-12-09 PROCEDURE — 93306 TTE W/DOPPLER COMPLETE: CPT

## 2023-01-03 ENCOUNTER — TELEPHONE (OUTPATIENT)
Dept: CARDIOLOGY | Facility: MEDICAL CENTER | Age: 73
End: 2023-01-03
Payer: MEDICARE

## 2023-01-03 NOTE — TELEPHONE ENCOUNTER
Called the patient, states that she is doing well, Croatian-speaking  Recent echocardiogram shows stable aortic stenosis, normal LV function, now mild mitral stenosis  Also try to contact her daughter-in-law Efraínwsolo no answer, no voicemail available  No additional recommendations  Has follow-up appointment 4/2023

## 2023-01-10 ENCOUNTER — OFFICE VISIT (OUTPATIENT)
Dept: MEDICAL GROUP | Facility: PHYSICIAN GROUP | Age: 73
End: 2023-01-10
Payer: MEDICARE

## 2023-01-10 VITALS
DIASTOLIC BLOOD PRESSURE: 70 MMHG | TEMPERATURE: 97 F | WEIGHT: 212 LBS | HEART RATE: 86 BPM | HEIGHT: 63 IN | SYSTOLIC BLOOD PRESSURE: 114 MMHG | BODY MASS INDEX: 37.56 KG/M2 | OXYGEN SATURATION: 92 % | RESPIRATION RATE: 15 BRPM

## 2023-01-10 DIAGNOSIS — E11.69 HYPERLIPIDEMIA ASSOCIATED WITH TYPE 2 DIABETES MELLITUS (HCC): ICD-10-CM

## 2023-01-10 DIAGNOSIS — I15.2 HYPERTENSION ASSOCIATED WITH DIABETES (HCC): ICD-10-CM

## 2023-01-10 DIAGNOSIS — E11.29 TYPE 2 DIABETES MELLITUS WITH MICROALBUMINURIA, WITHOUT LONG-TERM CURRENT USE OF INSULIN (HCC): ICD-10-CM

## 2023-01-10 DIAGNOSIS — I70.0 ATHEROSCLEROSIS OF AORTA (HCC): ICD-10-CM

## 2023-01-10 DIAGNOSIS — E66.01 MORBID (SEVERE) OBESITY DUE TO EXCESS CALORIES (HCC): ICD-10-CM

## 2023-01-10 DIAGNOSIS — E78.5 HYPERLIPIDEMIA ASSOCIATED WITH TYPE 2 DIABETES MELLITUS (HCC): ICD-10-CM

## 2023-01-10 DIAGNOSIS — R80.9 TYPE 2 DIABETES MELLITUS WITH MICROALBUMINURIA, WITHOUT LONG-TERM CURRENT USE OF INSULIN (HCC): ICD-10-CM

## 2023-01-10 DIAGNOSIS — E11.59 HYPERTENSION ASSOCIATED WITH DIABETES (HCC): ICD-10-CM

## 2023-01-10 LAB
HBA1C MFR BLD: 6.7 % (ref 0–5.6)
INT CON NEG: ABNORMAL
INT CON POS: ABNORMAL

## 2023-01-10 PROCEDURE — 99214 OFFICE O/P EST MOD 30 MIN: CPT | Performed by: FAMILY MEDICINE

## 2023-01-10 PROCEDURE — 83036 HEMOGLOBIN GLYCOSYLATED A1C: CPT | Performed by: FAMILY MEDICINE

## 2023-01-10 RX ORDER — FLUOXETINE HYDROCHLORIDE 20 MG/1
CAPSULE ORAL
COMMUNITY
End: 2023-08-03

## 2023-01-10 RX ORDER — OMEPRAZOLE 20 MG/1
CAPSULE, DELAYED RELEASE ORAL
COMMUNITY
End: 2023-08-03

## 2023-01-10 ASSESSMENT — FIBROSIS 4 INDEX: FIB4 SCORE: 1.79

## 2023-01-10 ASSESSMENT — PATIENT HEALTH QUESTIONNAIRE - PHQ9: CLINICAL INTERPRETATION OF PHQ2 SCORE: 0

## 2023-01-10 NOTE — PROGRESS NOTES
CC:   Chief Complaint   Patient presents with    Follow-Up     3 month          HPI:   Madelyn presents today with  for a f/u visit..    She will be flying out to [place] in 2 days.  Plans on staying there for couple months.      Hypertension associated with diabetes (HCC)  Chronic medical diagnosis.  Blood pressure today in the office was 114/70.  Continue to take lisinopril 10 mg daily.  Checks BP at home occasionally with readings in the normal range.    Hyperlipidemia associated with type 2 diabetes mellitus (HCC)  Chronic medical condition.  Currently taking pravastatin 10mg.  Feels dizzy when she takes the medication  Tolerating the medication well without any side effects.  Patient denies chest pain or lower extremity muscle cramps.  Last set of labs from November were stable.       Latest Reference Range & Units 11/04/22 09:09   Cholesterol,Tot 100 - 199 mg/dL 132   Triglycerides 0 - 149 mg/dL 87   HDL >=40 mg/dL 45   LDL <100 mg/dL 70       Current Outpatient Medications Ordered in Epic   Medication Sig Dispense Refill    Multiple Vitamin (MULTI-VITAMIN DAILY PO) Take  by mouth.      lisinopril (PRINIVIL) 10 MG Tab Take 1 Tablet by mouth every day. Indications: High Blood Pressure Disorder 100 Tablet 3    pravastatin (PRAVACHOL) 10 MG Tab Take 1 Tablet by mouth every evening. 100 Tablet 3    FLUoxetine (PROZAC) 20 MG Cap TAKE 1 CAPSULE BY MOUTH ONCE DAILY Oral for 100 Days (Patient not taking: Reported on 1/10/2023)      omeprazole (PRILOSEC) 20 MG delayed-release capsule Oral for 30 Days (Patient not taking: Reported on 1/10/2023)       No current [facility]-ordered facility-administered medications on file.       Past Medical History:   Diagnosis Date    Acute cholecystitis 09/19/2019    Arthritis     osteo, finger/shoulders    Back pain     Blood clotting disorder (HCC) 2004    leg    Body mass index (BMI) 38.0-38.9, adult 9/15/2022    Breath shortness 09/07/2022    with exertion    Bronchitis     Cancer  "([facility]) 09/07/2022    endometrial cancer    Chickenpox     Dental disorder     upper/lower dentures    Diabetes     oral meds    Diabetes mellitus ([facility]) 09/19/2019      Ref. Range 4/8/2019 13:20 11/6/2019 16:47 Glycohemoglobin Latest Ref Range: 0.0 - 5.6 % 6.6 (H) 5.7 (A) Estim. Avg Glu Latest Units: mg/dL 143     Ref. Range 11/6/2019 16:35 Micro Alb Creat Ratio Latest Ref Range: 0 - 30 mg/g 167 (H)   She has history of well-controlled type 2 diabetes.  She is previously taking metformin 500 mg twice daily however her A1c on recheck was down to 5.7 so we discon    Dysuria 09/11/2019    Fatigue 11/6/2019    Since her hysterectomy and cholecystectomy she has had some trouble bouncing back to her usual self and feels a bit deconditioned. I suspect a trial with prozac will help her energy. She agrees to add a short daily walk with her  to assist with deconditioning.     Heart murmur 09/07/2022    Heart valve disease     \"murmur\"    High cholesterol     HTN (hypertension) 9/15/2022    Hyperlipidemia     Hypertension 09/07/2022    medicated    Hypotension due to hypovolemia 09/26/2019    She has a low blood pressure in office today of 84/52 with a baseline in the 100s to 120s systolic.  This is likely due to her frequent episodes of diarrhea exacerbated by ongoing use of lisinopril.  Yesterday, she felt lightheaded and dizzy but did not pass out or have a fall.  She is having challenges of what is appropriate to eat as she had recent pelvic radiation as well as a cholecystectomy b    Mumps     Nasal drainage     Pneumonia 09/07/2022    12 years ago    Psychiatric problem 09/07/2022    medicated at time    Sleep apnea 09/07/2022    CPAP    Snoring     Urinary incontinence 09/07/2022    at times       Social History     Tobacco Use    Smoking status: Never    Smokeless tobacco: Never   Vaping Use    Vaping Use: Never used   Substance Use Topics    Alcohol use: No    Drug use: No       Allergies:  Bloodless, Ibuprofen, " "and Penicillins    Health Maintenance: states that she received both the 13 and 23 pneumonia vaccines.       Objective:       Exam:  /70   Pulse 86   Temp 36.1 °C (97 °F) (Temporal)   Resp 15   Ht 1.6 m (5' 3\")   Wt 96.2 kg (212 lb)   LMP  (LMP Unknown)   SpO2 92%   BMI 37.55 kg/m²   Body mass index is 37.55 kg/m².  Wt Readings from Last 4 Encounters:   01/10/23 96.2 kg (212 lb)   11/17/22 95.3 kg (210 lb)   10/31/22 96.2 kg (212 lb)   10/11/22 95.6 kg (210 lb 12.8 oz)       Gen: Alert and oriented, No apparent distress. Appropriately groomed.  Neck: Neck is supple without lymphadenopathy.No thyromegaly.   Lungs: Normal effort, CTA bilaterally, no wheezes, rhonchi, or rales  CV: Regular rate and rhythm. No Lower extremity edema  Skin: No rash noted.      Assessment & Plan:     72 y.o. female with the following -     1. Hypertension associated with diabetes (HCC)  2. Type 2 diabetes mellitus with microalbuminuria, without long-term current use of insulin (formerly [facility])  - POCT Hemoglobin A1C  - HEMOGLOBIN A1C; Future  - CBC WITH DIFFERENTIAL; Future  - Comp Metabolic Panel; Future  - Lipid Profile; Future  - MICROALBUMIN CREAT RATIO URINE; Future  - TSH WITH REFLEX TO FT4; Future  - VITAMIN D,25 HYDROXY (DEFICIENCY); Future  - VITAMIN B12; Future    3. Hyperlipidemia associated with type 2 diabetes mellitus (HCC)  4. Atherosclerosis of aorta (HCC)  HCC Gap Form    Diagnosis: E66.01 - Morbid (severe) obesity due to excess calories (HCC)  Z68.37 - Body mass index (BMI) 37.0-37.9, adult  Comorbidity Diagnosis: Atherosclerosis of aorta (HCC)  The current BMI is 37.55 kg/m2 as of 01/11/23 13:10 PST  Assessment and plan: Chronic, stable.  BMI today is 37.55 encouraged healthy diet and physical activity changes with a goal of weight loss. Follow up at least annually. The comorbid condition is stable based on today's assessment. Continue current treatment plan. Follow up in 3 months.  Diagnosis to address: I70.0 - " Atherosclerosis of aorta (HCC)  Assessment and plan: Chronic, stable.  Noted to have aortic atherosclerosis on CAT scans completed in 2019.  Continue with pravastatin 10 mg daily..  Diagnosis: E11.59, I15.2 - Hypertension associated with diabetes (HCC)  Assessment and plan: Chronic, stable. Bp today is 114/70.  Continue with lisinopril 10mg.     Diagnosis: E11.29, R80.9 - Type 2 diabetes mellitus with microalbuminuria, without long-term current use of insulin ([facility])  Assessment and plan: Chronic,diet controlled.  last A1c was 7% in October.  Postprandial sugars have been in the 140s range.  Diagnosis: E11.69, E78.5 - Hyperlipidemia associated with type 2 diabetes mellitus (HCC)  Assessment and plan: Chronic, stable.  Last set of labs from November had LDL levels at 70.  Continue with pravastatin 10 mg daily.  Last edited 01/11/23 13:11 PST by Miriam Xiao M.D.        5. Morbid (severe) obesity due to excess calories ([facility])  6. Body mass index (BMI) 37.0-37.9, adult  Chronic. Stable. Weight has been stable.   A1c  improved to 6.7    Other orders  - FLUoxetine (PROZAC) 20 MG Cap; TAKE 1 CAPSULE BY MOUTH ONCE DAILY Oral for 100 Days (Patient not taking: Reported on 1/10/2023)  - omeprazole (PRILOSEC) 20 MG delayed-release capsule; Oral for 30 Days (Patient not taking: Reported on 1/10/2023)        No follow-ups on file.    Please note that this dictation was created using voice recognition software. I have made every reasonable attempt to correct obvious errors, but I expect that there are errors of grammar and possibly content that I did not discover before finalizing the note.

## 2023-01-11 NOTE — ASSESSMENT & PLAN NOTE
Chronic medical diagnosis.  Blood pressure today in the office was 114/70.  Continue to take lisinopril 10 mg daily.  Checks BP at home occasionally with readings in the normal range.

## 2023-01-11 NOTE — ASSESSMENT & PLAN NOTE
Chronic medical condition.  Currently taking pravastatin 10mg.  Feels dizzy when she takes the medication  Tolerating the medication well without any side effects.  Patient denies chest pain or lower extremity muscle cramps.  Last set of labs from November were stable.       Latest Reference Range & Units 11/04/22 09:09   Cholesterol,Tot 100 - 199 mg/dL 132   Triglycerides 0 - 149 mg/dL 87   HDL >=40 mg/dL 45   LDL <100 mg/dL 70

## 2023-01-16 ENCOUNTER — DOCUMENTATION (OUTPATIENT)
Dept: CARDIOLOGY | Facility: MEDICAL CENTER | Age: 73
End: 2023-01-16
Payer: MEDICARE

## 2023-01-17 NOTE — PROGRESS NOTES
This patient has been flagged through our echocardiogram surveillance with the following echocardiogram results:    Moderate Aortic Stenosis    Ordering Provider: Dr. Jak Noonan    Current Plan of Care for Structural Heart Disease: Added to surveillance tracking list    Next Echo date needed by: 12/09/2023

## 2023-04-24 ENCOUNTER — APPOINTMENT (OUTPATIENT)
Dept: CARDIOLOGY | Facility: MEDICAL CENTER | Age: 73
End: 2023-04-24
Payer: MEDICARE

## 2023-05-23 ENCOUNTER — APPOINTMENT (OUTPATIENT)
Dept: MEDICAL GROUP | Facility: PHYSICIAN GROUP | Age: 73
End: 2023-05-23
Payer: MEDICARE

## 2023-06-06 ENCOUNTER — TELEPHONE (OUTPATIENT)
Dept: HEALTH INFORMATION MANAGEMENT | Facility: OTHER | Age: 73
End: 2023-06-06

## 2023-08-03 ENCOUNTER — OFFICE VISIT (OUTPATIENT)
Dept: MEDICAL GROUP | Facility: PHYSICIAN GROUP | Age: 73
End: 2023-08-03
Payer: MEDICARE

## 2023-08-03 ENCOUNTER — HOSPITAL ENCOUNTER (OUTPATIENT)
Facility: MEDICAL CENTER | Age: 73
End: 2023-08-03
Attending: INTERNAL MEDICINE
Payer: MEDICARE

## 2023-08-03 VITALS
RESPIRATION RATE: 16 BRPM | HEIGHT: 63 IN | OXYGEN SATURATION: 93 % | WEIGHT: 205.2 LBS | BODY MASS INDEX: 36.36 KG/M2 | HEART RATE: 79 BPM | SYSTOLIC BLOOD PRESSURE: 106 MMHG | DIASTOLIC BLOOD PRESSURE: 60 MMHG | TEMPERATURE: 99.2 F

## 2023-08-03 DIAGNOSIS — R80.9 TYPE 2 DIABETES MELLITUS WITH MICROALBUMINURIA, WITHOUT LONG-TERM CURRENT USE OF INSULIN (HCC): ICD-10-CM

## 2023-08-03 DIAGNOSIS — G44.201 INTRACTABLE TENSION-TYPE HEADACHE, UNSPECIFIED CHRONICITY PATTERN: ICD-10-CM

## 2023-08-03 DIAGNOSIS — E11.69 HYPERLIPIDEMIA ASSOCIATED WITH TYPE 2 DIABETES MELLITUS (HCC): ICD-10-CM

## 2023-08-03 DIAGNOSIS — E78.5 HYPERLIPIDEMIA ASSOCIATED WITH TYPE 2 DIABETES MELLITUS (HCC): ICD-10-CM

## 2023-08-03 DIAGNOSIS — E11.29 TYPE 2 DIABETES MELLITUS WITH MICROALBUMINURIA, WITHOUT LONG-TERM CURRENT USE OF INSULIN (HCC): ICD-10-CM

## 2023-08-03 DIAGNOSIS — E11.59 HYPERTENSION ASSOCIATED WITH DIABETES (HCC): ICD-10-CM

## 2023-08-03 DIAGNOSIS — I15.2 HYPERTENSION ASSOCIATED WITH DIABETES (HCC): ICD-10-CM

## 2023-08-03 PROBLEM — G44.209 TENSION TYPE HEADACHE: Status: ACTIVE | Noted: 2023-08-03

## 2023-08-03 LAB
AMBIGUOUS DTTM AMBI4: NORMAL
CREAT UR-MCNC: 148.21 MG/DL
HBA1C MFR BLD: 6.4 % (ref ?–5.8)
MICROALBUMIN UR-MCNC: 1.5 MG/DL
MICROALBUMIN/CREAT UR: 10 MG/G (ref 0–30)
POCT INT CON NEG: NEGATIVE
POCT INT CON POS: POSITIVE

## 2023-08-03 PROCEDURE — 82570 ASSAY OF URINE CREATININE: CPT

## 2023-08-03 PROCEDURE — 3074F SYST BP LT 130 MM HG: CPT | Performed by: INTERNAL MEDICINE

## 2023-08-03 PROCEDURE — 99214 OFFICE O/P EST MOD 30 MIN: CPT | Performed by: INTERNAL MEDICINE

## 2023-08-03 PROCEDURE — 83036 HEMOGLOBIN GLYCOSYLATED A1C: CPT | Performed by: INTERNAL MEDICINE

## 2023-08-03 PROCEDURE — 3078F DIAST BP <80 MM HG: CPT | Performed by: INTERNAL MEDICINE

## 2023-08-03 PROCEDURE — 82043 UR ALBUMIN QUANTITATIVE: CPT

## 2023-08-03 RX ORDER — LISINOPRIL 10 MG/1
10 TABLET ORAL DAILY
Qty: 100 TABLET | Refills: 3 | Status: SHIPPED
Start: 2023-08-03 | End: 2023-08-30

## 2023-08-03 ASSESSMENT — FIBROSIS 4 INDEX: FIB4 SCORE: 1.82

## 2023-08-04 NOTE — ASSESSMENT & PLAN NOTE
New problem, resolved before our visit, advised that she can take Tylenol 500 mg 2 tablets up to 3 times daily as needed.  Could have been related to caffeine withdrawal.

## 2023-08-04 NOTE — ASSESSMENT & PLAN NOTE
Chronic ongoing problem, may build to dial back on lisinopril with weight loss.  Update her urine microalbumin first, she has follow-up with her PCP in few weeks and her dose can be reduced at that time if indicated.

## 2023-08-04 NOTE — ASSESSMENT & PLAN NOTE
Chronic ongoing problem, reports compliance with pravastatin 10 mg daily, continue at this time and update blood work to ensure ongoing stability.

## 2023-08-04 NOTE — PROGRESS NOTES
Subjective:   Chief Complaint/History of Present Illness:  Madelyn Schmitz is a 73 y.o. female established patient who presents today to discuss medical problems as listed below. Madelyn is is accompanied by her  for today's visit.    Problem   Tension Type Headache    She had 3 days of headache with associated left ear pain before presenting to clinic today.  The headache went away on its own today.  She reports she normally has a cup of coffee per day and had not had coffee consistently in the past few days.  Blood pressure is low normal today, she reports compliance with medicines.  No recent illness but does have occasional chills.  Overdue get her blood work completed.  Headache did go away after she took second dose of Tylenol.     Hyperlipidemia Associated With Type 2 Diabetes Mellitus (Hcc)     Latest Reference Range & Units 11/04/22 09:09   Cholesterol,Tot 100 - 199 mg/dL 132   Triglycerides 0 - 149 mg/dL 87   HDL >=40 mg/dL 45   LDL <100 mg/dL 70     The 10-year ASCVD risk score (Kota RAMACHANDRAN, et al., 2019) is: 25.9%    She has history of intolerance to statins.  Ports she still has pravastatin and does not need refill.  No recent blood work.       Type 2 Diabetes Mellitus With Microalbuminuria, Without Long-Term Current Use of Insulin ([facility])     Latest Reference Range & Units 10/11/22 11:46 01/10/23 15:37 08/03/23 17:12   Glycohemoglobin 5.8 % 7.0 ! 6.7 ! 6.4 !     She has longstanding history of type 2 diabetes.  A1c currently consistent with prediabetes and she also had weight loss since traveling to [place] and increasing her walking.  Not currently on any pharmacotherapy for diabetes.  Overdue for monofilament and urine microalbumin which were both completed today.       Hypertension Associated With Diabetes ([facility])    History of hypertension, lower today since she has had weight loss in the past 6 months.  She takes ACE inhibitor due to history of microalbuminuria.    Blood pressure in clinic  "is 106/60    Current regimen: Lisinopril 10 mg daily          Current Medications:  Current Outpatient Medications Ordered in Epic   Medication Sig Dispense Refill    lisinopril (PRINIVIL) 10 MG Tab Take 1 Tablet by mouth every day. Indications: High Blood Pressure Disorder 100 Tablet 3    Multiple Vitamin (MULTI-VITAMIN DAILY PO) Take  by mouth.      pravastatin (PRAVACHOL) 10 MG Tab Take 1 Tablet by mouth every evening. 100 Tablet 3     No current Epic-ordered facility-administered medications on file.          Objective:   Physical Exam:    Vitals: /60 (BP Location: Left arm, Patient Position: Sitting, BP Cuff Size: Adult)   Pulse 79   Temp 37.3 °C (99.2 °F) (Temporal)   Resp 16   Ht 1.6 m (5' 3\")   Wt 93.1 kg (205 lb 3.2 oz)   SpO2 93%    BMI: Body mass index is 36.35 kg/m².  Physical Exam  Constitutional:       General: She is in acute distress.      Appearance: Normal appearance. She is not ill-appearing.   HENT:      Right Ear: Ear canal and external ear normal. There is no impacted cerumen.      Left Ear: Ear canal and external ear normal. There is no impacted cerumen.   Eyes:      Conjunctiva/sclera: Conjunctivae normal.   Cardiovascular:      Rate and Rhythm: Normal rate and regular rhythm.      Pulses: Normal pulses.      Heart sounds: Murmur heard.   Pulmonary:      Effort: Pulmonary effort is normal. No respiratory distress.      Breath sounds: No wheezing or rhonchi.   Musculoskeletal:      Comments: Trace edema b/l.    Diabetic Foot Exam: No ulcers/laceration/blisters present on bilateral feet, thickened skin on bottom of feet, normal gross anatomy of bilateral feet without abnormal curvature or arch, no toe deformities, +toenail thickness, no ingrown toenails, no increase in skin temperature bilaterally, no skin erythema to bilateral feet, bilateral dorsalis pedis 2+ and equal, Refill less than 2 seconds bilaterally, [place] 10 g monofilament testing normal in bilateral great toes, " bilateral balls of feet at medial/lateral/mid ball of foot      Skin:     Comments: Callus on bottom of feet   Psychiatric:         Mood and Affect: Mood normal.         Behavior: Behavior normal.         Thought Content: Thought content normal.         Judgment: Judgment normal.               Assessment and Plan:   Madelyn is a 73 y.o. female with the following:  Problem List Items Addressed This Visit       Hyperlipidemia associated with type 2 diabetes mellitus (HCC)     Chronic ongoing problem, reports compliance with pravastatin 10 mg daily, continue at this time and update blood work to ensure ongoing stability.         Relevant Medications    lisinopril (PRINIVIL) 10 MG Tab    Other Relevant Orders    POCT A1C (Completed)    Hypertension associated with diabetes (HCC)     Chronic ongoing problem, may build to dial back on lisinopril with weight loss.  Update her urine microalbumin first, she has follow-up with her PCP in few weeks and her dose can be reduced at that time if indicated.         Relevant Medications    lisinopril (PRINIVIL) 10 MG Tab    Tension type headache     New problem, resolved before our visit, advised that she can take Tylenol 500 mg 2 tablets up to 3 times daily as needed.  Could have been related to caffeine withdrawal.         Type 2 diabetes mellitus with microalbuminuria, without long-term current use of insulin (HCC)     Chronic improved problem, A1c now in the prediabetic threshold on no pharmacotherapy.  Update remaining lab work.  She has thickened skin on her feet and overgrown toenails but no neuropathy on monofilament testing.  Overdue to check for microalbuminuria, collected in clinic will be sent to the lab.  Follow-up in a few weeks with routine blood work.         Relevant Orders    MICROALBUMIN CREAT RATIO URINE    Diabetic Monofilament Lower Extremity Exam (Completed)          RTC: Return in about 3 weeks (around 8/24/2023).    I spent a total of 32 minutes with record  review, exam, communication with the patient, communication with other providers, and documentation of this encounter.    PLEASE NOTE: This dictation was created using voice recognition software. I have made every reasonable attempt to correct obvious errors, but I expect that there are errors of grammar and possibly content that I did not discover before finalizing the note.      Monika Mike, DO  Geriatric and Internal Medicine  [facility]

## 2023-08-04 NOTE — ASSESSMENT & PLAN NOTE
Chronic improved problem, A1c now in the prediabetic threshold on no pharmacotherapy.  Update remaining lab work.  She has thickened skin on her feet and overgrown toenails but no neuropathy on monofilament testing.  Overdue to check for microalbuminuria, collected in clinic will be sent to the lab.  Follow-up in a few weeks with routine blood work.

## 2023-08-21 ENCOUNTER — HOSPITAL ENCOUNTER (OUTPATIENT)
Dept: LAB | Facility: MEDICAL CENTER | Age: 73
End: 2023-08-21
Attending: FAMILY MEDICINE
Payer: MEDICARE

## 2023-08-21 DIAGNOSIS — R80.9 TYPE 2 DIABETES MELLITUS WITH MICROALBUMINURIA, WITHOUT LONG-TERM CURRENT USE OF INSULIN (HCC): ICD-10-CM

## 2023-08-21 DIAGNOSIS — E11.29 TYPE 2 DIABETES MELLITUS WITH MICROALBUMINURIA, WITHOUT LONG-TERM CURRENT USE OF INSULIN (HCC): ICD-10-CM

## 2023-08-21 LAB
25(OH)D3 SERPL-MCNC: 29 NG/ML (ref 30–100)
ALBUMIN SERPL BCP-MCNC: 4 G/DL (ref 3.2–4.9)
ALBUMIN/GLOB SERPL: 1.1 G/DL
ALP SERPL-CCNC: 65 U/L (ref 30–99)
ALT SERPL-CCNC: 9 U/L (ref 2–50)
ANION GAP SERPL CALC-SCNC: 10 MMOL/L (ref 7–16)
AST SERPL-CCNC: 14 U/L (ref 12–45)
BASOPHILS # BLD AUTO: 0.6 % (ref 0–1.8)
BASOPHILS # BLD: 0.03 K/UL (ref 0–0.12)
BILIRUB SERPL-MCNC: 0.4 MG/DL (ref 0.1–1.5)
BUN SERPL-MCNC: 15 MG/DL (ref 8–22)
CALCIUM ALBUM COR SERPL-MCNC: 9.4 MG/DL (ref 8.5–10.5)
CALCIUM SERPL-MCNC: 9.4 MG/DL (ref 8.5–10.5)
CHLORIDE SERPL-SCNC: 103 MMOL/L (ref 96–112)
CHOLEST SERPL-MCNC: 184 MG/DL (ref 100–199)
CO2 SERPL-SCNC: 25 MMOL/L (ref 20–33)
CREAT SERPL-MCNC: 0.69 MG/DL (ref 0.5–1.4)
CREAT UR-MCNC: 85.96 MG/DL
EOSINOPHIL # BLD AUTO: 0.32 K/UL (ref 0–0.51)
EOSINOPHIL NFR BLD: 6 % (ref 0–6.9)
ERYTHROCYTE [DISTWIDTH] IN BLOOD BY AUTOMATED COUNT: 54.7 FL (ref 35.9–50)
EST. AVERAGE GLUCOSE BLD GHB EST-MCNC: 143 MG/DL
FASTING STATUS PATIENT QL REPORTED: NORMAL
GFR SERPLBLD CREATININE-BSD FMLA CKD-EPI: 90 ML/MIN/1.73 M 2
GLOBULIN SER CALC-MCNC: 3.5 G/DL (ref 1.9–3.5)
GLUCOSE SERPL-MCNC: 121 MG/DL (ref 65–99)
HBA1C MFR BLD: 6.6 % (ref 4–5.6)
HCT VFR BLD AUTO: 41.1 % (ref 37–47)
HDLC SERPL-MCNC: 37 MG/DL
HGB BLD-MCNC: 12.6 G/DL (ref 12–16)
IMM GRANULOCYTES # BLD AUTO: 0.02 K/UL (ref 0–0.11)
IMM GRANULOCYTES NFR BLD AUTO: 0.4 % (ref 0–0.9)
LDLC SERPL CALC-MCNC: 122 MG/DL
LYMPHOCYTES # BLD AUTO: 1.6 K/UL (ref 1–4.8)
LYMPHOCYTES NFR BLD: 30 % (ref 22–41)
MCH RBC QN AUTO: 30.4 PG (ref 27–33)
MCHC RBC AUTO-ENTMCNC: 30.7 G/DL (ref 32.2–35.5)
MCV RBC AUTO: 99 FL (ref 81.4–97.8)
MICROALBUMIN UR-MCNC: <1.2 MG/DL
MICROALBUMIN/CREAT UR: NORMAL MG/G (ref 0–30)
MONOCYTES # BLD AUTO: 0.42 K/UL (ref 0–0.85)
MONOCYTES NFR BLD AUTO: 7.9 % (ref 0–13.4)
NEUTROPHILS # BLD AUTO: 2.94 K/UL (ref 1.82–7.42)
NEUTROPHILS NFR BLD: 55.1 % (ref 44–72)
NRBC # BLD AUTO: 0 K/UL
NRBC BLD-RTO: 0 /100 WBC (ref 0–0.2)
PLATELET # BLD AUTO: 237 K/UL (ref 164–446)
PMV BLD AUTO: 9.9 FL (ref 9–12.9)
POTASSIUM SERPL-SCNC: 4.1 MMOL/L (ref 3.6–5.5)
PROT SERPL-MCNC: 7.5 G/DL (ref 6–8.2)
RBC # BLD AUTO: 4.15 M/UL (ref 4.2–5.4)
SODIUM SERPL-SCNC: 138 MMOL/L (ref 135–145)
TRIGL SERPL-MCNC: 124 MG/DL (ref 0–149)
TSH SERPL DL<=0.005 MIU/L-ACNC: 1.87 UIU/ML (ref 0.38–5.33)
VIT B12 SERPL-MCNC: 531 PG/ML (ref 211–911)
WBC # BLD AUTO: 5.3 K/UL (ref 4.8–10.8)

## 2023-08-21 PROCEDURE — 83036 HEMOGLOBIN GLYCOSYLATED A1C: CPT

## 2023-08-21 PROCEDURE — 82570 ASSAY OF URINE CREATININE: CPT

## 2023-08-21 PROCEDURE — 80053 COMPREHEN METABOLIC PANEL: CPT

## 2023-08-21 PROCEDURE — 82043 UR ALBUMIN QUANTITATIVE: CPT

## 2023-08-21 PROCEDURE — 84443 ASSAY THYROID STIM HORMONE: CPT

## 2023-08-21 PROCEDURE — 82607 VITAMIN B-12: CPT

## 2023-08-21 PROCEDURE — 80061 LIPID PANEL: CPT

## 2023-08-21 PROCEDURE — 82306 VITAMIN D 25 HYDROXY: CPT

## 2023-08-21 PROCEDURE — 36415 COLL VENOUS BLD VENIPUNCTURE: CPT

## 2023-08-21 PROCEDURE — 85025 COMPLETE CBC W/AUTO DIFF WBC: CPT

## 2023-08-30 ENCOUNTER — OFFICE VISIT (OUTPATIENT)
Dept: MEDICAL GROUP | Facility: PHYSICIAN GROUP | Age: 73
End: 2023-08-30
Payer: MEDICARE

## 2023-08-30 VITALS
HEART RATE: 69 BPM | TEMPERATURE: 96.9 F | DIASTOLIC BLOOD PRESSURE: 70 MMHG | OXYGEN SATURATION: 98 % | SYSTOLIC BLOOD PRESSURE: 124 MMHG | RESPIRATION RATE: 16 BRPM | HEIGHT: 63 IN | BODY MASS INDEX: 36.41 KG/M2 | WEIGHT: 205.5 LBS

## 2023-08-30 DIAGNOSIS — E78.5 HYPERLIPIDEMIA ASSOCIATED WITH TYPE 2 DIABETES MELLITUS (HCC): ICD-10-CM

## 2023-08-30 DIAGNOSIS — E11.69 HYPERLIPIDEMIA ASSOCIATED WITH TYPE 2 DIABETES MELLITUS (HCC): ICD-10-CM

## 2023-08-30 DIAGNOSIS — I15.2 HYPERTENSION ASSOCIATED WITH DIABETES (HCC): ICD-10-CM

## 2023-08-30 DIAGNOSIS — E55.9 VITAMIN D DEFICIENCY: ICD-10-CM

## 2023-08-30 DIAGNOSIS — R80.9 TYPE 2 DIABETES MELLITUS WITH MICROALBUMINURIA, WITHOUT LONG-TERM CURRENT USE OF INSULIN (HCC): ICD-10-CM

## 2023-08-30 DIAGNOSIS — E11.59 HYPERTENSION ASSOCIATED WITH DIABETES (HCC): ICD-10-CM

## 2023-08-30 DIAGNOSIS — E11.29 TYPE 2 DIABETES MELLITUS WITH MICROALBUMINURIA, WITHOUT LONG-TERM CURRENT USE OF INSULIN (HCC): ICD-10-CM

## 2023-08-30 DIAGNOSIS — Z12.31 ENCOUNTER FOR SCREENING MAMMOGRAM FOR BREAST CANCER: ICD-10-CM

## 2023-08-30 DIAGNOSIS — Z78.0 POSTMENOPAUSAL: ICD-10-CM

## 2023-08-30 DIAGNOSIS — I35.0 NONRHEUMATIC AORTIC VALVE STENOSIS: ICD-10-CM

## 2023-08-30 PROBLEM — Z85.42 HISTORY OF ENDOMETRIAL CANCER: Status: ACTIVE | Noted: 2019-08-01

## 2023-08-30 LAB — RETINAL SCREEN: NEGATIVE

## 2023-08-30 PROCEDURE — 99215 OFFICE O/P EST HI 40 MIN: CPT | Performed by: FAMILY MEDICINE

## 2023-08-30 PROCEDURE — 3078F DIAST BP <80 MM HG: CPT | Performed by: FAMILY MEDICINE

## 2023-08-30 PROCEDURE — 92250 FUNDUS PHOTOGRAPHY W/I&R: CPT | Performed by: FAMILY MEDICINE

## 2023-08-30 PROCEDURE — 3074F SYST BP LT 130 MM HG: CPT | Performed by: FAMILY MEDICINE

## 2023-08-30 RX ORDER — LISINOPRIL 5 MG/1
5 TABLET ORAL DAILY
Qty: 100 TABLET | Refills: 3 | Status: SHIPPED | OUTPATIENT
Start: 2023-08-30 | End: 2023-10-09 | Stop reason: SDUPTHER

## 2023-08-30 RX ORDER — ROSUVASTATIN CALCIUM 10 MG/1
10 TABLET, COATED ORAL EVERY EVENING
Qty: 100 TABLET | Refills: 3 | Status: SHIPPED | OUTPATIENT
Start: 2023-08-30

## 2023-08-30 ASSESSMENT — ENCOUNTER SYMPTOMS
HEADACHES: 0
FALLS: 0
CHILLS: 0
SHORTNESS OF BREATH: 0
FEVER: 0

## 2023-08-30 ASSESSMENT — FIBROSIS 4 INDEX: FIB4 SCORE: 1.48

## 2023-08-30 NOTE — PROGRESS NOTES
Subjective:     CC:   Chief Complaint   Patient presents with    Lab Results         HPI:   Madelyn presents today with her  for a follow-up visit..  Last seen by me on 1/10/23. last colonoscopy was in nov 2018.     Since our last appointment, has been seen by:  Dr. Mike on August 3 for headaches.  These have since resolved.    Problem   Vitamin D Deficiency    Chronic. Not taking vitamin D  Recent labs with with levels at D     Nonrheumatic Aortic Valve Stenosis    Chronic medical diagnoses.  No SOB. Last echo completed in dec 2022 with moderate AS. Last seen by cards in oct 2022 with recommendations to f/u in 6 months.     Hyperlipidemia Associated With Type 2 Diabetes Mellitus (Hcc)    Chronic medical condition. Stopped pravastatin 10mg a month ago. Has been feeling fatigue with this med.  Has been feeling better since stopping this med.   Records reviewed.  She has taken atorvastatin and simvastatin in the past.     Latest Reference Range & Units 08/21/23 08:37   Cholesterol,Tot 100 - 199 mg/dL 184   Triglycerides 0 - 149 mg/dL 124   HDL >=40 mg/dL 37 !   LDL <100 mg/dL 122 (H)   !: Data is abnormal  (H): Data is abnormally high     Type 2 Diabetes Mellitus With Microalbuminuria, Without Long-Term Current Use of Insulin (Hcc)    Chronic. Recent  labs  with A1c at 6.6%.  Has lost 7 pounds over the last 7 months.  Remains diet controlled. Has declined meds for this.      History of Endometrial Cancer    She was diagnosed with endometrial cancer in June 2019.  She completed hysterectomy and radiation therapy, no chemotherapy indicated.  Tolerated treatment well and follows up with her gynecologist.  She does endorse urinary urgency overnight since her surgery. She continues to have f/u with her oncologist.  Stable, continue current plan of care with current medications       Hypertension Associated With Diabetes (Hcc)    Chronic.  Hasn't taken her lisinopril 10mg since her appt on 8/3/23.  bp today 124/70.  "Her dizziness has improved since stopping this med. Urine microalbumin from 2019 was elevated at 167, resutls normal since then   Has a machine at home- will start checking bps at home.                   Current Outpatient Medications Ordered in Epic   Medication Sig Dispense Refill    rosuvastatin (CRESTOR) 10 MG Tab Take 1 Tablet by mouth every evening. 100 Tablet 3    lisinopril (PRINIVIL) 5 MG Tab Take 1 Tablet by mouth every day. 100 Tablet 3    Multiple Vitamin (MULTI-VITAMIN DAILY PO) Take  by mouth.       No current Epic-ordered facility-administered medications on file.       Health Maintenance: states she received the pcv 13 thru [facility] in the past. Encouragaed to receive the covid and flu vaccine    ROS:  Review of Systems   Constitutional:  Positive for malaise/fatigue. Negative for chills and fever.   Respiratory:  Negative for shortness of breath.    Cardiovascular:  Negative for chest pain.   Musculoskeletal:  Negative for falls.   Neurological:  Negative for headaches.       Objective:     Exam:  /70   Pulse 69   Temp 36.1 °C (96.9 °F) (Temporal)   Resp 16   Ht 1.6 m (5' 3\")   Wt 93.2 kg (205 lb 8 oz)   LMP  (LMP Unknown)   SpO2 98%   BMI 36.40 kg/m²  Body mass index is 36.4 kg/m².    Physical Exam  Constitutional:       Appearance: Normal appearance.   Eyes:      Extraocular Movements: Extraocular movements intact.   Cardiovascular:      Rate and Rhythm: Normal rate and regular rhythm.      Heart sounds: Murmur heard.   Pulmonary:      Effort: Pulmonary effort is normal.      Breath sounds: Normal breath sounds.   Musculoskeletal:      Cervical back: Normal range of motion and neck supple.   Skin:     General: Skin is warm.   Neurological:      Mental Status: She is alert.   Psychiatric:         Mood and Affect: Mood normal.         Behavior: Behavior normal.             Labs: See above    Assessment & Plan:     73 y.o. female with the following -     1. Hypertension associated with " diabetes (HCC)  Chronic medical diagnosis.  Blood pressure 124/70 today.  Stable.  Records reviewed and patient does have history of microalbuminuria in the past.  Suspect that she is started on lisinopril for this.  We discussed discontinuing this medication or restarting it at a lower dose with precautions.  We will go ahead and start at a lower dose of 5 mg daily.  Patient will keep me updated should she have any dizziness, or lightheadedness or start to feel any weakness.  She does have a blood pressure monitor at home and will also start checking BPs at home.  - lisinopril (PRINIVIL) 5 MG Tab; Take 1 Tablet by mouth every day.  Dispense: 100 Tablet; Refill: 3    2. Hyperlipidemia associated with type 2 diabetes mellitus (HCC)  Chronic medical diagnosis.  Not well controlled.  She discontinued the pravastatin as she was having some side effects.  Records reviewed and she had tried atorvastatin, simvastatin in the past as well.  We will start her on a low-dose of rosuvastatin.  - rosuvastatin (CRESTOR) 10 MG Tab; Take 1 Tablet by mouth every evening.  Dispense: 100 Tablet; Refill: 3  - Comp Metabolic Panel; Future  - Lipid Profile; Future    3. Type 2 diabetes mellitus with microalbuminuria, without long-term current use of insulin (HCC)  Chronic medical diagnosis.  Recent labs with A1c at 6.6%.  Remains diet controlled.  Retinal scan completed in the office today.  - POCT Retinal Eye Exam  - HEMOGLOBIN A1C; Future    4. Vitamin D deficiency  New medical diagnosis.  Not well controlled.  Encouraged her to start taking over-the-counter vitamin D 2000 units daily.    5. Encounter for screening mammogram for breast cancer  - MA-SCREENING MAMMO BILAT W/TOMOSYNTHESIS W/CAD; Future    6. Postmenopausal  Last bone density was completed in January 2020.  Encouraged her to call and schedule follow-up with her gynonc, Dr. Mancia, she does have a history of endometrial cancer treated in 2019  - DS-BONE DENSITY STUDY (DEXA);  Future    7. Nonrheumatic aortic valve stenosis  Chronic medical diagnosis.  Not well controlled.  Denies any shortness of breath but does complain of fatigue and dizziness at times.  Last echocardiogram was completed in December 2022 and last follow-up with cardiology was in October of that year.  Has not had her 6-month follow-up with them.  Encouraged her to call and schedule follow-up with her cardiologist.    HCC Gap Form    Last edited 08/30/23 08:28 PDT by Miriam Xiao M.D.           I spent a total of 49 minutes with record review, exam, communication with the patient and spouse in [place], and documentation of this encounter.      Return in about 3 months (around 11/30/2023) for Diabetes, hyperlipidemia.    Please note that this dictation was created using voice recognition software. I have made every reasonable attempt to correct obvious errors, but I expect that there are errors of grammar and possibly content that I did not discover before finalizing the note.

## 2023-08-31 ENCOUNTER — TELEPHONE (OUTPATIENT)
Dept: RADIOLOGY | Facility: MEDICAL CENTER | Age: 73
End: 2023-08-31
Payer: MEDICARE

## 2023-08-31 NOTE — TELEPHONE ENCOUNTER
8/31/23 -PT  could not understand what TYRA is and was overwhelmed scheduling the two appointments before it. Luca requested to call [facility] and I did NO VM set up.

## 2023-10-09 ENCOUNTER — OFFICE VISIT (OUTPATIENT)
Dept: CARDIOLOGY | Facility: MEDICAL CENTER | Age: 73
End: 2023-10-09
Attending: NURSE PRACTITIONER
Payer: MEDICARE

## 2023-10-09 VITALS
DIASTOLIC BLOOD PRESSURE: 68 MMHG | OXYGEN SATURATION: 93 % | SYSTOLIC BLOOD PRESSURE: 110 MMHG | RESPIRATION RATE: 14 BRPM | BODY MASS INDEX: 35.26 KG/M2 | WEIGHT: 199 LBS | HEIGHT: 63 IN | HEART RATE: 71 BPM

## 2023-10-09 DIAGNOSIS — G47.30 SLEEP APNEA, UNSPECIFIED TYPE: ICD-10-CM

## 2023-10-09 DIAGNOSIS — I70.0 ATHEROSCLEROSIS OF AORTA (HCC): ICD-10-CM

## 2023-10-09 DIAGNOSIS — E78.5 HYPERLIPIDEMIA ASSOCIATED WITH TYPE 2 DIABETES MELLITUS (HCC): ICD-10-CM

## 2023-10-09 DIAGNOSIS — I15.2 HYPERTENSION ASSOCIATED WITH DIABETES (HCC): ICD-10-CM

## 2023-10-09 DIAGNOSIS — E11.59 HYPERTENSION ASSOCIATED WITH DIABETES (HCC): ICD-10-CM

## 2023-10-09 DIAGNOSIS — I35.0 NONRHEUMATIC AORTIC VALVE STENOSIS: ICD-10-CM

## 2023-10-09 DIAGNOSIS — E11.69 HYPERLIPIDEMIA ASSOCIATED WITH TYPE 2 DIABETES MELLITUS (HCC): ICD-10-CM

## 2023-10-09 PROCEDURE — 3078F DIAST BP <80 MM HG: CPT | Performed by: NURSE PRACTITIONER

## 2023-10-09 PROCEDURE — 99214 OFFICE O/P EST MOD 30 MIN: CPT | Performed by: NURSE PRACTITIONER

## 2023-10-09 PROCEDURE — 93005 ELECTROCARDIOGRAM TRACING: CPT | Performed by: NURSE PRACTITIONER

## 2023-10-09 PROCEDURE — 99212 OFFICE O/P EST SF 10 MIN: CPT | Performed by: NURSE PRACTITIONER

## 2023-10-09 PROCEDURE — 3074F SYST BP LT 130 MM HG: CPT | Performed by: NURSE PRACTITIONER

## 2023-10-09 PROCEDURE — 99211 OFF/OP EST MAY X REQ PHY/QHP: CPT | Performed by: NURSE PRACTITIONER

## 2023-10-09 RX ORDER — CHLORAL HYDRATE 500 MG
1000 CAPSULE ORAL
COMMUNITY

## 2023-10-09 RX ORDER — LISINOPRIL 5 MG/1
5 TABLET ORAL DAILY
Qty: 90 TABLET | Refills: 3 | Status: SHIPPED | OUTPATIENT
Start: 2023-10-09

## 2023-10-09 ASSESSMENT — ENCOUNTER SYMPTOMS
HALLUCINATIONS: 0
PND: 0
ORTHOPNEA: 0
RESPIRATORY NEGATIVE: 1
DEPRESSION: 0
SHORTNESS OF BREATH: 0
GASTROINTESTINAL NEGATIVE: 1
EYES NEGATIVE: 1
NERVOUS/ANXIOUS: 0
SENSORY CHANGE: 0
NEUROLOGICAL NEGATIVE: 1
NAUSEA: 0
BRUISES/BLEEDS EASILY: 0
CLAUDICATION: 0
MUSCULOSKELETAL NEGATIVE: 1
DIZZINESS: 0
CONSTITUTIONAL NEGATIVE: 1
WHEEZING: 0
PALPITATIONS: 0

## 2023-10-09 ASSESSMENT — FIBROSIS 4 INDEX: FIB4 SCORE: 1.48

## 2023-10-09 NOTE — PROGRESS NOTES
Cardiology Follow up Note    DOS: 10/9/2023  [id number]  Madelyn Schmitz    Chief complaint/Reason for consult: annual follow up    HPI: Pt is a 73 y.o. female who presents to the clinic today in follow up for hypertension, aortic stenosis, hyperlipidemia. Patient has a past medical history significant for but not limited to: sleep apnea, hypertension, hyperlipidemia, type 2 diabetes, aortic stenosis, aortic atherosclerosis. Is seen for her annual follow up. Patient blood pressure and hyperlipidemia medications have been titrated by her primary team. Discussed her valvular disease which does not seem to be progressing to symptoms. She has a follow up with her PCP and I explained that importance of wearing CPAP and taking care of diabetes, hypertension, and hyperlipidemia. Will see her back in one year or sooner should she develop signs and symptoms of worsening valvular disease.       Past Medical History:   Diagnosis Date    Acute cholecystitis 09/19/2019    Arthritis     osteo, finger/shoulders    Back pain     Blood clotting disorder ([facility]) 2004    leg    Body mass index (BMI) 38.0-38.9, adult 9/15/2022    Breath shortness 09/07/2022    with exertion    Bronchitis     Cancer ([facility]) 09/07/2022    endometrial cancer    Chickenpox     Dental disorder     upper/lower dentures    Diabetes     oral meds    Diabetes mellitus ([facility]) 09/19/2019      Ref. Range 4/8/2019 13:20 11/6/2019 16:47 Glycohemoglobin Latest Ref Range: 0.0 - 5.6 % 6.6 (H) 5.7 (A) Estim. Avg Glu Latest Units: mg/dL 143     Ref. Range 11/6/2019 16:35 Micro Alb Creat Ratio Latest Ref Range: 0 - 30 mg/g 167 (H)   She has history of well-controlled type 2 diabetes.  She is previously taking metformin 500 mg twice daily however her A1c on recheck was down to 5.7 so we discon    Dysuria 09/11/2019    Fatigue 11/6/2019    Since her hysterectomy and cholecystectomy she has had some trouble bouncing back to her usual self and feels a bit deconditioned. I  "suspect a trial with prozac will help her energy. She agrees to add a short daily walk with her  to assist with deconditioning.     Heart murmur 09/07/2022    Heart valve disease     \"murmur\"    High cholesterol     HTN (hypertension) 9/15/2022    Hyperlipidemia     Hypertension 09/07/2022    medicated    Hypotension due to hypovolemia 09/26/2019    She has a low blood pressure in office today of 84/52 with a baseline in the 100s to 120s systolic.  This is likely due to her frequent episodes of diarrhea exacerbated by ongoing use of lisinopril.  Yesterday, she felt lightheaded and dizzy but did not pass out or have a fall.  She is having challenges of what is appropriate to eat as she had recent pelvic radiation as well as a cholecystectomy b    Mumps     Nasal drainage     Pneumonia 09/07/2022    12 years ago    Psychiatric problem 09/07/2022    medicated at time    Sleep apnea 09/07/2022    CPAP    Snoring     Urinary incontinence 09/07/2022    at times       Past Surgical History:   Procedure Laterality Date    LAPAROSCOPIC INCISIONAL HERNIA REPAIR N/A 9/9/2022    Procedure: LAPAROSCOPIC REPAIR OF INCISIONAL HERNIA;  Surgeon: Sim Anaya M.D.;  Location: [facility];  Service: General    GABY BY LAPAROSCOPY  9/19/2019    Procedure: CHOLECYSTECTOMY, LAPAROSCOPIC;  Surgeon: Jenniffer Johnson M.D.;  Location: [facility];  Service: General    HYSTERECTOMY ROBOTIC XI  6/27/2019    Procedure: HYSTERECTOMY, ROBOT-ASSISTED, USING DA YO XI;  Surgeon: Alexandr Mancia M.D.;  Location: [facility];  Service: Gynecology    SALPINGO OOPHORECTOMY Bilateral 6/27/2019    Procedure: SALPINGO-OOPHORECTOMY;  Surgeon: Alexandr Mancia M.D.;  Location: [facility];  Service: Gynecology    NODE BIOPSY SENTINEL  6/27/2019    Procedure: BIOPSY, LYMPH NODE, SENTINEL;  Surgeon: Alexandr Mancia M.D.;  Location: [facility];  Service: Gynecology    HYSTEROSCOPY WITH " MYOSURE N/A 4/17/2019    Procedure: HYSTEROSCOPY, WITH TISSUE REMOVAL, USING HYSTEROSCOPIC ROTATING CUTTER BLADE - DIAGNOSTIC;  Surgeon: Racquel Gatica M.D.;  Location: SURGERY SAME DAY [facility];  Service: Gynecology    DILATION AND CURETTAGE N/A 4/17/2019    Procedure: DILATION AND CURETTAGE;  Surgeon: Racquel Gatica M.D.;  Location: SURGERY SAME DAY [facility];  Service: Gynecology    PRIMARY C SECTION  1972/1974/1977    x 3       Social History     Socioeconomic History    Marital status:      Spouse name: Not on file    Number of children: Not on file    Years of education: Not on file    Highest education level: Not on file   Occupational History    Not on file   Tobacco Use    Smoking status: Never    Smokeless tobacco: Never   Vaping Use    Vaping Use: Never used   Substance and Sexual Activity    Alcohol use: No    Drug use: No    Sexual activity: Not Currently     Partners: Male     Birth control/protection: Post-Menopausal   Other Topics Concern    Not on file   Social History Narrative    She is Yakut speaking. Madleyn has been  to her  for 50 years. Her daughter-in-law, Delores, participates in her medical care and assists with translation. She has three grown sons. '72, '74, '77. Has 8 grandchildren. 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 Was born in [facility], Came to the [place] in [place]79. She is happy with her life and her sons. She enjoys reading, watch tv shows, talk with her friends on the phone. No tob/etoh/drugs.      Social Determinants of Health     Financial Resource Strain: Not on file   Food Insecurity: Not on file   Transportation Needs: Not on file   Physical Activity: Not on file   Stress: Not on file   Social Connections: Not on file   Intimate Partner Violence: Not on file   Housing Stability: Not on file       Family History   Problem Relation Age of Onset    Heart Disease Father     Asthma Brother     Asthma  Brother     Cancer Maternal Aunt         gyn cancer    Sleep Apnea Neg Hx        Allergies   Allergen Reactions    Bloodless      Nondenominational    Ibuprofen Rash and Swelling     .    Penicillins Rash and Swelling     .       Current Outpatient Medications   Medication Sig Dispense Refill    MAGNESIUM PO Take  by mouth.      Omega-3 Fatty Acids (FISH OIL) 1000 MG Cap capsule Take 1,000 mg by mouth 3 times a day with meals.      lisinopril (PRINIVIL) 5 MG Tab Take 1 Tablet by mouth every day. 90 Tablet 3    rosuvastatin (CRESTOR) 10 MG Tab Take 1 Tablet by mouth every evening. 100 Tablet 3    Multiple Vitamin (MULTI-VITAMIN DAILY PO) Take  by mouth.       No current facility-administered medications for this visit.       Vitals:    10/09/23 0908   BP: 110/68   Pulse: 71   Resp: 14   SpO2: 93%         Review of Systems   Constitutional: Negative.  Negative for malaise/fatigue.   HENT: Negative.     Eyes: Negative.    Respiratory: Negative.  Negative for shortness of breath and wheezing.    Cardiovascular:  Negative for chest pain, palpitations, orthopnea, claudication, leg swelling and PND.   Gastrointestinal: Negative.  Negative for nausea.   Genitourinary: Negative.    Musculoskeletal: Negative.    Skin: Negative.    Neurological: Negative.  Negative for dizziness and sensory change.   Endo/Heme/Allergies: Negative.  Does not bruise/bleed easily.   Psychiatric/Behavioral:  Negative for depression and hallucinations. The patient is not nervous/anxious.           EKG interpreted by me: Sinus rhythm     Physical Exam  Constitutional:       Appearance: Normal appearance.   HENT:      Head: Normocephalic.   Eyes:      Pupils: Pupils are equal, round, and reactive to light.   Neck:      Vascular: No JVD.   Cardiovascular:      Rate and Rhythm: Normal rate and regular rhythm.      Pulses: Normal pulses.      Heart sounds: Murmur heard.      Comments: Sargent over aortic and mitral areas  Pulmonary:      Effort: Pulmonary  "effort is normal.      Breath sounds: Normal breath sounds.   Abdominal:      General: Abdomen is flat.      Palpations: Abdomen is soft.   Musculoskeletal:      Cervical back: Normal range of motion.      Right lower leg: No edema.      Left lower leg: No edema.      Comments: Has some cramping in calves at night, can try magnesium   Skin:     General: Skin is warm and dry.   Neurological:      Mental Status: She is alert and oriented to person, place, and time.   Psychiatric:         Mood and Affect: Mood normal.         Behavior: Behavior normal.          Data:  Lipids:   Lab Results   Component Value Date/Time    CHOLSTRLTOT 184 08/21/2023 08:37 AM    TRIGLYCERIDE 124 08/21/2023 08:37 AM    HDL 37 (A) 08/21/2023 08:37 AM     (H) 08/21/2023 08:37 AM        BMP:  Lab Results   Component Value Date/Time    SODIUM 138 08/21/2023 0837    POTASSIUM 4.1 08/21/2023 0837    CHLORIDE 103 08/21/2023 0837    CO2 25 08/21/2023 0837    GLUCOSE 121 (H) 08/21/2023 0837    BUN 15 08/21/2023 0837    CREATININE 0.69 08/21/2023 0837    CALCIUM 9.4 08/21/2023 0837    ANION 10.0 08/21/2023 0837       GFR:  Lab Results   Component Value Date/Time    IFAFRICA >60 11/01/2021 0815    IFNOTAFR >60 11/01/2021 0815        TSH:   Lab Results   Component Value Date/Time    TSHULTRASEN 1.870 08/21/2023 0837       MAGNESIUM:  Lab Results   Component Value Date/Time    MAGNESIUM 2.0 09/19/2019 0453        THYROXINE (T4):   No results found for: \"FREEDIR\"     CBC:   Lab Results   Component Value Date/Time    WBC 5.3 08/21/2023 08:37 AM    RBC 4.15 (L) 08/21/2023 08:37 AM    HEMOGLOBIN 12.6 08/21/2023 08:37 AM    HEMATOCRIT 41.1 08/21/2023 08:37 AM    MCV 99.0 (H) 08/21/2023 08:37 AM    MCH 30.4 08/21/2023 08:37 AM    MCHC 30.7 (L) 08/21/2023 08:37 AM    RDW 54.7 (H) 08/21/2023 08:37 AM    PLATELETCT 237 08/21/2023 08:37 AM    MPV 9.9 08/21/2023 08:37 AM    NEUTSPOLYS 55.10 08/21/2023 08:37 AM    LYMPHOCYTES 30.00 08/21/2023 08:37 AM    " "MONOCYTES 7.90 08/21/2023 08:37 AM    EOSINOPHILS 6.00 08/21/2023 08:37 AM    BASOPHILS 0.60 08/21/2023 08:37 AM    IMMGRAN 0.40 08/21/2023 08:37 AM    NRBC 0.00 08/21/2023 08:37 AM    NEUTS 2.94 08/21/2023 08:37 AM    LYMPHS 1.60 08/21/2023 08:37 AM    MONOS 0.42 08/21/2023 08:37 AM    EOS 0.32 08/21/2023 08:37 AM    BASO 0.03 08/21/2023 08:37 AM    IMMGRANAB 0.02 08/21/2023 08:37 AM    NRBCAB 0.00 08/21/2023 08:37 AM        CBC w/o DIFF  Lab Results   Component Value Date/Time    WBC 5.3 08/21/2023 08:37 AM    RBC 4.15 (L) 08/21/2023 08:37 AM    HEMOGLOBIN 12.6 08/21/2023 08:37 AM    MCV 99.0 (H) 08/21/2023 08:37 AM    MCH 30.4 08/21/2023 08:37 AM    MCHC 30.7 (L) 08/21/2023 08:37 AM    RDW 54.7 (H) 08/21/2023 08:37 AM    MPV 9.9 08/21/2023 08:37 AM       LIVER:  Lab Results   Component Value Date/Time    ALKPHOSPHAT 65 08/21/2023 08:37 AM    ASTSGOT 14 08/21/2023 08:37 AM    ALTSGPT 9 08/21/2023 08:37 AM    TBILIRUBIN 0.4 08/21/2023 08:37 AM       BNP:  No results found for: \"BNPBTYPENAT\"    PT/INR:  Lab Results   Component Value Date/Time    PROTHROMBTM 13.5 06/24/2019 01:49 PM    INR 1.01 06/24/2019 01:49 PM             Impression/Plan:  1. Aortic atherosclerosis (HCC)  EKG      2. Hypertension associated with diabetes (HCC)  lisinopril (PRINIVIL) 5 MG Tab      3. Sleep apnea, unspecified type        4. Hyperlipidemia associated with type 2 diabetes mellitus (HCC)        5. Nonrheumatic aortic valve stenosis         - encouraged wearing CPAP as much as possible during sleeping at night   - discussed how it will improve heart function   - close follow up with PCP about hypertension, hyperlipidemia, and diabetes   - get lab work prior to seeing Dr. Xiao in December   - can try magnesium for cramping in calves at night   - discussed signs and symptoms of worsening valvular disease   - blood pressure good   - return to clinic in one year        A total of 28 minutes of time was spent on day of encounter reviewing " medical record, performing history and examination, counseling, ordering medication/test/consults, collaborating with referring service, and documentation.    David Mccrary AGACNP-EP  Cardiac Electrophysiology

## 2023-10-10 ENCOUNTER — HOSPITAL ENCOUNTER (OUTPATIENT)
Dept: RADIOLOGY | Facility: MEDICAL CENTER | Age: 73
End: 2023-10-10
Attending: FAMILY MEDICINE
Payer: MEDICARE

## 2023-10-10 DIAGNOSIS — Z12.31 ENCOUNTER FOR SCREENING MAMMOGRAM FOR BREAST CANCER: ICD-10-CM

## 2023-10-10 PROCEDURE — 77063 BREAST TOMOSYNTHESIS BI: CPT

## 2023-10-11 LAB — EKG IMPRESSION: NORMAL

## 2023-10-11 PROCEDURE — 93010 ELECTROCARDIOGRAM REPORT: CPT | Performed by: INTERNAL MEDICINE

## 2023-11-09 ENCOUNTER — HOSPITAL ENCOUNTER (OUTPATIENT)
Dept: RADIOLOGY | Facility: MEDICAL CENTER | Age: 73
End: 2023-11-09
Attending: FAMILY MEDICINE
Payer: MEDICARE

## 2023-11-09 DIAGNOSIS — Z78.0 POSTMENOPAUSAL: ICD-10-CM

## 2023-11-09 PROCEDURE — 77080 DXA BONE DENSITY AXIAL: CPT

## 2023-12-04 ENCOUNTER — HOSPITAL ENCOUNTER (OUTPATIENT)
Dept: LAB | Facility: MEDICAL CENTER | Age: 73
End: 2023-12-04
Attending: FAMILY MEDICINE
Payer: MEDICARE

## 2023-12-04 DIAGNOSIS — R80.9 TYPE 2 DIABETES MELLITUS WITH MICROALBUMINURIA, WITHOUT LONG-TERM CURRENT USE OF INSULIN (HCC): ICD-10-CM

## 2023-12-04 DIAGNOSIS — E11.29 TYPE 2 DIABETES MELLITUS WITH MICROALBUMINURIA, WITHOUT LONG-TERM CURRENT USE OF INSULIN (HCC): ICD-10-CM

## 2023-12-04 DIAGNOSIS — E78.5 HYPERLIPIDEMIA ASSOCIATED WITH TYPE 2 DIABETES MELLITUS (HCC): ICD-10-CM

## 2023-12-04 DIAGNOSIS — E11.69 HYPERLIPIDEMIA ASSOCIATED WITH TYPE 2 DIABETES MELLITUS (HCC): ICD-10-CM

## 2023-12-04 LAB
ALBUMIN SERPL BCP-MCNC: 3.9 G/DL (ref 3.2–4.9)
ALBUMIN/GLOB SERPL: 1.2 G/DL
ALP SERPL-CCNC: 62 U/L (ref 30–99)
ALT SERPL-CCNC: 13 U/L (ref 2–50)
ANION GAP SERPL CALC-SCNC: 8 MMOL/L (ref 7–16)
AST SERPL-CCNC: 19 U/L (ref 12–45)
BILIRUB SERPL-MCNC: 0.4 MG/DL (ref 0.1–1.5)
BUN SERPL-MCNC: 14 MG/DL (ref 8–22)
CALCIUM ALBUM COR SERPL-MCNC: 9.3 MG/DL (ref 8.5–10.5)
CALCIUM SERPL-MCNC: 9.2 MG/DL (ref 8.5–10.5)
CHLORIDE SERPL-SCNC: 102 MMOL/L (ref 96–112)
CHOLEST SERPL-MCNC: 171 MG/DL (ref 100–199)
CO2 SERPL-SCNC: 27 MMOL/L (ref 20–33)
CREAT SERPL-MCNC: 0.6 MG/DL (ref 0.5–1.4)
EST. AVERAGE GLUCOSE BLD GHB EST-MCNC: 143 MG/DL
FASTING STATUS PATIENT QL REPORTED: NORMAL
GFR SERPLBLD CREATININE-BSD FMLA CKD-EPI: 93 ML/MIN/1.73 M 2
GLOBULIN SER CALC-MCNC: 3.2 G/DL (ref 1.9–3.5)
GLUCOSE SERPL-MCNC: 118 MG/DL (ref 65–99)
HBA1C MFR BLD: 6.6 % (ref 4–5.6)
HDLC SERPL-MCNC: 46 MG/DL
LDLC SERPL CALC-MCNC: 103 MG/DL
POTASSIUM SERPL-SCNC: 4.4 MMOL/L (ref 3.6–5.5)
PROT SERPL-MCNC: 7.1 G/DL (ref 6–8.2)
SODIUM SERPL-SCNC: 137 MMOL/L (ref 135–145)
TRIGL SERPL-MCNC: 108 MG/DL (ref 0–149)

## 2023-12-04 PROCEDURE — 80061 LIPID PANEL: CPT

## 2023-12-04 PROCEDURE — 80053 COMPREHEN METABOLIC PANEL: CPT

## 2023-12-04 PROCEDURE — 83036 HEMOGLOBIN GLYCOSYLATED A1C: CPT

## 2023-12-04 PROCEDURE — 36415 COLL VENOUS BLD VENIPUNCTURE: CPT

## 2023-12-05 ENCOUNTER — OFFICE VISIT (OUTPATIENT)
Dept: MEDICAL GROUP | Facility: PHYSICIAN GROUP | Age: 73
End: 2023-12-05
Payer: MEDICARE

## 2023-12-05 VITALS
RESPIRATION RATE: 16 BRPM | OXYGEN SATURATION: 95 % | HEIGHT: 63 IN | WEIGHT: 209 LBS | SYSTOLIC BLOOD PRESSURE: 124 MMHG | DIASTOLIC BLOOD PRESSURE: 70 MMHG | BODY MASS INDEX: 37.03 KG/M2 | HEART RATE: 72 BPM | TEMPERATURE: 97.3 F

## 2023-12-05 DIAGNOSIS — E11.69 HYPERLIPIDEMIA ASSOCIATED WITH TYPE 2 DIABETES MELLITUS (HCC): ICD-10-CM

## 2023-12-05 DIAGNOSIS — M85.80 OSTEOPENIA, UNSPECIFIED LOCATION: ICD-10-CM

## 2023-12-05 DIAGNOSIS — R80.9 TYPE 2 DIABETES MELLITUS WITH MICROALBUMINURIA, WITHOUT LONG-TERM CURRENT USE OF INSULIN (HCC): ICD-10-CM

## 2023-12-05 DIAGNOSIS — G47.30 SLEEP APNEA, UNSPECIFIED TYPE: ICD-10-CM

## 2023-12-05 DIAGNOSIS — E11.29 TYPE 2 DIABETES MELLITUS WITH MICROALBUMINURIA, WITHOUT LONG-TERM CURRENT USE OF INSULIN (HCC): ICD-10-CM

## 2023-12-05 DIAGNOSIS — E78.5 HYPERLIPIDEMIA ASSOCIATED WITH TYPE 2 DIABETES MELLITUS (HCC): ICD-10-CM

## 2023-12-05 PROCEDURE — 99214 OFFICE O/P EST MOD 30 MIN: CPT | Performed by: FAMILY MEDICINE

## 2023-12-05 PROCEDURE — 3078F DIAST BP <80 MM HG: CPT | Performed by: FAMILY MEDICINE

## 2023-12-05 PROCEDURE — 3074F SYST BP LT 130 MM HG: CPT | Performed by: FAMILY MEDICINE

## 2023-12-05 ASSESSMENT — ENCOUNTER SYMPTOMS
FALLS: 0
FEVER: 0
SHORTNESS OF BREATH: 0
CHILLS: 0

## 2023-12-05 ASSESSMENT — FIBROSIS 4 INDEX: FIB4 SCORE: 1.67

## 2023-12-05 NOTE — PROGRESS NOTES
"Subjective:     CC:   Chief Complaint   Patient presents with    Follow-Up     3 months     Lab Results         HPI:   Madelyn presents today with her  for a follow-up.  Last seen by me on August 30.        Problem   Osteopenia      Chronic medical diagnosis.  Recently underwent bone density on November 9 with a T score of -1.3 to the lumbar spine and -0.4 to the proximal left femur.  Continues to take vitamin D but not calcium    Bone Density (1/2020):  lumbar spine T score of -1.1   proximal left femur T score of 0.0        Hyperlipidemia Associated With Type 2 Diabetes Mellitus (Hcc)    Chronic medical condition.   Crestor 10   Latest Reference Range & Units 12/04/23 08:27   Cholesterol,Tot 100 - 199 mg/dL 171   Triglycerides 0 - 149 mg/dL 108   HDL >=40 mg/dL 46   LDL <100 mg/dL 103 (H)   (H): Data is abnormally high      Stopped pravastatin 10mg a month ago. Has been feeling fatigue with this med.  Has been feeling better since stopping this med.   Records reviewed.  She has taken atorvastatin and simvastatin in the past.     Latest Reference Range & Units 08/21/23 08:37   Cholesterol,Tot 100 - 199 mg/dL 184   Triglycerides 0 - 149 mg/dL 124   HDL >=40 mg/dL 37 !   LDL <100 mg/dL 122 (H)   !: Data is abnormal  (H): Data is abnormally high     Type 2 Diabetes Mellitus With Microalbuminuria, Without Long-Term Current Use of Insulin (Hcc)    Chronic. Recent  labs  with A1c at 6.6%.  -stable.  Remains diet controlled. Has declined meds for this.      Sleep Apnea    Cpap 3-5 hrs night    Last saw sleep medicine (Chula Greene) in 8/2019:   \"PSG from 3/2018 indicated an AHI of 103.6 and low oxygenation of 72%.  She is here for first compliance.  Currently she is being treated with CPAP @ 9cmH20. Compliance download over the past 30 days indicates 93 % compliance, average use of 2 hours 3 minutes per night, AHI of 1. Patient reports she is acclimating to therapy.  Prior compliance showed only 38 minutes of " "use per night.  She still feels pressures are slightly too high.  She is using nasal pillows which are comfortable.  She feels restricted by the tubing and is unable to move around. She we will get up and go to the bathroom and does not put her machine back on after that time.  She does however feel she is getting a deeper sleep for those 2 hours and has more energy during the day.\"    03/31/21 states that she wakes sometimes feeling tired w/o energy to do things and with a mild HA. She defers a referral back to the sleep clinic.                Current Outpatient Medications Ordered in Epic   Medication Sig Dispense Refill    Calcium Carb-Cholecalciferol (CALCIUM/VITAMIN D PO) Take  by mouth.      MAGNESIUM PO Take  by mouth.      Omega-3 Fatty Acids (FISH OIL) 1000 MG Cap capsule Take 1,000 mg by mouth 3 times a day with meals.      lisinopril (PRINIVIL) 5 MG Tab Take 1 Tablet by mouth every day. 90 Tablet 3    rosuvastatin (CRESTOR) 10 MG Tab Take 1 Tablet by mouth every evening. 100 Tablet 3    Multiple Vitamin (MULTI-VITAMIN DAILY PO) Take  by mouth.       No current Epic-ordered facility-administered medications on file.       Health Maintenance: declining flu vaccine due to adverse events in past. States she received the prevnar 13 before the 23 thru [facility] in the past.    ROS:  Review of Systems   Constitutional:  Negative for chills and fever.   Respiratory:  Negative for shortness of breath.    Cardiovascular:  Negative for chest pain.   Musculoskeletal:  Negative for falls.       Objective:     Exam:  /70   Pulse 72   Temp 36.3 °C (97.3 °F) (Temporal)   Resp 16   Ht 1.6 m (5' 3\")   Wt 94.8 kg (209 lb)   LMP  (LMP Unknown)   SpO2 95%   BMI 37.02 kg/m²  Body mass index is 37.02 kg/m².    Physical Exam  Constitutional:       Appearance: Normal appearance.   Eyes:      Extraocular Movements: Extraocular movements intact.   Cardiovascular:      Rate and Rhythm: Normal rate and regular rhythm.     "  Heart sounds: Murmur heard.   Pulmonary:      Effort: Pulmonary effort is normal.      Breath sounds: Normal breath sounds.   Neurological:      Mental Status: She is alert.   Psychiatric:         Mood and Affect: Mood normal.         Behavior: Behavior normal.             Labs: See above    Assessment & Plan:     73 y.o. female with the following -     1. Type 2 diabetes mellitus with microalbuminuria, without long-term current use of insulin (HCC)  Chronic medical diagnosis.  Stable.  Diet controlled.  Has declined medications in the past.  Recent labs have A1c is 6.6%.  - VITAMIN D,25 HYDROXY (DEFICIENCY); Future  - HEMOGLOBIN A1C; Future  - Lipid Profile; Future    2. Hyperlipidemia associated with type 2 diabetes mellitus (HCC)  Chronic medical diagnosis.  Improving.  Recent labs with LDL level at 102.  We discussed that her goal is LDL less than 70 as she does have type 2 diabetes.  Gave her the option of working on diet lifestyle and or increasing her rosuvastatin to 20 mg.  Patient does not want to make any adjustments to her meds and will work on diet and lifestyle.    3. Osteopenia, unspecified location  Chronic medical diagnosis.  Not well controlled.  Discussed with her that recent bone density does show decreased.  She is currently only taking vitamin D.  Discussed with her to add calcium 1200 mg daily as well.    4. Sleep apnea, unspecified type  Chronic medical diagnosis.  Improving.  Recently started on CPAP.                Return in about 3 months (around 3/5/2024).    Please note that this dictation was created using voice recognition software. I have made every reasonable attempt to correct obvious errors, but I expect that there are errors of grammar and possibly content that I did not discover before finalizing the note.

## 2024-02-26 ENCOUNTER — OFFICE VISIT (OUTPATIENT)
Dept: MEDICAL GROUP | Facility: PHYSICIAN GROUP | Age: 74
End: 2024-02-26
Payer: MEDICARE

## 2024-02-26 VITALS
HEIGHT: 63 IN | SYSTOLIC BLOOD PRESSURE: 114 MMHG | OXYGEN SATURATION: 96 % | HEART RATE: 74 BPM | WEIGHT: 209.3 LBS | RESPIRATION RATE: 16 BRPM | BODY MASS INDEX: 37.09 KG/M2 | DIASTOLIC BLOOD PRESSURE: 68 MMHG | TEMPERATURE: 99 F

## 2024-02-26 DIAGNOSIS — I15.2 HYPERTENSION ASSOCIATED WITH DIABETES (HCC): ICD-10-CM

## 2024-02-26 DIAGNOSIS — E11.69 HYPERLIPIDEMIA ASSOCIATED WITH TYPE 2 DIABETES MELLITUS (HCC): ICD-10-CM

## 2024-02-26 DIAGNOSIS — E11.29 TYPE 2 DIABETES MELLITUS WITH MICROALBUMINURIA, WITHOUT LONG-TERM CURRENT USE OF INSULIN (HCC): ICD-10-CM

## 2024-02-26 DIAGNOSIS — E78.5 HYPERLIPIDEMIA ASSOCIATED WITH TYPE 2 DIABETES MELLITUS (HCC): ICD-10-CM

## 2024-02-26 DIAGNOSIS — I70.0 ATHEROSCLEROSIS OF AORTA (HCC): ICD-10-CM

## 2024-02-26 DIAGNOSIS — R80.9 TYPE 2 DIABETES MELLITUS WITH MICROALBUMINURIA, WITHOUT LONG-TERM CURRENT USE OF INSULIN (HCC): ICD-10-CM

## 2024-02-26 DIAGNOSIS — E11.59 HYPERTENSION ASSOCIATED WITH DIABETES (HCC): ICD-10-CM

## 2024-02-26 DIAGNOSIS — F33.8 SEASONAL AFFECTIVE DISORDER (HCC): ICD-10-CM

## 2024-02-26 DIAGNOSIS — E66.01 MORBID (SEVERE) OBESITY DUE TO EXCESS CALORIES (HCC): ICD-10-CM

## 2024-02-26 PROCEDURE — 99214 OFFICE O/P EST MOD 30 MIN: CPT | Performed by: FAMILY MEDICINE

## 2024-02-26 PROCEDURE — 3078F DIAST BP <80 MM HG: CPT | Performed by: FAMILY MEDICINE

## 2024-02-26 PROCEDURE — 3074F SYST BP LT 130 MM HG: CPT | Performed by: FAMILY MEDICINE

## 2024-02-26 ASSESSMENT — ENCOUNTER SYMPTOMS
FEVER: 0
BACK PAIN: 1
HEADACHES: 1
DEPRESSION: 1

## 2024-02-26 ASSESSMENT — PATIENT HEALTH QUESTIONNAIRE - PHQ9
SUM OF ALL RESPONSES TO PHQ QUESTIONS 1-9: 5
CLINICAL INTERPRETATION OF PHQ2 SCORE: 2
5. POOR APPETITE OR OVEREATING: 0 - NOT AT ALL

## 2024-02-26 ASSESSMENT — FIBROSIS 4 INDEX: FIB4 SCORE: 1.69

## 2024-02-26 NOTE — PROGRESS NOTES
Subjective:     CC:   Chief Complaint   Patient presents with    Follow-Up         HPI:   Madelyn presents today for a f/u visit.  Last seen by me on December 5.      Problem   Seasonal Affective Disorder (Hcc)    Medical diagnosis.  States that she does get down during the winter months when it is not as diony outside.  PHQ-9 completed in office today with a score of 5.     Hypertension Associated With Diabetes (Hcc)      Hypertension  Chronic medical diagnosis  He has noticed frequent headaches headaches  Last one was a week ago.   Takes tylenol/alleve prn  Last eye exam was 2-3 yr ago.  Home bps  are 149/76.  Blood pressure today in the office was 114/68    8/30/23 Chronic.  Hasn't taken her lisinopril 10mg since her appt on 8/3/23.  bp today 124/70. Her dizziness has improved since stopping this med. Urine microalbumin from 2019 was elevated at 167, resutls normal since then   Has a machine at home- will start checking bps at home.                   Current Outpatient Medications Ordered in Epic   Medication Sig Dispense Refill    Calcium Carb-Cholecalciferol (CALCIUM/VITAMIN D PO) Take  by mouth.      MAGNESIUM PO Take  by mouth.      Omega-3 Fatty Acids (FISH OIL) 1000 MG Cap capsule Take 1,000 mg by mouth 3 times a day with meals.      lisinopril (PRINIVIL) 5 MG Tab Take 1 Tablet by mouth every day. 90 Tablet 3    rosuvastatin (CRESTOR) 10 MG Tab Take 1 Tablet by mouth every evening. 100 Tablet 3    Multiple Vitamin (MULTI-VITAMIN DAILY PO) Take  by mouth.       No current Epic-ordered facility-administered medications on file.       Health Maintenance: States that she has received both of the pneumonia vaccines through her previous PCP.    ROS:  Review of Systems   Constitutional:  Negative for fever.   Musculoskeletal:  Positive for back pain (a few weeks ago).   Neurological:  Positive for headaches.   Psychiatric/Behavioral:  Positive for depression.        Objective:     Exam:  /68 (BP Location: Left  "arm, Patient Position: Standing, BP Cuff Size: Large adult)   Pulse 74   Temp 37.2 °C (99 °F) (Temporal)   Resp 16   Ht 1.6 m (5' 3\")   Wt 94.9 kg (209 lb 4.8 oz)   LMP  (LMP Unknown)   SpO2 96%   BMI 37.08 kg/m²  Body mass index is 37.08 kg/m².    Physical Exam  Constitutional:       Appearance: Normal appearance.   Eyes:      Extraocular Movements: Extraocular movements intact.   Cardiovascular:      Rate and Rhythm: Normal rate and regular rhythm.      Heart sounds: Murmur heard.   Pulmonary:      Effort: Pulmonary effort is normal.      Breath sounds: Normal breath sounds.   Neurological:      Mental Status: She is alert.   Psychiatric:         Mood and Affect: Mood normal.         Behavior: Behavior normal.         Thought Content: Thought content normal.         Judgment: Judgment normal.             Labs: see above    Assessment & Plan:     74 y.o. female with the following -     1. Type 2 diabetes mellitus with microalbuminuria, without long-term current use of insulin (HCC)  2. Hypertension associated with diabetes (HCC)  3. Hyperlipidemia associated with type 2 diabetes mellitus (HCC)  4. Atherosclerosis of aorta (HCC)  5. Morbid (severe) obesity due to excess calories (HCC)  6. Body mass index (BMI) 37.0-37.9, adult    HCC Gap Form    Diagnosis: E66.01 - Morbid (severe) obesity due to excess calories (HCC)  Z68.37 - Body mass index (BMI) 37.0-37.9, adult  Comorbidity Diagnosis: Atherosclerosis of aorta (HCC)  The current BMI is 37.08 kg/m2 as of 02/26/24 12:59 PST  Assessment and plan: Chronic, stable. Encouraged healthy diet and physical activity changes with a goal of weight loss. Follow up at least annually. The comorbid condition is stable based on today's assessment. Continue current treatment plan. Follow up in 3 months.  Diagnosis to address: I70.0 - Atherosclerosis of aorta (HCC)  Assessment and plan: Chronic, stable. Continue with current crestor 10mg daily.   Diagnosis: E11.59, I15.2 - " Hypertension associated with diabetes (HCC)  Assessment and plan: Chronic, stable. Continue with lisinopril 5mg in the evenings at 6 pm.   Diagnosis: E11.29, R80.9 - Type 2 diabetes mellitus with microalbuminuria, without long-term current use of insulin (HCC)  Assessment and plan: Chronic, stable. Continue with diet controlled.  Last A1c from December was stable at 6.6%  Diagnosis: E11.69, E78.5 - Hyperlipidemia associated with type 2 diabetes mellitus (HCC)  Assessment and plan: Chronic, stable. Continue with crestor 10mg at 6 pm.   Last edited 02/26/24 13:00 PST by Miriam Xiao M.D.       7. Seasonal affective disorder (HCC)  New medical diagnoses.  Will continue to monitor.    Encouraged patient to complete her fasting lab work after March 5 and that we will call her with results.        Return in about 3 months (around 5/26/2024) for Diabetes.    Please note that this dictation was created using voice recognition software. I have made every reasonable attempt to correct obvious errors, but I expect that there are errors of grammar and possibly content that I did not discover before finalizing the note.

## 2024-03-07 ENCOUNTER — HOSPITAL ENCOUNTER (OUTPATIENT)
Dept: LAB | Facility: MEDICAL CENTER | Age: 74
End: 2024-03-07
Attending: FAMILY MEDICINE
Payer: MEDICARE

## 2024-03-07 DIAGNOSIS — E11.29 TYPE 2 DIABETES MELLITUS WITH MICROALBUMINURIA, WITHOUT LONG-TERM CURRENT USE OF INSULIN (HCC): ICD-10-CM

## 2024-03-07 DIAGNOSIS — R80.9 TYPE 2 DIABETES MELLITUS WITH MICROALBUMINURIA, WITHOUT LONG-TERM CURRENT USE OF INSULIN (HCC): ICD-10-CM

## 2024-03-07 LAB
25(OH)D3 SERPL-MCNC: 34 NG/ML (ref 30–100)
CHOLEST SERPL-MCNC: 135 MG/DL (ref 100–199)
EST. AVERAGE GLUCOSE BLD GHB EST-MCNC: 151 MG/DL
FASTING STATUS PATIENT QL REPORTED: NORMAL
HBA1C MFR BLD: 6.9 % (ref 4–5.6)
HDLC SERPL-MCNC: 45 MG/DL
LDLC SERPL CALC-MCNC: 70 MG/DL
TRIGL SERPL-MCNC: 99 MG/DL (ref 0–149)

## 2024-03-07 PROCEDURE — 36415 COLL VENOUS BLD VENIPUNCTURE: CPT

## 2024-03-07 PROCEDURE — 83036 HEMOGLOBIN GLYCOSYLATED A1C: CPT

## 2024-03-07 PROCEDURE — 82306 VITAMIN D 25 HYDROXY: CPT

## 2024-03-07 PROCEDURE — 80061 LIPID PANEL: CPT

## 2024-03-15 NOTE — RESULT ENCOUNTER NOTE
Please call patient and let her know in [place] taht her recent labs showed that her blood sugars were elevated as her A1c had increased to 6.9%.  please let her know that we have two options--she can work on diet/lifestyle to improved her blood sugars or we can start her on a low dose medication for the diabetes .  Please let me know her preference.     Her lipid panel and vit D levels had improved.    Thanks  Miriam Xiao M.D.

## 2024-03-25 ENCOUNTER — TELEPHONE (OUTPATIENT)
Dept: HEALTH INFORMATION MANAGEMENT | Facility: OTHER | Age: 74
End: 2024-03-25
Payer: MEDICARE

## 2024-04-16 ENCOUNTER — OFFICE VISIT (OUTPATIENT)
Dept: MEDICAL GROUP | Facility: PHYSICIAN GROUP | Age: 74
End: 2024-04-16
Payer: MEDICARE

## 2024-04-16 ENCOUNTER — HOSPITAL ENCOUNTER (OUTPATIENT)
Dept: LAB | Facility: MEDICAL CENTER | Age: 74
End: 2024-04-16
Attending: INTERNAL MEDICINE
Payer: MEDICARE

## 2024-04-16 VITALS
BODY MASS INDEX: 37.35 KG/M2 | OXYGEN SATURATION: 97 % | RESPIRATION RATE: 18 BRPM | HEIGHT: 63 IN | SYSTOLIC BLOOD PRESSURE: 106 MMHG | TEMPERATURE: 97.3 F | HEART RATE: 76 BPM | WEIGHT: 210.8 LBS | DIASTOLIC BLOOD PRESSURE: 62 MMHG

## 2024-04-16 DIAGNOSIS — R10.84 GENERALIZED ABDOMINAL PAIN: ICD-10-CM

## 2024-04-16 LAB
ALBUMIN SERPL BCP-MCNC: 3.9 G/DL (ref 3.2–4.9)
ALBUMIN/GLOB SERPL: 1 G/DL
ALP SERPL-CCNC: 60 U/L (ref 30–99)
ALT SERPL-CCNC: 9 U/L (ref 2–50)
ANION GAP SERPL CALC-SCNC: 10 MMOL/L (ref 7–16)
AST SERPL-CCNC: 15 U/L (ref 12–45)
BASOPHILS # BLD AUTO: 0.3 % (ref 0–1.8)
BASOPHILS # BLD: 0.02 K/UL (ref 0–0.12)
BILIRUB SERPL-MCNC: 0.5 MG/DL (ref 0.1–1.5)
BUN SERPL-MCNC: 14 MG/DL (ref 8–22)
CALCIUM ALBUM COR SERPL-MCNC: 9.6 MG/DL (ref 8.5–10.5)
CALCIUM SERPL-MCNC: 9.5 MG/DL (ref 8.5–10.5)
CHLORIDE SERPL-SCNC: 102 MMOL/L (ref 96–112)
CO2 SERPL-SCNC: 26 MMOL/L (ref 20–33)
CREAT SERPL-MCNC: 0.57 MG/DL (ref 0.5–1.4)
CRP SERPL HS-MCNC: 1.97 MG/DL (ref 0–0.75)
EOSINOPHIL # BLD AUTO: 0.22 K/UL (ref 0–0.51)
EOSINOPHIL NFR BLD: 3.1 % (ref 0–6.9)
ERYTHROCYTE [DISTWIDTH] IN BLOOD BY AUTOMATED COUNT: 53 FL (ref 35.9–50)
GFR SERPLBLD CREATININE-BSD FMLA CKD-EPI: 94 ML/MIN/1.73 M 2
GLOBULIN SER CALC-MCNC: 3.9 G/DL (ref 1.9–3.5)
GLUCOSE SERPL-MCNC: 127 MG/DL (ref 65–99)
HCT VFR BLD AUTO: 40.8 % (ref 37–47)
HGB BLD-MCNC: 13.2 G/DL (ref 12–16)
IMM GRANULOCYTES # BLD AUTO: 0.02 K/UL (ref 0–0.11)
IMM GRANULOCYTES NFR BLD AUTO: 0.3 % (ref 0–0.9)
LIPASE SERPL-CCNC: 22 U/L (ref 11–82)
LYMPHOCYTES # BLD AUTO: 1.94 K/UL (ref 1–4.8)
LYMPHOCYTES NFR BLD: 27.6 % (ref 22–41)
MCH RBC QN AUTO: 31 PG (ref 27–33)
MCHC RBC AUTO-ENTMCNC: 32.4 G/DL (ref 32.2–35.5)
MCV RBC AUTO: 95.8 FL (ref 81.4–97.8)
MONOCYTES # BLD AUTO: 0.4 K/UL (ref 0–0.85)
MONOCYTES NFR BLD AUTO: 5.7 % (ref 0–13.4)
NEUTROPHILS # BLD AUTO: 4.43 K/UL (ref 1.82–7.42)
NEUTROPHILS NFR BLD: 63 % (ref 44–72)
NRBC # BLD AUTO: 0 K/UL
NRBC BLD-RTO: 0 /100 WBC (ref 0–0.2)
PLATELET # BLD AUTO: 237 K/UL (ref 164–446)
PMV BLD AUTO: 10.2 FL (ref 9–12.9)
POTASSIUM SERPL-SCNC: 4.5 MMOL/L (ref 3.6–5.5)
PROT SERPL-MCNC: 7.8 G/DL (ref 6–8.2)
RBC # BLD AUTO: 4.26 M/UL (ref 4.2–5.4)
SODIUM SERPL-SCNC: 138 MMOL/L (ref 135–145)
WBC # BLD AUTO: 7 K/UL (ref 4.8–10.8)

## 2024-04-16 PROCEDURE — 83690 ASSAY OF LIPASE: CPT

## 2024-04-16 PROCEDURE — 3078F DIAST BP <80 MM HG: CPT | Performed by: INTERNAL MEDICINE

## 2024-04-16 PROCEDURE — 99214 OFFICE O/P EST MOD 30 MIN: CPT | Performed by: INTERNAL MEDICINE

## 2024-04-16 PROCEDURE — 80053 COMPREHEN METABOLIC PANEL: CPT

## 2024-04-16 PROCEDURE — 86140 C-REACTIVE PROTEIN: CPT

## 2024-04-16 PROCEDURE — 85025 COMPLETE CBC W/AUTO DIFF WBC: CPT

## 2024-04-16 PROCEDURE — 3074F SYST BP LT 130 MM HG: CPT | Performed by: INTERNAL MEDICINE

## 2024-04-16 PROCEDURE — 36415 COLL VENOUS BLD VENIPUNCTURE: CPT

## 2024-04-16 ASSESSMENT — FIBROSIS 4 INDEX: FIB4 SCORE: 1.69

## 2024-04-16 NOTE — PROGRESS NOTES
Subjective:   Chief Complaint/History of Present Illness:  Madelyn Schmitz is a 74 y.o. female established patient who presents today to discuss medical problems as listed below. Madelyn is accompanied by her  for today's visit.    Problem   Generalized Abdominal Pain    Starting on Saturday she woke up with severe abdominal pain.  She felt like it was her upper abdomen.  However on examination she is actually indicating more of the mid to lower abdomen is where she experiences the pain.  She cannot describe the pain but reports that it is uncomfortable, worse with standing and walking, and improves at night when she is lying in bed and sleeping.  She has had nearly loss of appetite with an episode of nausea and emesis yesterday after breakfast.  She is trying to stay hydrated however her blood pressure is slightly reduced today.  She notes she is having 2-3 bowel movements daily, denies melena or hematochezia.  Denies history of peptic ulcer disease.  She is status postcholecystectomy.  She also has history of uterine cancer with hysterectomy and radiation a few years ago, last CT scan was in May 2022.  She has known ventral hernias as well as sigmoid diverticulosis.  Denies any known history of diverticulitis.  Her  ate the same food as her the night before the symptoms started and has not been ill.  She has tried [place] without much improvement.  He took an old hydrocodone last night which helped her sleep but then the pain came back again this morning.  On examination she is tender in the epigastric region as well as diffusely throughout the lower abdomen.          Current Medications:  Current Outpatient Medications Ordered in Epic   Medication Sig Dispense Refill    Calcium Carb-Cholecalciferol (CALCIUM/VITAMIN D PO) Take  by mouth.      MAGNESIUM PO Take  by mouth.      Omega-3 Fatty Acids (FISH OIL) 1000 MG Cap capsule Take 1,000 mg by mouth 3 times a day with meals.       "lisinopril (PRINIVIL) 5 MG Tab Take 1 Tablet by mouth every day. 90 Tablet 3    rosuvastatin (CRESTOR) 10 MG Tab Take 1 Tablet by mouth every evening. 100 Tablet 3    Multiple Vitamin (MULTI-VITAMIN DAILY PO) Take  by mouth.       No current Epic-ordered facility-administered medications on file.          Objective:   Physical Exam:    Vitals: /62   Pulse 76   Temp 36.3 °C (97.3 °F) (Temporal)   Resp 18   Ht 1.6 m (5' 3\")   Wt 95.6 kg (210 lb 12.8 oz)   SpO2 97%    BMI: Body mass index is 37.34 kg/m².  Physical Exam  Constitutional:       Appearance: She is not toxic-appearing or diaphoretic.      Comments: Appears uncomfortable   HENT:      Right Ear: External ear normal.      Left Ear: External ear normal.   Eyes:      General: No scleral icterus.     Conjunctiva/sclera: Conjunctivae normal.   Cardiovascular:      Rate and Rhythm: Regular rhythm. Tachycardia present.      Heart sounds: Murmur heard.   Pulmonary:      Effort: Pulmonary effort is normal. No respiratory distress.      Breath sounds: No wheezing.   Abdominal:      General: There is no distension.      Palpations: Abdomen is soft.      Tenderness: There is abdominal tenderness. There is guarding.      Hernia: A hernia is present.      Comments: Epigastric and diffuse lower abdomen tender to light and deep palpation   Musculoskeletal:      Right lower leg: No edema.      Left lower leg: No edema.   Skin:     Findings: No bruising or rash.   Psychiatric:         Behavior: Behavior normal.         Thought Content: Thought content normal.         Judgment: Judgment normal.          Assessment and Plan:   Madelyn is a 74 y.o. female with the following:  Problem List Items Addressed This Visit       Generalized abdominal pain     New decompensated issue.  She is tender both epigastric and lower abdomen.  Differential includes pancreatitis, gastroenteritis, diverticulitis, or complication of known hernias due to adhesions from prior hysterectomy and " radiation treatment.  She is stable for outpatient management according to her vital signs right now.  Will expedite lab work and CT imaging to determine etiology of her pain so we can come up with a plan for treatment.  ER precautions provided and they are agreeable to outpatient workup.  We have imaging we can determine if she needs surgical evaluation versus supportive treatment versus antibiotic therapy.  Will update her tomorrow when I have the imaging back.         Relevant Orders    CBC WITH DIFFERENTIAL    Comp Metabolic Panel    LIPASE    CRP QUANTITIVE (NON-CARDIAC)    CT-ABDOMEN-PELVIS WITH          RTC: Return if symptoms worsen or fail to improve.    I spent a total of 30 minutes with record review, exam, communication with the patient ( utilized on iPad), communication with other providers, and documentation of this encounter.    PLEASE NOTE: This dictation was created using voice recognition software. I have made every reasonable attempt to correct obvious errors, but I expect that there are errors of grammar and possibly content that I did not discover before finalizing the note.      Monika Mike, DO  Geriatric and Internal Medicine  [facility]

## 2024-04-16 NOTE — ASSESSMENT & PLAN NOTE
New decompensated issue.  She is tender both epigastric and lower abdomen.  Differential includes pancreatitis, gastroenteritis, diverticulitis, or complication of known hernias due to adhesions from prior hysterectomy and radiation treatment.  She is stable for outpatient management according to her vital signs right now.  Will expedite lab work and CT imaging to determine etiology of her pain so we can come up with a plan for treatment.  ER precautions provided and they are agreeable to outpatient workup.  We have imaging we can determine if she needs surgical evaluation versus supportive treatment versus antibiotic therapy.  Will update her tomorrow when I have the imaging back.

## 2024-04-17 ENCOUNTER — HOSPITAL ENCOUNTER (OUTPATIENT)
Dept: RADIOLOGY | Facility: MEDICAL CENTER | Age: 74
End: 2024-04-17
Attending: INTERNAL MEDICINE
Payer: MEDICARE

## 2024-04-17 DIAGNOSIS — R10.84 GENERALIZED ABDOMINAL PAIN: ICD-10-CM

## 2024-04-17 PROCEDURE — 74177 CT ABD & PELVIS W/CONTRAST: CPT

## 2024-04-17 PROCEDURE — 700117 HCHG RX CONTRAST REV CODE 255: Performed by: INTERNAL MEDICINE

## 2024-04-17 RX ADMIN — IOHEXOL 100 ML: 350 INJECTION, SOLUTION INTRAVENOUS at 10:23

## 2024-05-20 ASSESSMENT — ENCOUNTER SYMPTOMS: GENERAL WELL-BEING: FAIR

## 2024-05-20 ASSESSMENT — PATIENT HEALTH QUESTIONNAIRE - PHQ9
1. LITTLE INTEREST OR PLEASURE IN DOING THINGS: SEVERAL DAYS
2. FEELING DOWN, DEPRESSED, IRRITABLE, OR HOPELESS: SEVERAL DAYS

## 2024-05-20 ASSESSMENT — ACTIVITIES OF DAILY LIVING (ADL): BATHING_REQUIRES_ASSISTANCE: 0

## 2024-05-21 NOTE — ASSESSMENT & PLAN NOTE
Chronic, stable. PHQ today 6/10. Pt does not require pharmacotherapy and feels she is coping thus far. Follow up with PCP at least annually for continued monitoring and management.

## 2024-05-21 NOTE — ASSESSMENT & PLAN NOTE
Chronic, stable. BP today 130/80. Pt does not routinely monitor BP at home. Denies dizziness/lightheadedness, chest pain, dyspnea. Continue current treatment regime: lisinopril 5mg daily. Follow up with PCP annually for continued monitoring and management.

## 2024-05-21 NOTE — ASSESSMENT & PLAN NOTE
Chronic, ongoing. BMI today 37.73. Associated with T2DM, HLD, HTN, GERD, CANDIDA. Continue to encourage healthy calorie conscious diet with regular physical activity. Follow up with PCP at least annually for continued monitoring and management.

## 2024-05-21 NOTE — ASSESSMENT & PLAN NOTE
Chronic, stable. Last A1c 6.9 as of 2024. Last monofilament foot exam: 2023, last urine microalb/creat: 2023, last retinal screenin2023. Pt has had elevated microalbumin and maintains on lisinopril 5mg daily. Continue to advise low carbohydrate diet with regular physical activity. Continue current treatment regime. Follow up with PCP as scheduled for continued monitoring and management.

## 2024-05-21 NOTE — ASSESSMENT & PLAN NOTE
Chronic, stable. Continue with current defined treatment plan: famotine PRN. Follow-up at least annually.

## 2024-05-21 NOTE — ASSESSMENT & PLAN NOTE
Chronic, stable. Most recent lipid panel from 3/2024 WNL. However, she has since d/c rosuvastatin due to complaint of dizziness. She states she has attempted two statins all of which have reportedly caused dizziness. Recommend Mediterranean diet and regular physical activity. Follow up with PCP at least annually for continued monitoring and management.   Lab Results   Component Value Date/Time    CHOLSTRLTOT 135 03/07/2024 08:15 AM    LDL 70 03/07/2024 08:15 AM    HDL 45 03/07/2024 08:15 AM    TRIGLYCERIDE 99 03/07/2024 08:15 AM

## 2024-05-22 ENCOUNTER — OFFICE VISIT (OUTPATIENT)
Dept: FAMILY PLANNING/WOMEN'S HEALTH CLINIC | Facility: PHYSICIAN GROUP | Age: 74
End: 2024-05-22
Payer: MEDICARE

## 2024-05-22 VITALS
BODY MASS INDEX: 37.74 KG/M2 | SYSTOLIC BLOOD PRESSURE: 130 MMHG | HEIGHT: 63 IN | WEIGHT: 213 LBS | DIASTOLIC BLOOD PRESSURE: 80 MMHG

## 2024-05-22 DIAGNOSIS — I15.2 HYPERTENSION ASSOCIATED WITH DIABETES (HCC): ICD-10-CM

## 2024-05-22 DIAGNOSIS — E11.69 HYPERLIPIDEMIA ASSOCIATED WITH TYPE 2 DIABETES MELLITUS (HCC): ICD-10-CM

## 2024-05-22 DIAGNOSIS — E11.29 TYPE 2 DIABETES MELLITUS WITH MICROALBUMINURIA, WITHOUT LONG-TERM CURRENT USE OF INSULIN (HCC): ICD-10-CM

## 2024-05-22 DIAGNOSIS — R29.898 WEAKNESS OF BOTH LOWER EXTREMITIES: ICD-10-CM

## 2024-05-22 DIAGNOSIS — E78.5 HYPERLIPIDEMIA ASSOCIATED WITH TYPE 2 DIABETES MELLITUS (HCC): ICD-10-CM

## 2024-05-22 DIAGNOSIS — E66.01 CLASS 2 SEVERE OBESITY DUE TO EXCESS CALORIES WITH SERIOUS COMORBIDITY AND BODY MASS INDEX (BMI) OF 37.0 TO 37.9 IN ADULT (HCC): ICD-10-CM

## 2024-05-22 DIAGNOSIS — E11.59 HYPERTENSION ASSOCIATED WITH DIABETES (HCC): ICD-10-CM

## 2024-05-22 DIAGNOSIS — K21.9 GASTROESOPHAGEAL REFLUX DISEASE WITHOUT ESOPHAGITIS: ICD-10-CM

## 2024-05-22 DIAGNOSIS — R32 URINARY INCONTINENCE, UNSPECIFIED TYPE: ICD-10-CM

## 2024-05-22 DIAGNOSIS — I70.0 ATHEROSCLEROSIS OF AORTA (HCC): ICD-10-CM

## 2024-05-22 DIAGNOSIS — R80.9 TYPE 2 DIABETES MELLITUS WITH MICROALBUMINURIA, WITHOUT LONG-TERM CURRENT USE OF INSULIN (HCC): ICD-10-CM

## 2024-05-22 DIAGNOSIS — F33.8 SEASONAL AFFECTIVE DISORDER (HCC): ICD-10-CM

## 2024-05-22 DIAGNOSIS — Z91.81 HISTORY OF FALL: ICD-10-CM

## 2024-05-22 PROBLEM — E66.812 CLASS 2 SEVERE OBESITY DUE TO EXCESS CALORIES WITH SERIOUS COMORBIDITY AND BODY MASS INDEX (BMI) OF 37.0 TO 37.9 IN ADULT (HCC): Status: ACTIVE | Noted: 2022-09-15

## 2024-05-22 PROCEDURE — 3075F SYST BP GE 130 - 139MM HG: CPT | Performed by: PHYSICIAN ASSISTANT

## 2024-05-22 PROCEDURE — G0439 PPPS, SUBSEQ VISIT: HCPCS | Performed by: PHYSICIAN ASSISTANT

## 2024-05-22 PROCEDURE — 1125F AMNT PAIN NOTED PAIN PRSNT: CPT | Performed by: PHYSICIAN ASSISTANT

## 2024-05-22 PROCEDURE — 3079F DIAST BP 80-89 MM HG: CPT | Performed by: PHYSICIAN ASSISTANT

## 2024-05-22 SDOH — ECONOMIC STABILITY: FOOD INSECURITY: WITHIN THE PAST 12 MONTHS, YOU WORRIED THAT YOUR FOOD WOULD RUN OUT BEFORE YOU GOT MONEY TO BUY MORE.: NEVER TRUE

## 2024-05-22 SDOH — ECONOMIC STABILITY: INCOME INSECURITY: HOW HARD IS IT FOR YOU TO PAY FOR THE VERY BASICS LIKE FOOD, HOUSING, MEDICAL CARE, AND HEATING?: NOT HARD AT ALL

## 2024-05-22 SDOH — ECONOMIC STABILITY: TRANSPORTATION INSECURITY
IN THE PAST 12 MONTHS, HAS THE LACK OF TRANSPORTATION KEPT YOU FROM MEDICAL APPOINTMENTS OR FROM GETTING MEDICATIONS?: NO

## 2024-05-22 SDOH — ECONOMIC STABILITY: FOOD INSECURITY: WITHIN THE PAST 12 MONTHS, THE FOOD YOU BOUGHT JUST DIDN'T LAST AND YOU DIDN'T HAVE MONEY TO GET MORE.: NEVER TRUE

## 2024-05-22 SDOH — ECONOMIC STABILITY: TRANSPORTATION INSECURITY
IN THE PAST 12 MONTHS, HAS LACK OF TRANSPORTATION KEPT YOU FROM MEETINGS, WORK, OR FROM GETTING THINGS NEEDED FOR DAILY LIVING?: NO

## 2024-05-22 ASSESSMENT — PAIN SCALES - GENERAL: PAINLEVEL: 2=MINIMAL-SLIGHT

## 2024-05-22 ASSESSMENT — FIBROSIS 4 INDEX: FIB4 SCORE: 1.6

## 2024-05-22 ASSESSMENT — PATIENT HEALTH QUESTIONNAIRE - PHQ9
5. POOR APPETITE OR OVEREATING: 0 - NOT AT ALL
SUM OF ALL RESPONSES TO PHQ QUESTIONS 1-9: 6
CLINICAL INTERPRETATION OF PHQ2 SCORE: 2

## 2024-05-22 NOTE — ASSESSMENT & PLAN NOTE
Pt reports episodes of complete urinary incontinence due to urinary urgency. Discussed setting bathroom schedule, pelvic floor exercises, hygiene. If urinary incontinence worsens, advise further discussion with PCP.

## 2024-05-22 NOTE — ASSESSMENT & PLAN NOTE
Pt reports a recent history of fall that occurred when trying to carry a heavy object upstairs. She suffered no major injury beyond a large bruise on her left shoulder, but required her son's assistance to get back up. She complains of increasing b/l lower extremity weakness over the last year. Fortunately, this has not led to any further falls. Discussed potential etiologies such as deconditioning, circulatory, etc. Pt is interested in physical therapy to help her improve her strength and stability on her feet. Will place referral. In the interim, recommend methods to reduce fall risk. Follow up with PCP for further monitoring and management.

## 2024-05-22 NOTE — PROGRESS NOTES
Comprehensive Health Assessment Program     Madelyn Schmitz is a 74 y.o. here for her comprehensive health assessment.    Patient Active Problem List    Diagnosis Date Noted    Weakness of both lower extremities 05/22/2024    History of fall 05/22/2024    Urinary incontinence 05/22/2024    Generalized abdominal pain 04/16/2024    Seasonal affective disorder (HCC) 02/26/2024    Vitamin D deficiency 08/30/2023    Tension type headache 08/03/2023    Gait instability 09/23/2022    Drug-induced constipation 09/15/2022    Class 2 severe obesity due to excess calories with serious comorbidity and body mass index (BMI) of 37.0 to 37.9 in adult ([facility]) 09/15/2022    S/P repair of ventral hernia 09/09/2022    Umbilical hernia without obstruction and without gangrene 07/08/2022    GERD (gastroesophageal reflux disease) 09/30/2021    Nonrheumatic aortic valve sclerosis 03/31/2021    Left leg pain 03/31/2021    Nonrheumatic aortic valve stenosis 03/31/2021    Aortic atherosclerosis ([facility]) 03/11/2020    Osteopenia 03/11/2020    Abnormal mammogram 03/11/2020    Sessile colonic polyp 11/11/2019    Hyperlipidemia associated with type 2 diabetes mellitus ([facility]) 11/06/2019    Menopause 11/06/2019    Type 2 diabetes mellitus with microalbuminuria, without long-term current use of insulin ([facility]) 09/19/2019    History of endometrial cancer 08/01/2019    Sleep apnea 04/06/2018    Hypertension associated with diabetes ([facility]) 04/06/2018       Current Outpatient Medications   Medication Sig Dispense Refill    Calcium Carb-Cholecalciferol (CALCIUM/VITAMIN D PO) Take  by mouth.      lisinopril (PRINIVIL) 5 MG Tab Take 1 Tablet by mouth every day. 90 Tablet 3    Multiple Vitamin (MULTI-VITAMIN DAILY PO) Take  by mouth.      MAGNESIUM PO Take  by mouth. (Patient not taking: Reported on 5/22/2024)      Omega-3 Fatty Acids (FISH OIL) 1000 MG Cap capsule Take 1,000 mg by mouth 3 times a day with meals. (Patient not taking: Reported on  5/22/2024)      rosuvastatin (CRESTOR) 10 MG Tab Take 1 Tablet by mouth every evening. 100 Tablet 3     No current facility-administered medications for this visit.          Current supplements as per medication list.     Allergies:   Bloodless, Ibuprofen, and Penicillins  Social History     Tobacco Use    Smoking status: Never    Smokeless tobacco: Never   Vaping Use    Vaping status: Never Used   Substance Use Topics    Alcohol use: No    Drug use: No     Family History   Problem Relation Age of Onset    Heart Disease Father     Asthma Brother     Asthma Brother     Cancer Maternal Aunt         gyn cancer    Sleep Apnea Neg Hx      Madelyn  has a past medical history of Acute cholecystitis (09/19/2019), Arthritis, Back pain, Blood clotting disorder ([facility]) (2004), Body mass index (BMI) 38.0-38.9, adult (9/15/2022), Breath shortness (09/07/2022), Bronchitis, Cancer ([facility]) (09/07/2022), Chickenpox, Dental disorder, Diabetes, Diabetes mellitus ([facility]) (09/19/2019), Dysuria (09/11/2019), Fatigue (11/6/2019), Heart murmur (09/07/2022), Heart valve disease, High cholesterol, HTN (hypertension) (9/15/2022), Hyperlipidemia, Hypertension (09/07/2022), Hypotension due to hypovolemia (09/26/2019), Mumps, Nasal drainage, Pneumonia (09/07/2022), Psychiatric problem (09/07/2022), Sleep apnea (09/07/2022), Snoring, and Urinary incontinence (09/07/2022).    She has no past medical history of Encounter for long-term (current) use of other medications.   Past Surgical History:   Procedure Laterality Date    LAPAROSCOPIC INCISIONAL HERNIA REPAIR N/A 9/9/2022    Procedure: LAPAROSCOPIC REPAIR OF INCISIONAL HERNIA;  Surgeon: Sim Anaya M.D.;  Location: SURGERY [facility];  Service: General    GABY BY LAPAROSCOPY  9/19/2019    Procedure: CHOLECYSTECTOMY, LAPAROSCOPIC;  Surgeon: Jenniffer Johnson M.D.;  Location: [facility];  Service: General    HYSTERECTOMY ROBOTIC XI  6/27/2019    Procedure: HYSTERECTOMY,  ROBOT-ASSISTED, USING DA YO XI;  Surgeon: Alexandr Mancia M.D.;  Location: SURGERY [facility];  Service: Gynecology    SALPINGO OOPHORECTOMY Bilateral 6/27/2019    Procedure: SALPINGO-OOPHORECTOMY;  Surgeon: Alexandr Mancia M.D.;  Location: SURGERY [facility];  Service: Gynecology    NODE BIOPSY SENTINEL  6/27/2019    Procedure: BIOPSY, LYMPH NODE, SENTINEL;  Surgeon: Alexandr Mancia M.D.;  Location: SURGERY [facility];  Service: Gynecology    HYSTEROSCOPY WITH MYOSURE N/A 4/17/2019    Procedure: HYSTEROSCOPY, WITH TISSUE REMOVAL, USING HYSTEROSCOPIC ROTATING CUTTER BLADE - DIAGNOSTIC;  Surgeon: Racquel Gatica M.D.;  Location: SURGERY SAME DAY [facility];  Service: Gynecology    DILATION AND CURETTAGE N/A 4/17/2019    Procedure: DILATION AND CURETTAGE;  Surgeon: Racquel Gatica M.D.;  Location: SURGERY SAME DAY [facility];  Service: Gynecology    PRIMARY C SECTION  1972/1974/1977    x 3       Screening:  In the last six months have you experienced any leakage of urine? Yes    Depression Screening  Little interest or pleasure in doing things?  1 - several days  Feeling down, depressed , or hopeless? 1 - several days  Trouble falling or staying asleep, or sleeping too much?  0 - not at all  Feeling tired or having little energy?  2 - more than half the days  Poor appetite or overeating?  0 - not at all  Feeling bad about yourself - or that you are a failure or have let yourself or your family down? 2 - more than half the days  Trouble concentrating on things, such as reading the newspaper or watching television? 0 - not at all  Moving or speaking so slowly that other people could have noticed.  Or the opposite - being so fidgety or restless that you have been moving around a lot more than usual?  0 - not at all  Thoughts that you would be better off dead, or of hurting yourself?  0 - not at all  Patient Health Questionnaire Score: 6    If depressive symptoms identified deferred to follow up  visit unless specifically addressed in assessment and plan.    Interpretation of PHQ-9 Total Score   Score Severity   1-4 No Depression   5-9 Mild Depression   10-14 Moderate Depression   15-19 Moderately Severe Depression   20-27 Severe Depression    Screening for Cognitive Impairment  Do you or any of your friends or family members have any concern about your memory? No  Three Minute Recall (Leader, Season, Table) 3/3    Luis clock face with all 12 numbers and set the hands to show 10 minutes after 11.  Yes 5  Cognitive concerns identified deferred for follow up unless specifically addressed in assessment and plan.    Fall Risk Assessment  Has the patient had two or more falls in the last year or any fall with injury in the last year?  Yes    Safety Assessment  Do you always wear your seatbelt?  Yes  Any changes to home needed to function safely? No  Difficulty hearing.  No  Patient counseled about all safety risks that were identified.    Functional Assessment ADLs  Are there any barriers preventing you from cooking for yourself or meeting nutritional needs?  No.    Are there any barriers preventing you from driving safely or obtaining transportation?  No.    Are there any barriers preventing you from using a telephone or calling for help?  No    Are there any barriers preventing you from shopping?  No.    Are there any barriers preventing you from taking care of your own finances?  No    Are there any barriers preventing you from managing your medications?  No    Are there any barriers preventing you from showering, bathing or dressing yourself? No    Are there any barriers preventing you from doing housework or laundry? No    Are there any barriers preventing you from using the toilet?No    Are you currently engaging in any exercise or physical activity?  Yes. Housework    Self-Assessment of Health  What is your perception of your health? Fair    Do you sleep more than six hours a night? Yes    In the past 7  days, how much did pain keep you from doing your normal work? Some Right arm due to fall   Do you spend quality time with family or friends (virtually or in person)? Yes    Do you usually eat a heart healthy diet that constists of a variety of fruits, vegetables, whole grains and fiber? Yes    Do you eat foods high in fat and/or Fast Food more than three times per week? No    How concerned are you that your medical conditions are not being well managed? a little Depression and swelling in her legs  Are you worried that in the next 2 months, you may not have stable housing that you own, rent, or stay in as part of a household? No        Advance Care Planning  Do you have an Advance Directive, Living Will, Durable Power of , or POLST? Yes      Durable Power of    is on file      Health Maintenance Summary            Overdue - Pneumococcal Vaccine: 65+ Years (2 of 2 - PCV) Overdue since 2/20/2018 02/20/2017  Imm Admin: Pneumococcal polysaccharide vaccine (PPSV-23)              Postponed - COVID-19 Vaccine (6 - 2023-24 season) Postponed until 12/28/2024 12/28/2023  Imm Admin: Comirnaty (Covid-19 Vaccine, Mrna, [id number] Formula)    06/23/2022  Imm Admin: PFIZER DEGROOT CAP SARS-COV-2 VACCINATION (12+)    11/17/2021  Imm Admin: MODERNA SARS-COV-2 VACCINE (12+)    03/20/2021  Imm Admin: MODERNA SARS-COV-2 VACCINE (12+)    02/20/2021  Imm Admin: MODERNA SARS-COV-2 VACCINE (12+)    Only the first 5 history entries have been loaded, but more history exists.              Diabetes: Monofilament / LE Exam (Yearly) Next due on 8/3/2024      08/03/2023  Diabetic Monofilament Lower Extremity Exam    04/20/2022  Diabetic Monofilament Lower Extremity Exam    04/20/2022  [employer]: WORKFLOW - DIABETES - DIABETIC FOOT EXAM PERFORMED    02/23/2021  Diabetic Monofilament Lower Extremity Exam    11/06/2019  Diabetic Monofilament Lower Extremity Exam    Only the first 5 history entries have been loaded, but more  history exists.              Diabetes: Urine Protein Screening (Yearly) Next due on 8/21/2024 08/21/2023  MICROALBUMIN CREAT RATIO URINE    08/03/2023  MICROALBUMIN CREAT RATIO URINE    04/20/2022  MICROALBUMIN CREAT RATIO URINE    09/25/2020  MICROALBUMIN CREAT RATIO URINE    11/06/2019  MICROALBUMIN CREAT RATIO URINE (CLINIC COLLECT)    Only the first 5 history entries have been loaded, but more history exists.              Diabetes: Retinopathy Screening (Yearly) Next due on 8/30/2024 08/30/2023  POCT Retinal Eye Exam    10/11/2022  POCT Retinal Eye Exam    07/01/2021  POCT Retinal Eye Exam    11/22/2019  REFERRAL FOR RETINAL SCREENING EXAM              A1c Screening (Every 6 Months) Next due on 9/7/2024 03/07/2024  HEMOGLOBIN A1C    12/04/2023  HEMOGLOBIN A1C    08/21/2023  HEMOGLOBIN A1C    08/03/2023  POCT A1C    01/10/2023  POCT Hemoglobin A1C    Only the first 5 history entries have been loaded, but more history exists.              Mammogram (Yearly) Next due on 10/10/2024      10/10/2023  MA-SCREENING MAMMO BILAT W/TOMOSYNTHESIS W/CAD    01/05/2022  MA-SCREENING MAMMO BILAT W/TOMOSYNTHESIS W/CAD    01/04/2021  MA-DIAGNOSTIC MAMMO BILAT W/TOMOSYNTHESIS W/CAD    06/29/2020  MA-DIAGNOSTIC MAMMO LEFT W/TOMOSYNTHESIS W/CAD    01/13/2020  MA-DIAGNOSTIC DIGITAL MAMMO-UNILAT LEFT    Only the first 5 history entries have been loaded, but more history exists.              Fasting Lipid Profile (Yearly) Next due on 3/7/2025      03/07/2024  Lipid Profile    12/04/2023  Lipid Profile    08/21/2023  Lipid Profile    11/04/2022  Lipid Profile    11/01/2021  Lipid Profile    Only the first 5 history entries have been loaded, but more history exists.              SERUM CREATININE (Yearly) Next due on 4/16/2025 04/16/2024  Comp Metabolic Panel    12/04/2023  Comp Metabolic Panel    08/21/2023  Comp Metabolic Panel    09/10/2022  Comp Metabolic Panel (CMP)    09/07/2022  Basic Metabolic Panel    Only the  first 5 history entries have been loaded, but more history exists.              Annual Wellness Visit (Yearly) Next due on 5/22/2025 05/22/2024  Level of Service: ANNUAL WELLNESS VISIT-INCLUDES PPPS SUBSEQUE*              Bone Density Scan (Every 5 Years) Next due on 11/9/2028 11/09/2023  DS-BONE DENSITY STUDY (DEXA)    01/03/2020  DS-BONE DENSITY STUDY (DEXA)              IMM DTaP/Tdap/Td Vaccine (2 - Td or Tdap) Next due on 11/6/2029 11/06/2019  Imm Admin: Tdap Vaccine              Hepatitis C Screening  Completed      01/03/2020  Hepatitis C Antibody component of HEP C VIRUS ANTIBODY              Zoster (Shingles) Vaccines (Series Information) Completed      11/04/2021  Imm Admin: Zoster Vaccine Recombinant (RZV) (SHINGRIX)    07/01/2021  Imm Admin: Zoster Vaccine Recombinant (RZV) (SHINGRIX)              Hepatitis A Vaccine (Hep A) (Series Information) Aged Out      No completion history exists for this topic.              HPV Vaccines (Series Information) Aged Out      No completion history exists for this topic.              Polio Vaccine (Inactivated Polio) (Series Information) Aged Out      No completion history exists for this topic.              Meningococcal Immunization (Series Information) Aged Out      No completion history exists for this topic.              Discontinued - Cervical Cancer Screening  Discontinued        Frequency changed to Never automatically (Topic No Longer Applies)    11/01/2017  PATHOLOGY GYN SPECIMEN    11/01/2017  THINPREP PAP, REFLEX HPV ON ASC-US AND ABOVE              Discontinued - Colorectal Cancer Screening  Discontinued        Frequency changed to Never automatically (Topic No Longer Applies)    11/08/2019  REFERRAL TO GI FOR COLONOSCOPY              Discontinued - Hepatitis B Vaccine (Hep B)  Discontinued      No completion history exists for this topic.              Discontinued - Influenza Vaccine  Discontinued      No completion history exists for this  "topic.                    Patient Care Team:  Miriam Xiao M.D. as PCP - General (Family Medicine)  Racquel Gatica M.D. (Obstetrics & Gynecology)  Galdino RIDLEY M.D. as Consulting Physician (Radiation Oncology)  Alexandr Mancia M.D. as Consulting Physician (Gynecologic Oncology)  Jak Noonan M.D. (Cardiovascular Disease (Cardiology))    Financial Resource Strain: Low Risk  (5/22/2024)    Overall Financial Resource Strain (CARDIA)     Difficulty of Paying Living Expenses: Not hard at all      Transportation Needs: No Transportation Needs (5/22/2024)    PRAPARE - Transportation     Lack of Transportation (Medical): No     Lack of Transportation (Non-Medical): No      Food Insecurity: No Food Insecurity (5/22/2024)    Hunger Vital Sign     Worried About Running Out of Food in the Last Year: Never true     Ran Out of Food in the Last Year: Never true        Encounter Vitals  Blood Pressure : 130/80  Weight: 96.6 kg (213 lb)  Height: 160 cm (5' 3\")  BMI (Calculated): 37.73  Pain Score: 2=Minimal-Slight (pain in her R arm after falling last thrursday)     Alert, oriented in no acute distress.  Eye contact is good, speech goal directed, affect calm.    Assessment and Plan. The following treatment and monitoring plan is recommended:  Aortic atherosclerosis (HCC)  Chronic, stable. Diagnosis made following incidental finding of disease on imaging. Associated with HLD. Encouraged Mediterranean diet and regular physical exercise. Follow up with PCP at least annually for continued monitoring.     GERD (gastroesophageal reflux disease)  Chronic, stable. Continue with current defined treatment plan: famotine PRN. Follow-up at least annually.    Hyperlipidemia associated with type 2 diabetes mellitus (HCC)  Chronic, stable. Most recent lipid panel from 3/2024 WNL. However, she has since d/c rosuvastatin due to complaint of dizziness. She states she has attempted two statins all of which have reportedly caused " dizziness. Recommend Mediterranean diet and regular physical activity. Follow up with PCP at least annually for continued monitoring and management.   Lab Results   Component Value Date/Time    CHOLSTRLTOT 135 2024 08:15 AM    LDL 70 2024 08:15 AM    HDL 45 2024 08:15 AM    TRIGLYCERIDE 99 2024 08:15 AM         Hypertension associated with diabetes (HCC)  Chronic, stable. BP today 130/80. Pt does not routinely monitor BP at home. Denies dizziness/lightheadedness, chest pain, dyspnea. Continue current treatment regime: lisinopril 5mg daily. Follow up with PCP annually for continued monitoring and management.     Class 2 severe obesity due to excess calories with serious comorbidity and body mass index (BMI) of 37.0 to 37.9 in adult (HCC)  Chronic, ongoing. BMI today 37.73. Associated with T2DM, HLD, HTN, GERD, CANDIDA. Continue to encourage healthy calorie conscious diet with regular physical activity. Follow up with PCP at least annually for continued monitoring and management.    Type 2 diabetes mellitus with microalbuminuria, without long-term current use of insulin (HCC)  Chronic, stable. Last A1c 6.9 as of 2024. Last monofilament foot exam: 2023, last urine microalb/creat: 2023, last retinal screenin2023. Pt has had elevated microalbumin and maintains on lisinopril 5mg daily. Continue to advise low carbohydrate diet with regular physical activity. Continue current treatment regime. Follow up with PCP as scheduled for continued monitoring and management.    Seasonal affective disorder (HCC)  Chronic, stable. PHQ today 6/10. Pt does not require pharmacotherapy and feels she is coping thus far. Follow up with PCP at least annually for continued monitoring and management.    History of fall  Pt reports a recent history of fall that occurred when trying to carry a heavy object upstairs. She suffered no major injury beyond a large bruise on her left shoulder, but required her son's  assistance to get back up. She complains of increasing b/l lower extremity weakness over the last year. Fortunately, this has not led to any further falls. Discussed potential etiologies such as deconditioning, circulatory, etc. Pt is interested in physical therapy to help her improve her strength and stability on her feet. Will place referral. In the interim, recommend methods to reduce fall risk. Follow up with PCP for further monitoring and management.    Urinary incontinence  Pt reports episodes of complete urinary incontinence due to urinary urgency. Discussed setting bathroom schedule, pelvic floor exercises, hygiene. If urinary incontinence worsens, advise further discussion with PCP.    Services suggested: No services needed at this time  Health Care Screening: Age-appropriate preventive services recommended by USPTF and ACIP covered by Medicare were discussed today. Services ordered if indicated and agreed upon by the patient.  Referrals offered: Community-based lifestyle interventions to reduce health risks and promote self-management and wellness, fall prevention, nutrition, physical activity, tobacco-use cessation, weight loss, and mental health services as per orders if indicated.    Discussion today about general wellness and lifestyle habits:    Prevent falls and reduce trip hazards; Cautioned about securing or removing rugs.  Have a working fire alarm and carbon monoxide detector.  Engage in regular physical activity and social activities.    Follow-up: Return for follow up visit with PCP as previously scheduled.

## 2024-06-10 ENCOUNTER — OFFICE VISIT (OUTPATIENT)
Dept: MEDICAL GROUP | Facility: PHYSICIAN GROUP | Age: 74
End: 2024-06-10
Payer: MEDICARE

## 2024-06-10 VITALS
HEIGHT: 63 IN | BODY MASS INDEX: 37.39 KG/M2 | HEART RATE: 74 BPM | WEIGHT: 211 LBS | TEMPERATURE: 97.3 F | DIASTOLIC BLOOD PRESSURE: 58 MMHG | SYSTOLIC BLOOD PRESSURE: 116 MMHG | OXYGEN SATURATION: 97 % | RESPIRATION RATE: 18 BRPM

## 2024-06-10 DIAGNOSIS — R80.9 TYPE 2 DIABETES MELLITUS WITH MICROALBUMINURIA, WITHOUT LONG-TERM CURRENT USE OF INSULIN (HCC): ICD-10-CM

## 2024-06-10 DIAGNOSIS — E11.59 HYPERTENSION ASSOCIATED WITH DIABETES (HCC): ICD-10-CM

## 2024-06-10 DIAGNOSIS — E78.5 HYPERLIPIDEMIA ASSOCIATED WITH TYPE 2 DIABETES MELLITUS (HCC): ICD-10-CM

## 2024-06-10 DIAGNOSIS — E11.29 TYPE 2 DIABETES MELLITUS WITH MICROALBUMINURIA, WITHOUT LONG-TERM CURRENT USE OF INSULIN (HCC): ICD-10-CM

## 2024-06-10 DIAGNOSIS — I15.2 HYPERTENSION ASSOCIATED WITH DIABETES (HCC): ICD-10-CM

## 2024-06-10 DIAGNOSIS — E11.69 HYPERLIPIDEMIA ASSOCIATED WITH TYPE 2 DIABETES MELLITUS (HCC): ICD-10-CM

## 2024-06-10 DIAGNOSIS — I35.0 NONRHEUMATIC AORTIC VALVE STENOSIS: ICD-10-CM

## 2024-06-10 PROCEDURE — 3078F DIAST BP <80 MM HG: CPT | Performed by: FAMILY MEDICINE

## 2024-06-10 PROCEDURE — 99214 OFFICE O/P EST MOD 30 MIN: CPT | Performed by: FAMILY MEDICINE

## 2024-06-10 PROCEDURE — 3074F SYST BP LT 130 MM HG: CPT | Performed by: FAMILY MEDICINE

## 2024-06-10 ASSESSMENT — ENCOUNTER SYMPTOMS
FALLS: 1
NAUSEA: 0
VOMITING: 0
ABDOMINAL PAIN: 0
PALPITATIONS: 0
DIZZINESS: 1

## 2024-06-10 ASSESSMENT — FIBROSIS 4 INDEX: FIB4 SCORE: 1.6

## 2024-06-10 NOTE — PROGRESS NOTES
"Verbal consent was acquired by the patient to use [employer] ambient listening note generation during this visit         History of Present Illness  The patient is here with her  for a follow-up visit. She was last seen by me on 02/26/2024. She stopped taking her Crestor about a month ago because it was causing dizziness and she suffered a fall about  3 weeks ago as a result of it. She had bruising to her right upper arm, which has since improved. She has full range of motion. She is accompanied by her .        Review of Systems   Cardiovascular:  Negative for chest pain and palpitations.   Gastrointestinal:  Negative for abdominal pain, nausea and vomiting.   Musculoskeletal:  Positive for falls (3 weeks ago-).   Neurological:  Positive for dizziness (with crestor).       Outpatient Medications Marked as Taking for the 6/10/24 encounter (Office Visit) with Miriam Xiao M.D.   Medication Sig Dispense Refill    Calcium Carb-Cholecalciferol (CALCIUM/VITAMIN D PO) Take  by mouth.      MAGNESIUM PO Take  by mouth.      Omega-3 Fatty Acids (FISH OIL) 1000 MG Cap capsule Take 1,000 mg by mouth 3 times a day with meals.      lisinopril (PRINIVIL) 5 MG Tab Take 1 Tablet by mouth every day. 90 Tablet 3    Multiple Vitamin (MULTI-VITAMIN DAILY PO) Take  by mouth.         /58 (BP Location: Left arm, Patient Position: Sitting, BP Cuff Size: Large adult)   Pulse 74   Temp 36.3 °C (97.3 °F) (Temporal)   Resp 18   Ht 1.6 m (5' 3\")   Wt 95.7 kg (211 lb)   SpO2 97% , Body mass index is 37.38 kg/m².        Physical Exam  Constitutional:       Appearance: Normal appearance.   Eyes:      Extraocular Movements: Extraocular movements intact.   Cardiovascular:      Rate and Rhythm: Normal rate and regular rhythm.      Heart sounds: Murmur heard.   Pulmonary:      Effort: Pulmonary effort is normal.      Breath sounds: Normal breath sounds.   Neurological:      Mental Status: She is alert.   Psychiatric:         " Mood and Affect: Mood normal.         Behavior: Behavior normal.         Results  Laboratory Studies  A1c increased to 6.9% in March.       Assessment & Plan  1. Hypertension associated with diabetes.  The patient's chronic medical diagnosis remains stable, as evidenced by today's blood pressure reading of 116/58. Continuation of lisinopril 5 mg daily is advised.    2. Nonrheumatic aortic valve stenosis.  This is a chronic medical diagnosis. The patient's last follow-up with cardiology APRN was in 10/2023, during which a recommendation was made for a follow-up in 1 year. The patient is advised to schedule a follow-up appointment. Her last echocardiogram, conducted in 12/2022, revealed moderate aortic valve stenosis and mild mitral valve stenosis.    3. Hyperlipidemia associated with type 2 diabetes.  This is a chronic medical diagnosis, as evidenced by the patient's A1c level escalating to 6.9 percent in 03/2023. The patient discontinued her Crestor 10 mg approximately a month ago due to associated dizziness. Since discontinuation, her dizziness has improved. Repeat labs are planned, and she is advised to complete them within the next 1 to 2 weeks. It was discussed that her dizziness may also be a result of her valve stenosis. Once the results are available, a decision regarding the necessity of a different statin will be made.    4. Type 2 diabetes mellitus.  This is a chronic medical diagnosis, as evidenced by the patient's A1c level escalating to 6.9 percent in 03/2024. The patient has expressed reluctance to commence medication, expressing a preference for diet control. She is advised to complete these labs within the next 2 weeks, and we will contact her with the results.    Follow-up  A follow-up appointment is scheduled for 3 months from now.    Orders Placed This Encounter    MICROALBUMIN CREAT RATIO URINE    VITAMIN D,25 HYDROXY (DEFICIENCY)    VITAMIN B12    Lipid Profile    TSH WITH REFLEX TO FT4    Comp  Metabolic Panel    CBC WITH DIFFERENTIAL    HEMOGLOBIN A1C                 This note was created using voice recognition software (Dragon). The accuracy of the dictation is limited by the abilities of the software. I have reviewed the note prior to signing, however some errors in grammar and context are still possible. If you have any questions related to this note please do not hesitate to contact our office.

## 2024-06-24 ENCOUNTER — HOSPITAL ENCOUNTER (OUTPATIENT)
Dept: LAB | Facility: MEDICAL CENTER | Age: 74
End: 2024-06-24
Attending: FAMILY MEDICINE
Payer: MEDICARE

## 2024-06-24 DIAGNOSIS — E78.5 HYPERLIPIDEMIA ASSOCIATED WITH TYPE 2 DIABETES MELLITUS (HCC): ICD-10-CM

## 2024-06-24 DIAGNOSIS — E11.69 HYPERLIPIDEMIA ASSOCIATED WITH TYPE 2 DIABETES MELLITUS (HCC): ICD-10-CM

## 2024-06-24 LAB
25(OH)D3 SERPL-MCNC: 34 NG/ML (ref 30–100)
ALBUMIN SERPL BCP-MCNC: 3.7 G/DL (ref 3.2–4.9)
ALBUMIN/GLOB SERPL: 1 G/DL
ALP SERPL-CCNC: 68 U/L (ref 30–99)
ALT SERPL-CCNC: 16 U/L (ref 2–50)
ANION GAP SERPL CALC-SCNC: 10 MMOL/L (ref 7–16)
AST SERPL-CCNC: 20 U/L (ref 12–45)
BASOPHILS # BLD AUTO: 0.5 % (ref 0–1.8)
BASOPHILS # BLD: 0.03 K/UL (ref 0–0.12)
BILIRUB SERPL-MCNC: 0.4 MG/DL (ref 0.1–1.5)
BUN SERPL-MCNC: 16 MG/DL (ref 8–22)
CALCIUM ALBUM COR SERPL-MCNC: 9.6 MG/DL (ref 8.5–10.5)
CALCIUM SERPL-MCNC: 9.4 MG/DL (ref 8.5–10.5)
CHLORIDE SERPL-SCNC: 102 MMOL/L (ref 96–112)
CHOLEST SERPL-MCNC: 192 MG/DL (ref 100–199)
CO2 SERPL-SCNC: 25 MMOL/L (ref 20–33)
CREAT SERPL-MCNC: 0.62 MG/DL (ref 0.5–1.4)
CREAT UR-MCNC: 73.05 MG/DL
EOSINOPHIL # BLD AUTO: 0.23 K/UL (ref 0–0.51)
EOSINOPHIL NFR BLD: 4.1 % (ref 0–6.9)
ERYTHROCYTE [DISTWIDTH] IN BLOOD BY AUTOMATED COUNT: 56.3 FL (ref 35.9–50)
EST. AVERAGE GLUCOSE BLD GHB EST-MCNC: 154 MG/DL
GFR SERPLBLD CREATININE-BSD FMLA CKD-EPI: 92 ML/MIN/1.73 M 2
GLOBULIN SER CALC-MCNC: 3.6 G/DL (ref 1.9–3.5)
GLUCOSE SERPL-MCNC: 143 MG/DL (ref 65–99)
HBA1C MFR BLD: 7 % (ref 4–5.6)
HCT VFR BLD AUTO: 41.8 % (ref 37–47)
HDLC SERPL-MCNC: 41 MG/DL
HGB BLD-MCNC: 13.1 G/DL (ref 12–16)
IMM GRANULOCYTES # BLD AUTO: 0.01 K/UL (ref 0–0.11)
IMM GRANULOCYTES NFR BLD AUTO: 0.2 % (ref 0–0.9)
LDLC SERPL CALC-MCNC: 127 MG/DL
LYMPHOCYTES # BLD AUTO: 1.58 K/UL (ref 1–4.8)
LYMPHOCYTES NFR BLD: 27.9 % (ref 22–41)
MCH RBC QN AUTO: 31 PG (ref 27–33)
MCHC RBC AUTO-ENTMCNC: 31.3 G/DL (ref 32.2–35.5)
MCV RBC AUTO: 99.1 FL (ref 81.4–97.8)
MICROALBUMIN UR-MCNC: <1.2 MG/DL
MICROALBUMIN/CREAT UR: NORMAL MG/G (ref 0–30)
MONOCYTES # BLD AUTO: 0.46 K/UL (ref 0–0.85)
MONOCYTES NFR BLD AUTO: 8.1 % (ref 0–13.4)
NEUTROPHILS # BLD AUTO: 3.35 K/UL (ref 1.82–7.42)
NEUTROPHILS NFR BLD: 59.2 % (ref 44–72)
NRBC # BLD AUTO: 0 K/UL
NRBC BLD-RTO: 0 /100 WBC (ref 0–0.2)
PLATELET # BLD AUTO: 223 K/UL (ref 164–446)
PMV BLD AUTO: 10.3 FL (ref 9–12.9)
POTASSIUM SERPL-SCNC: 4.7 MMOL/L (ref 3.6–5.5)
PROT SERPL-MCNC: 7.3 G/DL (ref 6–8.2)
RBC # BLD AUTO: 4.22 M/UL (ref 4.2–5.4)
SODIUM SERPL-SCNC: 137 MMOL/L (ref 135–145)
TRIGL SERPL-MCNC: 118 MG/DL (ref 0–149)
TSH SERPL DL<=0.005 MIU/L-ACNC: 3.29 UIU/ML (ref 0.38–5.33)
VIT B12 SERPL-MCNC: 517 PG/ML (ref 211–911)
WBC # BLD AUTO: 5.7 K/UL (ref 4.8–10.8)

## 2024-06-24 PROCEDURE — 84443 ASSAY THYROID STIM HORMONE: CPT

## 2024-06-24 PROCEDURE — 85025 COMPLETE CBC W/AUTO DIFF WBC: CPT

## 2024-06-24 PROCEDURE — 83036 HEMOGLOBIN GLYCOSYLATED A1C: CPT

## 2024-06-24 PROCEDURE — 80053 COMPREHEN METABOLIC PANEL: CPT

## 2024-06-24 PROCEDURE — 82570 ASSAY OF URINE CREATININE: CPT

## 2024-06-24 PROCEDURE — 82607 VITAMIN B-12: CPT

## 2024-06-24 PROCEDURE — 82306 VITAMIN D 25 HYDROXY: CPT

## 2024-06-24 PROCEDURE — 36415 COLL VENOUS BLD VENIPUNCTURE: CPT

## 2024-06-24 PROCEDURE — 80061 LIPID PANEL: CPT

## 2024-06-24 PROCEDURE — 82043 UR ALBUMIN QUANTITATIVE: CPT

## 2024-09-09 ENCOUNTER — PHYSICAL THERAPY (OUTPATIENT)
Dept: PHYSICAL THERAPY | Facility: REHABILITATION | Age: 74
End: 2024-09-09
Attending: PHYSICIAN ASSISTANT
Payer: MEDICARE

## 2024-09-09 DIAGNOSIS — R29.898 WEAKNESS OF BOTH LOWER EXTREMITIES: ICD-10-CM

## 2024-09-09 PROCEDURE — 97110 THERAPEUTIC EXERCISES: CPT

## 2024-09-09 PROCEDURE — 97161 PT EVAL LOW COMPLEX 20 MIN: CPT

## 2024-09-09 ASSESSMENT — BALANCE ASSESSMENTS
BALANCE - STANDING STATIC: POOR +
BALANCE - SITTING STATIC: NORMAL
BALANCE - SITTING DYNAMIC: NORMAL

## 2024-09-09 ASSESSMENT — ENCOUNTER SYMPTOMS: PAIN SCALE: 0

## 2024-09-09 NOTE — OP THERAPY EVALUATION
Outpatient Physical Therapy  INITIAL EVALUATION    [facility] [place]  [address], [address]  [place] [address]  Phone:  [phone]  Fax:  [phone]    Date of Evaluation: 2024    Patient: Madelyn Schmitz  YOB: 1950  MRN: [id number]     Referring Provider: Brooklynn Aden P.A.-C.  [address] [place] [address]  [place] [id number]   Referring Diagnosis Weakness of both lower extremities [R29.898]     Time Calculation  Start time: 945  Stop time: 1030 Time Calculation (min): 45 minutes       Chief Complaint: Strength    Visit Diagnoses     ICD-10-CM   1. Weakness of both lower extremities  R29.898       Date of onset of impairment: No data found     services were used for this visit ID # [id number]    Subjective   History of Present Illness:     History of chief complaint:  Madelyn Schmitz is a 74 y.o. female that presents to therapy with LE weakness. She states that symptoms came one with insidious onset after surgeries and recovery. Her most recent surgery  Cramping in leg occurring frequently. Laying in bed and when needing to get up to use the restroom. Reports multiple falls over the last year. Reports incontinence issues     Aggravating factors: note weakness with : stairs, getting out of a chair. Bending/ squatting   Releiving factors: avoiding the above    ADL limitations: limited LE strength and likely fall risk.    Pain:     Current pain ratin        Past Medical History:   Diagnosis Date    Acute cholecystitis 2019    Arthritis     osteo, finger/shoulders    Back pain     Blood clotting disorder ([facility]) 2004    leg    Body mass index (BMI) 38.0-38.9, adult 9/15/2022    Breath shortness 2022    with exertion    Bronchitis     Cancer ([facility]) 2022    endometrial cancer    Chickenpox     Dental disorder     upper/lower dentures    Diabetes     oral meds    Diabetes mellitus ([facility]) 2019      Ref. Range 2019  "13:20 11/6/2019 16:47 Glycohemoglobin Latest Ref Range: 0.0 - 5.6 % 6.6 (H) 5.7 (A) Estim. Avg Glu Latest Units: mg/dL 143     Ref. Range 11/6/2019 16:35 Micro Alb Creat Ratio Latest Ref Range: 0 - 30 mg/g 167 (H)   She has history of well-controlled type 2 diabetes.  She is previously taking metformin 500 mg twice daily however her A1c on recheck was down to 5.7 so we discon    Dysuria 09/11/2019    Fatigue 11/6/2019    Since her hysterectomy and cholecystectomy she has had some trouble bouncing back to her usual self and feels a bit deconditioned. I suspect a trial with prozac will help her energy. She agrees to add a short daily walk with her  to assist with deconditioning.     Heart murmur 09/07/2022    Heart valve disease     \"murmur\"    High cholesterol     HTN (hypertension) 9/15/2022    Hyperlipidemia     Hypertension 09/07/2022    medicated    Hypotension due to hypovolemia 09/26/2019    She has a low blood pressure in office today of 84/52 with a baseline in the 100s to 120s systolic.  This is likely due to her frequent episodes of diarrhea exacerbated by ongoing use of lisinopril.  Yesterday, she felt lightheaded and dizzy but did not pass out or have a fall.  She is having challenges of what is appropriate to eat as she had recent pelvic radiation as well as a cholecystectomy b    Mumps     Nasal drainage     Pneumonia 09/07/2022    12 years ago    Psychiatric problem 09/07/2022    medicated at time    Sleep apnea 09/07/2022    CPAP    Snoring     Urinary incontinence 09/07/2022    at times     Past Surgical History:   Procedure Laterality Date    LAPAROSCOPIC INCISIONAL HERNIA REPAIR N/A 9/9/2022    Procedure: LAPAROSCOPIC REPAIR OF INCISIONAL HERNIA;  Surgeon: Sim Anaya M.D.;  Location: SURGERY [facility];  Service: General    GABY BY LAPAROSCOPY  9/19/2019    Procedure: CHOLECYSTECTOMY, LAPAROSCOPIC;  Surgeon: Jenniffer Johnson M.D.;  Location: [facility];  Service: " General    HYSTERECTOMY ROBOTIC XI  6/27/2019    Procedure: HYSTERECTOMY, ROBOT-ASSISTED, USING DA YO XI;  Surgeon: Alexandr Mancia M.D.;  Location: SURGERY [facility];  Service: Gynecology    SALPINGO OOPHORECTOMY Bilateral 6/27/2019    Procedure: SALPINGO-OOPHORECTOMY;  Surgeon: Alexandr Mancia M.D.;  Location: SURGERY [facility];  Service: Gynecology    NODE BIOPSY SENTINEL  6/27/2019    Procedure: BIOPSY, LYMPH NODE, SENTINEL;  Surgeon: Alexandr Mancia M.D.;  Location: SURGERY [facility];  Service: Gynecology    HYSTEROSCOPY WITH MYOSURE N/A 4/17/2019    Procedure: HYSTEROSCOPY, WITH TISSUE REMOVAL, USING HYSTEROSCOPIC ROTATING CUTTER BLADE - DIAGNOSTIC;  Surgeon: Racquel Gatica M.D.;  Location: SURGERY [facility];  Service: Gynecology    DILATION AND CURETTAGE N/A 4/17/2019    Procedure: DILATION AND CURETTAGE;  Surgeon: Racquel Gatica M.D.;  Location: SURGERY SAME DAY [facility];  Service: Gynecology    PRIMARY C SECTION  1972/1974/1977    x 3       Precautions:       Objective   Range of motion and strength:    Active range of motion is within functional limits.    Hip flexion and extension limited: flexion 3+/5, extension 4-/5, knee extension 4/5, knee flexion 5/5. Ankle and foot strength WNL    Sensation and reflexes:     Sensation is intact.    Palpation and joint mobility:     No tenderness to palpation noted.    Joint mobility is normal.    Balance:     Sitting (static): Normal    Sitting (dynamic): Normal    Standing (static): Poor +    MEJÍA demonstrated significant difficulty with transfers and need for excessive UE use/dependency. Limited SLS, slow to turn     Gait:      Wide PEDRO, slightly shuffling gait pattern.     Coordination and tone:     Coordination is intact.    Tone is normal.    Basic self care and IADL's:     Independent with all self care.    Cognition and visual perception:     No cognition deficits noted.    No visual perception deficits  noted.          Therapeutic Exercises (CPT 49050):       Therapeutic Exercise Summary: HEP instruction/performance and development. Handout provided and exercises located below:  Access Code: [email or web address]  URL: [email or web address]  Date: 09/09/2024  Prepared by: Adalid Castellanos    Exercises  - Sit to Stand with Armchair  - 3 x daily - 5 reps      Time-based treatments/modalities:    Physical Therapy Timed Treatment Charges  Therapeutic exercise minutes (CPT 61862): 10 minutes      Assessment, Response and Plan:   Assessment details:  Madelyn Schmitz is a 74 y.o. female with signs and symptoms consistent with LE weakness and moderate fall risk. She requires skilled physical therapy intervention to increase strength, increase functional mobility, improve ADL completion, reduce fall risk and establish a home exercise program.  Goals:   Short Term Goals:   1. Patient will be Independent with prescribed Home Exercise Program (HEP) and will be able to demonstrate exercises without cues for improved overall symptoms/activity tolerance.   2. Pt will improve ability to get in and out of standard chair with minimal UE use indicative of improved LE strength.  Short term goal time span:  2-4 weeks      Long Term Goals:    3. Pt will improve LEFS score to greater than 40/80 indicative of improved function and reduced perceived disability.  4. Pt will improve MEJÍA score to greater than 45/56 indicative of improved function and decreased fall risk.  Long term goal time span:  4-6 weeks    Plan:   Planned therapy interventions:  Therapeutic Exercise (CPT 07994), Neuromuscular Re-education (CPT 21093) and E Stim Unattended (CPT 87523)  Frequency: 1-2x/week.  Duration in weeks:  8  Discussed with:  Patient    Functional Assessment Used  PT Functional Assessment Tool Used: LEFS, MEJÍA  PT Functional Assessment Score: LEFS 33/80, MEJÍA 42/56     Referring provider co-signature:  I have reviewed this plan of care and my  co-signature certifies the need for services.    Certification Period: 09/09/2024 to  11/04/24    Physician Signature: ________________________________ Date: ______________

## 2024-09-11 ENCOUNTER — PHYSICAL THERAPY (OUTPATIENT)
Dept: PHYSICAL THERAPY | Facility: REHABILITATION | Age: 74
End: 2024-09-11
Attending: PHYSICIAN ASSISTANT
Payer: MEDICARE

## 2024-09-11 DIAGNOSIS — R29.898 WEAKNESS OF BOTH LOWER EXTREMITIES: ICD-10-CM

## 2024-09-11 PROCEDURE — 97110 THERAPEUTIC EXERCISES: CPT

## 2024-09-11 NOTE — OP THERAPY DAILY TREATMENT
Outpatient Physical Therapy  DAILY TREATMENT     [facility] [place]  [address], [address]  [facility] [address]  Phone:  [phone]  Fax:  [phone]    Date: 09/11/2024    Patient: Madelyn Schmitz  YOB: 1950  MRN: [id number]     Time Calculation    Start time: 0938  Stop time: 1016 Time Calculation (min): 38 minutes         Chief Complaint: strength and balance  Visit #: 2     services were used for this session ID# [id number]    SUBJECTIVE:  Reports some compliance with the exercise at home. No health changes reported otherwise.       OBJECTIVE:  Current objective measures:            Therapeutic Exercises (CPT 78112):     1. nsutep, lvl 1 x 6min    2. Shuttle, DL 4c x 1mim, 4c x 1min, 3c x 1min    3. ankle rocker, x 2min    4. standing tandem balance (add to HEP), 30 sec each position      Therapeutic Exercise Summary: HEP instruction/performance and development. Handout provided and exercises located below:  Access Code: [email or web address]  URL: [email or web address]  Date: 09/11/2024  Prepared by: Adalid Castellanos    Exercises  - Sit to Stand with Armchair  - 3 x daily - 5 reps  - Tandem Stance with Support  - 2 x daily - 2 reps - 30 hold      Time-based treatments/modalities:    Physical Therapy Timed Treatment Charges  Therapeutic exercise minutes (CPT 59976): 38 minutes      Pain rating (1-10) before treatment:  0  Pain rating (1-10) after treatment:  0    ASSESSMENT:   Response to treatment: treatment tolerated well and baseline exercise tolerance achieved with observed and reported fatigue. F/u next week.      PLAN/RECOMMENDATIONS:   Plan for treatment: therapy treatment to continue next visit.  Planned interventions for next visit: continue with current treatment.

## 2024-09-18 ENCOUNTER — PHYSICAL THERAPY (OUTPATIENT)
Dept: PHYSICAL THERAPY | Facility: REHABILITATION | Age: 74
End: 2024-09-18
Attending: PHYSICIAN ASSISTANT
Payer: MEDICARE

## 2024-09-18 ENCOUNTER — OFFICE VISIT (OUTPATIENT)
Dept: MEDICAL GROUP | Facility: PHYSICIAN GROUP | Age: 74
End: 2024-09-18
Payer: MEDICARE

## 2024-09-18 VITALS
WEIGHT: 209.8 LBS | BODY MASS INDEX: 37.17 KG/M2 | OXYGEN SATURATION: 95 % | TEMPERATURE: 97.7 F | HEIGHT: 63 IN | SYSTOLIC BLOOD PRESSURE: 110 MMHG | DIASTOLIC BLOOD PRESSURE: 60 MMHG | HEART RATE: 77 BPM

## 2024-09-18 DIAGNOSIS — E11.59 HYPERTENSION ASSOCIATED WITH DIABETES (HCC): ICD-10-CM

## 2024-09-18 DIAGNOSIS — Z12.31 ENCOUNTER FOR SCREENING MAMMOGRAM FOR BREAST CANCER: ICD-10-CM

## 2024-09-18 DIAGNOSIS — I15.2 HYPERTENSION ASSOCIATED WITH DIABETES (HCC): ICD-10-CM

## 2024-09-18 DIAGNOSIS — I35.0 NONRHEUMATIC AORTIC VALVE STENOSIS: ICD-10-CM

## 2024-09-18 DIAGNOSIS — E11.29 TYPE 2 DIABETES MELLITUS WITH MICROALBUMINURIA, WITHOUT LONG-TERM CURRENT USE OF INSULIN (HCC): ICD-10-CM

## 2024-09-18 DIAGNOSIS — R35.1 NOCTURIA: ICD-10-CM

## 2024-09-18 DIAGNOSIS — R29.898 WEAKNESS OF BOTH LOWER EXTREMITIES: ICD-10-CM

## 2024-09-18 DIAGNOSIS — R80.9 TYPE 2 DIABETES MELLITUS WITH MICROALBUMINURIA, WITHOUT LONG-TERM CURRENT USE OF INSULIN (HCC): ICD-10-CM

## 2024-09-18 LAB
HBA1C MFR BLD: 6.8 % (ref ?–5.8)
POCT INT CON NEG: NEGATIVE
POCT INT CON POS: POSITIVE
RETINAL SCREEN: NEGATIVE

## 2024-09-18 PROCEDURE — 3074F SYST BP LT 130 MM HG: CPT | Performed by: FAMILY MEDICINE

## 2024-09-18 PROCEDURE — 83036 HEMOGLOBIN GLYCOSYLATED A1C: CPT | Performed by: FAMILY MEDICINE

## 2024-09-18 PROCEDURE — 92250 FUNDUS PHOTOGRAPHY W/I&R: CPT | Mod: 26 | Performed by: FAMILY MEDICINE

## 2024-09-18 PROCEDURE — 3078F DIAST BP <80 MM HG: CPT | Performed by: FAMILY MEDICINE

## 2024-09-18 PROCEDURE — 97110 THERAPEUTIC EXERCISES: CPT

## 2024-09-18 PROCEDURE — 99215 OFFICE O/P EST HI 40 MIN: CPT | Performed by: FAMILY MEDICINE

## 2024-09-18 RX ORDER — GLIPIZIDE 2.5 MG/1
2.5 TABLET ORAL
Qty: 200 TABLET | Refills: 3 | Status: SHIPPED | OUTPATIENT
Start: 2024-09-18

## 2024-09-18 RX ORDER — LISINOPRIL 5 MG/1
5 TABLET ORAL DAILY
Qty: 90 TABLET | Refills: 3 | Status: SHIPPED | OUTPATIENT
Start: 2024-09-18

## 2024-09-18 ASSESSMENT — ENCOUNTER SYMPTOMS: SORE THROAT: 0

## 2024-09-18 ASSESSMENT — FIBROSIS 4 INDEX: FIB4 SCORE: 1.7

## 2024-09-18 NOTE — OP THERAPY DAILY TREATMENT
Outpatient Physical Therapy  DAILY TREATMENT     [facility] [place]  [address]  [facility] [address]  Phone:  [phone]  Fax:  [phone]    Date: 09/18/2024    Patient: Madelyn Schmitz  YOB: 1950  MRN: [id number]     Time Calculation    Start time: 0130  Stop time: 0200 Time Calculation (min): 30 minutes         Chief Complaint: strength and balance  Visit #: 3     services were used for this session ID# [id number]    SUBJECTIVE:    No reported issues or changes. Review of medical record indicates referral for testing of LE sensation along with blood testing. Full medical record from today's previous appt is not available to review due to dictation processing time.     Reports some compliance with the exercise at home. No health changes reported otherwise.       OBJECTIVE:  Current objective measures:  96%, 76bpm s/p exercises          Therapeutic Exercises (CPT 43952):     1. nustep, lvl 2 x 5min, lvl 1 x 5min    2. Shuttle, DL 4c x 1mim, 5c x 1min, 5c x 1min    3. ankle rocker, x 2min    4. standing tandem balance (add to HEP), 30 sec each position      Therapeutic Exercise Summary: HEP instruction/performance and development. Handout provided and exercises located below:  Access Code: [email or web address]  URL: [email or web address]  Date: 09/11/2024  Prepared by: Adalid Castellanos    Exercises  - Sit to Stand with Armchair  - 3 x daily - 5 reps  - Tandem Stance with Support  - 2 x daily - 2 reps - 30 hold      Time-based treatments/modalities:    Physical Therapy Timed Treatment Charges  Therapeutic exercise minutes (CPT 90036): 30 minutes      Pain rating (1-10) before treatment:  0  Pain rating (1-10) after treatment:  0    ASSESSMENT:   Response to treatment: reported fatigue with treatment was very limiting despite this pt was able to progress with previous exercises with load and endurance.  F/u next week.      PLAN/RECOMMENDATIONS:   Plan for  treatment: therapy treatment to continue next visit.  Planned interventions for next visit: continue with current treatment.

## 2024-09-18 NOTE — PROGRESS NOTES
"Verbal consent was acquired by the patient to use [employer] ambient listening note generation during this visit         History of Present Illness  The patient is here today for a follow-up visit. Her last appointment with me was on 06/10/2024. She is joined by her .    She reports experiencing significant drowsiness in the mornings, particularly during breakfast. This has been a consistent issue since her hysterectomy in 2019, which necessitates her waking up 4 to 5 times during the night to urinate. She also mentions that she does not sleep well at night due to this issue.    SOCIAL HISTORY  No smoking history.      Review of Systems   HENT:  Negative for sore throat.         Hoarseness   Genitourinary:  Positive for frequency.        Nocturia  4-5x       Outpatient Medications Marked as Taking for the 9/18/24 encounter (Office Visit) with Miriam Xiao M.D.   Medication Sig Dispense Refill    glipiZIDE 2.5 MG Tab Take 2.5 mg by mouth 2 times a day. 200 Tablet 3    lisinopril (PRINIVIL) 5 MG Tab Take 1 Tablet by mouth every day. 90 Tablet 3    Calcium Carb-Cholecalciferol (CALCIUM/VITAMIN D PO) Take  by mouth.      MAGNESIUM PO Take  by mouth.      Omega-3 Fatty Acids (FISH OIL) 1000 MG Cap capsule Take 1,000 mg by mouth 3 times a day with meals.      Multiple Vitamin (MULTI-VITAMIN DAILY PO) Take  by mouth.         /60 (BP Location: Right arm, Patient Position: Sitting, BP Cuff Size: Adult)   Pulse 77   Temp 36.5 °C (97.7 °F) (Temporal)   Ht 1.6 m (5' 3\")   Wt 95.2 kg (209 lb 12.8 oz)   SpO2 95% , Body mass index is 37.16 kg/m².        Physical Exam  Constitutional:       Appearance: Normal appearance.   Eyes:      Extraocular Movements: Extraocular movements intact.   Cardiovascular:      Rate and Rhythm: Normal rate and regular rhythm.      Heart sounds: Murmur heard.   Pulmonary:      Effort: Pulmonary effort is normal.      Breath sounds: Normal breath sounds.   Neurological:      Mental " Status: She is alert.   Psychiatric:         Mood and Affect: Mood normal.         Behavior: Behavior normal.         Monofilament examination shows intact sensation in 6 over 6 areas tested bilaterally.   Visual examination revels no lesions, ulcers. Pulses 2+ and symmetrical -DP and PT. Good capillary refill, less than 2 seconds   Results  Laboratory Studies  A1c was 7% in June.       Assessment & Plan  1. Diabetes Mellitus.  Chronic medical diagnosis.  Stable.  Her diabetes remains under control through dietary measures. The last recorded A1c level in June 2024 was 7%. She has been managing her diabetes without medication for the past 2 years, having discontinued metformin 3 years ago. An A1c test will be conducted today to monitor her condition.  Results were at 6.8%.  She does not want to restart metformin due to concern about side effects.  We discussed other options and shared decision making to start her on glipizide 2.5 mg twice a day.  Hypoglycemic precautions discussed with patient and spouse.  Chronic medical diagnosis.  Stable.    2. Hypertension.  She continues her daily regimen of lisinopril 5 mg to manage her blood pressure. Blood pressure today in the office is 110/60 mmHg.    3. Nocturia.  She reports waking up 4-5 times at night to urinate since her hysterectomy in 2019. This disrupts her sleep.  Encouraged her to drink water throughout the day and to reduce her water intake at nighttime.  Denies any dysuria.  She reports not sleeping well at night due to frequent urination.    4. Encounter for screening mammogram for breast cancer  Mammogram ordered    5.  Aortic valve stenosis  Chronic medical diagnosis.  Her last follow-up with cardiology was October 2023.  Encouraged her to call and schedule annual follow-up.    6 Health Maintenance.  She was advised to receive the COVID-19 vaccine but declined the influenza vaccine due to previous intolerance.      Orders Placed This Encounter    Diabetic  Monofilament Lower Extremity Exam    MA-SCREENING MAMMO BILAT W/TOMOSYNTHESIS W/CAD    MICROALBUMIN CREAT RATIO URINE    VITAMIN D,25 HYDROXY (DEFICIENCY)    VITAMIN B12    Lipid Profile    TSH WITH REFLEX TO FT4    Comp Metabolic Panel    CBC WITH DIFFERENTIAL    HEMOGLOBIN A1C    POCT Hemoglobin A1C    POCT Retinal Eye Exam    glipiZIDE 2.5 MG Tab    lisinopril (PRINIVIL) 5 MG Tab             I spent a total of 40 minutes which included time preparing to see the patient (reviewing my last note, records and recent lab results)  I also performed a medically appropriate examination, counseled and educated the patient, ordered medications and tests.  and documentation of this encounter.     This note was created using voice recognition software (Dragon). The accuracy of the dictation is limited by the abilities of the software. I have reviewed the note prior to signing, however some errors in grammar and context are still possible. If you have any questions related to this note please do not hesitate to contact our office.

## 2024-09-23 ENCOUNTER — PHYSICAL THERAPY (OUTPATIENT)
Dept: PHYSICAL THERAPY | Facility: REHABILITATION | Age: 74
End: 2024-09-23
Attending: PHYSICIAN ASSISTANT
Payer: MEDICARE

## 2024-09-23 DIAGNOSIS — R29.898 WEAKNESS OF BOTH LOWER EXTREMITIES: ICD-10-CM

## 2024-09-23 PROCEDURE — 97110 THERAPEUTIC EXERCISES: CPT

## 2024-09-30 ENCOUNTER — PHYSICAL THERAPY (OUTPATIENT)
Dept: PHYSICAL THERAPY | Facility: REHABILITATION | Age: 74
End: 2024-09-30
Attending: PHYSICIAN ASSISTANT
Payer: MEDICARE

## 2024-09-30 DIAGNOSIS — R29.898 WEAKNESS OF BOTH LOWER EXTREMITIES: ICD-10-CM

## 2024-09-30 PROCEDURE — 97110 THERAPEUTIC EXERCISES: CPT

## 2024-10-07 ENCOUNTER — PHYSICAL THERAPY (OUTPATIENT)
Dept: PHYSICAL THERAPY | Facility: REHABILITATION | Age: 74
End: 2024-10-07
Attending: PHYSICIAN ASSISTANT
Payer: MEDICARE

## 2024-10-07 DIAGNOSIS — R29.898 WEAKNESS OF BOTH LOWER EXTREMITIES: ICD-10-CM

## 2024-10-07 PROCEDURE — 97110 THERAPEUTIC EXERCISES: CPT

## 2024-10-14 ENCOUNTER — PHYSICAL THERAPY (OUTPATIENT)
Dept: PHYSICAL THERAPY | Facility: REHABILITATION | Age: 74
End: 2024-10-14
Attending: PHYSICIAN ASSISTANT
Payer: MEDICARE

## 2024-10-14 DIAGNOSIS — R29.898 WEAKNESS OF BOTH LOWER EXTREMITIES: ICD-10-CM

## 2024-10-14 PROCEDURE — 97110 THERAPEUTIC EXERCISES: CPT

## 2024-10-16 ENCOUNTER — PHYSICAL THERAPY (OUTPATIENT)
Dept: PHYSICAL THERAPY | Facility: REHABILITATION | Age: 74
End: 2024-10-16
Attending: PHYSICIAN ASSISTANT
Payer: MEDICARE

## 2024-10-16 DIAGNOSIS — R29.898 WEAKNESS OF BOTH LOWER EXTREMITIES: ICD-10-CM

## 2024-10-16 PROCEDURE — 97110 THERAPEUTIC EXERCISES: CPT

## 2024-11-11 ENCOUNTER — HOSPITAL ENCOUNTER (OUTPATIENT)
Dept: RADIOLOGY | Facility: MEDICAL CENTER | Age: 74
End: 2024-11-11
Attending: FAMILY MEDICINE
Payer: MEDICARE

## 2024-11-11 DIAGNOSIS — R80.9 TYPE 2 DIABETES MELLITUS WITH MICROALBUMINURIA, WITHOUT LONG-TERM CURRENT USE OF INSULIN (HCC): ICD-10-CM

## 2024-11-11 DIAGNOSIS — E11.29 TYPE 2 DIABETES MELLITUS WITH MICROALBUMINURIA, WITHOUT LONG-TERM CURRENT USE OF INSULIN (HCC): ICD-10-CM

## 2024-11-11 PROCEDURE — 77067 SCR MAMMO BI INCL CAD: CPT

## 2024-11-12 DIAGNOSIS — R92.8 ABNORMAL MAMMOGRAM OF LEFT BREAST: ICD-10-CM

## 2024-11-26 ENCOUNTER — HOSPITAL ENCOUNTER (OUTPATIENT)
Dept: LAB | Facility: MEDICAL CENTER | Age: 74
End: 2024-11-26
Attending: FAMILY MEDICINE
Payer: MEDICARE

## 2024-11-26 DIAGNOSIS — E11.29 TYPE 2 DIABETES MELLITUS WITH MICROALBUMINURIA, WITHOUT LONG-TERM CURRENT USE OF INSULIN (HCC): ICD-10-CM

## 2024-11-26 DIAGNOSIS — R80.9 TYPE 2 DIABETES MELLITUS WITH MICROALBUMINURIA, WITHOUT LONG-TERM CURRENT USE OF INSULIN (HCC): ICD-10-CM

## 2024-11-26 LAB
25(OH)D3 SERPL-MCNC: 35 NG/ML (ref 30–100)
ALBUMIN SERPL BCP-MCNC: 3.9 G/DL (ref 3.2–4.9)
ALBUMIN/GLOB SERPL: 1.1 G/DL
ALP SERPL-CCNC: 64 U/L (ref 30–99)
ALT SERPL-CCNC: 8 U/L (ref 2–50)
ANION GAP SERPL CALC-SCNC: 9 MMOL/L (ref 7–16)
AST SERPL-CCNC: 19 U/L (ref 12–45)
BASOPHILS # BLD AUTO: 0.4 % (ref 0–1.8)
BASOPHILS # BLD: 0.03 K/UL (ref 0–0.12)
BILIRUB SERPL-MCNC: 0.5 MG/DL (ref 0.1–1.5)
BUN SERPL-MCNC: 13 MG/DL (ref 8–22)
CALCIUM ALBUM COR SERPL-MCNC: 9 MG/DL (ref 8.5–10.5)
CALCIUM SERPL-MCNC: 8.9 MG/DL (ref 8.5–10.5)
CHLORIDE SERPL-SCNC: 101 MMOL/L (ref 96–112)
CHOLEST SERPL-MCNC: 126 MG/DL (ref 100–199)
CO2 SERPL-SCNC: 26 MMOL/L (ref 20–33)
CREAT SERPL-MCNC: 0.64 MG/DL (ref 0.5–1.4)
CREAT UR-MCNC: 138.95 MG/DL
EOSINOPHIL # BLD AUTO: 0.37 K/UL (ref 0–0.51)
EOSINOPHIL NFR BLD: 5.5 % (ref 0–6.9)
ERYTHROCYTE [DISTWIDTH] IN BLOOD BY AUTOMATED COUNT: 54.9 FL (ref 35.9–50)
EST. AVERAGE GLUCOSE BLD GHB EST-MCNC: 134 MG/DL
FASTING STATUS PATIENT QL REPORTED: NORMAL
GFR SERPLBLD CREATININE-BSD FMLA CKD-EPI: 91 ML/MIN/1.73 M 2
GLOBULIN SER CALC-MCNC: 3.7 G/DL (ref 1.9–3.5)
GLUCOSE SERPL-MCNC: 105 MG/DL (ref 65–99)
HBA1C MFR BLD: 6.3 % (ref 4–5.6)
HCT VFR BLD AUTO: 40.6 % (ref 37–47)
HDLC SERPL-MCNC: 42 MG/DL
HGB BLD-MCNC: 12.6 G/DL (ref 12–16)
IMM GRANULOCYTES # BLD AUTO: 0.01 K/UL (ref 0–0.11)
IMM GRANULOCYTES NFR BLD AUTO: 0.1 % (ref 0–0.9)
LDLC SERPL CALC-MCNC: 61 MG/DL
LYMPHOCYTES # BLD AUTO: 1.6 K/UL (ref 1–4.8)
LYMPHOCYTES NFR BLD: 23.8 % (ref 22–41)
MCH RBC QN AUTO: 30.1 PG (ref 27–33)
MCHC RBC AUTO-ENTMCNC: 31 G/DL (ref 32.2–35.5)
MCV RBC AUTO: 97.1 FL (ref 81.4–97.8)
MICROALBUMIN UR-MCNC: <1.2 MG/DL
MICROALBUMIN/CREAT UR: NORMAL MG/G (ref 0–30)
MONOCYTES # BLD AUTO: 0.46 K/UL (ref 0–0.85)
MONOCYTES NFR BLD AUTO: 6.8 % (ref 0–13.4)
NEUTROPHILS # BLD AUTO: 4.25 K/UL (ref 1.82–7.42)
NEUTROPHILS NFR BLD: 63.4 % (ref 44–72)
NRBC # BLD AUTO: 0 K/UL
NRBC BLD-RTO: 0 /100 WBC (ref 0–0.2)
PLATELET # BLD AUTO: 252 K/UL (ref 164–446)
PMV BLD AUTO: 10.2 FL (ref 9–12.9)
POTASSIUM SERPL-SCNC: 4.3 MMOL/L (ref 3.6–5.5)
PROT SERPL-MCNC: 7.6 G/DL (ref 6–8.2)
RBC # BLD AUTO: 4.18 M/UL (ref 4.2–5.4)
SODIUM SERPL-SCNC: 136 MMOL/L (ref 135–145)
TRIGL SERPL-MCNC: 115 MG/DL (ref 0–149)
TSH SERPL DL<=0.005 MIU/L-ACNC: 3.26 UIU/ML (ref 0.38–5.33)
VIT B12 SERPL-MCNC: 672 PG/ML (ref 211–911)
WBC # BLD AUTO: 6.7 K/UL (ref 4.8–10.8)

## 2024-11-26 PROCEDURE — 85025 COMPLETE CBC W/AUTO DIFF WBC: CPT

## 2024-11-26 PROCEDURE — 80053 COMPREHEN METABOLIC PANEL: CPT

## 2024-11-26 PROCEDURE — 36415 COLL VENOUS BLD VENIPUNCTURE: CPT

## 2024-11-26 PROCEDURE — 82306 VITAMIN D 25 HYDROXY: CPT

## 2024-11-26 PROCEDURE — 82570 ASSAY OF URINE CREATININE: CPT

## 2024-11-26 PROCEDURE — 82043 UR ALBUMIN QUANTITATIVE: CPT

## 2024-11-26 PROCEDURE — 83036 HEMOGLOBIN GLYCOSYLATED A1C: CPT

## 2024-11-26 PROCEDURE — 82607 VITAMIN B-12: CPT

## 2024-11-26 PROCEDURE — 80061 LIPID PANEL: CPT

## 2024-11-26 PROCEDURE — 84443 ASSAY THYROID STIM HORMONE: CPT

## 2024-12-02 ENCOUNTER — HOSPITAL ENCOUNTER (OUTPATIENT)
Dept: RADIOLOGY | Facility: MEDICAL CENTER | Age: 74
End: 2024-12-02
Attending: FAMILY MEDICINE
Payer: MEDICARE

## 2024-12-02 DIAGNOSIS — R92.8 ABNORMAL MAMMOGRAM OF LEFT BREAST: ICD-10-CM

## 2024-12-02 PROCEDURE — G0279 TOMOSYNTHESIS, MAMMO: HCPCS | Mod: LT

## 2024-12-02 PROCEDURE — 76642 ULTRASOUND BREAST LIMITED: CPT | Mod: LT

## 2024-12-10 ENCOUNTER — APPOINTMENT (OUTPATIENT)
Dept: MEDICAL GROUP | Facility: PHYSICIAN GROUP | Age: 74
End: 2024-12-10
Payer: MEDICARE

## 2024-12-10 VITALS
OXYGEN SATURATION: 94 % | HEIGHT: 63 IN | DIASTOLIC BLOOD PRESSURE: 60 MMHG | HEART RATE: 80 BPM | BODY MASS INDEX: 37.81 KG/M2 | TEMPERATURE: 97.6 F | SYSTOLIC BLOOD PRESSURE: 110 MMHG | WEIGHT: 213.4 LBS

## 2024-12-10 DIAGNOSIS — L30.9 DERMATITIS: ICD-10-CM

## 2024-12-10 DIAGNOSIS — E78.5 HYPERLIPIDEMIA ASSOCIATED WITH TYPE 2 DIABETES MELLITUS (HCC): ICD-10-CM

## 2024-12-10 DIAGNOSIS — R80.9 TYPE 2 DIABETES MELLITUS WITH MICROALBUMINURIA, WITHOUT LONG-TERM CURRENT USE OF INSULIN (HCC): ICD-10-CM

## 2024-12-10 DIAGNOSIS — I35.0 NONRHEUMATIC AORTIC VALVE STENOSIS: ICD-10-CM

## 2024-12-10 DIAGNOSIS — G47.30 SLEEP APNEA, UNSPECIFIED TYPE: ICD-10-CM

## 2024-12-10 DIAGNOSIS — E11.59 HYPERTENSION ASSOCIATED WITH DIABETES (HCC): ICD-10-CM

## 2024-12-10 DIAGNOSIS — E11.69 HYPERLIPIDEMIA ASSOCIATED WITH TYPE 2 DIABETES MELLITUS (HCC): ICD-10-CM

## 2024-12-10 DIAGNOSIS — I15.2 HYPERTENSION ASSOCIATED WITH DIABETES (HCC): ICD-10-CM

## 2024-12-10 DIAGNOSIS — E11.29 TYPE 2 DIABETES MELLITUS WITH MICROALBUMINURIA, WITHOUT LONG-TERM CURRENT USE OF INSULIN (HCC): ICD-10-CM

## 2024-12-10 PROCEDURE — 3078F DIAST BP <80 MM HG: CPT | Performed by: FAMILY MEDICINE

## 2024-12-10 PROCEDURE — 99215 OFFICE O/P EST HI 40 MIN: CPT | Performed by: FAMILY MEDICINE

## 2024-12-10 PROCEDURE — 3074F SYST BP LT 130 MM HG: CPT | Performed by: FAMILY MEDICINE

## 2024-12-10 RX ORDER — CLOBETASOL PROPIONATE 0.5 MG/G
1 OINTMENT TOPICAL 2 TIMES DAILY PRN
Qty: 60 G | Refills: 1 | Status: SHIPPED | OUTPATIENT
Start: 2024-12-10

## 2024-12-10 RX ORDER — MULTIVIT WITH MINERALS/LUTEIN
TABLET ORAL
COMMUNITY

## 2024-12-10 RX ORDER — ROSUVASTATIN CALCIUM 10 MG/1
10 TABLET, COATED ORAL EVERY EVENING
COMMUNITY

## 2024-12-10 ASSESSMENT — ENCOUNTER SYMPTOMS: HEADACHES: 0

## 2024-12-10 ASSESSMENT — FIBROSIS 4 INDEX: FIB4 SCORE: 2.03

## 2024-12-10 NOTE — PROGRESS NOTES
"Verbal consent was acquired by the patient to use [employer] ambient listening note generation during this visit         History of Present Illness  The patient presents today for a follow-up visit. She was last seen on 09/18/2024. She is accompanied by her .    She has been experiencing pruritus in her left knee for several years. She has been managing with a topical ointment.    She reports no symptoms of chest pain or headaches.    MEDICATIONS  Current: Lisinopril, glipizide, rosuvastatin, multivitamin, fish oil, magnesium, calcium.      Review of Systems   Constitutional:  Positive for malaise/fatigue.   Cardiovascular:  Negative for chest pain.   Skin:         Left knee erythema and pruritus   Neurological:  Negative for headaches.       Outpatient Medications Marked as Taking for the 12/10/24 encounter (Office Visit) with Miriam Xiao M.D.   Medication Sig Dispense Refill    Ascorbic Acid (VITAMIN C) 1000 MG Tab Take  by mouth.      rosuvastatin (CRESTOR) 10 MG Tab Take 10 mg by mouth every evening.      clobetasol (TEMOVATE) 0.05 % Ointment Apply 1 Act topically 2 times a day as needed (itching). 60 g 1    glipiZIDE 2.5 MG Tab Take 2.5 mg by mouth 2 times a day. 200 Tablet 3    lisinopril (PRINIVIL) 5 MG Tab Take 1 Tablet by mouth every day. 90 Tablet 3    Calcium Carb-Cholecalciferol (CALCIUM/VITAMIN D PO) Take  by mouth.      MAGNESIUM PO Take  by mouth.      Omega-3 Fatty Acids (FISH OIL) 1000 MG Cap capsule Take 1,000 mg by mouth 3 times a day with meals.      Multiple Vitamin (MULTI-VITAMIN DAILY PO) Take  by mouth.         /60   Pulse 80   Temp 36.4 °C (97.6 °F) (Temporal)   Ht 1.6 m (5' 3\")   Wt 96.8 kg (213 lb 6.4 oz)   SpO2 94% , Body mass index is 37.8 kg/m².        Physical Exam  Constitutional:       Appearance: Normal appearance.   Eyes:      Extraocular Movements: Extraocular movements intact.   Cardiovascular:      Rate and Rhythm: Normal rate and regular rhythm.      Heart " sounds: Murmur heard.   Pulmonary:      Effort: Pulmonary effort is normal.      Breath sounds: Normal breath sounds.   Neurological:      Mental Status: She is alert.   Psychiatric:         Mood and Affect: Mood normal.         Behavior: Behavior normal.         Results  Laboratory Studies  A1c improved at 6.3%. LDL cholesterol improved to 61.       Assessment & Plan  1. Type 2 diabetes mellitus.  Chronic medical diagnosis.  Improving.  Her recent laboratory results indicate an improvement in her A1c levels, now at 6.3 percent. She will maintain her current regimen of glipizide 2.5 mg, administered twice daily. A prescription for glipizide with 3 refills has been sent to [facility].    2. Hypertension.  Chronic medical diagnosis.  Stable.  Her blood pressure today is 110/60 mmHg. She has been on lisinopril 5 mg daily She will continue taking lisinopril 5 mg daily. A prescription for lisinopril with 3 refills has been sent to [facility].    3. Hyperlipidemia.  Chronic medical diagnosis.  Improved.  Her recent blood work shows an improvement in her cholesterol levels, with LDL now at 61 mg/dL. She will continue taking rosuvastatin 10 mg daily.    4.  Dermatitis  New medical problem.  This has been going on for some years.  Not improving.  She has been applying a topical ointment from [place].  New prescription for clobetasol sent to pharmacy.  Discussed with her that we can have her follow-up with dermatology if necessary.      5.  Sleep apnea  Chronic medical diagnosis.  Not well-controlled.  Continues to feel fatigued throughout the day and sleepy.  States that she usually uses her CPAP for 3 to 4 hours nightly.  Encouraged her to use it longer     6.  Aortic valve stenosis  Chronic medical diagnosis.  Last follow-up with cardiology was in October 2023 with recommendations to follow-up in 1 year.  Last echocardiogram was completed December 2022.  New orders for echo placed today.  Encouraged her to follow-up with cardiology.  New  referral placed today.    Orders Placed This Encounter    EC-ECHOCARDIOGRAM COMPLETE W/O CONT    REFERRAL TO CARDIOLOGY    Ascorbic Acid (VITAMIN C) 1000 MG Tab    rosuvastatin (CRESTOR) 10 MG Tab    clobetasol (TEMOVATE) 0.05 % Ointment             I spent a total of 42 minutes which included time preparing to see the patient (reviewing my last note, records and recent lab results)  I also performed a medically appropriate examination, counseled and educated the patient, ordered medications and tests.  Also referred the patient to other healthcare professionals.  and documentation of this encounter.     This note was created using voice recognition software (Dragon). The accuracy of the dictation is limited by the abilities of the software. I have reviewed the note prior to signing, however some errors in grammar and context are still possible. If you have any questions related to this note please do not hesitate to contact our office.

## 2024-12-23 ENCOUNTER — HOSPITAL ENCOUNTER (OUTPATIENT)
Dept: CARDIOLOGY | Facility: MEDICAL CENTER | Age: 74
End: 2024-12-23
Attending: FAMILY MEDICINE
Payer: MEDICARE

## 2024-12-23 DIAGNOSIS — I35.0 NONRHEUMATIC AORTIC VALVE STENOSIS: ICD-10-CM

## 2024-12-23 LAB
LV EJECT FRACT MOD 2C 99903: 76.95
LV EJECT FRACT MOD 4C 99902: 61.75
LV EJECT FRACT MOD BP 99901: 69.36

## 2024-12-23 PROCEDURE — 93306 TTE W/DOPPLER COMPLETE: CPT | Mod: 26 | Performed by: STUDENT IN AN ORGANIZED HEALTH CARE EDUCATION/TRAINING PROGRAM

## 2024-12-23 PROCEDURE — 93306 TTE W/DOPPLER COMPLETE: CPT

## 2024-12-23 PROCEDURE — 700117 HCHG RX CONTRAST REV CODE 255: Performed by: INTERNAL MEDICINE

## 2024-12-23 RX ADMIN — HUMAN ALBUMIN MICROSPHERES AND PERFLUTREN 3 ML: 10; .22 INJECTION, SOLUTION INTRAVENOUS at 18:30

## 2024-12-27 ENCOUNTER — TELEPHONE (OUTPATIENT)
Dept: MEDICAL GROUP | Facility: PHYSICIAN GROUP | Age: 74
End: 2024-12-27
Payer: MEDICARE

## 2024-12-31 ENCOUNTER — TELEPHONE (OUTPATIENT)
Dept: CARDIOLOGY | Facility: MEDICAL CENTER | Age: 74
End: 2024-12-31
Payer: MEDICARE

## 2024-12-31 NOTE — TELEPHONE ENCOUNTER
Referral from: Dr. Miriam Xiao for Moderate-Severe AS    Patient called on 12/31/2024.    Spoke to patient with assistance from  (ID# [id number]) with [employer]. Patient scheduled with Dr. Cortes at her earliest convenience on 1/29/2025.    All questions answered.    Phone number given to patient for [facility] for any further questions or concerns.

## 2025-01-29 ENCOUNTER — TELEPHONE (OUTPATIENT)
Dept: CARDIOLOGY | Facility: MEDICAL CENTER | Age: 75
End: 2025-01-29

## 2025-01-29 ENCOUNTER — OFFICE VISIT (OUTPATIENT)
Dept: CARDIOLOGY | Facility: MEDICAL CENTER | Age: 75
End: 2025-01-29
Attending: INTERNAL MEDICINE
Payer: MEDICARE

## 2025-01-29 VITALS
HEIGHT: 63 IN | BODY MASS INDEX: 37.56 KG/M2 | DIASTOLIC BLOOD PRESSURE: 60 MMHG | SYSTOLIC BLOOD PRESSURE: 116 MMHG | RESPIRATION RATE: 16 BRPM | HEART RATE: 70 BPM | OXYGEN SATURATION: 98 % | WEIGHT: 212 LBS

## 2025-01-29 DIAGNOSIS — E66.01 CLASS 2 SEVERE OBESITY DUE TO EXCESS CALORIES WITH SERIOUS COMORBIDITY AND BODY MASS INDEX (BMI) OF 37.0 TO 37.9 IN ADULT (HCC): ICD-10-CM

## 2025-01-29 DIAGNOSIS — I35.8 NONRHEUMATIC AORTIC VALVE SCLEROSIS: ICD-10-CM

## 2025-01-29 DIAGNOSIS — E11.29 TYPE 2 DIABETES MELLITUS WITH MICROALBUMINURIA, WITHOUT LONG-TERM CURRENT USE OF INSULIN (HCC): ICD-10-CM

## 2025-01-29 DIAGNOSIS — I70.0 ATHEROSCLEROSIS OF AORTA (HCC): ICD-10-CM

## 2025-01-29 DIAGNOSIS — R80.9 TYPE 2 DIABETES MELLITUS WITH MICROALBUMINURIA, WITHOUT LONG-TERM CURRENT USE OF INSULIN (HCC): ICD-10-CM

## 2025-01-29 DIAGNOSIS — E66.812 CLASS 2 SEVERE OBESITY DUE TO EXCESS CALORIES WITH SERIOUS COMORBIDITY AND BODY MASS INDEX (BMI) OF 37.0 TO 37.9 IN ADULT (HCC): ICD-10-CM

## 2025-01-29 PROCEDURE — 99212 OFFICE O/P EST SF 10 MIN: CPT | Performed by: INTERNAL MEDICINE

## 2025-01-29 ASSESSMENT — FIBROSIS 4 INDEX: FIB4 SCORE: 2.03

## 2025-01-29 NOTE — PROGRESS NOTES
"CARDIOLOGY STRUCTURAL HEART CONSULTATION    PCP: Miriam Xiao M.D.    1. Nonrheumatic aortic valve sclerosis    2. Class 2 severe obesity due to excess calories with serious comorbidity and body mass index (BMI) of 37.0 to 37.9 in adult ([facility])    3. Type 2 diabetes mellitus with microalbuminuria, without long-term current use of insulin ([facility])    4. Aortic atherosclerosis ([facility])        Madelyn Schmitz has probable D1 class II-III aortic stenosis. I recommend corroborating the diagnosis with R/LHC with AV study and will assess I did discuss with her the rationale as well as risk benefits and alternatives to proceeding with heart catheterization.  She is in agreement with proceeding though does question whether she would ultimately want anything done for her condition.  She does wish to proceed at this time.  She will confer further with her family and contact me if there are additional issues that arise.  She does express a desire to have no blood transfusion.    Follow up: 3 months    History: Madelyn Schmitz is a 74 y.o. female with history of diabetes, hypertension and hyperlipidemia presenting for assessment of aortic stenosis.  Recent echocardiogram showed moderate to severe aortic stenosis-I personally interpreted the study and agree with these findings.      She is accompanied by her daughter-in-law who provides adjunctive history.  The patient reports fatigue over the past several years.  This condition appears to be getting worse.  She had been diagnosed with uterine cancer as well as treated for hernias following this as well as cholecystitis and is attributed to her ailing health to these conditions.    .      ROS:   10 point review systems is otherwise negative except as per the HPI    PE:  /60 (BP Location: Left arm, Patient Position: Sitting, BP Cuff Size: Adult)   Pulse 70   Resp 16   Ht 1.6 m (5' 3\")   Wt 96.2 kg (212 lb)   LMP  (LMP Unknown)   SpO2 98%   BMI 37.55 kg/m² "   GEN: NAD  CARDIAC: regular systolic ejection murmur normal S1, S2 preserved carotid upstroke  RESP: Clear to auscultation bilaterally  ABD: Soft, non-tender, non-distended  EXT: No edema  NEURO: No focal deficit    Studies interpreted by me: Echo:  Probable bicuspid AS. Severe AS by gradient, somewhat preserved valve motion.   This encounter addressed a chronic medical condition with life-threatening implications.-Severe symptomatic aortic stenosis () Today's E/M visit is associated with medical care services that serve as the continuing focal point for all needed health care services and/or with medical care services that  are part of ongoing care related to a patient's single, serious condition, or a complex condition: This includes  furnishing services to patients on an ongoing basis that result in care that is personalized  to the patient. The services result in a comprehensive, longitudinal, and continuous  relationship with the patient and involve delivery of team-based care that is accessible, coordinated with other practitioners and providers, and integrated with the broader health  care landscape.     Past Medical History:   Diagnosis Date    Acute cholecystitis 09/19/2019    Arthritis     osteo, finger/shoulders    Back pain     Blood clotting disorder ([facility]) 2004    leg    Body mass index (BMI) 38.0-38.9, adult 9/15/2022    Breath shortness 09/07/2022    with exertion    Bronchitis     Cancer ([facility]) 09/07/2022    endometrial cancer    Chickenpox     Dental disorder     upper/lower dentures    Diabetes     oral meds    Diabetes mellitus ([facility]) 09/19/2019      Ref. Range 4/8/2019 13:20 11/6/2019 16:47 Glycohemoglobin Latest Ref Range: 0.0 - 5.6 % 6.6 (H) 5.7 (A) Estim. Avg Glu Latest Units: mg/dL 143     Ref. Range 11/6/2019 16:35 Micro Alb Creat Ratio Latest Ref Range: 0 - 30 mg/g 167 (H)   She has history of well-controlled type 2 diabetes.  She is previously taking metformin 500 mg twice daily  "however her A1c on recheck was down to 5.7 so we discon    Dysuria 09/11/2019    Fatigue 11/6/2019    Since her hysterectomy and cholecystectomy she has had some trouble bouncing back to her usual self and feels a bit deconditioned. I suspect a trial with prozac will help her energy. She agrees to add a short daily walk with her  to assist with deconditioning.     Heart murmur 09/07/2022    Heart valve disease     \"murmur\"    High cholesterol     HTN (hypertension) 9/15/2022    Hyperlipidemia     Hypertension 09/07/2022    medicated    Hypotension due to hypovolemia 09/26/2019    She has a low blood pressure in office today of 84/52 with a baseline in the 100s to 120s systolic.  This is likely due to her frequent episodes of diarrhea exacerbated by ongoing use of lisinopril.  Yesterday, she felt lightheaded and dizzy but did not pass out or have a fall.  She is having challenges of what is appropriate to eat as she had recent pelvic radiation as well as a cholecystectomy b    Mumps     Nasal drainage     Pneumonia 09/07/2022    12 years ago    Psychiatric problem 09/07/2022    medicated at time    Sleep apnea 09/07/2022    CPAP    Snoring     Urinary incontinence 09/07/2022    at times     Past Surgical History:   Procedure Laterality Date    LAPAROSCOPIC INCISIONAL HERNIA REPAIR N/A 9/9/2022    Procedure: LAPAROSCOPIC REPAIR OF INCISIONAL HERNIA;  Surgeon: Sim Anaya M.D.;  Location: [facility];  Service: General    GABY BY LAPAROSCOPY  9/19/2019    Procedure: CHOLECYSTECTOMY, LAPAROSCOPIC;  Surgeon: Jenniffer Johnson M.D.;  Location: [facility];  Service: General    HYSTERECTOMY ROBOTIC XI  6/27/2019    Procedure: HYSTERECTOMY, ROBOT-ASSISTED, USING DA YO XI;  Surgeon: Alexandr Mancia M.D.;  Location: [facility];  Service: Gynecology    SALPINGO OOPHORECTOMY Bilateral 6/27/2019    Procedure: SALPINGO-OOPHORECTOMY;  Surgeon: Alexandr Mancia M.D.;  Location: " SURGERY [facility];  Service: Gynecology    NODE BIOPSY SENTINEL  6/27/2019    Procedure: BIOPSY, LYMPH NODE, SENTINEL;  Surgeon: Alexandr Mancia M.D.;  Location: SURGERY [facility];  Service: Gynecology    HYSTEROSCOPY WITH MYOSURE N/A 4/17/2019    Procedure: HYSTEROSCOPY, WITH TISSUE REMOVAL, USING HYSTEROSCOPIC ROTATING CUTTER BLADE - DIAGNOSTIC;  Surgeon: Racquel Gatica M.D.;  Location: SURGERY SAME DAY [facility];  Service: Gynecology    DILATION AND CURETTAGE N/A 4/17/2019    Procedure: DILATION AND CURETTAGE;  Surgeon: Racquel Gatica M.D.;  Location: SURGERY SAME DAY [facility];  Service: Gynecology    PRIMARY C SECTION  1972/1974/1977    x 3     Allergies   Allergen Reactions    Bloodless      Adventism    Ibuprofen Rash and Swelling     .    Penicillins Rash and Swelling     .     Outpatient Encounter Medications as of 1/29/2025   Medication Sig Dispense Refill    Ascorbic Acid (VITAMIN C) 1000 MG Tab Take  by mouth.      rosuvastatin (CRESTOR) 10 MG Tab Take 10 mg by mouth every evening.      clobetasol (TEMOVATE) 0.05 % Ointment Apply 1 Act topically 2 times a day as needed (itching). 60 g 1    glipiZIDE 2.5 MG Tab Take 2.5 mg by mouth 2 times a day. 200 Tablet 3    lisinopril (PRINIVIL) 5 MG Tab Take 1 Tablet by mouth every day. 90 Tablet 3    Calcium Carb-Cholecalciferol (CALCIUM/VITAMIN D PO) Take  by mouth.      MAGNESIUM PO Take  by mouth.      Omega-3 Fatty Acids (FISH OIL) 1000 MG Cap capsule Take 1,000 mg by mouth 3 times a day with meals.      Multiple Vitamin (MULTI-VITAMIN DAILY PO) Take  by mouth.       No facility-administered encounter medications on file as of 1/29/2025.     Social History     Socioeconomic History    Marital status:      Spouse name: Not on file    Number of children: Not on file    Years of education: Not on file    Highest education level: Not on file   Occupational History    Not on file   Tobacco Use    Smoking status: Never     Smokeless tobacco: Never   Vaping Use    Vaping status: Never Used   Substance and Sexual Activity    Alcohol use: No    Drug use: No    Sexual activity: Not Currently     Partners: Male     Birth control/protection: Post-Menopausal   Other Topics Concern    Not on file   Social History Narrative    She is Amharic speaking. Madelyn has been  to her  for 50 years. Her daughter-in-law, Delores, participates in her medical care and assists with translation. She has three grown sons. '72, '74, '77. Has 8 grandchildren. 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 Was born in [facility], Came to the  in '79. She is happy with her life and her sons. She enjoys reading, watch tv shows, talk with her friends on the phone. No tob/etoh/drugs.      Social Drivers of Health     Financial Resource Strain: Low Risk  (5/22/2024)    Overall Financial Resource Strain (CARDIA)     Difficulty of Paying Living Expenses: Not hard at all   Food Insecurity: No Food Insecurity (5/22/2024)    Hunger Vital Sign     Worried About Running Out of Food in the Last Year: Never true     Ran Out of Food in the Last Year: Never true   Transportation Needs: No Transportation Needs (5/22/2024)    PRAPARE - Transportation     Lack of Transportation (Medical): No     Lack of Transportation (Non-Medical): No   Physical Activity: Not on file   Stress: Not on file   Social Connections: Not on file   Intimate Partner Violence: Not on file   Housing Stability: Not on file     Family History   Problem Relation Age of Onset    Heart Disease Father     Asthma Brother     Asthma Brother     Cancer Maternal Aunt         gyn cancer    Sleep Apnea Neg Hx          Studies  Lab Results   Component Value Date/Time    CHOLSTRLTOT 126 11/26/2024 07:54 AM    LDL 61 11/26/2024 07:54 AM    HDL 42 11/26/2024 07:54 AM    TRIGLYCERIDE 115 11/26/2024 07:54 AM       Lab Results   Component Value Date/Time    SODIUM 136  11/26/2024 07:54 AM    POTASSIUM 4.3 11/26/2024 07:54 AM    CHLORIDE 101 11/26/2024 07:54 AM    CO2 26 11/26/2024 07:54 AM    GLUCOSE 105 (H) 11/26/2024 07:54 AM    BUN 13 11/26/2024 07:54 AM    CREATININE 0.64 11/26/2024 07:54 AM      Lab Results   Component Value Date/Time    PROTHROMBTM 13.5 06/24/2019 01:49 PM    INR 1.01 06/24/2019 01:49 PM      Lab Results   Component Value Date/Time    WBC 6.7 11/26/2024 07:54 AM    RBC 4.18 (L) 11/26/2024 07:54 AM    HEMOGLOBIN 12.6 11/26/2024 07:54 AM    HEMATOCRIT 40.6 11/26/2024 07:54 AM    MCV 97.1 11/26/2024 07:54 AM    MCH 30.1 11/26/2024 07:54 AM    MCHC 31.0 (L) 11/26/2024 07:54 AM    MPV 10.2 11/26/2024 07:54 AM    NEUTSPOLYS 63.40 11/26/2024 07:54 AM    LYMPHOCYTES 23.80 11/26/2024 07:54 AM    MONOCYTES 6.80 11/26/2024 07:54 AM    EOSINOPHILS 5.50 11/26/2024 07:54 AM    BASOPHILS 0.40 11/26/2024 07:54 AM

## 2025-01-30 NOTE — TELEPHONE ENCOUNTER
"Phone Number Called: [phone] (Daughter in-law Aowyn)    Call outcome:  S/w pt's daughter in law Aowyn    Message: Called and s/w pt's daughter in law Aowyn regarding questions she had about pt's OV today and upcomming cath lab procedure scheduled for May. Pt's daughter in law was not present at appt today, obtained initial info from separate family member that was in attendance today who was concerned that the procedure wasn't scheduled until May. Reassured pt that per BE's OV note, procedure was not mentioned to be performed urgently and that \"alternatives to proceeding with heart catheterization\" were also discussed prior to pt choosing to proceed with cath lab procedure. Pt's daughter in law verbalized understanding and was appreciative for the clarification.    "

## 2025-01-30 NOTE — TELEPHONE ENCOUNTER
Patient is scheduled on 2-23-25 for a R&L Hrt with . Patient was told to hold Glipizide AM day of procedure and to check in at 9:30 for an 11:30 procedure. H&P was done on 1-29-25 by . Pre admit to call patient. This was scheduled with help from .

## 2025-01-30 NOTE — TELEPHONE ENCOUNTER
BE    Caller: Delores    Topic/issue: Patients daughter - in Law is asking for further clarification on the procedure and why its being pushed out so far.  Please advise.    Callback Number: [phone]    Thank you,  Yamel KRAFT

## 2025-01-31 ENCOUNTER — APPOINTMENT (OUTPATIENT)
Dept: ADMISSIONS | Facility: MEDICAL CENTER | Age: 75
End: 2025-01-31
Attending: INTERNAL MEDICINE
Payer: MEDICARE

## 2025-02-07 ENCOUNTER — PRE-ADMISSION TESTING (OUTPATIENT)
Dept: ADMISSIONS | Facility: MEDICAL CENTER | Age: 75
End: 2025-02-07
Attending: INTERNAL MEDICINE
Payer: MEDICARE

## 2025-02-07 DIAGNOSIS — Z01.812 PRE-OPERATIVE LABORATORY EXAMINATION: ICD-10-CM

## 2025-02-07 NOTE — PREADMIT AVS NOTE
Current Medications   Medication Instructions    Ascorbic Acid (VITAMIN C) 1000 MG Tab Esta sabas seguir tomando mukesh medicamento, george no se lo tome en la mañana de la cirugía. (It is ok to continue this medication prior to surgery but hold this medication on the morning of surgery.)    rosuvastatin (CRESTOR) 10 MG Tab Esta sabas seguir tomando mukesh medicamento según lo recetado. (It is ok to continue this medication as prescribed.)     glipiZIDE 2.5 MG Tab Esta sabas seguir tomando mukesh medicamento, george no se lo tome en la mañana de la cirugía. (It is ok to continue this medication prior to surgery but hold this medication on the morning of surgery.)    lisinopril (PRINIVIL) 5 MG Tab Esta sabas seguir tomando mukesh medicamento según lo recetado. (It is ok to continue this medication as prescribed.)     Calcium Carb-Cholecalciferol (CALCIUM/VITAMIN D PO) Esta sabas seguir tomando mukesh medicamento, george no se lo tome en la mañana de la cirugía. (It is ok to continue this medication prior to surgery but hold this medication on the morning of surgery.)    MAGNESIUM PO Esta sabas seguir tomando mukesh medicamento, george no se lo tome en la mañana de la cirugía. (It is ok to continue this medication prior to surgery but hold this medication on the morning of surgery.)    Multiple Vitamin (MULTI-VITAMIN DAILY PO) Esta sabas seguir tomando mukesh medicamento según lo recetado. (It is ok to continue this medication as prescribed.)

## 2025-02-07 NOTE — PREADMIT AVS NOTE
Current Medications   Medication Instructions    Ascorbic Acid (VITAMIN C) 1000 MG Tab Hold medication day of procedure    rosuvastatin (CRESTOR) 10 MG Tab Continue taking medication as prescribed, including morning of procedure     glipiZIDE 2.5 MG Tab Hold medication day of procedure    lisinopril (PRINIVIL) 5 MG Tab Continue until night before surgery    Calcium Carb-Cholecalciferol (CALCIUM/VITAMIN D PO) Hold medication day of procedure    MAGNESIUM PO Hold medication day of procedure    Multiple Vitamin (MULTI-VITAMIN DAILY PO) Hold medication day of procedure       HOLD GLIPIZIDE A.M. DAY OF SURGERY 2/13/25

## 2025-02-10 ENCOUNTER — PRE-ADMISSION TESTING (OUTPATIENT)
Dept: ADMISSIONS | Facility: MEDICAL CENTER | Age: 75
End: 2025-02-10
Attending: INTERNAL MEDICINE
Payer: MEDICARE

## 2025-02-10 DIAGNOSIS — Z01.812 PRE-OPERATIVE LABORATORY EXAMINATION: ICD-10-CM

## 2025-02-10 DIAGNOSIS — Z01.810 PRE-OPERATIVE CARDIOVASCULAR EXAMINATION: ICD-10-CM

## 2025-02-10 LAB
ALBUMIN SERPL BCP-MCNC: 3.6 G/DL (ref 3.2–4.9)
ALBUMIN/GLOB SERPL: 1 G/DL
ALP SERPL-CCNC: 67 U/L (ref 30–99)
ALT SERPL-CCNC: 7 U/L (ref 2–50)
ANION GAP SERPL CALC-SCNC: 9 MMOL/L (ref 7–16)
APTT PPP: 29.3 SEC (ref 24.7–36)
AST SERPL-CCNC: 19 U/L (ref 12–45)
BILIRUB SERPL-MCNC: 0.4 MG/DL (ref 0.1–1.5)
BUN SERPL-MCNC: 12 MG/DL (ref 8–22)
CALCIUM ALBUM COR SERPL-MCNC: 9.3 MG/DL (ref 8.5–10.5)
CALCIUM SERPL-MCNC: 9 MG/DL (ref 8.5–10.5)
CHLORIDE SERPL-SCNC: 101 MMOL/L (ref 96–112)
CO2 SERPL-SCNC: 27 MMOL/L (ref 20–33)
CREAT SERPL-MCNC: 0.73 MG/DL (ref 0.5–1.4)
EKG IMPRESSION: NORMAL
ERYTHROCYTE [DISTWIDTH] IN BLOOD BY AUTOMATED COUNT: 55.3 FL (ref 35.9–50)
GFR SERPLBLD CREATININE-BSD FMLA CKD-EPI: 85 ML/MIN/1.73 M 2
GLOBULIN SER CALC-MCNC: 3.5 G/DL (ref 1.9–3.5)
GLUCOSE SERPL-MCNC: 162 MG/DL (ref 65–99)
HCT VFR BLD AUTO: 39.4 % (ref 37–47)
HGB BLD-MCNC: 12.3 G/DL (ref 12–16)
INR PPP: 1.1 (ref 0.87–1.13)
MCH RBC QN AUTO: 30.4 PG (ref 27–33)
MCHC RBC AUTO-ENTMCNC: 31.2 G/DL (ref 32.2–35.5)
MCV RBC AUTO: 97.5 FL (ref 81.4–97.8)
PLATELET # BLD AUTO: 220 K/UL (ref 164–446)
PMV BLD AUTO: 8.6 FL (ref 9–12.9)
POTASSIUM SERPL-SCNC: 4.4 MMOL/L (ref 3.6–5.5)
PROT SERPL-MCNC: 7.1 G/DL (ref 6–8.2)
PROTHROMBIN TIME: 14.2 SEC (ref 12–14.6)
RBC # BLD AUTO: 4.04 M/UL (ref 4.2–5.4)
SODIUM SERPL-SCNC: 137 MMOL/L (ref 135–145)
WBC # BLD AUTO: 6.9 K/UL (ref 4.8–10.8)

## 2025-02-10 PROCEDURE — 93010 ELECTROCARDIOGRAM REPORT: CPT | Performed by: INTERNAL MEDICINE

## 2025-02-10 PROCEDURE — 36415 COLL VENOUS BLD VENIPUNCTURE: CPT

## 2025-02-10 PROCEDURE — 80053 COMPREHEN METABOLIC PANEL: CPT

## 2025-02-10 PROCEDURE — 93005 ELECTROCARDIOGRAM TRACING: CPT | Mod: TC

## 2025-02-10 PROCEDURE — 85610 PROTHROMBIN TIME: CPT

## 2025-02-10 PROCEDURE — 85730 THROMBOPLASTIN TIME PARTIAL: CPT

## 2025-02-10 PROCEDURE — 85027 COMPLETE CBC AUTOMATED: CPT

## 2025-02-13 ENCOUNTER — APPOINTMENT (OUTPATIENT)
Dept: CARDIOLOGY | Facility: MEDICAL CENTER | Age: 75
End: 2025-02-13
Attending: INTERNAL MEDICINE
Payer: MEDICARE

## 2025-02-13 ENCOUNTER — HOSPITAL ENCOUNTER (OUTPATIENT)
Facility: MEDICAL CENTER | Age: 75
End: 2025-02-14
Attending: INTERNAL MEDICINE | Admitting: INTERNAL MEDICINE
Payer: MEDICARE

## 2025-02-13 DIAGNOSIS — I35.8 NONRHEUMATIC AORTIC VALVE SCLEROSIS: ICD-10-CM

## 2025-02-13 PROBLEM — I35.0 AORTIC STENOSIS, SEVERE: Status: ACTIVE | Noted: 2025-02-13

## 2025-02-13 LAB
CALCULATED OXYGEN CONTENT: 12.3
CALCULATED OXYGEN CONTENT: NORMAL
GLUCOSE BLD STRIP.AUTO-MCNC: 101 MG/DL (ref 65–99)
OXYHEMOGLOBIN: 74.9
OXYHEMOGLOBIN: 95.4
TOTAL HEMOGLOBIN: 11.9
TOTAL HEMOGLOBIN: 12.1

## 2025-02-13 PROCEDURE — 99152 MOD SED SAME PHYS/QHP 5/>YRS: CPT | Performed by: INTERNAL MEDICINE

## 2025-02-13 PROCEDURE — 700105 HCHG RX REV CODE 258: Performed by: INTERNAL MEDICINE

## 2025-02-13 PROCEDURE — 93567 NJX CAR CTH SPRVLV AORTGRPHY: CPT | Performed by: INTERNAL MEDICINE

## 2025-02-13 PROCEDURE — G0278 ILIAC ART ANGIO,CARDIAC CATH: HCPCS | Performed by: INTERNAL MEDICINE

## 2025-02-13 PROCEDURE — 93308 TTE F-UP OR LMTD: CPT | Mod: 26 | Performed by: INTERNAL MEDICINE

## 2025-02-13 PROCEDURE — 99153 MOD SED SAME PHYS/QHP EA: CPT

## 2025-02-13 PROCEDURE — 93460 R&L HRT ART/VENTRICLE ANGIO: CPT | Mod: 26 | Performed by: INTERNAL MEDICINE

## 2025-02-13 PROCEDURE — 160002 HCHG RECOVERY MINUTES (STAT)

## 2025-02-13 PROCEDURE — 700111 HCHG RX REV CODE 636 W/ 250 OVERRIDE (IP): Mod: JZ

## 2025-02-13 PROCEDURE — 700102 HCHG RX REV CODE 250 W/ 637 OVERRIDE(OP)

## 2025-02-13 PROCEDURE — 93308 TTE F-UP OR LMTD: CPT

## 2025-02-13 PROCEDURE — G0378 HOSPITAL OBSERVATION PER HR: HCPCS

## 2025-02-13 PROCEDURE — 82962 GLUCOSE BLOOD TEST: CPT

## 2025-02-13 PROCEDURE — 700101 HCHG RX REV CODE 250

## 2025-02-13 PROCEDURE — A9270 NON-COVERED ITEM OR SERVICE: HCPCS | Performed by: NURSE PRACTITIONER

## 2025-02-13 PROCEDURE — 85018 HEMOGLOBIN: CPT | Performed by: INTERNAL MEDICINE

## 2025-02-13 PROCEDURE — 160046 HCHG PACU - 1ST 60 MINS PHASE II

## 2025-02-13 PROCEDURE — A9270 NON-COVERED ITEM OR SERVICE: HCPCS

## 2025-02-13 PROCEDURE — 700102 HCHG RX REV CODE 250 W/ 637 OVERRIDE(OP): Performed by: NURSE PRACTITIONER

## 2025-02-13 PROCEDURE — 160035 HCHG PACU - 1ST 60 MINS PHASE I

## 2025-02-13 RX ORDER — HEPARIN SODIUM 1000 [USP'U]/ML
INJECTION, SOLUTION INTRAVENOUS; SUBCUTANEOUS
Status: COMPLETED
Start: 2025-02-13 | End: 2025-02-13

## 2025-02-13 RX ORDER — LISINOPRIL 5 MG/1
5 TABLET ORAL DAILY
Status: DISCONTINUED | OUTPATIENT
Start: 2025-02-13 | End: 2025-02-14 | Stop reason: HOSPADM

## 2025-02-13 RX ORDER — MIDAZOLAM HYDROCHLORIDE 1 MG/ML
INJECTION INTRAMUSCULAR; INTRAVENOUS
Status: COMPLETED
Start: 2025-02-13 | End: 2025-02-13

## 2025-02-13 RX ORDER — LIDOCAINE HYDROCHLORIDE 20 MG/ML
INJECTION, SOLUTION INFILTRATION; PERINEURAL
Status: COMPLETED
Start: 2025-02-13 | End: 2025-02-13

## 2025-02-13 RX ORDER — ROSUVASTATIN CALCIUM 10 MG/1
10 TABLET, COATED ORAL EVERY EVENING
Status: DISCONTINUED | OUTPATIENT
Start: 2025-02-13 | End: 2025-02-14 | Stop reason: HOSPADM

## 2025-02-13 RX ORDER — HEPARIN SODIUM 200 [USP'U]/100ML
INJECTION, SOLUTION INTRAVENOUS
Status: COMPLETED
Start: 2025-02-13 | End: 2025-02-13

## 2025-02-13 RX ORDER — VERAPAMIL HYDROCHLORIDE 2.5 MG/ML
INJECTION, SOLUTION INTRAVENOUS
Status: COMPLETED
Start: 2025-02-13 | End: 2025-02-13

## 2025-02-13 RX ORDER — SODIUM CHLORIDE 9 MG/ML
3 INJECTION, SOLUTION INTRAVENOUS CONTINUOUS
Status: ACTIVE | OUTPATIENT
Start: 2025-02-13 | End: 2025-02-13

## 2025-02-13 RX ORDER — ASPIRIN 81 MG/1
TABLET, CHEWABLE ORAL
Status: COMPLETED
Start: 2025-02-13 | End: 2025-02-13

## 2025-02-13 RX ORDER — ACETAMINOPHEN 325 MG/1
650 TABLET ORAL EVERY 6 HOURS PRN
Status: DISCONTINUED | OUTPATIENT
Start: 2025-02-13 | End: 2025-02-14 | Stop reason: HOSPADM

## 2025-02-13 RX ADMIN — LIDOCAINE HYDROCHLORIDE: 20 INJECTION, SOLUTION INFILTRATION; PERINEURAL at 13:25

## 2025-02-13 RX ADMIN — ASPIRIN 324 MG: 81 TABLET, CHEWABLE ORAL at 14:32

## 2025-02-13 RX ADMIN — VERAPAMIL HYDROCHLORIDE 5 MG: 2.5 INJECTION, SOLUTION INTRAVENOUS at 13:25

## 2025-02-13 RX ADMIN — NITROGLYCERIN 10 ML: 20 INJECTION INTRAVENOUS at 13:45

## 2025-02-13 RX ADMIN — MIDAZOLAM HYDROCHLORIDE 1 MG: 1 INJECTION, SOLUTION INTRAMUSCULAR; INTRAVENOUS at 15:21

## 2025-02-13 RX ADMIN — HEPARIN SODIUM: 1000 INJECTION, SOLUTION INTRAVENOUS; SUBCUTANEOUS at 13:25

## 2025-02-13 RX ADMIN — ACETAMINOPHEN 650 MG: 325 TABLET ORAL at 16:22

## 2025-02-13 RX ADMIN — ROSUVASTATIN CALCIUM 10 MG: 10 TABLET, FILM COATED ORAL at 17:57

## 2025-02-13 RX ADMIN — FENTANYL CITRATE 100 MCG: 50 INJECTION, SOLUTION INTRAMUSCULAR; INTRAVENOUS at 15:21

## 2025-02-13 RX ADMIN — HEPARIN SODIUM 4000 UNITS: 200 INJECTION, SOLUTION INTRAVENOUS at 13:44

## 2025-02-13 RX ADMIN — ACETAMINOPHEN 650 MG: 325 TABLET ORAL at 22:08

## 2025-02-13 RX ADMIN — SODIUM CHLORIDE 3 ML/KG/HR: 9 INJECTION, SOLUTION INTRAVENOUS at 15:58

## 2025-02-13 RX ADMIN — LISINOPRIL 5 MG: 5 TABLET ORAL at 17:57

## 2025-02-13 ASSESSMENT — PAIN DESCRIPTION - PAIN TYPE
TYPE: ACUTE PAIN

## 2025-02-13 ASSESSMENT — FIBROSIS 4 INDEX: FIB4 SCORE: 2.51

## 2025-02-13 NOTE — PROCEDURES
"CARDIAC CATHETERIZATION REPORT    REFERRING: myself    PROCEDURE PHYSICIAN: Chad Cortes MD, [facility]  ASSISTANT: None    IMPRESSIONS:  1.  Severe aortic stenosis-D3  2.  Moderate stenosis of the mid LAD and OM 2  3.  Low-lying right coronary artery  4.  Widely patent bilateral iliofemoral system  5.  Procedure complicated by unusual wire position while crossing the aortic valve-LV puncture felt possible though less likely.    Recommendations:  Admit to hospital.  Repeat echocardiogram in 1 hour, if enlarging effusion will place pericardial drain    Pre-procedure diagnosis: Severe aortic stenosis  Post-procedure diagnosis: Same    Procedure performed  Selective coronary angiography  Left heart catheterization  Right heart catheterization    Conscious sedation was supervised by myself and administered by trained personnel using fentanyl and versed between 1434 and 1511. The patient tolerated sedation without complication.     Procedure Description  1. Access: 5/6 Indonesian right radial artery and brachial vein Micropuncture technique was utilized following local anesthesia with lidocaine.  A radial cocktail containing verapamil and saline was administered in the radial artery sheath An antecubital IV was exchanged for a venous sheath over a wire    2. Diagnostic description: The catheter was passed to the central circulation with the aide of J tipped 0.35\" wire. A 5F TIG 4.0 and Dual lumen Slava Pigtail catheter.  were used to inject the coronary circulation and enter the left ventricle during invasive hemodynamic monitoring and simultaneous LV and aortic pressures were recorded  and inject the ascending aorta  and inject the abdominal aorta aortography performed for surgical planning purposes.  and a balloon tipped catheter was passed intravenously through the right heart chambers into wedge position, obtaining hemodynamic information.     During left heart catheterization, I crossed the aortic valve with a " straight wire and an AL-1 catheter.  The wire traversed easily a few centimeters beyond the aortic valve and then I advanced the AL-1 catheter into the chamber.  I then passed a exchange length wire which curiously seemed to wrap the pericardium as opposed to the LV apex.  This raised the concern that the catheter was through the myocardium.  I then removed the catheter and recross the aortic valve in the same manner.  After positioning the AL-1 catheter in the ventricle again the J-tipped wire seemed to wrap the pericardium.  The possibility that passage of the catheter through the left ventricle 2 times in a row seemed exceedingly unlikely though I remained unsatisfied with the position.  I called for an echocardiogram and removed the catheter and wire again from the LV cavity and again crossed in the same manner as before using the straight tip wire and the AL-1 catheter.  This time I did select a slightly different angle of entry through the aortic valve and when advancing the AL-1 catheter into the ventricle felt I had achieved a comfortable position in the left ventricular apex.  I then exchanged for the Slava catheter and completed aortic valve study.    3. Closing: At completion of the procedure the relevant equipment was removed from the body and hemostasis achieved by Radial band and Manual Compression    Findings   Hemodynamics:   Aorta: 133/59 mmHg  LVEDP: 19 mmHg  No significant pullback gradient across the aortic valve  RA: 12 mmHg  RV: 41/14 mmHg  PA: 37/20/27 mmHg  PCWP: 17 mmHg  Cardiac Output/Index (thermodilution): 5.8 L/min, 2.98 L/min/m2  Cardiac Output/Index (Sourav): 5.15 L/min, 2.65 L/min/m2  Aortic valve mean gradient: 26.4 mmHg  MARLENE: 0.99 cm²    Coronary Anatomy   Left Main: Normal   LAD:  50 % stenosis in the mid segment   LCx: Minimal luminal irregularities in the AV groove. The OM1 is normal. The OM2 has ostial 60% stenosis   RCA: Dominant, Minimal luminal  irregularities     Supravalvular aortogram:  Normal caliber aorta.  Annulus appears small.  The right coronary artery appears borderline low.  The sinuses are effaced    Abdominal aorta/iliac angiogram:  Widely patent bilateral iliofemoral system    Technical Factors  1. Complications: None  2. Estimated Blood Loss: <50 cc  3. Specimens: None  4. Contrast Volume: 60 ml  5. Medications: Radial cocktail (Verapamil 2.5 mg, Nitroglycerin 100 mcg) Heparin 5000 Units  6. Radiation (air kerma): 178 mGy

## 2025-02-13 NOTE — OR NURSING
1531 - Patient arrival to PPU after right and left heart cath. Assessed patient with cath lab RN Pamela. Pt awake and alert. Dressing to right brachial is C/D/soft. TR band to right radial is C/D/soft, and pt denies numbness or tinglng to right hand. VSS. Educated patient on plan of care and wrist precautions. Patient complains of 4/10 headache.   1540 - updated patient's son Richmond.  1545 - family at bedside, tolerating oral intake  1415 - belongings returned to patient at bedside  1420 - echo at bedside  1434 - Dr. Cortes at bedside  1701- 3 cc air removed from TR band, site remains C/D/soft  1709 - called report to Vasquez ELAINE. All questions addressed  1716 - 3 cc air removed from TR band, site remains C/D/soft  1717 - patient transported to [facility] on continuous monitoring with RN and family and all personal belongings present. Right brachial site and right radial TR band assessed with RN, at bedside. All questions/concerns addressed.

## 2025-02-14 ENCOUNTER — TELEPHONE (OUTPATIENT)
Dept: CARDIOLOGY | Facility: MEDICAL CENTER | Age: 75
End: 2025-02-14

## 2025-02-14 ENCOUNTER — APPOINTMENT (OUTPATIENT)
Dept: CARDIOLOGY | Facility: MEDICAL CENTER | Age: 75
End: 2025-02-14
Attending: NURSE PRACTITIONER
Payer: MEDICARE

## 2025-02-14 VITALS
SYSTOLIC BLOOD PRESSURE: 121 MMHG | OXYGEN SATURATION: 91 % | BODY MASS INDEX: 37.15 KG/M2 | HEART RATE: 82 BPM | HEIGHT: 63 IN | TEMPERATURE: 97.9 F | DIASTOLIC BLOOD PRESSURE: 59 MMHG | RESPIRATION RATE: 20 BRPM | WEIGHT: 209.66 LBS

## 2025-02-14 LAB
ANION GAP SERPL CALC-SCNC: 10 MMOL/L (ref 7–16)
BUN SERPL-MCNC: 12 MG/DL (ref 8–22)
CALCIUM SERPL-MCNC: 8.9 MG/DL (ref 8.5–10.5)
CHLORIDE SERPL-SCNC: 105 MMOL/L (ref 96–112)
CO2 SERPL-SCNC: 24 MMOL/L (ref 20–33)
CREAT SERPL-MCNC: 0.64 MG/DL (ref 0.5–1.4)
GFR SERPLBLD CREATININE-BSD FMLA CKD-EPI: 91 ML/MIN/1.73 M 2
GLUCOSE SERPL-MCNC: 126 MG/DL (ref 65–99)
POTASSIUM SERPL-SCNC: 4.1 MMOL/L (ref 3.6–5.5)
SODIUM SERPL-SCNC: 139 MMOL/L (ref 135–145)

## 2025-02-14 PROCEDURE — 93325 DOPPLER ECHO COLOR FLOW MAPG: CPT

## 2025-02-14 PROCEDURE — 93325 DOPPLER ECHO COLOR FLOW MAPG: CPT | Mod: 26 | Performed by: INTERNAL MEDICINE

## 2025-02-14 PROCEDURE — 80048 BASIC METABOLIC PNL TOTAL CA: CPT

## 2025-02-14 PROCEDURE — 93308 TTE F-UP OR LMTD: CPT | Mod: 26 | Performed by: INTERNAL MEDICINE

## 2025-02-14 PROCEDURE — A9270 NON-COVERED ITEM OR SERVICE: HCPCS | Performed by: NURSE PRACTITIONER

## 2025-02-14 PROCEDURE — 93321 DOPPLER ECHO F-UP/LMTD STD: CPT | Mod: 26 | Performed by: INTERNAL MEDICINE

## 2025-02-14 PROCEDURE — 700102 HCHG RX REV CODE 250 W/ 637 OVERRIDE(OP): Performed by: NURSE PRACTITIONER

## 2025-02-14 PROCEDURE — G0378 HOSPITAL OBSERVATION PER HR: HCPCS

## 2025-02-14 RX ADMIN — LISINOPRIL 5 MG: 5 TABLET ORAL at 05:32

## 2025-02-14 RX ADMIN — ACETAMINOPHEN 650 MG: 325 TABLET ORAL at 05:31

## 2025-02-14 ASSESSMENT — PAIN DESCRIPTION - PAIN TYPE
TYPE: ACUTE PAIN
TYPE: ACUTE PAIN

## 2025-02-14 ASSESSMENT — FIBROSIS 4 INDEX: FIB4 SCORE: 2.51

## 2025-02-14 NOTE — PROGRESS NOTES
Bedside report received from off going RN/tech: Vasquez, assumed care of patient.     Fall Risk Score:  High  Fall risk interventions in place: Place yellow fall risk ID band on patient, Provide patient/family education based on risk assessment, Educate patient/family to call staff for assistance when getting out of bed, Place fall precaution signage outside patient door, Place patient in room close to nursing station, Utilize bed/chair fall alarm, Notify charge of high risk for huddle, and Bed alarm connected correctly  Bed type: Regular (Steffen Score less than 17 interventions in place)  Patient on cardiac monitor: Yes  IVF/IV medications: Infusion per MAR (List Med(s)) NS at 284.7mL/hr  Oxygen: Room Air  Bedside sitter: Not Applicable   Isolation: Not applicable

## 2025-02-14 NOTE — PROGRESS NOTES
Monitor Summary  Rhythm: SR  Rate: 71-74  Ectopy: couplet R PVC, O PVC,   .17 / .10 / .39

## 2025-02-14 NOTE — TELEPHONE ENCOUNTER
Chad Cortes M.D.  Tracy Gooden R.N.  Can you get her a TAVR pathway appt? She said she didn't want valve replacement in clinic but I think she will. If she says she doesn't want it we can hold on the TAVR path and just get her a regular follow up with me .  Thx  BE

## 2025-02-14 NOTE — DISCHARGE PLANNING
HTH/SCP TCN chart review completed. The most current review of medical record, knowledge of pt's PLOF and social support, LACE+ score of 49 was considered.  Collaborated with TIKA Deal prior to meeting with the pt.     TCN met with patient at bedside. Patient noted to be Fijian speaking with patient  and family noted to be present at bedside. Translation services offered, DIL provided translation at patient/ family request.     Introduced TCN program. Provided education regarding post acute levels of care specific to potential discharge planning needs discussed with CM including outpatient PT and home healthcare services. Patient stated she would be amendable to consideration of home healthcare with choice obtained for HH ([facility], [facility]) and provided to CM. Patient endorsed she has a FWW and wheelchair at home.     Discussed HTH/SCP plan benefits. Pt and family verbalized understanding. Per review noted orders for PT consult as appropriate. In collaboration with CM, current discharge considerations are noted to be home with close outpatient follow up versus home with home healthcare services pending therapy/ physician recommendations. TCN will continue to follow and collaborate with discharge planning team as additional post acute needs arise. Thank you.     Completed today:  PT consult order noted  Choice obtained: HH ([facility], [facility]). (DME  patient noted to have FWW and wheelchair)  SCP with [facility] PCP. Patient noted to have primary care visit scheduled for 3/11

## 2025-02-14 NOTE — PROGRESS NOTES
Bedside report received from off going RN/tech: Pratik, assumed care of patient.     Fall Risk Score: LOW RISK  Fall risk interventions in place: Place yellow fall risk ID band on patient, Provide patient/family education based on risk assessment, Educate patient/family to call staff for assistance when getting out of bed, Place fall precaution signage outside patient door, and Place patient in room close to nursing station  Bed type: Regular (Steffen Score less than 17 interventions in place)  Patient on cardiac monitor: Yes  IVF/IV medications: Not Applicable   Oxygen: Room Air  Bedside sitter: Not Applicable   Isolation: Not applicable

## 2025-02-14 NOTE — DISCHARGE SUMMARY
"  CHIEF COMPLAINT ON ADMISSION  Post Elective Cardiac Catheterization     CODE STATUS  Full    HPI & HOSPITAL COURSE    Madelyn Schmitz is 75 y.o. female is a patient of Dr. Cortes with significant history of nonrheumatic aortic valve sclerosis, diabetes mellitus type 2, hypertension, hyperlipidemia obesity. They are here for elective right and left heart catheterization for AV study given aortic stenosis.    The patient presented to the office with symptoms of progressive fatigue. They were therefore sent for elective cardiac catheterization.      Per Dr Cortes, procedure was \"complicated by unusual wire position while crossing the aortic valve-LV puncture felt possible though less likely.\" She underwent an echocardiogram that showed a small pericardial effusion in Cath Lab.  A repeat echocardiogram a few hours after the procedure again demonstrated a small pericardial effusion.  A follow-up morning Limited echocardiogram once again demonstrated a small pericardial effusion without hemodynamic instability.    Their radial site was clean, dry, and intact with no signs of hematoma, bleeding, or infection. The patient understands discharge instructions related to lifting precautions with  site for one week. They were able to ambulate the halls without any exertional angina. Their vitals and laboratory workup were unremarkable. The patient was given thorough discussion of his discharge instructions per nursing and myself. Patient was discharged to home with family with no further questions/concerns, their outpatient follow up has been made by our office.      FOLLOW UP  Future Appointments   Date Time Provider Department [place]   3/11/2025  1:20 PM ELIGIO Lilly Tar[place]   3/14/2025 10:15 AM KEREN Martínez None   5/1/2025  9:20 AM ELIGIO Blum None           MEDICATIONS ON DISCHARGE   They will continue their home medications    PROCEDURES on 2/13/2025 by Dr. Cortes:  Selective " coronary angiography  Left heart catheterization  Right heart catheterization    IMPRESSIONS:  1.  Severe aortic stenosis-D3  2.  Moderate stenosis of the mid LAD and OM 2  3.  Low-lying right coronary artery  4.  Widely patent bilateral iliofemoral system  5.  Procedure complicated by unusual wire position while crossing the aortic valve-LV puncture felt possible though less likely.    Findings   Hemodynamics:   Aorta: 133/59 mmHg  LVEDP: 19 mmHg  No significant pullback gradient across the aortic valve  RA: 12 mmHg  RV: 41/14 mmHg  PA: 37/20/27 mmHg  PCWP: 17 mmHg  Cardiac Output/Index (thermodilution): 5.8 L/min, 2.98 L/min/m2  Cardiac Output/Index (Sourav): 5.15 L/min, 2.65 L/min/m2  Aortic valve mean gradient: 26.4 mmHg  MARLENE: 0.99 cm²     Coronary Anatomy              Left Main: Normal              LAD:  50 % stenosis in the mid segment              LCx: Minimal luminal irregularities in the AV groove. The OM1 is normal. The OM2 has ostial 60% stenosis              RCA: Dominant, Minimal luminal irregularities                Supravalvular aortogram:  Normal caliber aorta.  Annulus appears small.  The right coronary artery appears borderline low.  The sinuses are effaced     Abdominal aorta/iliac angiogram:  Widely patent bilateral iliofemoral system         LABORATORY  Lab Results   Component Value Date/Time    SODIUM 136 02/14/2018 03:29 AM    POTASSIUM 4.2 02/14/2018 03:29 AM    CHLORIDE 106 02/14/2018 03:29 AM    CO2 22 02/14/2018 03:29 AM    GLUCOSE 95 02/14/2018 03:29 AM    BUN 16 02/14/2018 03:29 AM    CREATININE 1.02 02/14/2018 03:29 AM        Lab Results   Component Value Date/Time    WBC 8.0 02/14/2018 03:29 AM    HEMOGLOBIN 15.0 02/14/2018 03:29 AM    HEMATOCRIT 43.1 02/14/2018 03:29 AM    PLATELETCT 247 02/14/2018 03:29 AM         Medication List        CONTINUE taking these medications        Instructions   CALCIUM/VITAMIN D PO   Take  by mouth.     clobetasol 0.05 % Oint  Commonly known as:  Temovate   Apply 1 Act topically 2 times a day as needed (itching).  Dose: 1 Act     fish oil 1000 MG Caps capsule   Take 1,000 mg by mouth 3 times a day with meals.  Dose: 1,000 mg     glipiZIDE 2.5 MG Tabs   Take 2.5 mg by mouth 2 times a day.  Dose: 2.5 mg     lisinopril 5 MG Tabs  Commonly known as: Prinivil   Take 1 Tablet by mouth every day.  Dose: 5 mg     MAGNESIUM PO   Take  by mouth.     MULTI-VITAMIN DAILY PO   Take  by mouth.     rosuvastatin 10 MG Tabs  Commonly known as: Crestor   Take 10 mg by mouth every evening.  Dose: 10 mg     Vitamin C 1000 MG Tabs   Take  by mouth.                   Please note this dictation was created using voice recognition software.  I have made every reasonable attempt to correct obvious errors, but there may be errors of grammar and possibly content that I did not discover before finalizing the note.    SHANTEL Mendoza.P.R.N.  [facility]  [phone]    Olumiant Pregnancy And Lactation Text: Based on animal studies, Olumiant may cause embryo-fetal harm when administered to pregnant women.  The medication should not be used in pregnancy.  Breastfeeding is not recommended during treatment.

## 2025-02-14 NOTE — PROGRESS NOTES
Madelyn Schmitz has been provided discharge instructions, to include follow up care, home medications, and activity/diet reviewed. Copy of discharge instructions in patient chart, signed and reviewed. Patient verbalizes the understanding of the discharge instructions. Arm band removed. Patient did not have home meds during admit.  Questions and concerns addressed prior to leaving the discharge lounge. Transported via car by family. Patient discharge to home.

## 2025-02-14 NOTE — DISCHARGE INSTRUCTIONS
POST ANGIOGRAM  General Care Instructions  Maintain a bandage over the incision site for 24 hours.  It's normal to find a small bruise or dime-sized lump at the insertion site. This should disappear within a few weeks.  Do not apply lotions or powders to the site.  Do not immerse the catheter insertion site in water (bathtub/swimming) for five days. It is ok to shower 24 hours after the procedure.  You may resume your normal diet immediately; on the day of your procedure, drink 6-10 glasses of water to help flush the contrast liquid out of your system.  If the doctor inserted the catheter in through your groin:  Walking short distances on a flat surface is OK. Limit going up/down stairs for the first 2 days.  DO NOT do yard work, drive, squat, lift heavy objects, or play sports for 2 days; or until your health care provider tells you it is OK.  If the doctor inserted the catheter in your arm:  For 5 days, DO NOT lift anything heavier than 10 pounds (approximately a gallon of milk). DO NOT do any heavy pushing, pulling, or twisting.    Medications  If your current medications need to be changed, you will be provided with an updated list of your medications prior to discharge.  If you take warfarin (Coumadin), resume taking your usual dose the evening after the procedure.  DO NOT STOP taking prescribed blood thinning (anti-platelet) medications unless instructed by your cardiologist.  These medications include:  Aspirin, Clopidogrel (Plavix), Ticagrelor (Brilinta), or Prasugrel (Effient)   If you take one of the following anticoagulants, RESUME 24 HOURS after your procedure:  Apixiban (Eliquis), Rivaroxaban (Xarelto), Dabigatran (Pradaxa), Edoxaban (Savaysa)  If you take metformin (Glucophage), RESUME 48 HOURS after your procedure.    When to call your healthcare provider  Call your cardiologist right away at [phone] if you have any of the following:   Problems/Concerns taking any of your prescribed heart  medicines.   The insertion site has increasing pain, swelling, redness, bleeding, or drainage.   Your arm or leg below where the insertion site changes color, is cool, or is numb.   You have chest pain or shortness of breath that does not go away with rest.   Your pulse feels irregular -- very slow (less than 60 beats/minute) or very fast (over 100 beats/minute).   You have dizziness, fainting, or you are very tired.   You are coughing up blood or yellow or green mucus.   You have chills or a fever over 101°F (38.3°C).    If there is bleeding at the catheter insertion site, apply pressure for 10 minutes.  If bleeding persists, call 911, and continue to hold pressure until advanced medical support arrives.

## 2025-02-14 NOTE — TELEPHONE ENCOUNTER
Hayley RODRIGUEZ called and spoke to patient's spouse Luis. Follow up visit with Dr. Cortes scheduled for 2/19/2025. All questions were answered. Luis verbalized understanding.

## 2025-02-19 ENCOUNTER — OFFICE VISIT (OUTPATIENT)
Dept: CARDIOLOGY | Facility: MEDICAL CENTER | Age: 75
End: 2025-02-19
Attending: INTERNAL MEDICINE
Payer: MEDICARE

## 2025-02-19 VITALS
OXYGEN SATURATION: 96 % | BODY MASS INDEX: 39.2 KG/M2 | HEART RATE: 96 BPM | WEIGHT: 213 LBS | RESPIRATION RATE: 16 BRPM | HEIGHT: 62 IN | DIASTOLIC BLOOD PRESSURE: 64 MMHG | SYSTOLIC BLOOD PRESSURE: 120 MMHG

## 2025-02-19 DIAGNOSIS — E11.59 HYPERTENSION ASSOCIATED WITH DIABETES (HCC): ICD-10-CM

## 2025-02-19 DIAGNOSIS — I35.0 NONRHEUMATIC AORTIC VALVE STENOSIS: ICD-10-CM

## 2025-02-19 DIAGNOSIS — I15.2 HYPERTENSION ASSOCIATED WITH DIABETES (HCC): ICD-10-CM

## 2025-02-19 DIAGNOSIS — I70.0 ATHEROSCLEROSIS OF AORTA (HCC): ICD-10-CM

## 2025-02-19 DIAGNOSIS — Z01.810 PREOPERATIVE CARDIOVASCULAR EXAMINATION: ICD-10-CM

## 2025-02-19 PROCEDURE — 3078F DIAST BP <80 MM HG: CPT | Performed by: INTERNAL MEDICINE

## 2025-02-19 PROCEDURE — 3074F SYST BP LT 130 MM HG: CPT | Performed by: INTERNAL MEDICINE

## 2025-02-19 PROCEDURE — 99215 OFFICE O/P EST HI 40 MIN: CPT | Performed by: INTERNAL MEDICINE

## 2025-02-19 PROCEDURE — G2211 COMPLEX E/M VISIT ADD ON: HCPCS | Performed by: INTERNAL MEDICINE

## 2025-02-19 PROCEDURE — 99212 OFFICE O/P EST SF 10 MIN: CPT | Performed by: INTERNAL MEDICINE

## 2025-02-19 ASSESSMENT — FIBROSIS 4 INDEX: FIB4 SCORE: 2.51

## 2025-02-19 NOTE — PROGRESS NOTES
"CARDIOLOGY OUTPATIENT FOLLOWUP    PCP: Miriam Xiao M.D.    1. Nonrheumatic aortic valve stenosis    2. Hypertension associated with diabetes (HCC)    3. Aortic atherosclerosis (HCC)        Madelyn Schmitz has D3 aortic stenosis with class II-III symptoms.  I advise moving forward with aortic valve replacement.  Will have her complete a TAVR planning CT and assessment with cardiothoracic surgery.    Follow up: 2 weeks    History: Madelyn Schmitz is a 75 y.o. female with history of diabetes, hypertension, hyperlipidemia presenting for follow-up of aortic stenosis and heart catheterization.    The heart catheterization procedure demonstrated a mean gradient of 26 and aortic valve area of less than 1 cm²-an echocardiogram demonstrated a V-max greater than 4 m/s and severely restricted valve opening.  She was also found to have moderate two-vessel coronary artery disease.    The catheterization procedure was complicated by apparent LV puncture during aortic valve crossing.  She was observed in the hospital overnight and had a stable trace/small pericardial effusion which did not require drainage.  She had several days of discomfort in the neck though this has resolved now.    She is accompanied by her  and her daughter-in-law today.  The to report that she has had decreased exertional capacity over the past 6 months.  She also has increased sleepiness-particularly after breakfast.      Physical Exam:  /64 (BP Location: Left arm, Patient Position: Sitting, BP Cuff Size: Adult)   Pulse 96   Resp 16   Ht 1.575 m (5' 2\")   Wt 96.6 kg (213 lb)   LMP  (LMP Unknown)   SpO2 96%   BMI 38.96 kg/m²   GEN: NAD  CARDIAC: regular late peaking systolic ejection murmur diminished and delayed carotid upstrokes  RESP: Clear to auscultation bilaterally  ABD: Soft, non-tender, non-distended  EXT: No edema  NEURO: No focal deficit    Studies interpreted by me: Echo:  Severe aortic stenosis  Today's " encounter addressed an illness with threat to life/bodily function severe aortic stenosis  () Today's E/M visit is associated with medical care services that serve as the continuing focal point for all needed health care services and/or with medical care services that  are part of ongoing care related to a patient's single, serious condition, or a complex condition: This includes  furnishing services to patients on an ongoing basis that result in care that is personalized  to the patient. The services result in a comprehensive, longitudinal, and continuous  relationship with the patient and involve delivery of team-based care that is accessible, coordinated with other practitioners and providers, and integrated with the broader health  care landscape.     The ASCVD Risk score (Kota RAMACHANDRAN, et al., 2019) failed to calculate.    Studies  Lab Results   Component Value Date/Time    CHOLSTRLTOT 126 11/26/2024 07:54 AM    LDL 61 11/26/2024 07:54 AM    HDL 42 11/26/2024 07:54 AM    TRIGLYCERIDE 115 11/26/2024 07:54 AM       Lab Results   Component Value Date/Time    SODIUM 139 02/14/2025 02:27 AM    POTASSIUM 4.1 02/14/2025 02:27 AM    CHLORIDE 105 02/14/2025 02:27 AM    CO2 24 02/14/2025 02:27 AM    GLUCOSE 126 (H) 02/14/2025 02:27 AM    BUN 12 02/14/2025 02:27 AM    CREATININE 0.64 02/14/2025 02:27 AM      Lab Results   Component Value Date/Time    PROTHROMBTM 14.2 02/10/2025 10:00 AM    INR 1.10 02/10/2025 10:00 AM      Lab Results   Component Value Date/Time    WBC 6.9 02/10/2025 10:00 AM    RBC 4.04 (L) 02/10/2025 10:00 AM    HEMOGLOBIN 12.3 02/10/2025 10:00 AM    HEMATOCRIT 39.4 02/10/2025 10:00 AM    MCV 97.5 02/10/2025 10:00 AM    MCH 30.4 02/10/2025 10:00 AM    MCHC 31.2 (L) 02/10/2025 10:00 AM    MPV 8.6 (L) 02/10/2025 10:00 AM    NEUTSPOLYS 63.40 11/26/2024 07:54 AM    LYMPHOCYTES 23.80 11/26/2024 07:54 AM    MONOCYTES 6.80 11/26/2024 07:54 AM    EOSINOPHILS 5.50 11/26/2024 07:54 AM    BASOPHILS 0.40  "11/26/2024 07:54 AM        Past Medical History:   Diagnosis Date    Acute cholecystitis 09/19/2019    Anesthesia     Arthritis     osteo, finger/shoulders    Back pain     Blood clotting disorder ([facility]) 2004    leg    Body mass index (BMI) 38.0-38.9, adult 09/15/2022    Breath shortness 09/07/2022    with exertion    Bronchitis     Cancer ([facility]) 09/07/2022    endometrial cancer    Chickenpox     Dental disorder     upper/lower dentures    Diabetes     oral meds    Diabetes mellitus ([facility]) 09/19/2019      Ref. Range 4/8/2019 13:20 11/6/2019 16:47 Glycohemoglobin Latest Ref Range: 0.0 - 5.6 % 6.6 (H) 5.7 (A) Estim. Avg Glu Latest Units: mg/dL 143     Ref. Range 11/6/2019 16:35 Micro Alb Creat Ratio Latest Ref Range: 0 - 30 mg/g 167 (H)   She has history of well-controlled type 2 diabetes.  She is previously taking metformin 500 mg twice daily however her A1c on recheck was down to 5.7 so we discon    Dysuria 09/11/2019    Fatigue 11/06/2019    Since her hysterectomy and cholecystectomy she has had some trouble bouncing back to her usual self and feels a bit deconditioned. I suspect a trial with prozac will help her energy. She agrees to add a short daily walk with her  to assist with deconditioning.     Heart murmur 09/07/2022    Heart valve disease     \"murmur\"    High cholesterol     HTN (hypertension) 09/15/2022    Hyperlipidemia     Hypertension 09/07/2022    medicated    Hypotension due to hypovolemia 09/26/2019    She has a low blood pressure in office today of 84/52 with a baseline in the 100s to 120s systolic.  This is likely due to her frequent episodes of diarrhea exacerbated by ongoing use of lisinopril.  Yesterday, she felt lightheaded and dizzy but did not pass out or have a fall.  She is having challenges of what is appropriate to eat as she had recent pelvic radiation as well as a cholecystectomy b    Mumps     Nasal drainage     Pneumonia 09/07/2022    12 years ago    PONV (postoperative nausea " and vomiting)     Psychiatric problem 09/07/2022    medicated at time    Sleep apnea 09/07/2022    CPAP    Snoring     Urinary incontinence 09/07/2022    at times     Allergies   Allergen Reactions    Bloodless      Shinto    Ibuprofen Rash and Swelling     .    Penicillins Rash and Swelling     .     Outpatient Encounter Medications as of 2/19/2025   Medication Sig Dispense Refill    Ascorbic Acid (VITAMIN C) 1000 MG Tab Take  by mouth.      rosuvastatin (CRESTOR) 10 MG Tab Take 10 mg by mouth every evening.      clobetasol (TEMOVATE) 0.05 % Ointment Apply 1 Act topically 2 times a day as needed (itching). 60 g 1    glipiZIDE 2.5 MG Tab Take 2.5 mg by mouth 2 times a day. 200 Tablet 3    lisinopril (PRINIVIL) 5 MG Tab Take 1 Tablet by mouth every day. (Patient taking differently: Take 5 mg by mouth every evening.) 90 Tablet 3    Calcium Carb-Cholecalciferol (CALCIUM/VITAMIN D PO) Take  by mouth.      MAGNESIUM PO Take  by mouth.      Omega-3 Fatty Acids (FISH OIL) 1000 MG Cap capsule Take 1,000 mg by mouth 3 times a day with meals.      Multiple Vitamin (MULTI-VITAMIN DAILY PO) Take  by mouth.       No facility-administered encounter medications on file as of 2/19/2025.     Social History     Socioeconomic History    Marital status:      Spouse name: Not on file    Number of children: Not on file    Years of education: Not on file    Highest education level: Not on file   Occupational History    Not on file   Tobacco Use    Smoking status: Never    Smokeless tobacco: Never   Vaping Use    Vaping status: Never Used   Substance and Sexual Activity    Alcohol use: No    Drug use: No    Sexual activity: Not Currently     Partners: Male     Birth control/protection: Post-Menopausal   Other Topics Concern    Not on file   Social History Narrative    She is [place]ian speaking. Madelyn has been  to her  for 50 years. Her daughter-in-law, Delores, participates in her medical care and assists with  translation. She has three grown sons. '72, '74, '77. Has 8 grandchildren. 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 Was born in [facility], Came to the [place] in '79. She is happy with her life and her sons. She enjoys reading, watch tv shows, talk with her friends on the phone. No tob/etoh/drugs.      Social Drivers of Health     Financial Resource Strain: Low Risk  (5/22/2024)    Overall Financial Resource Strain (CARDIA)     Difficulty of Paying Living Expenses: Not hard at all   Food Insecurity: No Food Insecurity (5/22/2024)    Hunger Vital Sign     Worried About Running Out of Food in the Last Year: Never true     Ran Out of Food in the Last Year: Never true   Transportation Needs: No Transportation Needs (5/22/2024)    PRAPARE - Transportation     Lack of Transportation (Medical): No     Lack of Transportation (Non-Medical): No   Physical Activity: Not on file   Stress: Not on file   Social Connections: Not on file   Intimate Partner Violence: Not on file   Housing Stability: Not on file         ROS:   10 point review systems is otherwise negative except as per the HPI    Chief Complaint   Patient presents with    Aortic Stenosis

## 2025-02-19 NOTE — Clinical Note
[facility]  [address] [place] [address] [address]    [id number]    Madelyn Schmitz  [address]  [place] [address]    February 19, 2025    Member Name: Madelyn Schmitz   Member Number: [id number]   Reference Number: [id number]   Approved Services: MRI/CAT Scan   Approved Service Dates: 02/19/2025 - 06/19/2025   Requesting Provider: aHyley Del Rio   Requested Provider: [facility]     Dear Madelyn Schmitz:    The following medical service(s) requested by Hayley Del Rio have been approved:    Procedure Code Procedure Code Name Approved Quantity Status   19410 (CPT®) [place] CT ANGIO, CHEST (NON-CORON), COMBO, INCL * 1 Authorized   42317 (CPT®) [place] ANGIO ABD&PLVIS CNTRST MTRL W/WO CNTRST IMGES 1 Authorized       The services should be provided by [facility] no later than 06/19/2025. Please contact the provider listed below with any questions.     Provider Information:  [facility]  [phone]    Your plan benefit may require a deductible, co-payment or coinsurance for these services. This authorization does not guarantee [facility] will pay the claim for services that you receive. Payment by [facility] for these services is subject to the terms of your Evidence of Coverage, your eligibility at the time of service, and confirmation of benefit coverage.    For any questions or additional information, please contact Customer Service:    [facility] Toll Free: [phone]  TTY users dial: [phone]   Call Center Hours:  Oct 1 - Mar 31, Mon - Fri 7 AM to 8 PM PST  Oct 1 - Mar 31, Sat - Sun 8 AM to 8 PM PST  Apr 1 - Sep 30, Mon - Fri 7 AM to 8 PM PST   Office Hours: Mon - Fri 8 AM to 5 PM PST   E-mail: [email or web address]   Website:  [email or web address]      This information is available for free in other languages. Please contact Customer Service at the phone number above  for more information. [facility] complies with applicable Federal civil rights laws and does not discriminate on the basis of race, color, national origin, age, disability or sex.    Sincerely,     Healthcare Utilization Management Department     Cc: [facility]   Hayley Del Rio    Multi-Language Insert  Multi- Services  English: We have free  services to answer any questions you may have about our health or drug plan.  To get an , just call us at [phone].  Someone who speaks English/Language can help you.  This is a free service.  Irish: Tenemos servicios de intérprete sin costo alguno  para responder cualquier pregunta que pueda tener sobre nuestro plan de clem o medicamentos. Para hablar con un intérprete, por favor llame al [phone]. Alguien que hable español le podrá ayudar. Sugey es un servicio gratuito.  Chinese Mandarin: ?????????????????????????????? ???????????????? [phone]????????????????? ?????????  Chinese Cantonese: ?????????????????????????????? ????????????? [phone]???????????????????? ????????  Tagalog:  Luis herrera serbisyo sa patelsasaling-reyes ahmadi hinggil sa liz madrid o panggamot.  Nathan grissom tagasalingwarren dubon sa [phone]. Ankit grissom Tagalog.  Amado godinez.  Nigerian:  Nous proposons demetris services gratuits d'interprétation pour répondre à toutes johnathan questions relatives à notre régime de santé ou d'assurance-médicaments. Pour accéder au service d'interprétation, il vous suffit de nous appeler au [phone]. Un interlocuteur parCore[facility] Health [facility] Hospital Françs pourra vous aider. Ce service est gratuit.  Bhutanese:  Nory pascal có d?ch v? thông d?ch mi?n phí ð? tr? l?i các câu h?i v? chýõng s?c kh?e và chjustog trình thu?c men. N?u quí v? c?n thông d?ch viên erin g?i  [phone] s? có nhân viên nói ti?ng Vi?t giúp ð? quí v?. Ðây là d?ch v? mi?n phí .  Burundian:  Unser kostenser Dolmetscherservice beantwortet Ihren Fragen zu unserem Gesundheits- und Arzneimittelplan. Unsere Dolmetscher erreichen Sie [phone]. Man wird Ihnen whitley auf [facility]. Dieser Service ist vasylShriners Hospitals for Children.  Serbian:  ??? ?? ?? ?? ?? ??? ?? ??? ?? ???? ?? ?? ???? ???? ????. ?? ???? ????? ?? [phone] ??? ??? ????.  ???? ?? ???? ?? ?? ????. ? ???? ??? ?????.   Burundian: Åñëè ó âàñ âîçíèêíóò âîïðîñû îòíîñèòåëüíî ñòðàõîâîãî èëè ìåäèêàìåíòíîãî ïëàíà, âû ìîæåòå âîñïîëüçîâàòüñÿ íàøèìè áåñïëàòíûìè óñëóãàìè ïåðåâîä÷èêîâ. ×òîáû âîñïîëüçîâàòüñÿ óñëóãàìè ïåðåâîä÷èêà, ïîçâîíèòå íàì ïî òåëåôîíó [phone]. Âàì îêàæåò ïîìîùü ñîòðóäíèê, êîòîðûé ãîâîðèò ïî-póññêè. Äàííàÿ óñëóãà áåñïëàòíàÿ.  Macedonian: ÅääÇ äÞÏã ÎÏãÇÊ ÇáãÊÑÌã ÇáÝæÑí ÇáãÌÇäíÉ ááÅÌÇÈÉ Úä Ãí ÃÓÆáÉ ÊÊÚáÞ ÈÇáÕÍÉ Ãæ ÌÏæá ÇáÃÏæíÉ áÏíäÇ. ááÍÕæá Úáì ãÊÑÌã ÝæÑí¡ áíÓ Úáíß Óæì ÇáÇÊÕÇá ÈäÇ Úáì [phone] . ÓíÞæã ÔÎÕ ãÇ íÊÍÏË ÇáÚÑÈíÉ ÈãÓÇÚÏÊß. åÐå ÎÏãÉ ãÌÇäíÉ.  Suly: ????? ????????? ?? ??? ?? ????? ?? ???? ??? ???? ???? ?? ?????? ?? ???? ???? ?? ??? ????? ??? ????? ???????? ?????? ?????? ???. ?? ???????? ??????? ???? ?? ???, ?? ???? [phone] ?? ??? ????. ??? ??????? ?? ?????? ????? ?? ???? ??? ?? ???? ??. ?? ?? ????? ???? ??.   Marshallese:  È disponibile un servizio di interpretariato gratuito per rispondere a eventuali domande sul nostro piano sanitario e farmaceutico. Per un interprete, contattare il rosalba [phone]. Un nostro incaricato miriam parla Italianovi fornirà l'assistenza necessaria. È un servizio gratuito.  Portugués:  Dispomos de serviços de interpretação gratuitos para responder a qualquer questão que tenha acerca do nosso plano de saúde ou de medicação. Para obter um intérprete, contacte-nos através do número [phone]. Irá encontrar alguém que fale o idioma  Português para o ajudar. Sugey serviço é gratuito.  Lithuanian  Creole:  Nou genyen sèvis entèprèt gratis anibal reponn tout kesyon ou ta genyen konsènan plan medikal oswa dwòg nou an.  Anibal jwenn yon entèprèt, jis rele nou nan [phone]. Yon moun ki pale Kreyòl kapab vamshi w.  Sa a se yon sèvis ki gratis.  Polish:  Umo¿liwiamy bezp³atne skorzystanie z us³ug t³umacza ustnego, który pomo¿e w uzyskaniu odpowiedzi na temat planu zdrowotnego lub dawkowania leków. Claudia skorzystaæ z pomocy t³umacza znaj¹cego isis hunt¿y zadzwoniæ pod numer [phone]. Ta us³uga jest bezp³atna.  Chilean: ????? ??????? ????????????????????? ??????????????????????????????????[phone] ???????????????? ? ????????????????? ?????

## 2025-02-21 NOTE — PROGRESS NOTES
REFERRING PHYSICIAN: Chad Cortes MD.     CONSULTING PHYSICIAN: Monika Chery MD     CHIEF COMPLAINT: Fatigue    HISTORY OF PRESENT ILLNESS: The patient is a 75 y.o. female with a past medical history significant for type II diabetes mellitus, hypertension, hyperlipidemia, obstructive sleep apnea, history of endometrial cancer, aortic stenosis, who presents today with progressive decline in her exercise capacity over the last 6 months due to fatigue and shortness of breath. She reports associated pleuritic chest pain, as well as, orthostatic dizziness occuring intermittently. She denies chest pain, palpitations, syncope, orthopnea, lower extremity edema. She has expressed her wish to be bloodless secondary to Mosque beliefs. She is accompanied by two of her daughter-in-laws, who provided Cuban translation, for the entirety of this consultation.     PAST MEDICAL HISTORY:   Active Ambulatory Problems     Diagnosis Date Noted    Sleep apnea 04/06/2018    Hypertension associated with diabetes ([facility]) 04/06/2018    History of endometrial cancer 08/01/2019    Type 2 diabetes mellitus with microalbuminuria, without long-term current use of insulin ([facility]) 09/19/2019    Hyperlipidemia associated with type 2 diabetes mellitus ([facility]) 11/06/2019    Menopause 11/06/2019    Sessile colonic polyp 11/11/2019    Aortic atherosclerosis ([facility]) 03/11/2020    Osteopenia 03/11/2020    Abnormal mammogram 03/11/2020    Nonrheumatic aortic valve sclerosis 03/31/2021    Left leg pain 03/31/2021    Nonrheumatic aortic valve stenosis 03/31/2021    GERD (gastroesophageal reflux disease) 09/30/2021    Umbilical hernia without obstruction and without gangrene 07/08/2022    S/P repair of ventral hernia 09/09/2022    Drug-induced constipation 09/15/2022    Class 2 severe obesity due to excess calories with serious comorbidity and body mass index (BMI) of 37.0 to 37.9 in adult ([facility]) 09/15/2022    Gait instability 09/23/2022    Tension type  headache 08/03/2023    Vitamin D deficiency 08/30/2023    Seasonal affective disorder (HCC) 02/26/2024    Generalized abdominal pain 04/16/2024    Weakness of both lower extremities 05/22/2024    History of fall 05/22/2024    Urinary incontinence 05/22/2024    Severe aortic stenosis 02/13/2025     Resolved Ambulatory Problems     Diagnosis Date Noted    Dysuria 09/11/2019    Acute cholecystitis 09/19/2019    Diarrhea 09/26/2019    Hypotension due to hypovolemia 09/26/2019    Fatigue 11/06/2019    Suprapubic pain, acute 02/23/2021    Adjustment disorder 03/31/2021    HTN (hypertension) 09/15/2022    Body mass index (BMI) 38.0-38.9, adult 09/15/2022     Past Medical History:   Diagnosis Date    Anesthesia     Arthritis     Back pain     Blood clotting disorder ([facility]) 2004    Breath shortness 09/07/2022    Bronchitis     Cancer ([facility]) 09/07/2022    Chickenpox     Dental disorder     Diabetes     Diabetes mellitus ([facility]) 09/19/2019    Heart murmur 09/07/2022    Heart valve disease     High cholesterol     Hyperlipidemia     Hypertension 09/07/2022    Mumps     Nasal drainage     Pneumonia 09/07/2022    PONV (postoperative nausea and vomiting)     Psychiatric problem 09/07/2022    Snoring        PAST SURGICAL HISTORY:   Past Surgical History:   Procedure Laterality Date    LAPAROSCOPIC INCISIONAL HERNIA REPAIR N/A 9/9/2022    Procedure: LAPAROSCOPIC REPAIR OF INCISIONAL HERNIA;  Surgeon: Sim Anaya M.D.;  Location: [facility];  Service: General    GABY BY LAPAROSCOPY  9/19/2019    Procedure: CHOLECYSTECTOMY, LAPAROSCOPIC;  Surgeon: Jenniffer Johnson M.D.;  Location: SURGERY [facility];  Service: General    HYSTERECTOMY ROBOTIC XI  6/27/2019    Procedure: HYSTERECTOMY, ROBOT-ASSISTED, USING DA YO XI;  Surgeon: Alexandr Mancia M.D.;  Location: SURGERY [facility];  Service: Gynecology    SALPINGO OOPHORECTOMY Bilateral 6/27/2019    Procedure: SALPINGO-OOPHORECTOMY;  Surgeon: Alexandr Mancia M.D.;   Location: SURGERY [facility];  Service: Gynecology    NODE BIOPSY SENTINEL  6/27/2019    Procedure: BIOPSY, LYMPH NODE, SENTINEL;  Surgeon: Alexandr Mancia M.D.;  Location: SURGERY [facility];  Service: Gynecology    HYSTEROSCOPY WITH MYOSURE N/A 4/17/2019    Procedure: HYSTEROSCOPY, WITH TISSUE REMOVAL, USING HYSTEROSCOPIC ROTATING CUTTER BLADE - DIAGNOSTIC;  Surgeon: Racquel Gatica M.D.;  Location: SURGERY SAME DAY [facility];  Service: Gynecology    DILATION AND CURETTAGE N/A 4/17/2019    Procedure: DILATION AND CURETTAGE;  Surgeon: Racquel Gatica M.D.;  Location: SURGERY SAME DAY [facility];  Service: Gynecology    PRIMARY C SECTION  1972/1974/1977    x 3        ALLERGIES:   Allergies   Allergen Reactions    Bloodless      Orthodox    Ibuprofen Rash and Swelling     .    Penicillins Rash and Swelling     .        CURRENT MEDICATIONS:   Current Outpatient Medications:     Ascorbic Acid (VITAMIN C) 1000 MG Tab, Take  by mouth., Disp: , Rfl:     rosuvastatin (CRESTOR) 10 MG Tab, Take 10 mg by mouth every evening., Disp: , Rfl:     clobetasol (TEMOVATE) 0.05 % Ointment, Apply 1 Act topically 2 times a day as needed (itching)., Disp: 60 g, Rfl: 1    glipiZIDE 2.5 MG Tab, Take 2.5 mg by mouth 2 times a day., Disp: 200 Tablet, Rfl: 3    lisinopril (PRINIVIL) 5 MG Tab, Take 1 Tablet by mouth every day. (Patient taking differently: Take 5 mg by mouth every evening.), Disp: 90 Tablet, Rfl: 3    Calcium Carb-Cholecalciferol (CALCIUM/VITAMIN D PO), Take  by mouth., Disp: , Rfl:     MAGNESIUM PO, Take  by mouth., Disp: , Rfl:     Omega-3 Fatty Acids (FISH OIL) 1000 MG Cap capsule, Take 1,000 mg by mouth 3 times a day with meals., Disp: , Rfl:     Multiple Vitamin (MULTI-VITAMIN DAILY PO), Take  by mouth., Disp: , Rfl:     FAMILY HISTORY:   Family History   Problem Relation Age of Onset    Heart Disease Father     Asthma Brother     Asthma Brother     Cancer Maternal Aunt         gyn cancer     Sleep Apnea Neg Hx         SOCIAL HISTORY:   Social History     Socioeconomic History    Marital status:      Spouse name: Not on file    Number of children: Not on file    Years of education: Not on file    Highest education level: Not on file   Occupational History    Not on file   Tobacco Use    Smoking status: Never    Smokeless tobacco: Never   Vaping Use    Vaping status: Never Used   Substance and Sexual Activity    Alcohol use: No    Drug use: No    Sexual activity: Not Currently     Partners: Male     Birth control/protection: Post-Menopausal   Other Topics Concern    Not on file   Social History Narrative    She is Thai speaking. Madelyn has been  to her  for 50 years. Her daughter-in-law, Delores, participates in her medical care and assists with translation. She has three grown sons. '72, '74, '77. Has 8 grandchildren. 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 Was [place] in [facility] [facility], Came to the  in '79. She is happy with her life and her sons. She enjoys reading, watch tv shows, talk with her friends on the phone. No tob/etoh/drugs.      Social Drivers of Health     Financial Resource Strain: Low Risk  (5/22/2024)    Overall Financial Resource Strain (CARDIA)     Difficulty of Paying Living Expenses: Not hard at all   Food Insecurity: No Food Insecurity (5/22/2024)    Hunger Vital Sign     Worried About Running Out of Food in the Last Year: Never true     Ran Out of Food in the Last Year: Never true   Transportation Needs: No Transportation Needs (5/22/2024)    PRAPARE - Transportation     Lack of Transportation (Medical): No     Lack of Transportation (Non-Medical): No   Physical Activity: Not on file   Stress: Not on file   Social Connections: Not on file   Intimate Partner Violence: Not on file   Housing Stability: Not on file       REVIEW OF SYSTEMS:  Review of Systems   Constitutional:  Positive for malaise/fatigue. Negative for  "chills, fever and weight loss.   HENT:  Negative for ear pain, nosebleeds and tinnitus.    Eyes:  Negative for double vision, photophobia and pain.   Respiratory:  Positive for shortness of breath. Negative for cough and hemoptysis.    Cardiovascular:  Positive for chest pain (pleuritic). Negative for palpitations, orthopnea, leg swelling and PND.   Gastrointestinal:  Negative for abdominal pain, blood in stool, nausea and vomiting.   Genitourinary:  Negative for frequency, hematuria and urgency.   Musculoskeletal: Negative.    Skin:  Negative for rash.   Neurological:  Positive for dizziness (intermittent, orthostatic). Negative for tremors, speech change, focal weakness, seizures and headaches.   Endo/Heme/Allergies:  Negative for polydipsia. Does not bruise/bleed easily.   Psychiatric/Behavioral:  Negative for hallucinations and memory loss.      PHYSICAL EXAMINATION:    /72 (BP Location: Right arm, Patient Position: Sitting, BP Cuff Size: Adult)   Pulse 78   Temp 36.3 °C (97.4 °F) (Temporal)   Ht 1.575 m (5' 2\")   Wt 96.6 kg (213 lb)   LMP  (LMP Unknown)   SpO2 90%   BMI 38.96 kg/m²    Physical Exam  Vitals reviewed.   Constitutional:       General: She is not in acute distress.     Appearance: Normal appearance.   HENT:      Head: Normocephalic and atraumatic.      Right Ear: External ear normal.      Left Ear: External ear normal.      Nose: Nose normal. No congestion.   Eyes:      General: No scleral icterus.     Extraocular Movements: Extraocular movements intact.      Conjunctiva/sclera: Conjunctivae normal.   Cardiovascular:      Rate and Rhythm: Normal rate and regular rhythm.   Pulmonary:      Effort: Pulmonary effort is normal. No respiratory distress.   Abdominal:      General: Abdomen is flat. There is no distension.   Musculoskeletal:         General: Normal range of motion.      Cervical back: Normal range of motion.   Skin:     General: Skin is warm and dry.   Neurological:      Mental " Status: She is alert and oriented to person, place, and time. Mental status is at baseline.      Cranial Nerves: No cranial nerve deficit.   Psychiatric:         Mood and Affect: Mood normal.         Judgment: Judgment normal.             LABS REVIEWED:  Lab Results   Component Value Date/Time    SODIUM 139 02/14/2025 02:27 AM    POTASSIUM 4.1 02/14/2025 02:27 AM    CHLORIDE 105 02/14/2025 02:27 AM    CO2 24 02/14/2025 02:27 AM    GLUCOSE 126 (H) 02/14/2025 02:27 AM    BUN 12 02/14/2025 02:27 AM    CREATININE 0.64 02/14/2025 02:27 AM      Lab Results   Component Value Date/Time    PROTHROMBTM 14.2 02/10/2025 10:00 AM    INR 1.10 02/10/2025 10:00 AM      Lab Results   Component Value Date/Time    WBC 6.9 02/10/2025 10:00 AM    RBC 4.04 (L) 02/10/2025 10:00 AM    HEMOGLOBIN 12.3 02/10/2025 10:00 AM    HEMATOCRIT 39.4 02/10/2025 10:00 AM    MCV 97.5 02/10/2025 10:00 AM    MCH 30.4 02/10/2025 10:00 AM    MCHC 31.2 (L) 02/10/2025 10:00 AM    MPV 8.6 (L) 02/10/2025 10:00 AM    NEUTSPOLYS 63.40 11/26/2024 07:54 AM    LYMPHOCYTES 23.80 11/26/2024 07:54 AM    MONOCYTES 6.80 11/26/2024 07:54 AM    EOSINOPHILS 5.50 11/26/2024 07:54 AM    BASOPHILS 0.40 11/26/2024 07:54 AM        IMAGING REVIEWED AND INTERPRETED:    ECHOCARDIOGRAM   12/23/24 [facility]  CONCLUSIONS  Normal left ventricular systolic function. The left ventricular   ejection fraction is visually estimated to be 70%. The ejection   fraction is measured to be  69 % by Roberts's biplane.   The right ventricle is normal in size and systolic function.  Mild to moderate mitral stenosis.  Mean transvalvular gradient is 5.4   mmHg at a heart rate of  83 BPM.  Moderate to sever aortic valve stenosis. Transvalvular gradients are -   Peak:63 mmHg, Mean: 33 mmHg. Vmax is 4.2  m/s. Aortic valve area   calculated from the continuity equation is 1.1 cm2.   Estimated right ventricular systolic pressure is 35 mmHg.  Compared to the prior study on 12/09/2022, aortic stenosis is now    moderate to severe and mitral stenos is now mild to moderate.    CARDIAC CATHETERIZATION   2/13/25 [facility]  Findings   Hemodynamics:   Aorta: 133/59 mmHg  LVEDP: 19 mmHg  No significant pullback gradient across the aortic valve  RA: 12 mmHg  RV: 41/14 mmHg  PA: 37/20/27 mmHg  PCWP: 17 mmHg  Cardiac Output/Index (thermodilution): 5.8 L/min, 2.98 L/min/m2  Cardiac Output/Index (Sourav): 5.15 L/min, 2.65 L/min/m2  Aortic valve mean gradient: 26.4 mmHg  MARLENE: 0.99 cm²     Coronary Anatomy              Left Main: Normal              LAD:  50 % stenosis in the mid segment              LCx: Minimal luminal irregularities in the AV groove. The OM1 is normal. The OM2 has ostial 60% stenosis              RCA: Dominant, Minimal luminal irregularities                Supravalvular aortogram:  Normal caliber aorta.  Annulus appears small.  The right coronary artery appears borderline low.  The sinuses are effaced     Abdominal aorta/iliac angiogram:  Widely patent bilateral iliofemoral system    CT SCAN CHEST -TAVR  Scheduled 2/27/25 [facility] at 0900      IMPRESSION:   75 y.o. female with a past medical history significant for type II diabetes mellitus, hypertension, hyperlipidemia, obstructive sleep apnea, history of endometrial cancer, bloodless, now with severe symptomatic, aortic stenosis      PLAN:  I recommend that the patient is a reasonable TAVR candidate given advanced age, strong patient preference. We will continue to follow workup and discuss in multidisciplinary conference.    We did discuss that in the extremely rare case of a serious cardiac or aortic injury there may be a situation where the only way to possibly save her life is to perform sternotomy. The patient and her daughters-in-law will discuss this at home and let the team know before the procedure what her wishes are for this scenario. We did discuss that her wishes are to remain without blood transfusions regardless of what therapies she accepts otherwise.        The STS mortality risk score for SAVR is 3% and the morbidity and mortality risk score is 10%. The scores were discussed with patient.    Thank you for this very challenging consultation and participation in the patient’s care.  I will keep you apprised of all future developments.      Sincerely,     Monika Chery MD

## 2025-02-26 ENCOUNTER — OFFICE VISIT (OUTPATIENT)
Facility: MEDICAL CENTER | Age: 75
End: 2025-02-26
Payer: MEDICARE

## 2025-02-26 ENCOUNTER — DOCUMENTATION (OUTPATIENT)
Dept: CARDIOLOGY | Facility: MEDICAL CENTER | Age: 75
End: 2025-02-26

## 2025-02-26 ENCOUNTER — NON-PROVIDER VISIT (OUTPATIENT)
Dept: CARDIOLOGY | Facility: MEDICAL CENTER | Age: 75
End: 2025-02-26
Attending: NURSE PRACTITIONER
Payer: MEDICARE

## 2025-02-26 VITALS
OXYGEN SATURATION: 90 % | BODY MASS INDEX: 39.2 KG/M2 | DIASTOLIC BLOOD PRESSURE: 72 MMHG | WEIGHT: 213 LBS | HEART RATE: 78 BPM | SYSTOLIC BLOOD PRESSURE: 112 MMHG | HEIGHT: 62 IN | TEMPERATURE: 97.4 F

## 2025-02-26 DIAGNOSIS — I35.0 SEVERE AORTIC STENOSIS: ICD-10-CM

## 2025-02-26 ASSESSMENT — ENCOUNTER SYMPTOMS
DOUBLE VISION: 0
SEIZURES: 0
POLYDIPSIA: 0
HEADACHES: 0
EYE PAIN: 0
CHILLS: 0
SPEECH CHANGE: 0
BRUISES/BLEEDS EASILY: 0
WEIGHT LOSS: 0
BLOOD IN STOOL: 0
FEVER: 0
NAUSEA: 0
SHORTNESS OF BREATH: 1
PHOTOPHOBIA: 0
ORTHOPNEA: 0
HEMOPTYSIS: 0
ABDOMINAL PAIN: 0
PND: 0
MUSCULOSKELETAL NEGATIVE: 1
MEMORY LOSS: 0
VOMITING: 0
TREMORS: 0
COUGH: 0
HALLUCINATIONS: 0
DIZZINESS: 1
FOCAL WEAKNESS: 0
PALPITATIONS: 0

## 2025-02-26 ASSESSMENT — FIBROSIS 4 INDEX: FIB4 SCORE: 2.51

## 2025-02-26 ASSESSMENT — PATIENT HEALTH QUESTIONNAIRE - PHQ9: CLINICAL INTERPRETATION OF PHQ2 SCORE: 0

## 2025-02-27 ENCOUNTER — HOSPITAL ENCOUNTER (OUTPATIENT)
Dept: RADIOLOGY | Facility: MEDICAL CENTER | Age: 75
End: 2025-02-27
Attending: NURSE PRACTITIONER
Payer: MEDICARE

## 2025-02-27 ENCOUNTER — RESULTS FOLLOW-UP (OUTPATIENT)
Dept: CARDIOLOGY | Facility: MEDICAL CENTER | Age: 75
End: 2025-02-27

## 2025-02-27 DIAGNOSIS — Z01.810 PREOPERATIVE CARDIOVASCULAR EXAMINATION: ICD-10-CM

## 2025-02-27 DIAGNOSIS — J84.10 PULMONARY GRANULOMA (HCC): ICD-10-CM

## 2025-02-27 DIAGNOSIS — K57.90 DIVERTICULOSIS: ICD-10-CM

## 2025-02-27 DIAGNOSIS — N32.89 BLADDER WALL THICKENING: ICD-10-CM

## 2025-02-27 DIAGNOSIS — I35.0 NONRHEUMATIC AORTIC VALVE STENOSIS: ICD-10-CM

## 2025-02-27 DIAGNOSIS — J90 PLEURAL EFFUSION: ICD-10-CM

## 2025-02-27 DIAGNOSIS — I31.39 PERICARDIAL EFFUSION: ICD-10-CM

## 2025-02-27 PROCEDURE — 76497 UNLISTED CT PROCEDURE: CPT

## 2025-02-27 PROCEDURE — 700117 HCHG RX CONTRAST REV CODE 255: Performed by: NURSE PRACTITIONER

## 2025-02-27 RX ADMIN — IOHEXOL 100 ML: 350 INJECTION, SOLUTION INTRAVENOUS at 09:00

## 2025-02-27 NOTE — PROGRESS NOTES
Valve Program Functional Assessment:     KCCQ12   1a) Showering/bathin  1b) Walking 1 block on ground: 1  1c) Hurrying or joggin  2) Swellin  3) Fatigue: 1  4) Shortness of breath: 2  5) Sleep sitting up: 5  6) Limited enjoyment of life: 2  7) Spend the rest of your life with HF: 1  8a) Hobbies, recreational activities:2  8b) Working or doing household chores:2  8c) Visiting family or friends: 2    5 meter walk test  1) __7.70____ s/5m  2) __7.62____ s/5m  3) __6.20____ s/5m  AVG:_7.17______     Strength   1) _18_____ kg  2) _18_____ kg  3) _14_____ kg  AVG:__16.6____    SHEPHERD ADLs  Patient independently preforms...   - Bathing: Yes   - Dressing: Yes   - Toileting: Yes   - Transferring: Yes   - Continence: Yes   - Feeding: Yes   Total Score: _6_/6    Living Situation  Patient lives:  with spouse    Mobility Aids   Patient uses:  none      FRAILTY SCORE: _1_/ 4

## 2025-02-27 NOTE — PROGRESS NOTES
"Valve Program Consultation: 2/26/2025 for tentative TAVR 3/24/2025    Family member acted as  during visit.    Mental Status Assessment:  Appearance: normal  Behavior: normal  Mood/Affect: normal  Thought process/content: normal  Cognition: slightly limited d/t language barrier  Functional ability: normal  Dental Concerns: dentures; patient denies s/s of active gum infection/irritation  Further mental assessment needed: no    Post-op Plan of Care:  Support systems: daughter in laws Delores and Corina both present at consult today and participating in discussion  Patient understands discharge plan: yes, with assistance from family  DME: CPAP (advised to bring to procedure)  Home health warranted prior to procedure: no  Social concerns for discharge: patient denies drug/ETOH use/abuse  PCP alerted of social concerns: no  POLST: none  Advance directive: DPOA-HC on file dated 2/8/2025  Post-op goal: \"to feel better than I do; have more energy\"  Medication instructions: hold all vitamins and supplements 7 days prior, hold Lisinopril 1 day prior, hold Glipizide the morning of the procedure    Concerns prior to procedure: bloodless    Met with patient during New TAVR consult.     All patient's questions and concerns were addressed during this visit. They understood pre-operative and post-operative plan of care.    Reviewed patient TAVR education packet explaining that information is provided regarding preparation for TAVR the night prior, what to expect during the hospital stay, average LOS, and what to look out for post TAVR discharge. Explained that patient will require SBE prophylactic antibiotic prior to any dental treatment post TAVR. Advised patient not to receive any new vaccinations within 1 week pre- and 1 week post-procedure.     Explained that patient should not eat or drink anything past midnight the day of the procedure. Encouraged patient to wear something clean and comfortable, easy to get on/off to " check in Monday morning, time TBD. Patient may have friends and family in the pre-operational area until patient is taken to the operating room, at which time family and friends will be asked to wait on floor 1 [facility] surgical waiting area. On completion of TAVR, heart team will update family. Once update is given, there is some time before family/friends may visit patient in their assigned room. Length of stay on average is one night, however patient may stay longer depending on specific needs at that time. Patient given printed instructions sheet with all above stated instructions. Patient states understanding of all material and education presented today and has no further questions at this time. Encouraged patient again, to contact me with any questions or concerns during this work-up process. Patient states understanding.

## 2025-03-11 ENCOUNTER — HOSPITAL ENCOUNTER (OUTPATIENT)
Facility: MEDICAL CENTER | Age: 75
End: 2025-03-11
Attending: FAMILY MEDICINE
Payer: MEDICARE

## 2025-03-11 ENCOUNTER — TELEPHONE (OUTPATIENT)
Dept: MEDICAL GROUP | Facility: PHYSICIAN GROUP | Age: 75
End: 2025-03-11

## 2025-03-11 ENCOUNTER — OFFICE VISIT (OUTPATIENT)
Dept: MEDICAL GROUP | Facility: PHYSICIAN GROUP | Age: 75
End: 2025-03-11
Payer: MEDICARE

## 2025-03-11 VITALS
HEART RATE: 86 BPM | OXYGEN SATURATION: 95 % | WEIGHT: 208.1 LBS | TEMPERATURE: 97.1 F | DIASTOLIC BLOOD PRESSURE: 70 MMHG | HEIGHT: 62 IN | SYSTOLIC BLOOD PRESSURE: 120 MMHG | BODY MASS INDEX: 38.29 KG/M2

## 2025-03-11 DIAGNOSIS — J84.10 PULMONARY GRANULOMA (HCC): ICD-10-CM

## 2025-03-11 DIAGNOSIS — E11.69 HYPERLIPIDEMIA ASSOCIATED WITH TYPE 2 DIABETES MELLITUS (HCC): ICD-10-CM

## 2025-03-11 DIAGNOSIS — R93.41 ABNORMAL CT SCAN, BLADDER: ICD-10-CM

## 2025-03-11 DIAGNOSIS — I15.2 HYPERTENSION ASSOCIATED WITH DIABETES (HCC): ICD-10-CM

## 2025-03-11 DIAGNOSIS — Z71.9 ENCOUNTER FOR CONSULTATION: ICD-10-CM

## 2025-03-11 DIAGNOSIS — E11.59 HYPERTENSION ASSOCIATED WITH DIABETES (HCC): ICD-10-CM

## 2025-03-11 DIAGNOSIS — E78.5 HYPERLIPIDEMIA ASSOCIATED WITH TYPE 2 DIABETES MELLITUS (HCC): ICD-10-CM

## 2025-03-11 LAB
APPEARANCE UR: CLEAR
BACTERIA #/AREA URNS HPF: ABNORMAL /HPF
BILIRUB UR QL STRIP.AUTO: NEGATIVE
CASTS URNS QL MICRO: ABNORMAL /LPF (ref 0–2)
COLOR UR: YELLOW
EPITHELIAL CELLS 1715: ABNORMAL /HPF (ref 0–5)
GLUCOSE UR STRIP.AUTO-MCNC: NEGATIVE MG/DL
KETONES UR STRIP.AUTO-MCNC: NEGATIVE MG/DL
LEUKOCYTE ESTERASE UR QL STRIP.AUTO: ABNORMAL
MICRO URNS: ABNORMAL
NITRITE UR QL STRIP.AUTO: NEGATIVE
PH UR STRIP.AUTO: 7.5 [PH] (ref 5–8)
PROT UR QL STRIP: NEGATIVE MG/DL
RBC # URNS HPF: ABNORMAL /HPF (ref 0–2)
RBC UR QL AUTO: NEGATIVE
SP GR UR STRIP.AUTO: 1.02
UROBILINOGEN UR STRIP.AUTO-MCNC: 1 EU/DL
WBC #/AREA URNS HPF: ABNORMAL /HPF

## 2025-03-11 PROCEDURE — 3078F DIAST BP <80 MM HG: CPT | Performed by: FAMILY MEDICINE

## 2025-03-11 PROCEDURE — 99215 OFFICE O/P EST HI 40 MIN: CPT | Performed by: FAMILY MEDICINE

## 2025-03-11 PROCEDURE — 3074F SYST BP LT 130 MM HG: CPT | Performed by: FAMILY MEDICINE

## 2025-03-11 PROCEDURE — 81001 URINALYSIS AUTO W/SCOPE: CPT

## 2025-03-11 RX ORDER — LISINOPRIL 5 MG/1
5 TABLET ORAL EVERY EVENING
Qty: 100 TABLET | Refills: 3 | Status: SHIPPED | OUTPATIENT
Start: 2025-03-11

## 2025-03-11 RX ORDER — ROSUVASTATIN CALCIUM 10 MG/1
10 TABLET, COATED ORAL EVERY EVENING
Qty: 100 TABLET | Refills: 3 | Status: SHIPPED | OUTPATIENT
Start: 2025-03-11

## 2025-03-11 ASSESSMENT — ENCOUNTER SYMPTOMS
PALPITATIONS: 0
FEVER: 0
CHILLS: 0
SHORTNESS OF BREATH: 1
COUGH: 0

## 2025-03-11 ASSESSMENT — FIBROSIS 4 INDEX: FIB4 SCORE: 2.51

## 2025-03-11 NOTE — PROGRESS NOTES
Verbal consent was acquired by the patient to use Scripted ambient listening note generation during this visit         History of Present Illness  The patient presents today for a follow-up visit. She was last seen on 12/10/2024. She is accompanied by her .      She underwent an echocardiogram on December 23 with findings of moderate to severe aortic stenosis, mild to moderate mitral stenosis, severely dilated left atrium.  She was then referred to cardiology and had her follow-up with Dr. Cortes on January 29 and underwent catheterization on February 13.  TAVR was recommended.  Patient had her consultation with cardiothoracic surgery, Dr. Emily Chery on February 26 she underwent preoperative CT which had findings of mild wall thickening and stranding of the bladder as well as small bilateral pleural effusions and scattered calcified granulomas.    She continues to complain of fatigue and denies any lower extremity edema.  Has lost approximately 5 pounds over the last 2 weeks.    Review of Systems   Constitutional:  Positive for malaise/fatigue. Negative for chills and fever.   Respiratory:  Positive for shortness of breath (occasional). Negative for cough.    Cardiovascular:  Positive for chest pain (occasional). Negative for palpitations and leg swelling.   Genitourinary:  Negative for dysuria and hematuria.        Nocturia x 3       Outpatient Medications Marked as Taking for the 3/11/25 encounter (Office Visit) with Miriam Xiao M.D.   Medication Sig Dispense Refill    rosuvastatin (CRESTOR) 10 MG Tab Take 1 Tablet by mouth every evening. 100 Tablet 3    lisinopril (PRINIVIL) 5 MG Tab Take 1 Tablet by mouth every evening. 100 Tablet 3    Ascorbic Acid (VITAMIN C) 1000 MG Tab Take  by mouth.      clobetasol (TEMOVATE) 0.05 % Ointment Apply 1 Act topically 2 times a day as needed (itching). 60 g 1    glipiZIDE 2.5 MG Tab Take 2.5 mg by mouth 2 times a day. 200 Tablet 3    Calcium Carb-Cholecalciferol  "(CALCIUM/VITAMIN D PO) Take  by mouth.      MAGNESIUM PO Take  by mouth.      Omega-3 Fatty Acids (FISH OIL) 1000 MG Cap capsule Take 1,000 mg by mouth 3 times a day with meals.      Multiple Vitamin (MULTI-VITAMIN DAILY PO) Take  by mouth.         /70   Pulse 86   Temp 36.2 °C (97.1 °F) (Temporal)   Ht 1.575 m (5' 2\")   Wt 94.4 kg (208 lb 1.6 oz)   SpO2 95% , Body mass index is 38.06 kg/m².        Physical Exam  Constitutional:       Appearance: Normal appearance.   Eyes:      Extraocular Movements: Extraocular movements intact.   Cardiovascular:      Rate and Rhythm: Normal rate and regular rhythm.      Heart sounds: Murmur heard.   Pulmonary:      Effort: Pulmonary effort is normal.      Breath sounds: Normal breath sounds.   Neurological:      Mental Status: She is alert.   Psychiatric:         Mood and Affect: Mood normal.         Behavior: Behavior normal.         Results         Assessment & Plan  1. Hypertension associated with diabetes (HCC)  Chronic. Stable.  Blood pressure today is 120/70.  Continue with lisinopril 5 mg daily  - lisinopril (PRINIVIL) 5 MG Tab; Take 1 Tablet by mouth every evening.  Dispense: 100 Tablet; Refill: 3    2. Hyperlipidemia associated with type 2 diabetes mellitus (HCC)  Chronic medical diagnosis.  Stable.  Continue with Crestor 10 mg daily.  November labs had total cholesterol less than 200 at 126.  And LDL level at 61.  - rosuvastatin (CRESTOR) 10 MG Tab; Take 1 Tablet by mouth every evening.  Dispense: 100 Tablet; Refill: 3    3. Abnormal CT scan, bladder  New medical diagnosis.  Patient underwent CT scan 2 weeks ago and was found to have mild wall thickening and stranding of the bladder.  Radiologist recommended checking a urinalysis.  She denies any hematuria, dysuria, or fever or chills.  - URINALYSIS,CULTURE IF INDICATED; Future    4. Pulmonary granuloma (HCC)  5. Encounter for consultation  New medical diagnosis.  She underwent CT scan for pre-TAVR workup 2 " weeks ago and was found to have scattered calcified granulomas.  Discussed with patient and spouse a consultation in the immediate co-pay.  Both agreed to proceed.  - E-consult to Pulmonology/Sleep     Orders Placed This Encounter    URINALYSIS,CULTURE IF INDICATED    rosuvastatin (CRESTOR) 10 MG Tab    lisinopril (PRINIVIL) 5 MG Tab    E-consult to Pulmonology/Sleep        HCC Gap Form    Diagnosis to address: J84.10 - Pulmonary granuloma (HCC)  Assessment and plan: Chronic, stable.    Noted on recent CT scan completed February 2025 for preop for TAVR..  E Consultation pulmonology placed today.  Last edited 03/11/25 14:01 PDT by Miriam Xiao M.D.            I spent a total of 49 minutes which included time preparing to see the patient (reviewing my last note, records and recent lab results)  I also performed a medically appropriate examination, counseled and educated the patient, ordered medications and tests.   communication with other providers, and documentation of this encounter.     This note was created using voice recognition software (Dragon) and [employer]. The accuracy of the dictation is limited by the abilities of the software.  . Occasional transcription errors may have escaped proof reading. I have made every reasonable attempt to correct obvious errors, but I expect that there are errors of grammar and possibly content that I did not discover before finalizing the note. ~

## 2025-03-11 NOTE — TELEPHONE ENCOUNTER
Pts daughter in law called to ask about fluid in pts lungs pt is suppose to have another heart procedure in a few weeks and would like to know if the fluid in lungs is a concern.  Aowyn:[phone]

## 2025-03-12 DIAGNOSIS — Z00.6 EXAMINATION OF PARTICIPANT IN CLINICAL TRIAL: ICD-10-CM

## 2025-03-12 DIAGNOSIS — I35.0 SEVERE AORTIC STENOSIS: ICD-10-CM

## 2025-03-12 NOTE — TELEPHONE ENCOUNTER
Spoke with pts daughter in law let her know pulm will reach out to us and we will call her back.  Pt wanted us to know her heart procedure is on march 24th.

## 2025-03-13 ENCOUNTER — HOSPITAL ENCOUNTER (OUTPATIENT)
Facility: MEDICAL CENTER | Age: 75
End: 2025-03-13
Attending: INTERNAL MEDICINE | Admitting: INTERNAL MEDICINE
Payer: MEDICARE

## 2025-03-13 ENCOUNTER — RESULTS FOLLOW-UP (OUTPATIENT)
Dept: MEDICAL GROUP | Facility: PHYSICIAN GROUP | Age: 75
End: 2025-03-13
Payer: MEDICARE

## 2025-03-14 ENCOUNTER — APPOINTMENT (OUTPATIENT)
Dept: ADMISSIONS | Facility: MEDICAL CENTER | Age: 75
End: 2025-03-14
Attending: INTERNAL MEDICINE
Payer: MEDICARE

## 2025-03-17 ENCOUNTER — DOCUMENTATION (OUTPATIENT)
Dept: CARDIOLOGY | Facility: MEDICAL CENTER | Age: 75
End: 2025-03-17
Payer: MEDICARE

## 2025-03-17 NOTE — PROGRESS NOTES
Ashish TAVR review: Suboptimal non con CT, measurements are not accurate, please use BAV/NAVEED to confirm sizing.  Consider a 23mm S3UR from a right femoral approach  - Effaced Sinuses  - L18, Cr8

## 2025-03-18 ENCOUNTER — PRE-ADMISSION TESTING (OUTPATIENT)
Dept: ADMISSIONS | Facility: MEDICAL CENTER | Age: 75
End: 2025-03-18
Attending: INTERNAL MEDICINE
Payer: MEDICARE

## 2025-03-18 NOTE — OR NURSING
Pre admit phone call completed with pt and daughter in law Delores Schmitz who acted as . Chart was updated. Pt to come to [facility] for testing on 3/20/2025. Pt is Bloodless.Bloodless listed as allergy. Consent placed in chart.

## 2025-03-19 ENCOUNTER — TELEPHONE (OUTPATIENT)
Dept: CARDIOLOGY | Facility: MEDICAL CENTER | Age: 75
End: 2025-03-19
Payer: MEDICARE

## 2025-03-19 ENCOUNTER — TELEPHONE (OUTPATIENT)
Dept: MEDICAL GROUP | Facility: PHYSICIAN GROUP | Age: 75
End: 2025-03-19
Payer: MEDICARE

## 2025-03-19 NOTE — TELEPHONE ENCOUNTER
Pts daughter in law called again to check if there was any update from the pulmonologist as the pts heart procedure is on  march 24th.

## 2025-03-19 NOTE — TELEPHONE ENCOUNTER
Valve Conference Plan of Care: 3/19/2025           TAVR Candidate: no, reviewed prior imaging and team recommends NAEVED first to confirm AS severity.

## 2025-03-20 ENCOUNTER — APPOINTMENT (OUTPATIENT)
Dept: ADMISSIONS | Facility: MEDICAL CENTER | Age: 75
End: 2025-03-20
Attending: INTERNAL MEDICINE
Payer: MEDICARE

## 2025-03-20 DIAGNOSIS — Z01.810 PRE-OPERATIVE CARDIOVASCULAR EXAMINATION: ICD-10-CM

## 2025-03-20 DIAGNOSIS — Z01.812 PRE-OPERATIVE LABORATORY EXAMINATION: ICD-10-CM

## 2025-03-20 NOTE — TELEPHONE ENCOUNTER
Used [employer]  Baljeet ID# [id number] to call patient.     Notified of VCD and recommendations for NAVEED. Patient and  would prefer to be seen in office to discuss further. Scheduled visit with Hayley RODRIGUEZ 3/31/2025. Offered sooner appt, but this date worked better for them.     They were notified the preadmit appt scheduled today was cancelled.     Call time - 25mins

## 2025-03-24 DIAGNOSIS — J98.4 CALCIFIED GRANULOMA OF LUNG: ICD-10-CM

## 2025-03-24 DIAGNOSIS — J90 PLEURAL EFFUSION: ICD-10-CM

## 2025-03-28 ENCOUNTER — HOSPITAL ENCOUNTER (OUTPATIENT)
Dept: RADIOLOGY | Facility: MEDICAL CENTER | Age: 75
End: 2025-03-28
Attending: INTERNAL MEDICINE
Payer: MEDICARE

## 2025-03-28 ENCOUNTER — OFFICE VISIT (OUTPATIENT)
Dept: SLEEP MEDICINE | Facility: MEDICAL CENTER | Age: 75
End: 2025-03-28
Attending: INTERNAL MEDICINE
Payer: MEDICARE

## 2025-03-28 VITALS
OXYGEN SATURATION: 100 % | DIASTOLIC BLOOD PRESSURE: 72 MMHG | HEART RATE: 90 BPM | WEIGHT: 209 LBS | SYSTOLIC BLOOD PRESSURE: 124 MMHG | HEIGHT: 63 IN | BODY MASS INDEX: 37.03 KG/M2

## 2025-03-28 DIAGNOSIS — I35.0 AORTIC VALVE STENOSIS, ETIOLOGY OF CARDIAC VALVE DISEASE UNSPECIFIED: ICD-10-CM

## 2025-03-28 DIAGNOSIS — J90 PLEURAL EFFUSION: ICD-10-CM

## 2025-03-28 DIAGNOSIS — J98.4 CALCIFIED GRANULOMA OF LUNG: ICD-10-CM

## 2025-03-28 PROCEDURE — 71046 X-RAY EXAM CHEST 2 VIEWS: CPT

## 2025-03-28 PROCEDURE — 99212 OFFICE O/P EST SF 10 MIN: CPT | Performed by: INTERNAL MEDICINE

## 2025-03-28 ASSESSMENT — ENCOUNTER SYMPTOMS
SPEECH CHANGE: 0
EYE DISCHARGE: 0
COUGH: 0
STRIDOR: 0
DIARRHEA: 0
SINUS PAIN: 0
ORTHOPNEA: 0
DEPRESSION: 0
HEARTBURN: 0
DOUBLE VISION: 0
WEIGHT LOSS: 0
HEADACHES: 0
PHOTOPHOBIA: 0
FEVER: 0
TREMORS: 0
CHILLS: 0
PALPITATIONS: 0
MYALGIAS: 0
VOMITING: 0
ABDOMINAL PAIN: 0
EYE REDNESS: 0
CLAUDICATION: 0
FALLS: 0
DIAPHORESIS: 0
NAUSEA: 0
HEMOPTYSIS: 0
WEAKNESS: 0
WHEEZING: 0
EYE PAIN: 0
SORE THROAT: 0
DIZZINESS: 0
SPUTUM PRODUCTION: 0
FOCAL WEAKNESS: 0
BLURRED VISION: 0
PND: 0
NECK PAIN: 0
BACK PAIN: 0
CONSTIPATION: 0
SHORTNESS OF BREATH: 0

## 2025-03-28 ASSESSMENT — FIBROSIS 4 INDEX: FIB4 SCORE: 2.51

## 2025-03-28 NOTE — PROGRESS NOTES
Chief Complaint   Patient presents with    New Patient     REF BY DR. HOPKINS FOR Pleural effusion, Calcified granuloma of lung    Results      CT CHEST 2/27/25       HPI: This patient is a 75 y.o. female presenting for evaluation of calcified granuloma and pleural effusion.  The patient is a lifelong non-smoker.  Past medical history significant for hypertension currently well-controlled, dyslipidemia on therapy, type 2 diabetes on oral medications and valvular disease including severe aortic stenosis and moderate mitral stenosis.  She is retired currently but worked mainly in [employer] in [place].  No pets in the home.  Family history is notable for father who smoked and suffer from cardiac issues and mother with asthma.  The patient herself has no respiratory history.  Over the past 2 months she has been undergoing evaluation for aortic valve replacement and underwent right and left heart cath on February 13.  She was noted to have moderate stenosis of her mid LAD and OM 2 but no need for intervention.  She was also found to have severe aortic stenosis.  Her procedure was complicated by pericardial effusion which remained small on serial imaging.  She was seen by thoracic surgery who recommended TAVR.  She underwent CT for TAVR on 2/27/2025.  This revealed small persistent pericardial effusion, right upper lobe calcified granuloma present since 2019 and a small left pleural effusion.  Initially an E consult was placed to pulmonary for granuloma?  A formal consult was placed on 3/24 for unknown reasons without follow-up imaging.  Patient denies new shortness of breath.  No pleuritic chest pain.  No edema.  No cough.  She is currently pending transfer esophageal echo to relook at her aortic valve.    Past Medical History:   Diagnosis Date    Acute cholecystitis 09/19/2019    Anesthesia     Arthritis     osteo, finger/shoulders    Back pain     Blood clotting disorder (HCC) 2004    leg approx 1999    Body mass  "index (BMI) 38.0-38.9, adult 09/15/2022    Breath shortness 03/18/2024    with exertion    Bronchitis     Cancer (HCC) 09/07/2022    endometrial cancer    Chickenpox     Dental disorder     upper/lower dentures    Diabetes     oral meds    Diabetes mellitus (HCC) 09/19/2019      Ref. Range 4/8/2019 13:20 11/6/2019 16:47 Glycohemoglobin Latest Ref Range: 0.0 - 5.6 % 6.6 (H) 5.7 (A) Estim. Avg Glu Latest Units: mg/dL 143     Ref. Range 11/6/2019 16:35 Micro Alb Creat Ratio Latest Ref Range: 0 - 30 mg/g 167 (H)   She has history of well-controlled type 2 diabetes.  She is previously taking metformin 500 mg twice daily however her A1c on recheck was down to 5.7 so we discon    Dysuria 09/11/2019    Fatigue 11/06/2019    Since her hysterectomy and cholecystectomy she has had some trouble bouncing back to her usual self and feels a bit deconditioned. I suspect a trial with prozac will help her energy. She agrees to add a short daily walk with her  to assist with deconditioning.     Heart murmur 09/07/2022    Heart valve disease     \"murmur\"    High cholesterol     medicated    HTN (hypertension) 09/15/2022    Hyperlipidemia     Hypertension 09/07/2022    medicated    Hypotension due to hypovolemia 09/26/2019    She has a low blood pressure in office today of 84/52 with a baseline in the 100s to 120s systolic.  This is likely due to her frequent episodes of diarrhea exacerbated by ongoing use of lisinopril.  Yesterday, she felt lightheaded and dizzy but did not pass out or have a fall.  She is having challenges of what is appropriate to eat as she had recent pelvic radiation as well as a cholecystectomy b    Mumps     Nasal drainage     Pneumonia 09/07/2022    12 years ago    PONV (postoperative nausea and vomiting)     Psychiatric problem 09/07/2022    medicated at time    Sleep apnea 09/07/2022    CPAP    Urinary incontinence 09/07/2022    at times       Social History     Socioeconomic History    Marital status: "      Spouse name: Not on file    Number of children: Not on file    Years of education: Not on file    Highest education level: Not on file   Occupational History    Not on file   Tobacco Use    Smoking status: Never    Smokeless tobacco: Never   Vaping Use    Vaping status: Never Used   Substance and Sexual Activity    Alcohol use: No    Drug use: No    Sexual activity: Not Currently     Partners: Male     Birth control/protection: Post-Menopausal   Other Topics Concern    Not on file   Social History Narrative    She is Anguill[place] speaking. Madelyn has been  to her  for 50 years. Her daughter-in-law, Delores, participates in her medical care and assists with translation. She has three grown sons. '72, '74, '77. Has 8 grandchildren. 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 Was born in [facility], Came to the  in '79. She is happy with her life and her sons. She enjoys reading, watch tv shows, talk with her friends on the phone. No tob/etoh/drugs.      Social Drivers of Health     Financial Resource Strain: Low Risk  (5/22/2024)    Overall Financial Resource Strain (CARDIA)     Difficulty of Paying Living Expenses: Not hard at all   Food Insecurity: No Food Insecurity (5/22/2024)    Hunger Vital Sign     Worried About Running Out of Food in the Last Year: Never true     Ran Out of Food in the Last Year: Never true   Transportation Needs: No Transportation Needs (5/22/2024)    PRAPARE - Transportation     Lack of Transportation (Medical): No     Lack of Transportation (Non-Medical): No   Physical Activity: Not on file   Stress: Not on file   Social Connections: Not on file   Intimate Partner Violence: Not on file   Housing Stability: Not on file       Family History   Problem Relation Age of Onset    Heart Disease Father     Asthma Brother     Asthma Brother     Cancer Maternal Aunt         gyn cancer    Sleep Apnea Neg Hx        Current Outpatient  Medications on File Prior to Visit   Medication Sig Dispense Refill    rosuvastatin (CRESTOR) 10 MG Tab Take 1 Tablet by mouth every evening. 100 Tablet 3    lisinopril (PRINIVIL) 5 MG Tab Take 1 Tablet by mouth every evening. (Patient taking differently: Take 5 mg by mouth every day.) 100 Tablet 3    Ascorbic Acid (VITAMIN C) 1000 MG Tab Take 1,000 mg by mouth every day.      glipiZIDE 2.5 MG Tab Take 2.5 mg by mouth 2 times a day. 200 Tablet 3    clobetasol (TEMOVATE) 0.05 % Ointment Apply 1 Act topically 2 times a day as needed (itching). 60 g 1    MAGNESIUM PO Take 500 mg by mouth every day.      Multiple Vitamin (MULTI-VITAMIN DAILY PO) Take 1 Tablet by mouth every day.       No current facility-administered medications on file prior to visit.       Allergies: Bloodless, Ibuprofen, and Penicillins    ROS:   Review of Systems   Constitutional:  Negative for chills, diaphoresis, fever, malaise/fatigue and weight loss.   HENT:  Negative for congestion, ear discharge, ear pain, hearing loss, nosebleeds, sinus pain, sore throat and tinnitus.    Eyes:  Negative for blurred vision, double vision, photophobia, pain, discharge and redness.   Respiratory:  Negative for cough, hemoptysis, sputum production, shortness of breath, wheezing and stridor.    Cardiovascular:  Negative for chest pain, palpitations, orthopnea, claudication, leg swelling and PND.   Gastrointestinal:  Negative for abdominal pain, constipation, diarrhea, heartburn, nausea and vomiting.   Genitourinary:  Negative for dysuria and urgency.   Musculoskeletal:  Negative for back pain, falls, joint pain, myalgias and neck pain.   Skin:  Negative for itching and rash.   Neurological:  Negative for dizziness, tremors, speech change, focal weakness, weakness and headaches.   Endo/Heme/Allergies:  Negative for environmental allergies.   Psychiatric/Behavioral:  Negative for depression.        /72 (BP Location: Right arm, Patient Position: Sitting, BP  "Cuff Size: Adult)   Pulse 90   Ht 1.6 m (5' 3\")   Wt 94.8 kg (209 lb)   SpO2 100%     Physical Exam:  Physical Exam  Constitutional:       General: She is not in acute distress.     Appearance: Normal appearance. She is well-developed and normal weight.   HENT:      Head: Normocephalic and atraumatic.      Right Ear: External ear normal.      Left Ear: External ear normal.      Nose: Nose normal. No congestion.      Mouth/Throat:      Mouth: Mucous membranes are moist.      Pharynx: Oropharynx is clear. No oropharyngeal exudate.   Eyes:      General: No scleral icterus.     Extraocular Movements: Extraocular movements intact.      Conjunctiva/sclera: Conjunctivae normal.      Pupils: Pupils are equal, round, and reactive to light.   Neck:      Vascular: No JVD.      Trachea: No tracheal deviation.   Cardiovascular:      Rate and Rhythm: Normal rate and regular rhythm.      Heart sounds: Murmur heard.      No friction rub. No gallop.      Comments: Late peaking systolic ejection murmur  Pulmonary:      Effort: Pulmonary effort is normal. No accessory muscle usage or respiratory distress.      Breath sounds: Normal breath sounds. No wheezing or rales.   Abdominal:      General: There is no distension.      Palpations: Abdomen is soft.      Tenderness: There is no abdominal tenderness.   Musculoskeletal:         General: No tenderness or deformity. Normal range of motion.      Cervical back: Normal range of motion and neck supple.      Right lower leg: No edema.      Left lower leg: No edema.   Lymphadenopathy:      Cervical: No cervical adenopathy.   Skin:     General: Skin is warm and dry.      Findings: No rash.      Nails: There is no clubbing.   Neurological:      Mental Status: She is alert and oriented to person, place, and time.      Cranial Nerves: No cranial nerve deficit.      Gait: Gait normal.   Psychiatric:         Behavior: Behavior normal.         PFTs as reviewed by me personally: None    Imaging " as reviewed by me personally: As per HPI    Assessment:  1. Pleural effusion  DX-CHEST-2 VIEWS      2. Calcified granuloma of lung        3. Aortic valve stenosis, etiology of cardiac valve disease unspecified            Plan:  Small and was noted 2 weeks after complicated left heart catheterization which also resulted in pericardial effusion suggesting postprocedural complication.  This was small and her exam is normal today therefore low suspicion for persistence.  Will repeat chest x-ray.    Benign.  Stable since 2019.  No follow-up indicated.  Followed by cardiology with plans for transesophageal echo.  Return if symptoms worsen or fail to improve.

## 2025-03-31 ENCOUNTER — OFFICE VISIT (OUTPATIENT)
Dept: CARDIOLOGY | Facility: MEDICAL CENTER | Age: 75
End: 2025-03-31
Attending: NURSE PRACTITIONER
Payer: MEDICARE

## 2025-03-31 ENCOUNTER — TELEPHONE (OUTPATIENT)
Dept: CARDIOLOGY | Facility: MEDICAL CENTER | Age: 75
End: 2025-03-31

## 2025-03-31 VITALS
BODY MASS INDEX: 37.56 KG/M2 | OXYGEN SATURATION: 95 % | WEIGHT: 212 LBS | SYSTOLIC BLOOD PRESSURE: 114 MMHG | DIASTOLIC BLOOD PRESSURE: 60 MMHG | HEIGHT: 63 IN | HEART RATE: 95 BPM | RESPIRATION RATE: 14 BRPM

## 2025-03-31 DIAGNOSIS — I35.0 SEVERE AORTIC STENOSIS: ICD-10-CM

## 2025-03-31 DIAGNOSIS — R80.9 TYPE 2 DIABETES MELLITUS WITH MICROALBUMINURIA, WITHOUT LONG-TERM CURRENT USE OF INSULIN (HCC): ICD-10-CM

## 2025-03-31 DIAGNOSIS — E66.01 CLASS 2 SEVERE OBESITY DUE TO EXCESS CALORIES WITH SERIOUS COMORBIDITY AND BODY MASS INDEX (BMI) OF 37.0 TO 37.9 IN ADULT (HCC): ICD-10-CM

## 2025-03-31 DIAGNOSIS — E11.59 HYPERTENSION ASSOCIATED WITH DIABETES (HCC): ICD-10-CM

## 2025-03-31 DIAGNOSIS — E11.29 TYPE 2 DIABETES MELLITUS WITH MICROALBUMINURIA, WITHOUT LONG-TERM CURRENT USE OF INSULIN (HCC): ICD-10-CM

## 2025-03-31 DIAGNOSIS — I15.2 HYPERTENSION ASSOCIATED WITH DIABETES (HCC): ICD-10-CM

## 2025-03-31 DIAGNOSIS — T73.3XXD FATIGUE DUE TO EXCESSIVE EXERTION, SUBSEQUENT ENCOUNTER: ICD-10-CM

## 2025-03-31 DIAGNOSIS — E66.812 CLASS 2 SEVERE OBESITY DUE TO EXCESS CALORIES WITH SERIOUS COMORBIDITY AND BODY MASS INDEX (BMI) OF 37.0 TO 37.9 IN ADULT (HCC): ICD-10-CM

## 2025-03-31 DIAGNOSIS — E78.5 HYPERLIPIDEMIA ASSOCIATED WITH TYPE 2 DIABETES MELLITUS (HCC): ICD-10-CM

## 2025-03-31 DIAGNOSIS — I70.0 ATHEROSCLEROSIS OF AORTA (HCC): ICD-10-CM

## 2025-03-31 DIAGNOSIS — G47.30 SLEEP APNEA, UNSPECIFIED TYPE: ICD-10-CM

## 2025-03-31 DIAGNOSIS — E11.69 HYPERLIPIDEMIA ASSOCIATED WITH TYPE 2 DIABETES MELLITUS (HCC): ICD-10-CM

## 2025-03-31 PROBLEM — I35.8 NONRHEUMATIC AORTIC VALVE SCLEROSIS: Status: RESOLVED | Noted: 2021-03-31 | Resolved: 2025-03-31

## 2025-03-31 PROBLEM — J90 PLEURAL EFFUSION: Status: RESOLVED | Noted: 2025-02-27 | Resolved: 2025-03-31

## 2025-03-31 PROBLEM — I31.39 PERICARDIAL EFFUSION: Status: RESOLVED | Noted: 2025-02-27 | Resolved: 2025-03-31

## 2025-03-31 PROCEDURE — 99212 OFFICE O/P EST SF 10 MIN: CPT | Performed by: NURSE PRACTITIONER

## 2025-03-31 ASSESSMENT — ENCOUNTER SYMPTOMS
MYALGIAS: 0
ORTHOPNEA: 0
SHORTNESS OF BREATH: 1
PALPITATIONS: 0
ABDOMINAL PAIN: 0
COUGH: 0
PND: 0
DIZZINESS: 0
FEVER: 0
CLAUDICATION: 0

## 2025-03-31 ASSESSMENT — FIBROSIS 4 INDEX: FIB4 SCORE: 2.51

## 2025-03-31 NOTE — PROGRESS NOTES
Chief Complaint   Patient presents with    Follow-Up     Nonrheumatic aortic valve stenosis    Hyperlipidemia    Hypertension     Subjective     Madelyn Schmitz is a 75 y.o. female who presents today for review of moderate/severe aortic stenosis management.    She is a patient of Dr. Cortes in our office. Hx of moderate-severe aortic stenosis, HLD with DM type II, CANDIDA on CPAP, obesity, aorta atherosclerosis, and generalized fatigue.    She presents today with her  and daughter in law (translating for her-as patient is Finnish speaking).    She has symptoms of generalized and exertional fatigue alongside frequent urination.    No other cardiac symptoms to report.    She has no chest pain, shortness of breath, edema, dizziness/lightheadedness, or palpitations.    Past Medical History:   Diagnosis Date    Acute cholecystitis 09/19/2019    Anesthesia     Arthritis     osteo, finger/shoulders    Back pain     Blood clotting disorder ([facility]) 2004    leg approx 1999    Body mass index (BMI) 38.0-38.9, adult 09/15/2022    Breath shortness 03/18/2024    with exertion    Bronchitis     Cancer ([facility]) 09/07/2022    endometrial cancer    Chickenpox     Dental disorder     upper/lower dentures    Diabetes     oral meds    Diabetes mellitus ([facility]) 09/19/2019      Ref. Range 4/8/2019 13:20 11/6/2019 16:47 Glycohemoglobin Latest Ref Range: 0.0 - 5.6 % 6.6 (H) 5.7 (A) Estim. Avg Glu Latest Units: mg/dL 143     Ref. Range 11/6/2019 16:35 Micro Alb Creat Ratio Latest Ref Range: 0 - 30 mg/g 167 (H)   She has history of well-controlled type 2 diabetes.  She is previously taking metformin 500 mg twice daily however her A1c on recheck was down to 5.7 so we discon    Dysuria 09/11/2019    Fatigue 11/06/2019    Since her hysterectomy and cholecystectomy she has had some trouble bouncing back to her usual self and feels a bit deconditioned. I suspect a trial with prozac will help her energy. She agrees to add a short daily walk  "with her  to assist with deconditioning.     Heart murmur 09/07/2022    Heart valve disease     \"murmur\"    High cholesterol     medicated    HTN (hypertension) 09/15/2022    Hyperlipidemia     Hypertension 09/07/2022    medicated    Hypotension due to hypovolemia 09/26/2019    She has a low blood pressure in office today of 84/52 with a baseline in the 100s to 120s systolic.  This is likely due to her frequent episodes of diarrhea exacerbated by ongoing use of lisinopril.  Yesterday, she felt lightheaded and dizzy but did not pass out or have a fall.  She is having challenges of what is appropriate to eat as she had recent pelvic radiation as well as a cholecystectomy b    Mumps     Nasal drainage     Pneumonia 09/07/2022    12 years ago    PONV (postoperative nausea and vomiting)     Psychiatric problem 09/07/2022    medicated at time    Sleep apnea 09/07/2022    CPAP    Urinary incontinence 09/07/2022    at times     Past Surgical History:   Procedure Laterality Date    LAPAROSCOPIC INCISIONAL HERNIA REPAIR N/A 9/9/2022    Procedure: LAPAROSCOPIC REPAIR OF INCISIONAL HERNIA;  Surgeon: Sim Anaya M.D.;  Location: [facility];  Service: General    GABY BY LAPAROSCOPY  9/19/2019    Procedure: CHOLECYSTECTOMY, LAPAROSCOPIC;  Surgeon: Jenniffer Johnson M.D.;  Location: [facility];  Service: General    HYSTERECTOMY ROBOTIC XI  6/27/2019    Procedure: HYSTERECTOMY, ROBOT-ASSISTED, USING DA YO XI;  Surgeon: Alexandr Mancia M.D.;  Location: [facility];  Service: Gynecology    SALPINGO OOPHORECTOMY Bilateral 6/27/2019    Procedure: SALPINGO-OOPHORECTOMY;  Surgeon: Alexandr Mancia M.D.;  Location: [facility];  Service: Gynecology    NODE BIOPSY SENTINEL  6/27/2019    Procedure: BIOPSY, LYMPH NODE, SENTINEL;  Surgeon: Alexandr Mancia M.D.;  Location: [facility];  Service: Gynecology    HYSTEROSCOPY WITH MYOSURE N/A 4/17/2019    Procedure: " HYSTEROSCOPY, WITH TISSUE REMOVAL, USING HYSTEROSCOPIC ROTATING CUTTER BLADE - DIAGNOSTIC;  Surgeon: Racquel Gatica M.D.;  Location: SURGERY SAME DAY [facility];  Service: Gynecology    DILATION AND CURETTAGE N/A 4/17/2019    Procedure: DILATION AND CURETTAGE;  Surgeon: Racquel Gatica M.D.;  Location: SURGERY SAME DAY [facility];  Service: Gynecology    PRIMARY C SECTION  1972/1974/1977    x 3     Family History   Problem Relation Age of Onset    Heart Disease Father     Asthma Brother     Asthma Brother     Cancer Maternal Aunt         gyn cancer    Sleep Apnea Neg Hx      Social History     Socioeconomic History    Marital status:      Spouse name: Not on file    Number of children: Not on file    Years of education: Not on file    Highest education level: Not on file   Occupational History    Not on file   Tobacco Use    Smoking status: Never    Smokeless tobacco: Never   Vaping Use    Vaping status: Never Used   Substance and Sexual Activity    Alcohol use: No    Drug use: No    Sexual activity: Not Currently     Partners: Male     Birth control/protection: Post-Menopausal   Other Topics Concern    Not on file   Social History Narrative    She is Setswana speaking. Madelyn has been  to her  for 50 years. Her daughter-in-law, Delores, participates in her medical care and assists with translation. She has three grown sons. '72, '74, '77. Has 8 grandchildren. 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 Was born in [facility], Came to the [place] in [place]79. She is happy with her life and her sons. She enjoys reading, watch tv shows, talk with her friends on the phone. No tob/etoh/drugs.      Social Drivers of Health     Financial Resource Strain: Low Risk  (5/22/2024)    Overall Financial Resource Strain (CARDIA)     Difficulty of Paying Living Expenses: Not hard at all   Food Insecurity: No Food Insecurity (5/22/2024)    Hunger Vital Sign     Worried  About Running Out of Food in the Last Year: Never true     Ran Out of Food in the Last Year: Never true   Transportation Needs: No Transportation Needs (5/22/2024)    PRAPARE - Transportation     Lack of Transportation (Medical): No     Lack of Transportation (Non-Medical): No   Physical Activity: Not on file   Stress: Not on file   Social Connections: Not on file   Intimate Partner Violence: Not on file   Housing Stability: Not on file     Allergies   Allergen Reactions    Bloodless      Mandaeism    Ibuprofen Rash and Swelling     .    Penicillins Rash and Swelling     .     Outpatient Encounter Medications as of 3/31/2025   Medication Sig Dispense Refill    rosuvastatin (CRESTOR) 10 MG Tab Take 1 Tablet by mouth every evening. 100 Tablet 3    lisinopril (PRINIVIL) 5 MG Tab Take 1 Tablet by mouth every evening. (Patient taking differently: Take 5 mg by mouth every day.) 100 Tablet 3    Ascorbic Acid (VITAMIN C) 1000 MG Tab Take 1,000 mg by mouth every day.      clobetasol (TEMOVATE) 0.05 % Ointment Apply 1 Act topically 2 times a day as needed (itching). 60 g 1    glipiZIDE 2.5 MG Tab Take 2.5 mg by mouth 2 times a day. 200 Tablet 3    MAGNESIUM PO Take 500 mg by mouth every day.      Multiple Vitamin (MULTI-VITAMIN DAILY PO) Take 1 Tablet by mouth every day.       No facility-administered encounter medications on file as of 3/31/2025.     Review of Systems   Unable to perform ROS: Language   Constitutional:  Positive for malaise/fatigue. Negative for fever.   Respiratory:  Positive for shortness of breath. Negative for cough.    Cardiovascular:  Negative for chest pain, palpitations, orthopnea, claudication, leg swelling and PND.   Gastrointestinal:  Negative for abdominal pain.   Musculoskeletal:  Negative for myalgias.   Neurological:  Negative for dizziness.              Objective     /60 (BP Location: Left arm, Patient Position: Sitting, BP Cuff Size: Adult)   Pulse 95   Resp 14   Ht 1.6 m  "(5' 3\")   Wt 96.2 kg (212 lb)   LMP  (LMP Unknown)   SpO2 95%   BMI 37.55 kg/m²     Physical Exam  Vitals and nursing note reviewed.   Constitutional:       Appearance: Normal appearance. She is well-developed. She is obese.   HENT:      Head: Normocephalic and atraumatic.   Neck:      Vascular: No JVD.   Cardiovascular:      Rate and Rhythm: Normal rate and regular rhythm.      Pulses: Normal pulses.      Heart sounds: Normal heart sounds.   Pulmonary:      Effort: Pulmonary effort is normal.      Breath sounds: Normal breath sounds.   Musculoskeletal:         General: Normal range of motion.   Skin:     General: Skin is warm and dry.      Capillary Refill: Capillary refill takes less than 2 seconds.   Neurological:      General: No focal deficit present.      Mental Status: She is alert and oriented to person, place, and time. Mental status is at baseline.   Psychiatric:         Mood and Affect: Mood normal.         Behavior: Behavior normal.         Thought Content: Thought content normal.         Judgment: Judgment normal.                Assessment & Plan     1. Hypertension associated with diabetes ([facility])        2. Sleep apnea, unspecified type        3. Type 2 diabetes mellitus with microalbuminuria, without long-term current use of insulin ([facility])        4. Severe aortic stenosis  EC-NAVEED W/O CONT    proBrain Natriuretic Peptide, NT    Basic Metabolic Panel      5. Aortic atherosclerosis ([facility])        6. Class 2 severe obesity due to excess calories with serious comorbidity and body mass index (BMI) of 37.0 to 37.9 in adult ([facility])        7. Hyperlipidemia associated with type 2 diabetes mellitus ([facility])        8. Fatigue due to excessive exertion, subsequent encounter  proBrain Natriuretic Peptide, NT    Basic Metabolic Panel        Medical Decision Making: Today's Assessment/Status/Plan:      1. Mod-sev AS  -fairly asymptomatic, just has fatigue  -obtain NAVEED for eval  -obtain BNP, bmp for eval  -follow, consider " low dose diuretic if warranted    2. Obesity with CANDIDA on CPAP and DM type II  -work on weight loss management  -DM management per PCP  -CANDIDA compliant and tolerating CPAP    3. HLD with aortic atherosclerosis  -cont statin  -LDL goal <70  -follow labs    Patient is to follow up with Hayley RODRIGUEZ in 1 month with NAVEED and labs.

## 2025-04-04 ENCOUNTER — TELEPHONE (OUTPATIENT)
Dept: CARDIOLOGY | Facility: MEDICAL CENTER | Age: 75
End: 2025-04-04
Payer: MEDICARE

## 2025-04-04 NOTE — TELEPHONE ENCOUNTER
Patient scheduled for NAVEED on 5/14/2025.    Called patient with assistance from Ha (ID# [id number]) with [employer]. Follow up visit with Hayley RODRIGUEZ rescheduled for post NAVEED on 5/22/2025. Advised patient to complete previously ordered labs. All questions answered. Patient verbalizes understanding.

## 2025-04-08 ENCOUNTER — HOSPITAL ENCOUNTER (OUTPATIENT)
Dept: LAB | Facility: MEDICAL CENTER | Age: 75
End: 2025-04-08
Attending: NURSE PRACTITIONER
Payer: MEDICARE

## 2025-04-08 DIAGNOSIS — T73.3XXD FATIGUE DUE TO EXCESSIVE EXERTION, SUBSEQUENT ENCOUNTER: ICD-10-CM

## 2025-04-08 DIAGNOSIS — I35.0 SEVERE AORTIC STENOSIS: ICD-10-CM

## 2025-04-08 LAB
ANION GAP SERPL CALC-SCNC: 9 MMOL/L (ref 7–16)
BUN SERPL-MCNC: 17 MG/DL (ref 8–22)
CALCIUM SERPL-MCNC: 9.1 MG/DL (ref 8.5–10.5)
CHLORIDE SERPL-SCNC: 105 MMOL/L (ref 96–112)
CO2 SERPL-SCNC: 26 MMOL/L (ref 20–33)
CREAT SERPL-MCNC: 0.68 MG/DL (ref 0.5–1.4)
GFR SERPLBLD CREATININE-BSD FMLA CKD-EPI: 90 ML/MIN/1.73 M 2
GLUCOSE SERPL-MCNC: 100 MG/DL (ref 65–99)
NT-PROBNP SERPL IA-MCNC: 611 PG/ML (ref 0–125)
POTASSIUM SERPL-SCNC: 4.9 MMOL/L (ref 3.6–5.5)
SODIUM SERPL-SCNC: 140 MMOL/L (ref 135–145)

## 2025-04-08 PROCEDURE — 80048 BASIC METABOLIC PNL TOTAL CA: CPT

## 2025-04-08 PROCEDURE — 83880 ASSAY OF NATRIURETIC PEPTIDE: CPT

## 2025-04-08 PROCEDURE — 36415 COLL VENOUS BLD VENIPUNCTURE: CPT

## 2025-04-09 ENCOUNTER — RESULTS FOLLOW-UP (OUTPATIENT)
Dept: CARDIOLOGY | Facility: MEDICAL CENTER | Age: 75
End: 2025-04-09
Payer: MEDICARE

## 2025-04-09 DIAGNOSIS — I35.0 SEVERE AORTIC STENOSIS: ICD-10-CM

## 2025-04-09 NOTE — TELEPHONE ENCOUNTER
----- Message from Nurse Practitioner JV Mahan sent at 4/9/2025 11:34 AM PDT -----  BNP elevated, recommend start lasix 20 mg once daily to see if this will help symptoms.     Repeat bmp, bnp in 2-4 weeks. SC

## 2025-04-11 RX ORDER — FUROSEMIDE 20 MG/1
20 TABLET ORAL DAILY
Qty: 100 TABLET | Refills: 3 | Status: SHIPPED | OUTPATIENT
Start: 2025-04-11

## 2025-04-17 ENCOUNTER — APPOINTMENT (OUTPATIENT)
Dept: ADMISSIONS | Facility: MEDICAL CENTER | Age: 75
End: 2025-04-17
Attending: ANESTHESIOLOGY
Payer: MEDICARE

## 2025-04-24 ENCOUNTER — PRE-ADMISSION TESTING (OUTPATIENT)
Dept: ADMISSIONS | Facility: MEDICAL CENTER | Age: 75
End: 2025-04-24
Attending: ANESTHESIOLOGY
Payer: MEDICARE

## 2025-04-24 NOTE — PREADMIT AVS NOTE
Current Medications   Medication Instructions    Omega-3 Fatty Acids (FISH OIL PO) Deje de allan esto 7 días antes de la cirugía. (Stop taking this 7 days prior to surgery.)    furosemide (LASIX) 20 MG Tab Esta sabas seguir tomando mukesh medicamento, george no se lo tome en la mañana de la cirugía. (It is ok to continue this medication prior to surgery but hold this medication on the morning of surgery.)    rosuvastatin (CRESTOR) 10 MG Tab Esta sabas seguir tomando mukesh medicamento según lo recetado. (It is ok to continue this medication as prescribed.)     lisinopril (PRINIVIL) 5 MG Tab Esta sabas seguir tomando mukesh medicamento, george asegúrese de no allan mukesh medicamento 24 horas antes de roberts cirugía. (It is ok to continue this medication but do not take this medication 24 hours prior to surgery.)    Ascorbic Acid (VITAMIN C) 1000 MG Tab Deje de allan esto 7 días antes de la cirugía. (Stop taking this 7 days prior to surgery.)    clobetasol (TEMOVATE) 0.05 % Ointment Siga las instrucciones de roberts cirujano o especialista. (Follow instructions from surgeon or specialist.)    glipiZIDE 2.5 MG Tab Esta sabas seguir tomando mukesh medicamento, george no se lo tome en la mañana de la cirugía. (It is ok to continue this medication prior to surgery but hold this medication on the morning of surgery.)    MAGNESIUM PO Deje de allan esto 7 días antes de la cirugía. (Stop taking this 7 days prior to surgery.)

## 2025-04-30 ENCOUNTER — RESULTS FOLLOW-UP (OUTPATIENT)
Dept: MEDICAL GROUP | Facility: PHYSICIAN GROUP | Age: 75
End: 2025-04-30

## 2025-04-30 ENCOUNTER — HOME HEALTH ADMISSION (OUTPATIENT)
Dept: HOME HEALTH SERVICES | Facility: HOME HEALTHCARE | Age: 75
End: 2025-04-30
Payer: MEDICARE

## 2025-04-30 ENCOUNTER — OFFICE VISIT (OUTPATIENT)
Dept: MEDICAL GROUP | Facility: PHYSICIAN GROUP | Age: 75
End: 2025-04-30
Payer: MEDICARE

## 2025-04-30 VITALS
HEART RATE: 81 BPM | HEIGHT: 63 IN | OXYGEN SATURATION: 93 % | WEIGHT: 211.2 LBS | BODY MASS INDEX: 37.42 KG/M2 | TEMPERATURE: 98 F | SYSTOLIC BLOOD PRESSURE: 114 MMHG | DIASTOLIC BLOOD PRESSURE: 62 MMHG

## 2025-04-30 DIAGNOSIS — R53.81 DEBILITY: ICD-10-CM

## 2025-04-30 DIAGNOSIS — E11.29 TYPE 2 DIABETES MELLITUS WITH MICROALBUMINURIA, WITHOUT LONG-TERM CURRENT USE OF INSULIN (HCC): ICD-10-CM

## 2025-04-30 DIAGNOSIS — E78.5 HYPERLIPIDEMIA ASSOCIATED WITH TYPE 2 DIABETES MELLITUS (HCC): ICD-10-CM

## 2025-04-30 DIAGNOSIS — I15.2 HYPERTENSION ASSOCIATED WITH DIABETES (HCC): ICD-10-CM

## 2025-04-30 DIAGNOSIS — E11.69 HYPERLIPIDEMIA ASSOCIATED WITH TYPE 2 DIABETES MELLITUS (HCC): ICD-10-CM

## 2025-04-30 DIAGNOSIS — R80.9 TYPE 2 DIABETES MELLITUS WITH MICROALBUMINURIA, WITHOUT LONG-TERM CURRENT USE OF INSULIN (HCC): ICD-10-CM

## 2025-04-30 DIAGNOSIS — E11.59 HYPERTENSION ASSOCIATED WITH DIABETES (HCC): ICD-10-CM

## 2025-04-30 DIAGNOSIS — I35.0 SEVERE AORTIC STENOSIS: ICD-10-CM

## 2025-04-30 LAB
HBA1C MFR BLD: 6 % (ref ?–5.8)
POCT INT CON NEG: NEGATIVE
POCT INT CON POS: POSITIVE

## 2025-04-30 PROCEDURE — 83036 HEMOGLOBIN GLYCOSYLATED A1C: CPT | Performed by: FAMILY MEDICINE

## 2025-04-30 PROCEDURE — 99214 OFFICE O/P EST MOD 30 MIN: CPT | Performed by: FAMILY MEDICINE

## 2025-04-30 PROCEDURE — 3074F SYST BP LT 130 MM HG: CPT | Performed by: FAMILY MEDICINE

## 2025-04-30 PROCEDURE — 3078F DIAST BP <80 MM HG: CPT | Performed by: FAMILY MEDICINE

## 2025-04-30 RX ORDER — GLIPIZIDE 2.5 MG/1
2.5 TABLET ORAL DAILY
Qty: 100 TABLET | Refills: 1
Start: 2025-04-30

## 2025-04-30 ASSESSMENT — ENCOUNTER SYMPTOMS
SHORTNESS OF BREATH: 0
PALPITATIONS: 0
NAUSEA: 0
HEADACHES: 0
HEARTBURN: 0
DIZZINESS: 0
COUGH: 0
ABDOMINAL PAIN: 0
FALLS: 0

## 2025-04-30 ASSESSMENT — FIBROSIS 4 INDEX: FIB4 SCORE: 2.51

## 2025-04-30 NOTE — PROGRESS NOTES
Verbal consent was acquired by the patient to use [employer] ambient listening note generation during this visit         History of Present Illness  The patient presents today for a visit and to note in her anecdote of AERO. She is accompanied by her .    She has been under the care of a pulmonologist and cardiologist, with an upcoming procedure scheduled for 05/14/2025 to evaluate her cardiac health. Her fatigue levels remain consistent, but she reports a slight improvement in energy since starting Lasix, which she takes daily in the morning. She does not experience significant swelling. She reports no shortness of breath or palpitations. She experienced mild chest pain approximately a week ago, which was accompanied by back pain. She occasionally experiences dizziness, but it has not been severe recently. She does not have frequent headaches. She reports no nausea or abdominal pain. She has an appointment with Dr. Petty.    She is on medication for hypertension, with her blood pressure readings being normal.    She is also on medication for cholesterol management.    She is on medication for diabetes, which she takes twice daily, once in the morning and once in the afternoon.    She reports leg pain, which was particularly severe yesterday, prompting her to take Aleve. She also experiences occasional shoulder pain. Her balance has improved slightly, and she has not had any recent falls. She reports feeling fatigued while walking. She has Tylenol available for arthritis management.    She is taking fish oil and magnesium supplements.    Supplemental Information  She has noticed a decrease in nighttime urination, although she still urinates frequently during the day.    MEDICATIONS  Current: Lasix, lisinopril, rosuvastatin, glipizide, fish oil, magnesium, Tylenol, Aleve      Review of Systems   Constitutional:  Positive for malaise/fatigue.   Respiratory:  Negative for cough and shortness of breath.   "  Cardiovascular:  Positive for chest pain (slight pain a week ago.). Negative for palpitations and leg swelling.   Gastrointestinal:  Negative for abdominal pain, heartburn and nausea.   Musculoskeletal:  Negative for falls.        Bilateral leg pain   Neurological:  Negative for dizziness and headaches.       Outpatient Medications Marked as Taking for the 4/30/25 encounter (Office Visit) with Miriam Xiao M.D.   Medication Sig Dispense Refill    Omega-3 Fatty Acids (FISH OIL PO) Take  by mouth every day.      furosemide (LASIX) 20 MG Tab Take 1 Tablet by mouth every day. 100 Tablet 3    rosuvastatin (CRESTOR) 10 MG Tab Take 1 Tablet by mouth every evening. 100 Tablet 3    lisinopril (PRINIVIL) 5 MG Tab Take 1 Tablet by mouth every evening. (Patient taking differently: Take 5 mg by mouth every day.) 100 Tablet 3    Ascorbic Acid (VITAMIN C) 1000 MG Tab Take 1,000 mg by mouth every day.      clobetasol (TEMOVATE) 0.05 % Ointment Apply 1 Act topically 2 times a day as needed (itching). 60 g 1    glipiZIDE 2.5 MG Tab Take 2.5 mg by mouth 2 times a day. 200 Tablet 3    MAGNESIUM PO Take 500 mg by mouth every day.         /62   Pulse 81   Temp 36.7 °C (98 °F) (Temporal)   Ht 1.6 m (5' 3\")   Wt 95.8 kg (211 lb 3.2 oz)   SpO2 93% , Body mass index is 37.41 kg/m².        Physical Exam  Constitutional:       Appearance: Normal appearance.   Eyes:      Extraocular Movements: Extraocular movements intact.   Cardiovascular:      Rate and Rhythm: Normal rate and regular rhythm.      Heart sounds: Murmur heard.   Pulmonary:      Effort: Pulmonary effort is normal.      Breath sounds: Normal breath sounds.   Neurological:      Mental Status: She is alert.   Psychiatric:         Mood and Affect: Mood normal.         Behavior: Behavior normal.         Results  Laboratory Studies  Potassium level was normal. A1C is 6.0.       Assessment & Plan  1. Cardiac evaluation.  Her blood pressure readings are within the normal " range at 114/62 mmHg. She is scheduled for an echocardiogram on 05/14/2025, followed by a consultation with Hayley Andrews the subsequent week. A CAT scan revealed minor fluid accumulation in the lungs, necessitating further investigation. She is advised to persist with her Lasix regimen. She is also advised to take Lasix early in the morning to reduce nighttime bathroom visits.    2. Hypertension.  Her blood pressure is well-controlled at 114/62 mmHg. She is currently taking 5 mg of medication daily.    3. Hyperlipidemia.  She is taking a daily pill for cholesterol management.    4. Diabetes Mellitus.  Her A1C level has shown improvement, currently at 6.0. She is advised to continue her current medication regimen but reduce glipizide to 2.5 mg once daily. A fingerstick test will be conducted today to monitor her blood sugar levels. If her A1C remains stable or improves further, the dosage of her diabetes medication may be reduced.    5. Leg pain.  She reports experiencing leg pain, which she attributes to arthritis. She is advised to use Tylenol as the initial treatment for her arthritis due to its lower risk profile compared to Aleve. A referral for home health services will be made to facilitate physical therapy sessions at her residence.    6. Medication Management.  She is taking fish oil and magnesium supplements. She is advised to continue these supplements as part of her daily regimen.    Follow-up  The patient will follow up in 3 months.  No orders of the defined types were placed in this encounter.          I spent a total of *** minutes which included time preparing to see the patient (reviewing my last note, records and recent lab results)  I also performed a medically appropriate examination, counseled and educated the patient, ordered medications and tests.  ***Also referred the patient to other healthcare professionals.  ***communication with other providers, and documentation of this encounter.      This note was created using voice recognition software (Dragon) and LINA. The accuracy of the dictation is limited by the abilities of the software.  . Occasional transcription errors may have escaped proof reading. I have made every reasonable attempt to correct obvious errors, but I expect that there are errors of grammar and possibly content that I did not discover before finalizing the note. ~

## 2025-05-02 ENCOUNTER — TELEPHONE (OUTPATIENT)
Dept: HOME HEALTH SERVICES | Facility: HOME HEALTHCARE | Age: 75
End: 2025-05-02
Payer: MEDICARE

## 2025-05-03 ENCOUNTER — TELEPHONE (OUTPATIENT)
Dept: HOME HEALTH SERVICES | Facility: HOME HEALTHCARE | Age: 75
End: 2025-05-03
Payer: MEDICARE

## 2025-05-03 NOTE — TELEPHONE ENCOUNTER
Called patient and someone answered then hung up. Called second time and same thing. Called patient's son and left  regarding home health scheduling. Later start of care for no contact for 5/4

## 2025-05-04 ENCOUNTER — TELEPHONE (OUTPATIENT)
Dept: HOME HEALTH SERVICES | Facility: HOME HEALTHCARE | Age: 75
End: 2025-05-04
Payer: MEDICARE

## 2025-05-04 NOTE — TELEPHONE ENCOUNTER
Called and spoke to patient regarding scheduling delay, patient understands and is agreeable to a call in the next few days for scheduling. Later start of care for 5/6 per request.

## 2025-05-06 ENCOUNTER — TELEPHONE (OUTPATIENT)
Dept: HOME HEALTH SERVICES | Facility: HOME HEALTHCARE | Age: 75
End: 2025-05-06
Payer: MEDICARE

## 2025-05-07 ENCOUNTER — TELEPHONE (OUTPATIENT)
Dept: MEDICAL GROUP | Facility: PHYSICIAN GROUP | Age: 75
End: 2025-05-07
Payer: MEDICARE

## 2025-05-07 ENCOUNTER — HOME CARE VISIT (OUTPATIENT)
Dept: HOME HEALTH SERVICES | Facility: HOME HEALTHCARE | Age: 75
End: 2025-05-07
Payer: MEDICARE

## 2025-05-07 ENCOUNTER — PRE-ADMISSION TESTING (OUTPATIENT)
Dept: ADMISSIONS | Facility: MEDICAL CENTER | Age: 75
End: 2025-05-07
Attending: NURSE PRACTITIONER
Payer: MEDICARE

## 2025-05-07 DIAGNOSIS — Z01.812 PRE-OPERATIVE LABORATORY EXAMINATION: ICD-10-CM

## 2025-05-07 LAB
BASOPHILS # BLD AUTO: 0.3 % (ref 0–1.8)
BASOPHILS # BLD: 0.03 K/UL (ref 0–0.12)
EOSINOPHIL # BLD AUTO: 0.09 K/UL (ref 0–0.51)
EOSINOPHIL NFR BLD: 1 % (ref 0–6.9)
ERYTHROCYTE [DISTWIDTH] IN BLOOD BY AUTOMATED COUNT: 55.6 FL (ref 35.9–50)
EST. AVERAGE GLUCOSE BLD GHB EST-MCNC: 143 MG/DL
HBA1C MFR BLD: 6.6 % (ref 4–5.6)
HCT VFR BLD AUTO: 36 % (ref 37–47)
HGB BLD-MCNC: 11.2 G/DL (ref 12–16)
IMM GRANULOCYTES # BLD AUTO: 0.03 K/UL (ref 0–0.11)
IMM GRANULOCYTES NFR BLD AUTO: 0.3 % (ref 0–0.9)
LYMPHOCYTES # BLD AUTO: 1.48 K/UL (ref 1–4.8)
LYMPHOCYTES NFR BLD: 16.8 % (ref 22–41)
MCH RBC QN AUTO: 29.8 PG (ref 27–33)
MCHC RBC AUTO-ENTMCNC: 31.1 G/DL (ref 32.2–35.5)
MCV RBC AUTO: 95.7 FL (ref 81.4–97.8)
MONOCYTES # BLD AUTO: 0.78 K/UL (ref 0–0.85)
MONOCYTES NFR BLD AUTO: 8.9 % (ref 0–13.4)
NEUTROPHILS # BLD AUTO: 6.39 K/UL (ref 1.82–7.42)
NEUTROPHILS NFR BLD: 72.7 % (ref 44–72)
NRBC # BLD AUTO: 0 K/UL
NRBC BLD-RTO: 0 /100 WBC (ref 0–0.2)
PLATELET # BLD AUTO: 238 K/UL (ref 164–446)
PMV BLD AUTO: 9.1 FL (ref 9–12.9)
RBC # BLD AUTO: 3.76 M/UL (ref 4.2–5.4)
WBC # BLD AUTO: 8.8 K/UL (ref 4.8–10.8)

## 2025-05-07 PROCEDURE — 665005 NO-PAY RAP - HOME HEALTH

## 2025-05-07 PROCEDURE — 83036 HEMOGLOBIN GLYCOSYLATED A1C: CPT

## 2025-05-07 PROCEDURE — 665998 HH PPS REVENUE CREDIT

## 2025-05-07 PROCEDURE — 85025 COMPLETE CBC W/AUTO DIFF WBC: CPT

## 2025-05-07 PROCEDURE — 665999 HH PPS REVENUE DEBIT

## 2025-05-07 PROCEDURE — G0493 RN CARE EA 15 MIN HH/HOSPICE: HCPCS

## 2025-05-07 PROCEDURE — 36415 COLL VENOUS BLD VENIPUNCTURE: CPT

## 2025-05-07 PROCEDURE — 665001 SOC-HOME HEALTH

## 2025-05-07 ASSESSMENT — FIBROSIS 4 INDEX: FIB4 SCORE: 2.32

## 2025-05-07 NOTE — TELEPHONE ENCOUNTER
Home health nurse called and said pt has a yeast rash on her belly area and needs a antifungal cream ordered for it.

## 2025-05-08 ENCOUNTER — HOME CARE VISIT (OUTPATIENT)
Dept: HOME HEALTH SERVICES | Facility: HOME HEALTHCARE | Age: 75
End: 2025-05-08
Payer: MEDICARE

## 2025-05-08 VITALS
HEART RATE: 87 BPM | SYSTOLIC BLOOD PRESSURE: 118 MMHG | WEIGHT: 210 LBS | OXYGEN SATURATION: 94 % | BODY MASS INDEX: 37.2 KG/M2 | TEMPERATURE: 97 F | RESPIRATION RATE: 16 BRPM | DIASTOLIC BLOOD PRESSURE: 68 MMHG

## 2025-05-08 DIAGNOSIS — L30.9 DERMATITIS: ICD-10-CM

## 2025-05-08 PROCEDURE — 665998 HH PPS REVENUE CREDIT

## 2025-05-08 PROCEDURE — G0180 MD CERTIFICATION HHA PATIENT: HCPCS | Performed by: FAMILY MEDICINE

## 2025-05-08 PROCEDURE — 665999 HH PPS REVENUE DEBIT

## 2025-05-08 RX ORDER — NYSTATIN 100000 U/G
1 CREAM TOPICAL 2 TIMES DAILY
Qty: 30 G | Refills: 1 | Status: SHIPPED | OUTPATIENT
Start: 2025-05-08

## 2025-05-08 SDOH — ECONOMIC STABILITY: HOUSING INSECURITY: HOME SAFETY: PATIENT HAS RTC CG FOR SAFETY

## 2025-05-08 SDOH — HEALTH STABILITY: PHYSICAL HEALTH: EXERCISE TYPE: WALKING

## 2025-05-08 ASSESSMENT — ENCOUNTER SYMPTOMS
STOOL FREQUENCY: DAILY
DYSPNEA ON EXERTION: 1
PAIN LOCATION - PAIN QUALITY: ACHE
LOWEST PAIN SEVERITY IN PAST 24 HOURS: 0/10
HEADACHES: 1
LAST BOWEL MOVEMENT: 67331
PAIN: 1
VOMITING: PT DENIES
HIGHEST PAIN SEVERITY IN PAST 24 HOURS: 4/10
FATIGUE: 1
DRY SKIN: 1
NAUSEA: PT DENIES
SKIN CHANGES: 1
PAIN LOCATION - PAIN FREQUENCY: INTERMITTENT
SHORTNESS OF BREATH: 1
SUBJECTIVE PAIN PROGRESSION: RESOLVED
PAIN SEVERITY GOAL: 0/10
BOWEL PATTERN NORMAL: 1
PAIN LOCATION - PAIN SEVERITY: 4/10
PAIN LOCATION: HEADACHES
NAIL CHANGES: 1
FATIGUES EASILY: 1
DYSPNEA ACTIVITY LEVEL: AFTER AMBULATING MORE THAN 20 FT

## 2025-05-08 NOTE — PROGRESS NOTES
Requested Prescriptions     Signed Prescriptions Disp Refills    nystatin (MYCOSTATIN) [id number] UNIT/GM Cream topical cream 30 g 1     Sig: Apply 1 g topically 2 times a day.    Miriam Xiao M.D.

## 2025-05-09 ENCOUNTER — HOME CARE VISIT (OUTPATIENT)
Dept: HOME HEALTH SERVICES | Facility: HOME HEALTHCARE | Age: 75
End: 2025-05-09
Payer: MEDICARE

## 2025-05-09 VITALS
OXYGEN SATURATION: 97 % | WEIGHT: 210 LBS | DIASTOLIC BLOOD PRESSURE: 64 MMHG | HEART RATE: 83 BPM | SYSTOLIC BLOOD PRESSURE: 112 MMHG | TEMPERATURE: 97.4 F | BODY MASS INDEX: 37.2 KG/M2 | RESPIRATION RATE: 17 BRPM

## 2025-05-09 PROCEDURE — 665999 HH PPS REVENUE DEBIT

## 2025-05-09 PROCEDURE — 665998 HH PPS REVENUE CREDIT

## 2025-05-09 PROCEDURE — G0299 HHS/HOSPICE OF RN EA 15 MIN: HCPCS

## 2025-05-09 ASSESSMENT — ENCOUNTER SYMPTOMS
MUSCLE WEAKNESS: 1
LOWER EXTREMITY EDEMA: 1
BOWEL PATTERN NORMAL: 1
LAST BOWEL MOVEMENT: 67334
HYPERTENSION: 1
STOOL FREQUENCY: DAILY
DENIES PAIN: 1

## 2025-05-09 ASSESSMENT — FIBROSIS 4 INDEX: FIB4 SCORE: 2.32

## 2025-05-09 ASSESSMENT — PATIENT HEALTH QUESTIONNAIRE - PHQ9: CLINICAL INTERPRETATION OF PHQ2 SCORE: 0

## 2025-05-10 PROCEDURE — 665998 HH PPS REVENUE CREDIT

## 2025-05-10 PROCEDURE — 665999 HH PPS REVENUE DEBIT

## 2025-05-11 PROCEDURE — 665999 HH PPS REVENUE DEBIT

## 2025-05-11 PROCEDURE — 665998 HH PPS REVENUE CREDIT

## 2025-05-12 PROCEDURE — 665998 HH PPS REVENUE CREDIT

## 2025-05-12 PROCEDURE — 665999 HH PPS REVENUE DEBIT

## 2025-05-13 ENCOUNTER — HOME CARE VISIT (OUTPATIENT)
Dept: HOME HEALTH SERVICES | Facility: HOME HEALTHCARE | Age: 75
End: 2025-05-13
Payer: MEDICARE

## 2025-05-13 VITALS
RESPIRATION RATE: 18 BRPM | BODY MASS INDEX: 37.2 KG/M2 | SYSTOLIC BLOOD PRESSURE: 112 MMHG | TEMPERATURE: 97.8 F | DIASTOLIC BLOOD PRESSURE: 68 MMHG | OXYGEN SATURATION: 97 % | HEART RATE: 77 BPM | WEIGHT: 210 LBS

## 2025-05-13 PROCEDURE — 665999 HH PPS REVENUE DEBIT

## 2025-05-13 PROCEDURE — 665998 HH PPS REVENUE CREDIT

## 2025-05-13 PROCEDURE — G0299 HHS/HOSPICE OF RN EA 15 MIN: HCPCS

## 2025-05-13 ASSESSMENT — ENCOUNTER SYMPTOMS
STOOL FREQUENCY: DAILY
FATIGUES EASILY: 1
DENIES PAIN: 1
BOWEL PATTERN NORMAL: 1
LOWER EXTREMITY EDEMA: 1
LAST BOWEL MOVEMENT: 67338

## 2025-05-13 ASSESSMENT — PATIENT HEALTH QUESTIONNAIRE - PHQ9: CLINICAL INTERPRETATION OF PHQ2 SCORE: 0

## 2025-05-13 ASSESSMENT — FIBROSIS 4 INDEX: FIB4 SCORE: 2.32

## 2025-05-14 ENCOUNTER — ANESTHESIA (OUTPATIENT)
Dept: CARDIOLOGY | Facility: MEDICAL CENTER | Age: 75
End: 2025-05-14
Payer: MEDICARE

## 2025-05-14 ENCOUNTER — APPOINTMENT (OUTPATIENT)
Dept: CARDIOLOGY | Facility: MEDICAL CENTER | Age: 75
End: 2025-05-14
Attending: NURSE PRACTITIONER
Payer: MEDICARE

## 2025-05-14 ENCOUNTER — HOSPITAL ENCOUNTER (OUTPATIENT)
Facility: MEDICAL CENTER | Age: 75
End: 2025-05-14
Attending: ANESTHESIOLOGY | Admitting: ANESTHESIOLOGY
Payer: MEDICARE

## 2025-05-14 ENCOUNTER — ANESTHESIA EVENT (OUTPATIENT)
Dept: CARDIOLOGY | Facility: MEDICAL CENTER | Age: 75
End: 2025-05-14
Payer: MEDICARE

## 2025-05-14 VITALS
DIASTOLIC BLOOD PRESSURE: 58 MMHG | HEART RATE: 76 BPM | SYSTOLIC BLOOD PRESSURE: 138 MMHG | BODY MASS INDEX: 38.58 KG/M2 | WEIGHT: 209.66 LBS | RESPIRATION RATE: 14 BRPM | OXYGEN SATURATION: 100 % | TEMPERATURE: 97.4 F | HEIGHT: 62 IN

## 2025-05-14 DIAGNOSIS — I35.0 SEVERE AORTIC STENOSIS: ICD-10-CM

## 2025-05-14 LAB
ANION GAP SERPL CALC-SCNC: 11 MMOL/L (ref 7–16)
BUN SERPL-MCNC: 12 MG/DL (ref 8–22)
CALCIUM SERPL-MCNC: 9 MG/DL (ref 8.5–10.5)
CHLORIDE SERPL-SCNC: 105 MMOL/L (ref 96–112)
CO2 SERPL-SCNC: 24 MMOL/L (ref 20–33)
CREAT SERPL-MCNC: 0.59 MG/DL (ref 0.5–1.4)
GFR SERPLBLD CREATININE-BSD FMLA CKD-EPI: 93 ML/MIN/1.73 M 2
GLUCOSE SERPL-MCNC: 103 MG/DL (ref 65–99)
POTASSIUM SERPL-SCNC: 4.4 MMOL/L (ref 3.6–5.5)
SODIUM SERPL-SCNC: 140 MMOL/L (ref 135–145)

## 2025-05-14 PROCEDURE — 160036 HCHG PACU - EA ADDL 30 MINS PHASE I

## 2025-05-14 PROCEDURE — 700105 HCHG RX REV CODE 258: Performed by: STUDENT IN AN ORGANIZED HEALTH CARE EDUCATION/TRAINING PROGRAM

## 2025-05-14 PROCEDURE — 665999 HH PPS REVENUE DEBIT

## 2025-05-14 PROCEDURE — 700111 HCHG RX REV CODE 636 W/ 250 OVERRIDE (IP): Performed by: STUDENT IN AN ORGANIZED HEALTH CARE EDUCATION/TRAINING PROGRAM

## 2025-05-14 PROCEDURE — 665998 HH PPS REVENUE CREDIT

## 2025-05-14 PROCEDURE — 160035 HCHG PACU - 1ST 60 MINS PHASE I

## 2025-05-14 PROCEDURE — 80048 BASIC METABOLIC PNL TOTAL CA: CPT

## 2025-05-14 PROCEDURE — 160002 HCHG RECOVERY MINUTES (STAT)

## 2025-05-14 PROCEDURE — 160015 HCHG STAT PREOP MINUTES

## 2025-05-14 PROCEDURE — 700101 HCHG RX REV CODE 250: Performed by: STUDENT IN AN ORGANIZED HEALTH CARE EDUCATION/TRAINING PROGRAM

## 2025-05-14 PROCEDURE — 4410588 EC-TEE W/O CONT

## 2025-05-14 RX ORDER — PHENYLEPHRINE HCL IN 0.9% NACL 1 MG/10 ML
SYRINGE (ML) INTRAVENOUS PRN
Status: DISCONTINUED | OUTPATIENT
Start: 2025-05-14 | End: 2025-05-14 | Stop reason: SURG

## 2025-05-14 RX ORDER — LIDOCAINE HYDROCHLORIDE 20 MG/ML
INJECTION, SOLUTION EPIDURAL; INFILTRATION; INTRACAUDAL; PERINEURAL PRN
Status: DISCONTINUED | OUTPATIENT
Start: 2025-05-14 | End: 2025-05-14 | Stop reason: SURG

## 2025-05-14 RX ORDER — SODIUM CHLORIDE, SODIUM LACTATE, POTASSIUM CHLORIDE, CALCIUM CHLORIDE 600; 310; 30; 20 MG/100ML; MG/100ML; MG/100ML; MG/100ML
INJECTION, SOLUTION INTRAVENOUS
Status: DISCONTINUED | OUTPATIENT
Start: 2025-05-14 | End: 2025-05-14 | Stop reason: SURG

## 2025-05-14 RX ADMIN — SODIUM CHLORIDE, POTASSIUM CHLORIDE, SODIUM LACTATE AND CALCIUM CHLORIDE: 600; 310; 30; 20 INJECTION, SOLUTION INTRAVENOUS at 12:08

## 2025-05-14 RX ADMIN — Medication 50 MCG: at 12:23

## 2025-05-14 RX ADMIN — LIDOCAINE HYDROCHLORIDE 20 MG: 20 INJECTION, SOLUTION EPIDURAL; INFILTRATION; INTRACAUDAL; PERINEURAL at 12:15

## 2025-05-14 RX ADMIN — PROPOFOL 50 MG: 10 INJECTION, EMULSION INTRAVENOUS at 12:15

## 2025-05-14 RX ADMIN — Medication 200 MCG: at 12:15

## 2025-05-14 RX ADMIN — PROPOFOL 10 MG: 10 INJECTION, EMULSION INTRAVENOUS at 12:23

## 2025-05-14 ASSESSMENT — ENCOUNTER SYMPTOMS
GASTROINTESTINAL NEGATIVE: 1
EYES NEGATIVE: 1
MUSCULOSKELETAL NEGATIVE: 1
CONSTITUTIONAL NEGATIVE: 1
NEUROLOGICAL NEGATIVE: 1
PSYCHIATRIC NEGATIVE: 1

## 2025-05-14 ASSESSMENT — FIBROSIS 4 INDEX: FIB4 SCORE: 2.32

## 2025-05-14 ASSESSMENT — PAIN SCALES - GENERAL: PAIN_LEVEL: 0

## 2025-05-14 NOTE — H&P
Madelyn Schmitz is an 75 y.o.  female with history of possibly severe aortic stenosis and mitral stenosis that is referred for NAVEED to better evaluate degree of valve stenosis.     Past Medical History[1]    Allergies: Allergies[2]    Active Problems:  No Active Problems: There are no active problems currently on the Problem List. Please update the Problem List and refresh.    See nursing record for preop vitals.       Review of Systems   Constitutional: Negative.    HENT: Negative.     Eyes: Negative.    Gastrointestinal: Negative.    Genitourinary: Negative.    Musculoskeletal: Negative.    Skin: Negative.    Neurological: Negative.    Endo/Heme/Allergies: Negative.    Psychiatric/Behavioral: Negative.     All other systems reviewed and are negative.      Physical Exam  Vitals and nursing note reviewed.   Constitutional:       Appearance: Normal appearance.   HENT:      Head: Normocephalic and atraumatic.      Nose: Nose normal.      Mouth/Throat:      Mouth: Mucous membranes are dry.      Pharynx: Oropharynx is clear.   Eyes:      Extraocular Movements: Extraocular movements intact.      Pupils: Pupils are equal, round, and reactive to light.   Cardiovascular:      Rate and Rhythm: Normal rate.      Heart sounds: Murmur heard.   Pulmonary:      Effort: Pulmonary effort is normal.   Abdominal:      Palpations: Abdomen is soft.   Musculoskeletal:         General: Normal range of motion.      Cervical back: Normal range of motion.   Skin:     General: Skin is warm and dry.      Capillary Refill: Capillary refill takes less than 2 seconds.   Neurological:      General: No focal deficit present.      Mental Status: She is alert.   Psychiatric:         Mood and Affect: Mood normal.         Assessment:  74 yo female with AS and MS, NAVEED ordered to better evaluate degree of valve stenosis.     Plan:   Will perform NAVEED.  Patient explained procedure, including risks, benefits. All questions answered.  Patient  "agrees to proceed. Final NAVEED report to be entered in [facility].     Jeffrey Ugarte M.D.  5/14/2025       [1]   Past Medical History:  Diagnosis Date    Acute cholecystitis 09/19/2019    Anesthesia     Arthritis     osteo, finger/shoulders    Back pain     Blood clotting disorder ([facility]) 2004    leg approx 1999    Body mass index (BMI) 38.0-38.9, adult 09/15/2022    Breath shortness 03/18/2024    with exertion    Bronchitis     Cancer ([facility]) 09/07/2022    endometrial cancer    Chickenpox     Dental disorder     upper/lower dentures    Diabetes     oral meds    Diabetes mellitus ([facility]) 09/19/2019      Ref. Range 4/8/2019 13:20 11/6/2019 16:47 Glycohemoglobin Latest Ref Range: 0.0 - 5.6 % 6.6 (H) 5.7 (A) Estim. Avg Glu Latest Units: mg/dL 143     Ref. Range 11/6/2019 16:35 Micro Alb Creat Ratio Latest Ref Range: 0 - 30 mg/g 167 (H)   She has history of well-controlled type 2 diabetes.  She is previously taking metformin 500 mg twice daily however her A1c on recheck was down to 5.7 so we discon    Dysuria 09/11/2019    Fatigue 11/06/2019    Since her hysterectomy and cholecystectomy she has had some trouble bouncing back to her usual self and feels a bit deconditioned. I suspect a trial with prozac will help her energy. She agrees to add a short daily walk with her  to assist with deconditioning.     Heart murmur 09/07/2022    Heart valve disease     \"murmur\"    High cholesterol     medicated    HTN (hypertension) 09/15/2022    Hyperlipidemia     Hypertension 09/07/2022    medicated    Hypotension due to hypovolemia 09/26/2019    She has a low blood pressure in office today of 84/52 with a baseline in the 100s to 120s systolic.  This is likely due to her frequent episodes of diarrhea exacerbated by ongoing use of lisinopril.  Yesterday, she felt lightheaded and dizzy but did not pass out or have a fall.  She is having challenges of what is appropriate to eat as she had recent pelvic radiation as well as a " cholecystectomy b    Mumps     Nasal drainage     Pneumonia 09/07/2022    12 years ago    PONV (postoperative nausea and vomiting)     Psychiatric problem 09/07/2022    medicated at time    Sleep apnea 09/07/2022    CPAP    Urinary incontinence 09/07/2022    at times   [2]   Allergies  Allergen Reactions    Bloodless      Church    Ibuprofen Rash and Swelling     .    Penicillins Rash and Swelling     .

## 2025-05-14 NOTE — DISCHARGE INSTRUCTIONS
What to Expect Post Anesthesia    Rest and take it easy for the first 24 hours.  A responsible adult is recommended to remain with you during that time.  It is normal to feel sleepy.  We encourage you to not do anything that requires balance, judgment or coordination.    FOR 24 HOURS DO NOT:  Drive, operate machinery or run household appliances.  Drink beer or alcoholic beverages.  Make important decisions or sign legal documents.    To avoid nausea, slowly advance diet as tolerated, avoiding spicy or greasy foods for the first day.  Add more substantial food to your diet according to your provider's instructions.  Babies can be fed formula or breast milk as soon as they are hungry.  INCREASE FLUIDS AND FIBER TO AVOID CONSTIPATION.    MILD FLU-LIKE SYMPTOMS ARE NORMAL.  YOU MAY EXPERIENCE GENERALIZED MUSCLE ACHES, THROAT IRRITATION, HEADACHE AND/OR SOME NAUSEA.    Discharge Instructions:  Transesophageal Echocardiogram (NAVEED)    NAVEED is a test that uses sound waves to take pictures of your heart. NAVEED is done by passing a small probe attached to a flexible tube down the part of the body that moves food from your mouth to your stomach (esophagus).    The pictures give clear images of your heart. This can help your doctor see if there are problems with your heart.   General instructions    Follow instructions from your doctor about what you cannot eat or drink.   You will take out any dentures or dental retainers.   Plan to have a responsible adult take you home from the hospital or clinic.   Plan to have a responsible adult care for you for the time you are told after you leave the hospital or clinic. This is important.  What can I expect after the procedure?    You will be monitored until you leave the hospital or clinic. This includes checking your blood pressure, heart rate, breathing rate, and blood oxygen level.   Your throat may feel sore and numb. This will get better over time. You will not be allowed to eat or  drink until the numbness has gone away.   It is common to have a sore throat for a day or two.   It is up to you to get the results of your procedure. Ask how to get your results when they are ready.   Pink tinge sputum is common after your procedure  Follow these instructions at home:    If you were given a sedative during your procedure, do not drive or use machines until your doctor says that it is safe.   Return to your normal activities when your doctor says that it is safe.   Keep all follow-up visits.  Go to ER / call 911:   If you cough up bright red blood or vomit blood   If you have severe pain in throat/stomach.   If you have difficulty breathing that does not go away with rest  Summary    NAVEED is a test that uses sound waves to take pictures of your heart.   You will be given a medicine to help you relax.   Pink tinge sputum is common after your procedure     Ecocardiograma transesofágico  Transesophageal Echocardiogram  El ecocardiograma transesofágico (ETE) es un estudio que utiliza ondas de marlene para allan fotos del corazón. El ETE se realiza pasando konstantin pequeña sonda unida a un tubo flexible hacia la parte del cuerpo que mueve la comida de la boca al estómago (esófago). Las fotos brindan imágenes claras del corazón. [place] puede ayudar al médico a detectar si tiene problemas en el corazón.  Informe al médico acerca de:  Cualquier alergia que tenga.  Todos los medicamentos que usa. Soledad incluye vitaminas, hierbas, gotas para los ojos, cremas y medicamentos de venta master.  Problemas previos que usted o algún miembro de roberts marta hayan tenido con los anestésicos.  Cualquier trastorno de la vicky que tenga.  Cirugías a las que se haya sometido.  Cualquier afección médica que tenga.  Dificultades para tragar.  Si tiene o ha tenido konstantin obstrucción en la parte del cuerpo que mueve la comida de la boca al estómago.  Si está embarazada o podría estarlo.  ¿Cuáles son los riesgos?  En general, mukesh es un  procedimiento seguro. Sin embargo, pueden ocurrir complicaciones, por ejemplo:  Daños a las estructuras o los órganos cercanos.  Un desgarro en la parte del cuerpo que transporta los alimentos desde la boca hasta el estómago.  Latidos cardíacos irregulares.  Ronquera o problemas para tragar.  Sangrado.  ¿Qué ocurre antes del procedimiento?  Medicamentos  Consulte al médico si debe cambiar o suspender:  Bonita medicamentos habituales.  Vitaminas, hierbas y suplementos.  Medicamentos de venta master.  No tome aspirina ni ibuprofeno a menos que se lo indiquen.  Indicaciones generales  Siga las instrucciones del médico respecto de lo que no puede comer o beber.  Deberá quitarse la dentadura postiza o el retenedor dental.  Gurmeet que un adulto responsable lo lleve a roberts casa desde el hospital o la clínica.  Gurmeet que un adulto responsable lo cuide tanya el tiempo que le indiquen después de que le den el torey del hospital o de la clínica. Waldron es importante.  ¿Qué ocurre tanya el procedimiento?    Se le colocará konstantin vía intravenosa (i.v.) en konstantin de las venas.  Es posible que le administren lo siguiente:  Un sedante. Sugey medicamento ayuda a relajarse.  Un medicamento para adormecer la parte posterior de la garganta. Puede rociarse o usarse para hacer gárgaras.  Le controlarán la presión arterial, la frecuencia cardíaca y respiratoria.  Pueden pedirle que se recueste sobre el costado gely.  Se le colocará en la boca un bloqueador de mordida. Waldron evitará que muerda la vía intravenosa.  La punta de la sonda se coloca en la parte posterior de la boca.  Le pedirán que trague.  El médico tomará fotos del corazón.  La sonda y el bloqueador de mordida se retirarán después de realizada la prueba.  Sugey procedimiento puede variar según el médico y el hospital.  ¿Qué puedo esperar después del procedimiento?  Lo controlarán hasta que deje el hospital o la clínica. [place] incluye controlar la presión arterial, la frecuencia cardíaca y  respiratoria, y el nivel de oxígeno en la vicky.  Puede sentir dolor y adormecimiento en la garganta. Stedman mejorará con el tiempo. No podrá comer ni beber hasta que el entumecimiento haya desaparecido.  Es común tener dolor de garganta tanya maris o dos días.  Es roberts responsabilidad retirar los resultados del procedimiento. Pregunte cómo obtener javed resultados cuando estén listos.  Siga estas instrucciones en roberts casa:  Si le administraron un sedante tanya el procedimiento, no conduzca ni use máquinas hasta que el médico le indique que es seguro hacerlo.  Regrese a javed actividades normales cuando el médico le diga que es seguro.  Cumpla con todas las visitas de seguimiento.  Resumen  El ETE es un estudio que utiliza ondas de marlene para allan fotos del corazón.  Le administrarán un medicamento para ayudarlo a relajarse.  No conduzca ni use maquinaria hasta que el médico le diga que es seguro hacerlo.  Esta información no tiene elio fin reemplazar el consejo del médico. Asegúrese de hacerle al médico cualquier pregunta que tenga.  Document Revised: 10/21/2021 Document Reviewed: 09/07/2021  Elsevier Patient Education © 2023 Elsevier Inc.

## 2025-05-14 NOTE — ANESTHESIA TIME REPORT
Anesthesia Start and Stop Event Times       Date Time Event    5/14/2025 1144 Ready for Procedure     1208 Anesthesia Start     1235 Anesthesia Stop          Responsible Staff  05/14/25      Name Role Begin End    Wilmar Bush M.D. Anesth 1208 1235          Overtime Reason:  no overtime (within assigned shift)    Comments:

## 2025-05-14 NOTE — OR NURSING
1233 - patient arrived to pacu.  2 identifiers verified, attached to monitors, alarms/parameters verified, and received report.   Oriented to unit and plan of care discussed.      1244 daughter in law updated patient in recovery     1315 patient ambulated to bathroom, steady on feet.    1320 patient tolerated water, no difficulty swallowing.    1330 All lines and monitors discontinued. Reviewed discharge paperwork with pt and  and sister in law. Discussed diet, activity, medications, follow up care and worsening symptoms. No questions at this time.

## 2025-05-14 NOTE — ANESTHESIA POSTPROCEDURE EVALUATION
Patient: Madelyn Schmitz    Procedure Summary       Date: 05/14/25 Room / Location: [facility] - Echocardiology [facility]    Anesthesia Start: 1208 Anesthesia Stop: 1235    Procedure: EC-NAVEED W/O CONT Diagnosis:       Severe aortic stenosis      (Aortic Stenosis)    Scheduled Providers: Jeffrey Ugarte M.D.; Wilmar Bush M.D. Responsible Provider: Wilmar Bush M.D.    Anesthesia Type: MAC ASA Status: 3 - Emergent            Final Anesthesia Type: MAC  Last vitals  BP   Blood Pressure : 109/71    Temp   36.3 °C (97.3 °F)    Pulse   68   Resp   18    SpO2   100 %      Anesthesia Post Evaluation    Patient location during evaluation: PACU  Patient participation: complete - patient participated  Level of consciousness: awake and alert  Pain score: 0    Airway patency: patent  Anesthetic complications: no  Cardiovascular status: hemodynamically stable  Respiratory status: acceptable  Hydration status: euvolemic    PONV: none          No notable events documented.     Nurse Pain Score: 0 (NPRS)

## 2025-05-15 ENCOUNTER — RESULTS FOLLOW-UP (OUTPATIENT)
Dept: CARDIOLOGY | Facility: MEDICAL CENTER | Age: 75
End: 2025-05-15
Payer: MEDICARE

## 2025-05-15 LAB — LV EJECT FRACT  99904: 55

## 2025-05-15 PROCEDURE — 665999 HH PPS REVENUE DEBIT

## 2025-05-15 PROCEDURE — 665998 HH PPS REVENUE CREDIT

## 2025-05-16 ENCOUNTER — TELEPHONE (OUTPATIENT)
Dept: CARDIOLOGY | Facility: MEDICAL CENTER | Age: 75
End: 2025-05-16
Payer: MEDICARE

## 2025-05-16 ENCOUNTER — HOME CARE VISIT (OUTPATIENT)
Dept: HOME HEALTH SERVICES | Facility: HOME HEALTHCARE | Age: 75
End: 2025-05-16
Payer: MEDICARE

## 2025-05-16 VITALS
OXYGEN SATURATION: 92 % | TEMPERATURE: 98 F | WEIGHT: 210 LBS | HEART RATE: 71 BPM | DIASTOLIC BLOOD PRESSURE: 62 MMHG | RESPIRATION RATE: 17 BRPM | SYSTOLIC BLOOD PRESSURE: 110 MMHG | BODY MASS INDEX: 38.41 KG/M2

## 2025-05-16 PROCEDURE — G0299 HHS/HOSPICE OF RN EA 15 MIN: HCPCS

## 2025-05-16 PROCEDURE — 665998 HH PPS REVENUE CREDIT

## 2025-05-16 PROCEDURE — 665999 HH PPS REVENUE DEBIT

## 2025-05-16 ASSESSMENT — PATIENT HEALTH QUESTIONNAIRE - PHQ9: CLINICAL INTERPRETATION OF PHQ2 SCORE: 0

## 2025-05-16 ASSESSMENT — ENCOUNTER SYMPTOMS
PAIN LOCATION - RELIEVING FACTORS: RESTING
HYPERTENSION: 1
PAIN LOCATION: BACK
HIGHEST PAIN SEVERITY IN PAST 24 HOURS: 2/10
PAIN LOCATION - PAIN SEVERITY: 2/10
FATIGUES EASILY: 1
STOOL FREQUENCY: DAILY
SUBJECTIVE PAIN PROGRESSION: WAXING AND WANING
PAIN LOCATION - PAIN FREQUENCY: INTERMITTENT
LAST BOWEL MOVEMENT: 67341
MUSCLE WEAKNESS: 1
PAIN: 1
BOWEL PATTERN NORMAL: 1
PAIN SEVERITY GOAL: 0/10
LOWEST PAIN SEVERITY IN PAST 24 HOURS: 0/10
PAIN LOCATION - EXACERBATING FACTORS: STANDING TOO LONG
PAIN LOCATION - PAIN QUALITY: ACHY

## 2025-05-16 ASSESSMENT — FIBROSIS 4 INDEX: FIB4 SCORE: 2.32

## 2025-05-16 NOTE — TELEPHONE ENCOUNTER
Used [employer]  [place] ID# [id number]    Called patient and scheduled consult with Dr. Cortes 5/21/2025

## 2025-05-16 NOTE — TELEPHONE ENCOUNTER
"----- Message from Nurse Practitioner JV Mahan sent at 5/15/2025  7:27 AM PDT -----  Can you girls get her back in with Sebastian to talk about NAVEED results and plan of care? :)  ----- Message -----  From: Chad Cortes M.D.  Sent: 5/14/2025   5:49 PM PDT  To: JV Gutiérrez    Im happy to see her and discuss again. TAVR could be considered but hard to say if symptoms will get better  ----- Message -----  From: JV Gutiérrez  Sent: 5/14/2025   2:18 PM PDT  To: Cathie Harris R.N.; Chad Cortes M.D.    Do you want to look at NAVEED and consider TAVR again for her?  ----- Message -----  From: JV Gonsalez  Sent: 5/14/2025   1:30 PM PDT  To: JV Gutiérrez Dr. just did her NAVEED today and sent me a voalte message \"moderate to severe AS along with some mitral disease.  She was scheduled for TAVR before but postponed due to question of how severe her AS was.  I got her aortic valve area to be 1.0 cm² on 3D echo.  Mitral valve is not that bad.  Seems like she is probably a reasonable candidate to get her TAVR sooner rather than later.\"  "

## 2025-05-17 PROCEDURE — 665999 HH PPS REVENUE DEBIT

## 2025-05-17 PROCEDURE — 665998 HH PPS REVENUE CREDIT

## 2025-05-17 ASSESSMENT — ACTIVITIES OF DAILY LIVING (ADL): OASIS_M1830: 05

## 2025-05-18 PROCEDURE — 665999 HH PPS REVENUE DEBIT

## 2025-05-18 PROCEDURE — 665998 HH PPS REVENUE CREDIT

## 2025-05-19 ENCOUNTER — HOME CARE VISIT (OUTPATIENT)
Dept: HOME HEALTH SERVICES | Facility: HOME HEALTHCARE | Age: 75
End: 2025-05-19
Payer: MEDICARE

## 2025-05-19 VITALS
HEART RATE: 82 BPM | RESPIRATION RATE: 17 BRPM | DIASTOLIC BLOOD PRESSURE: 60 MMHG | SYSTOLIC BLOOD PRESSURE: 120 MMHG | TEMPERATURE: 97.6 F | OXYGEN SATURATION: 95 %

## 2025-05-19 PROCEDURE — G0151 HHCP-SERV OF PT,EA 15 MIN: HCPCS

## 2025-05-19 PROCEDURE — 665998 HH PPS REVENUE CREDIT

## 2025-05-19 PROCEDURE — 665999 HH PPS REVENUE DEBIT

## 2025-05-19 ASSESSMENT — ACTIVITIES OF DAILY LIVING (ADL)
PHYSICAL TRANSFERS ASSESSED: 1
AMBULATION_DISTANCE/DURATION_TOLERATED: 75'
CURRENT_FUNCTION: INDEPENDENT
AMBULATION ASSISTANCE: SUPERVISION
AMBULATION ASSISTANCE: 1
AMBULATION ASSISTANCE ON FLAT SURFACES: 1

## 2025-05-19 ASSESSMENT — ENCOUNTER SYMPTOMS: DENIES PAIN: 1

## 2025-05-20 ENCOUNTER — HOSPITAL ENCOUNTER (OUTPATIENT)
Dept: LAB | Facility: MEDICAL CENTER | Age: 75
End: 2025-05-20
Attending: NURSE PRACTITIONER
Payer: MEDICARE

## 2025-05-20 ENCOUNTER — HOME CARE VISIT (OUTPATIENT)
Dept: HOME HEALTH SERVICES | Facility: HOME HEALTHCARE | Age: 75
End: 2025-05-20
Payer: MEDICARE

## 2025-05-20 DIAGNOSIS — I35.0 SEVERE AORTIC STENOSIS: ICD-10-CM

## 2025-05-20 LAB
ANION GAP SERPL CALC-SCNC: 11 MMOL/L (ref 7–16)
BUN SERPL-MCNC: 20 MG/DL (ref 8–22)
CALCIUM SERPL-MCNC: 9.2 MG/DL (ref 8.5–10.5)
CHLORIDE SERPL-SCNC: 103 MMOL/L (ref 96–112)
CO2 SERPL-SCNC: 25 MMOL/L (ref 20–33)
CREAT SERPL-MCNC: 0.78 MG/DL (ref 0.5–1.4)
GFR SERPLBLD CREATININE-BSD FMLA CKD-EPI: 78 ML/MIN/1.73 M 2
GLUCOSE SERPL-MCNC: 172 MG/DL (ref 65–99)
NT-PROBNP SERPL IA-MCNC: 632 PG/ML (ref 0–125)
POTASSIUM SERPL-SCNC: 4.4 MMOL/L (ref 3.6–5.5)
SODIUM SERPL-SCNC: 139 MMOL/L (ref 135–145)

## 2025-05-20 PROCEDURE — 665999 HH PPS REVENUE DEBIT

## 2025-05-20 PROCEDURE — 665998 HH PPS REVENUE CREDIT

## 2025-05-20 PROCEDURE — 83880 ASSAY OF NATRIURETIC PEPTIDE: CPT

## 2025-05-20 PROCEDURE — 80048 BASIC METABOLIC PNL TOTAL CA: CPT

## 2025-05-20 PROCEDURE — 36415 COLL VENOUS BLD VENIPUNCTURE: CPT

## 2025-05-21 ENCOUNTER — HOME CARE VISIT (OUTPATIENT)
Dept: HOME HEALTH SERVICES | Facility: HOME HEALTHCARE | Age: 75
End: 2025-05-21
Payer: MEDICARE

## 2025-05-21 ENCOUNTER — OFFICE VISIT (OUTPATIENT)
Dept: CARDIOLOGY | Facility: MEDICAL CENTER | Age: 75
End: 2025-05-21
Attending: INTERNAL MEDICINE
Payer: MEDICARE

## 2025-05-21 ENCOUNTER — DOCUMENTATION (OUTPATIENT)
Dept: CARDIOLOGY | Facility: MEDICAL CENTER | Age: 75
End: 2025-05-21

## 2025-05-21 VITALS
HEIGHT: 62 IN | BODY MASS INDEX: 37.91 KG/M2 | WEIGHT: 206 LBS | DIASTOLIC BLOOD PRESSURE: 56 MMHG | RESPIRATION RATE: 16 BRPM | OXYGEN SATURATION: 96 % | HEART RATE: 83 BPM | SYSTOLIC BLOOD PRESSURE: 94 MMHG

## 2025-05-21 DIAGNOSIS — E78.5 HYPERLIPIDEMIA ASSOCIATED WITH TYPE 2 DIABETES MELLITUS (HCC): ICD-10-CM

## 2025-05-21 DIAGNOSIS — I35.0 SEVERE AORTIC STENOSIS: ICD-10-CM

## 2025-05-21 DIAGNOSIS — Z00.6 EXAMINATION OF PARTICIPANT IN CLINICAL TRIAL: Primary | ICD-10-CM

## 2025-05-21 DIAGNOSIS — I35.0 NONRHEUMATIC AORTIC VALVE STENOSIS: ICD-10-CM

## 2025-05-21 DIAGNOSIS — E11.69 HYPERLIPIDEMIA ASSOCIATED WITH TYPE 2 DIABETES MELLITUS (HCC): ICD-10-CM

## 2025-05-21 DIAGNOSIS — I35.0 SEVERE AORTIC STENOSIS: Primary | ICD-10-CM

## 2025-05-21 PROCEDURE — 665998 HH PPS REVENUE CREDIT

## 2025-05-21 PROCEDURE — 3078F DIAST BP <80 MM HG: CPT | Performed by: INTERNAL MEDICINE

## 2025-05-21 PROCEDURE — G2211 COMPLEX E/M VISIT ADD ON: HCPCS | Performed by: INTERNAL MEDICINE

## 2025-05-21 PROCEDURE — 665999 HH PPS REVENUE DEBIT

## 2025-05-21 PROCEDURE — 3074F SYST BP LT 130 MM HG: CPT | Performed by: INTERNAL MEDICINE

## 2025-05-21 PROCEDURE — 99213 OFFICE O/P EST LOW 20 MIN: CPT | Performed by: INTERNAL MEDICINE

## 2025-05-21 PROCEDURE — 99215 OFFICE O/P EST HI 40 MIN: CPT | Performed by: INTERNAL MEDICINE

## 2025-05-21 ASSESSMENT — PATIENT HEALTH QUESTIONNAIRE - PHQ9
5. POOR APPETITE OR OVEREATING: 0 - NOT AT ALL
SUM OF ALL RESPONSES TO PHQ QUESTIONS 1-9: 2
CLINICAL INTERPRETATION OF PHQ2 SCORE: 1

## 2025-05-21 ASSESSMENT — FIBROSIS 4 INDEX: FIB4 SCORE: 2.32

## 2025-05-21 NOTE — PROGRESS NOTES
"INTERVENTIONAL CARDIOLOGY STRUCTURAL HEART FOLLOWUP    PCP: Miriam Xiao M.D.    1. Severe aortic stenosis    2. Hyperlipidemia associated with type 2 diabetes mellitus (HCC)        Madelyn Schmitz has ongoing symptoms-class II-III related to D3 aortic stenosis.  I advise moving forward with transcatheter aortic valve replacement.  Will need to be mindful of the right coronary artery which is borderline low-though unlikely to interact.    We discussed the risks, benefits and alternatives to TAVR including but not limited to a 10% risk of pacemaker, 5% risk of bleeding/access site complication, 2% risk of stroke, risk of contrast nephropathy and 2% risk of mortality. The patient is in agreement with proceeding.      Follow up: 6 weeks    History: Madelyn Schmitz is a 75 y.o. female with history of diabetes hypertension hyperlipidemia presenting for follow-up of aortic stenosis.  She also has moderate LAD disease.    She continues to have symptoms with an echocardiogram gradient demonstrating V-max greater than 4, mean gradient in the 20s and aortic valve area less than 1.  A calcium score was performed in February which was 745.  She underwent NAVEED with 3D planimeter under 1 cm².  She has continued to feel marked fatigue and is occasionally experiencing chest discomfort.    PE:  BP 94/56 (BP Location: Left arm, Patient Position: Sitting, BP Cuff Size: Adult)   Pulse 83   Resp 16   Ht 1.575 m (5' 2\")   Wt 93.4 kg (206 lb)   LMP  (LMP Unknown)   SpO2 96%   BMI 37.68 kg/m²   GEN: NAD  CARDIAC: regular systolic ejection murmur diminished S2 diminished and delayed carotid upstrokes  RESP: Clear to auscultation bilaterally  ABD: Soft, non-tender, non-distended  EXT: No edema  NEURO: No focal deficit    Studies interpreted by me: Echo:  Low gradient severe aortic stenosis  Today's encounter addressed an illness with threat to life/bodily function severe arctic stenosis  () Today's E/M visit " is associated with medical care services that serve as the continuing focal point for all needed health care services and/or with medical care services that  are part of ongoing care related to a patient's single, serious condition, or a complex condition: This includes  furnishing services to patients on an ongoing basis that result in care that is personalized  to the patient. The services result in a comprehensive, longitudinal, and continuous  relationship with the patient and involve delivery of team-based care that is accessible, coordinated with other practitioners and providers, and integrated with the broader health  care landscape.     Past Medical History[1]  Allergies[2]  Encounter Medications[3]  Social History     Socioeconomic History    Marital status:      Spouse name: Not on file    Number of children: Not on file    Years of education: Not on file    Highest education level: Not on file   Occupational History    Not on file   Tobacco Use    Smoking status: Never    Smokeless tobacco: Never   Vaping Use    Vaping status: Never Used   Substance and Sexual Activity    Alcohol use: No    Drug use: No    Sexual activity: Not Currently     Partners: Male     Birth control/protection: Post-Menopausal   Other Topics Concern    Not on file   Social History Narrative    She is Eritrean speaking. Madelyn has been  to her  for 50 years. Her daughter-in-law, Delores, participates in her medical care and assists with translation. She has three grown sons. '72, '74, '77. Has 8 grandchildren. 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 Was born in [facility], Came to the [place] in '79. She is happy with her life and her sons. She enjoys reading, watch tv shows, talk with her friends on the phone. No tob/etoh/drugs.      Social Drivers of Health     Financial Resource Strain: Low Risk  (5/22/2024)    Overall Financial Resource Strain (CARDIA)     Difficulty of  Paying Living Expenses: Not hard at all   Food Insecurity: No Food Insecurity (5/22/2024)    Hunger Vital Sign     Worried About Running Out of Food in the Last Year: Never true     Ran Out of Food in the Last Year: Never true   Transportation Needs: No Transportation Needs (5/22/2024)    PRAPARE - Transportation     Lack of Transportation (Medical): No     Lack of Transportation (Non-Medical): No   Physical Activity: Not on file   Stress: Not on file   Social Connections: Feeling Socially Integrated (5/7/2025)    OASIS : Social Isolation     Frequency of experiencing loneliness or isolation: Never   Intimate Partner Violence: Not on file   Housing Stability: Not on file       Studies  Lab Results   Component Value Date/Time    CHOLSTRLTOT 126 11/26/2024 07:54 AM    LDL 61 11/26/2024 07:54 AM    HDL 42 11/26/2024 07:54 AM    TRIGLYCERIDE 115 11/26/2024 07:54 AM       Lab Results   Component Value Date/Time    SODIUM 139 05/20/2025 09:44 AM    POTASSIUM 4.4 05/20/2025 09:44 AM    CHLORIDE 103 05/20/2025 09:44 AM    CO2 25 05/20/2025 09:44 AM    GLUCOSE 172 (H) 05/20/2025 09:44 AM    BUN 20 05/20/2025 09:44 AM    CREATININE 0.78 05/20/2025 09:44 AM      Lab Results   Component Value Date/Time    PROTHROMBTM 14.2 02/10/2025 10:00 AM    INR 1.10 02/10/2025 10:00 AM      Lab Results   Component Value Date/Time    WBC 8.8 05/07/2025 10:42 AM    RBC 3.76 (L) 05/07/2025 10:42 AM    HEMOGLOBIN 11.2 (L) 05/07/2025 10:42 AM    HEMATOCRIT 36.0 (L) 05/07/2025 10:42 AM    MCV 95.7 05/07/2025 10:42 AM    MCH 29.8 05/07/2025 10:42 AM    MCHC 31.1 (L) 05/07/2025 10:42 AM    MPV 9.1 05/07/2025 10:42 AM    NEUTSPOLYS 72.70 (H) 05/07/2025 10:42 AM    LYMPHOCYTES 16.80 (L) 05/07/2025 10:42 AM    MONOCYTES 8.90 05/07/2025 10:42 AM    EOSINOPHILS 1.00 05/07/2025 10:42 AM    BASOPHILS 0.30 05/07/2025 10:42 AM        Chief Complaint   Patient presents with    Follow-Up     Nonrheumatic aortic valve stenosis       ROS:   10 point review  "systems is otherwise negative except as per the HPI       [1]   Past Medical History:  Diagnosis Date    Acute cholecystitis 09/19/2019    Anesthesia     Arthritis     osteo, finger/shoulders    Back pain     Blood clotting disorder ([facility]) 2004    leg approx 1999    Body mass index (BMI) 38.0-38.9, adult 09/15/2022    Breath shortness 03/18/2024    with exertion    Bronchitis     Cancer ([facility]) 09/07/2022    endometrial cancer    Chickenpox     Dental disorder     upper/lower dentures    Diabetes     oral meds    Diabetes mellitus ([facility]) 09/19/2019      Ref. Range 4/8/2019 13:20 11/6/2019 16:47 Glycohemoglobin Latest Ref Range: 0.0 - 5.6 % 6.6 (H) 5.7 (A) Estim. Avg Glu Latest Units: mg/dL 143     Ref. Range 11/6/2019 16:35 Micro Alb Creat Ratio Latest Ref Range: 0 - 30 mg/g 167 (H)   She has history of well-controlled type 2 diabetes.  She is previously taking metformin 500 mg twice daily however her A1c on recheck was down to 5.7 so we discon    Dysuria 09/11/2019    Fatigue 11/06/2019    Since her hysterectomy and cholecystectomy she has had some trouble bouncing back to her usual self and feels a bit deconditioned. I suspect a trial with prozac will help her energy. She agrees to add a short daily walk with her  to assist with deconditioning.     Heart murmur 09/07/2022    Heart valve disease     \"murmur\"    High cholesterol     medicated    HTN (hypertension) 09/15/2022    Hyperlipidemia     Hypertension 09/07/2022    medicated    Hypotension due to hypovolemia 09/26/2019    She has a low blood pressure in office today of 84/52 with a baseline in the 100s to 120s systolic.  This is likely due to her frequent episodes of diarrhea exacerbated by ongoing use of lisinopril.  Yesterday, she felt lightheaded and dizzy but did not pass out or have a fall.  She is having challenges of what is appropriate to eat as she had recent pelvic radiation as well as a cholecystectomy b    Mumps     Nasal drainage     " Pneumonia 09/07/2022    12 years ago    PONV (postoperative nausea and vomiting)     Psychiatric problem 09/07/2022    medicated at time    Sleep apnea 09/07/2022    CPAP    Urinary incontinence 09/07/2022    at times   [2]   Allergies  Allergen Reactions    Bloodless      Latter day    Ibuprofen Rash and Swelling     .    Penicillins Rash and Swelling     .   [3]   Outpatient Encounter Medications as of 5/21/2025   Medication Sig Dispense Refill    nystatin (MYCOSTATIN) 150928 UNIT/GM Cream topical cream Apply 1 g topically 2 times a day. 30 g 1    glipiZIDE 2.5 MG Tab Take 2.5 mg by mouth every day. 100 Tablet 1    Omega-3 Fatty Acids (FISH OIL PO) Take  by mouth every day. Indications: suppliment      furosemide (LASIX) 20 MG Tab Take 1 Tablet by mouth every day. 100 Tablet 3    rosuvastatin (CRESTOR) 10 MG Tab Take 1 Tablet by mouth every evening. 100 Tablet 3    lisinopril (PRINIVIL) 5 MG Tab Take 1 Tablet by mouth every evening. 100 Tablet 3    Ascorbic Acid (VITAMIN C) 1000 MG Tab Take 1,000 mg by mouth every day. Indications: Inadequate Vitamin C      clobetasol (TEMOVATE) 0.05 % Ointment Apply 1 Act topically 2 times a day as needed (itching). 60 g 1    MAGNESIUM PO Take 500 mg by mouth every day. Indications: suppliment       No facility-administered encounter medications on file as of 5/21/2025.

## 2025-05-21 NOTE — PROGRESS NOTES
Met with patient and family after consult with Dr. Cortes. Patient's DIL Aowyn translated during discussion.    Provided patient with preparation for TAVR sheet and Tracy ELAINE's business card with direct phone number. Patient still has education packet we gave her in February. Answered all the patient/family's questions.     Plan for TAVR 6/2/2025.

## 2025-05-21 NOTE — PROGRESS NOTES
Valve Program Functional Assessment:     KCCQ12   1a) Showering/bathin  1b) Walking 1 block on ground: 2  1c) Hurrying or joggin  2) Swellin  3) Fatigue: 4  4) Shortness of breath: 4  5) Sleep sitting up: 5  6) Limited enjoyment of life: 4  7) Spend the rest of your life with HF: 2  8a) Hobbies, recreational activities:2  8b) Working or doing household chores:2  8c) Visiting family or friends: 2    5 meter walk test  1) __6.34____ s/5m  2) __6.67____ s/5m  3) __6.75____ s/5m  AVG:_6.58______     Strength   1) _16_____ kg  2) _16_____ kg  3) _16_____ kg  AVG:__16____    SHEPHERD ADLs  Patient independently preforms...   - Bathing: Yes   - Dressing: Yes   - Toileting: Yes   - Transferring: Yes  - Continence: Yes   - Feeding: Yes   Total Score: _6_/6    Living Situation  Patient lives: with spouse    Mobility Aids   Patient uses: none      FRAILTY SCORE: _1_/ 4

## 2025-05-21 NOTE — CASE COMMUNICATION
Patient was discussed in IDT today due to recent admission. Discussed progress toward goals of care with the treatment team. The treatment team currently involves the following disciplines: nursing. Plan is to discharge from home health services depending how soon TAVR can be scheduled.

## 2025-05-22 ENCOUNTER — PRE-ADMISSION TESTING (OUTPATIENT)
Dept: ADMISSIONS | Facility: MEDICAL CENTER | Age: 75
End: 2025-05-22
Attending: INTERNAL MEDICINE
Payer: MEDICARE

## 2025-05-22 ENCOUNTER — APPOINTMENT (OUTPATIENT)
Dept: CARDIOLOGY | Facility: MEDICAL CENTER | Age: 75
End: 2025-05-22
Attending: NURSE PRACTITIONER
Payer: MEDICARE

## 2025-05-22 PROCEDURE — 665999 HH PPS REVENUE DEBIT

## 2025-05-22 PROCEDURE — 665998 HH PPS REVENUE CREDIT

## 2025-05-23 PROCEDURE — 665999 HH PPS REVENUE DEBIT

## 2025-05-23 PROCEDURE — 665998 HH PPS REVENUE CREDIT

## 2025-05-24 ENCOUNTER — HOME CARE VISIT (OUTPATIENT)
Dept: HOME HEALTH SERVICES | Facility: HOME HEALTHCARE | Age: 75
End: 2025-05-24
Payer: MEDICARE

## 2025-05-24 VITALS
BODY MASS INDEX: 37.73 KG/M2 | SYSTOLIC BLOOD PRESSURE: 110 MMHG | DIASTOLIC BLOOD PRESSURE: 64 MMHG | WEIGHT: 206.3 LBS | RESPIRATION RATE: 17 BRPM | HEART RATE: 84 BPM | OXYGEN SATURATION: 94 % | TEMPERATURE: 97.8 F

## 2025-05-24 PROCEDURE — 665999 HH PPS REVENUE DEBIT

## 2025-05-24 PROCEDURE — G0299 HHS/HOSPICE OF RN EA 15 MIN: HCPCS

## 2025-05-24 PROCEDURE — 665998 HH PPS REVENUE CREDIT

## 2025-05-24 ASSESSMENT — ENCOUNTER SYMPTOMS
STOOL FREQUENCY: DAILY
DENIES PAIN: 1
BOWEL PATTERN NORMAL: 1
LAST BOWEL MOVEMENT: 67349
MUSCLE WEAKNESS: 1

## 2025-05-24 ASSESSMENT — FIBROSIS 4 INDEX: FIB4 SCORE: 2.32

## 2025-05-24 ASSESSMENT — PATIENT HEALTH QUESTIONNAIRE - PHQ9: CLINICAL INTERPRETATION OF PHQ2 SCORE: 0

## 2025-05-25 PROCEDURE — 665998 HH PPS REVENUE CREDIT

## 2025-05-25 PROCEDURE — 665999 HH PPS REVENUE DEBIT

## 2025-05-26 PROCEDURE — 665998 HH PPS REVENUE CREDIT

## 2025-05-26 PROCEDURE — 665999 HH PPS REVENUE DEBIT

## 2025-05-27 ENCOUNTER — TELEPHONE (OUTPATIENT)
Dept: CARDIOLOGY | Facility: MEDICAL CENTER | Age: 75
End: 2025-05-27
Payer: MEDICARE

## 2025-05-27 ENCOUNTER — PRE-ADMISSION TESTING (OUTPATIENT)
Dept: ADMISSIONS | Facility: MEDICAL CENTER | Age: 75
DRG: 266 | End: 2025-05-27
Attending: INTERNAL MEDICINE
Payer: MEDICARE

## 2025-05-27 ENCOUNTER — HOME CARE VISIT (OUTPATIENT)
Dept: HOME HEALTH SERVICES | Facility: HOME HEALTHCARE | Age: 75
End: 2025-05-27
Payer: MEDICARE

## 2025-05-27 VITALS
WEIGHT: 206 LBS | OXYGEN SATURATION: 95 % | DIASTOLIC BLOOD PRESSURE: 70 MMHG | TEMPERATURE: 97.4 F | SYSTOLIC BLOOD PRESSURE: 106 MMHG | RESPIRATION RATE: 17 BRPM | HEART RATE: 80 BPM | BODY MASS INDEX: 37.68 KG/M2

## 2025-05-27 DIAGNOSIS — I35.0 AORTIC VALVE STENOSIS, SEVERE: Primary | ICD-10-CM

## 2025-05-27 PROCEDURE — G0299 HHS/HOSPICE OF RN EA 15 MIN: HCPCS

## 2025-05-27 PROCEDURE — 665998 HH PPS REVENUE CREDIT

## 2025-05-27 PROCEDURE — 665999 HH PPS REVENUE DEBIT

## 2025-05-27 ASSESSMENT — ENCOUNTER SYMPTOMS
DENIES PAIN: 1
STOOL FREQUENCY: DAILY
LAST BOWEL MOVEMENT: 67352
BOWEL PATTERN NORMAL: 1

## 2025-05-27 ASSESSMENT — FIBROSIS 4 INDEX: FIB4 SCORE: 2.32

## 2025-05-27 NOTE — OR NURSING
Notified cardiac valve Clinical Coordinator, Claudette, of pt enrollment in bloodless program.  She states Dr Cortes is aware.

## 2025-05-27 NOTE — TELEPHONE ENCOUNTER
Received call from pre-admit RN wanting to confirm that we know that patient is bloodless. Advised that [facility] team is aware.

## 2025-05-28 ENCOUNTER — DOCUMENTATION (OUTPATIENT)
Dept: CARDIOLOGY | Facility: MEDICAL CENTER | Age: 75
End: 2025-05-28
Payer: MEDICARE

## 2025-05-28 ENCOUNTER — HOME CARE VISIT (OUTPATIENT)
Dept: HOME HEALTH SERVICES | Facility: HOME HEALTHCARE | Age: 75
End: 2025-05-28
Payer: MEDICARE

## 2025-05-28 ENCOUNTER — TELEPHONE (OUTPATIENT)
Dept: CARDIOLOGY | Facility: MEDICAL CENTER | Age: 75
End: 2025-05-28
Payer: MEDICARE

## 2025-05-28 VITALS
HEART RATE: 75 BPM | SYSTOLIC BLOOD PRESSURE: 118 MMHG | RESPIRATION RATE: 17 BRPM | DIASTOLIC BLOOD PRESSURE: 68 MMHG | TEMPERATURE: 97.8 F | OXYGEN SATURATION: 94 %

## 2025-05-28 PROCEDURE — 665999 HH PPS REVENUE DEBIT

## 2025-05-28 PROCEDURE — G0151 HHCP-SERV OF PT,EA 15 MIN: HCPCS

## 2025-05-28 PROCEDURE — 665998 HH PPS REVENUE CREDIT

## 2025-05-28 ASSESSMENT — ENCOUNTER SYMPTOMS
MUSCLE WEAKNESS: 1
DENIES PAIN: 1

## 2025-05-28 ASSESSMENT — PATIENT HEALTH QUESTIONNAIRE - PHQ9: CLINICAL INTERPRETATION OF PHQ2 SCORE: 0

## 2025-05-28 NOTE — TELEPHONE ENCOUNTER
Valve Conference Plan of Care: 5/28/2025 for TAVR 6/2/2025           TAVR Candidate: Yes  Access: right femoral  Valve Size: 23mm  General Anesthesia or MAC: GA with NAVEED  Unit: Telemetry  Incidental findings to be discussed with PCP and sent to Dr. Miriam Xiao: Patchy bibasilar opacities with scattered calcified granulomas, mild bladder wall thickening/standing,  and sigmoid diverticulosis.    Clearance needed prior to procedure: no    Check in time of 0530 6/2/2025.     Pre-op appointment completed: No, scheduled for completion 5/29/2025    NPO after midnight. Hold vitamins/supplements x7 days, hold Lisinopril x 1 day, hold Glipizide and Furosemide the morning of the procedure.     Patient to bring CPAP to procedure.

## 2025-05-28 NOTE — PROGRESS NOTES
Thank you for the opportunity to participate in your patient's care.     Your patient is scheduled for transcatheter aortic valve replacement (TAVR) on Monday, June 2, 2025.    Sincerely,    [facility] Structural [facility] Team    HENRY Patel, RN, Structural Heart Program Nurse Coordinator ([phone])  HENRY Eduardo, RN, Structural Heart Program Nurse Coordinator ([phone])

## 2025-05-28 NOTE — TELEPHONE ENCOUNTER
Contacted patient with assistance from Gemma (ID# [id number]) with [employer].    Patient notified of procedure date/time and check in process.     All questions were answered. Patient has direct extension for any further questions/concerns prior to the procedure.    Called patient's daughter-in-law Aowyn. Reviewed pre-procedure instructions. All questions answered.    Problem Relation Age of Onset    Cancer Mother         lung    Heart Disease Father          Review of Systems:  Constitutional: no fevers, no chills, no night sweats, no weight loss, no weight gain, no fatigue, light-headedness  Pain assessment: no pain  Head: no headaches  Ears: no hearing loss, no tinnitus, no vertigo  Eyes: no blurry vision, no diplopia, no dryness, no itchiness  Mouth: no oral ulcers, no dry mouth, no sore throat  Nose: no nasal congestion, no epistaxis  Cardiac: no chest pain, no palpitations, no leg swelling, no orthopnea, no PND, no syncope  Pulmonary: no dyspnea, no cough, no wheezing, no hemoptysis  GI: no nausea, no vomiting, no diarrhea, no constipation, no abdominal pain, no hematochezia  : no dysuria, no frequency, no urgency, no hematuria, no frothy urine, no dyspareunia, no pelvic pain, no vaginal bleeding, no abnormal vaginal discharge  MSK: no arthralgias, no myalgias, no early morning stiffness, no Raynaud's   Neuro: no focal neurological deficits, no seizures  Sleep: no snoring, no daytime somnolence   Psych: no depression, no anxiety, no suicidal ideation      Physical Exam:  Due to this being a TeleHealth encounter, evaluation of the following organ systems is limited: Vitals/Constitutional/EENT/Resp/CV/GI//MSK/Neuro/Skin/Heme-Lymph-Imm. Labs   Reviewed with patient     Imaging   Reviewed with patient      Assessment/Plan:     1. Essential hypertension  Too tightly controlled  ?symptomatic hypotension  Will DC Amlodipine   Check BP log daily  FU in 4 weeks    2. Dyslipidemia  ,  in July 2020  Up from last year. Discussed heart healthy diet  Continue Atorvastatin 40mg QD     3. Acquired hypothyroidism  Stable   TSH normal in July 2020  Continue synthroid 50mcg QD       Care discussed with patient and questions answered. Patient verbalizes understanding and agrees with plan. Discussed with patient the importance of continuity of care.  I encouraged

## 2025-05-29 ENCOUNTER — PRE-ADMISSION TESTING (OUTPATIENT)
Dept: ADMISSIONS | Facility: MEDICAL CENTER | Age: 75
DRG: 266 | End: 2025-05-29
Attending: INTERNAL MEDICINE
Payer: MEDICARE

## 2025-05-29 DIAGNOSIS — I35.0 NONRHEUMATIC AORTIC VALVE STENOSIS: Primary | ICD-10-CM

## 2025-05-29 DIAGNOSIS — Z01.812 PRE-OPERATIVE LABORATORY EXAMINATION: Primary | ICD-10-CM

## 2025-05-29 LAB
ALBUMIN SERPL BCP-MCNC: 3.4 G/DL (ref 3.2–4.9)
ALBUMIN/GLOB SERPL: 0.9 G/DL
ALP SERPL-CCNC: 64 U/L (ref 30–99)
ALT SERPL-CCNC: 9 U/L (ref 2–50)
ANION GAP SERPL CALC-SCNC: 9 MMOL/L (ref 7–16)
APTT PPP: 29.4 SEC (ref 24.7–36)
AST SERPL-CCNC: 19 U/L (ref 12–45)
BASOPHILS # BLD AUTO: 0.4 % (ref 0–1.8)
BASOPHILS # BLD: 0.03 K/UL (ref 0–0.12)
BILIRUB SERPL-MCNC: 0.4 MG/DL (ref 0.1–1.5)
BUN SERPL-MCNC: 17 MG/DL (ref 8–22)
CALCIUM ALBUM COR SERPL-MCNC: 9.6 MG/DL (ref 8.5–10.5)
CALCIUM SERPL-MCNC: 9.1 MG/DL (ref 8.5–10.5)
CHLORIDE SERPL-SCNC: 104 MMOL/L (ref 96–112)
CO2 SERPL-SCNC: 25 MMOL/L (ref 20–33)
CREAT SERPL-MCNC: 0.7 MG/DL (ref 0.5–1.4)
EOSINOPHIL # BLD AUTO: 0.29 K/UL (ref 0–0.51)
EOSINOPHIL NFR BLD: 4.3 % (ref 0–6.9)
ERYTHROCYTE [DISTWIDTH] IN BLOOD BY AUTOMATED COUNT: 56.6 FL (ref 35.9–50)
GFR SERPLBLD CREATININE-BSD FMLA CKD-EPI: 89 ML/MIN/1.73 M 2
GLOBULIN SER CALC-MCNC: 3.7 G/DL (ref 1.9–3.5)
GLUCOSE SERPL-MCNC: 112 MG/DL (ref 65–99)
HCT VFR BLD AUTO: 38 % (ref 37–47)
HGB BLD-MCNC: 11.5 G/DL (ref 12–16)
IMM GRANULOCYTES # BLD AUTO: 0.02 K/UL (ref 0–0.11)
IMM GRANULOCYTES NFR BLD AUTO: 0.3 % (ref 0–0.9)
INR PPP: 1.1 (ref 0.87–1.13)
LYMPHOCYTES # BLD AUTO: 2.25 K/UL (ref 1–4.8)
LYMPHOCYTES NFR BLD: 33.3 % (ref 22–41)
MCH RBC QN AUTO: 29.2 PG (ref 27–33)
MCHC RBC AUTO-ENTMCNC: 30.3 G/DL (ref 32.2–35.5)
MCV RBC AUTO: 96.4 FL (ref 81.4–97.8)
MONOCYTES # BLD AUTO: 0.57 K/UL (ref 0–0.85)
MONOCYTES NFR BLD AUTO: 8.4 % (ref 0–13.4)
NEUTROPHILS # BLD AUTO: 3.59 K/UL (ref 1.82–7.42)
NEUTROPHILS NFR BLD: 53.3 % (ref 44–72)
NRBC # BLD AUTO: 0 K/UL
NRBC BLD-RTO: 0 /100 WBC (ref 0–0.2)
NT-PROBNP SERPL IA-MCNC: 742 PG/ML (ref 0–125)
PLATELET # BLD AUTO: 200 K/UL (ref 164–446)
PMV BLD AUTO: 9.5 FL (ref 9–12.9)
POTASSIUM SERPL-SCNC: 4.4 MMOL/L (ref 3.6–5.5)
PROT SERPL-MCNC: 7.1 G/DL (ref 6–8.2)
PROTHROMBIN TIME: 14.2 SEC (ref 12–14.6)
RBC # BLD AUTO: 3.94 M/UL (ref 4.2–5.4)
SODIUM SERPL-SCNC: 138 MMOL/L (ref 135–145)
WBC # BLD AUTO: 6.8 K/UL (ref 4.8–10.8)

## 2025-05-29 PROCEDURE — 665998 HH PPS REVENUE CREDIT

## 2025-05-29 PROCEDURE — 83880 ASSAY OF NATRIURETIC PEPTIDE: CPT

## 2025-05-29 PROCEDURE — 85610 PROTHROMBIN TIME: CPT

## 2025-05-29 PROCEDURE — 85730 THROMBOPLASTIN TIME PARTIAL: CPT

## 2025-05-29 PROCEDURE — 665999 HH PPS REVENUE DEBIT

## 2025-05-29 PROCEDURE — 85025 COMPLETE CBC W/AUTO DIFF WBC: CPT

## 2025-05-29 PROCEDURE — 80053 COMPREHEN METABOLIC PANEL: CPT

## 2025-05-29 PROCEDURE — 36415 COLL VENOUS BLD VENIPUNCTURE: CPT

## 2025-05-30 PROCEDURE — 665999 HH PPS REVENUE DEBIT

## 2025-05-30 PROCEDURE — 665998 HH PPS REVENUE CREDIT

## 2025-06-02 ENCOUNTER — APPOINTMENT (OUTPATIENT)
Dept: CARDIOLOGY | Facility: MEDICAL CENTER | Age: 75
DRG: 266 | End: 2025-06-02
Attending: ANESTHESIOLOGY
Payer: MEDICARE

## 2025-06-02 ENCOUNTER — HOSPITAL ENCOUNTER (INPATIENT)
Facility: MEDICAL CENTER | Age: 75
LOS: 1 days | DRG: 266 | End: 2025-06-03
Attending: INTERNAL MEDICINE | Admitting: INTERNAL MEDICINE
Payer: MEDICARE

## 2025-06-02 ENCOUNTER — APPOINTMENT (OUTPATIENT)
Dept: RADIOLOGY | Facility: MEDICAL CENTER | Age: 75
DRG: 266 | End: 2025-06-02
Payer: MEDICARE

## 2025-06-02 ENCOUNTER — ANESTHESIA (OUTPATIENT)
Dept: SURGERY | Facility: MEDICAL CENTER | Age: 75
DRG: 266 | End: 2025-06-02
Payer: MEDICARE

## 2025-06-02 ENCOUNTER — ANESTHESIA EVENT (OUTPATIENT)
Dept: SURGERY | Facility: MEDICAL CENTER | Age: 75
DRG: 266 | End: 2025-06-02
Payer: MEDICARE

## 2025-06-02 DIAGNOSIS — Z95.2 S/P TAVR (TRANSCATHETER AORTIC VALVE REPLACEMENT): Primary | ICD-10-CM

## 2025-06-02 LAB
ACT BLD: 302 SEC (ref 74–137)
ALBUMIN SERPL BCP-MCNC: 3.5 G/DL (ref 3.2–4.9)
ALBUMIN/GLOB SERPL: 0.9 G/DL
ALP SERPL-CCNC: 66 U/L (ref 30–99)
ALT SERPL-CCNC: 8 U/L (ref 2–50)
ANION GAP SERPL CALC-SCNC: 9 MMOL/L (ref 7–16)
AST SERPL-CCNC: 27 U/L (ref 12–45)
BILIRUB SERPL-MCNC: 0.4 MG/DL (ref 0.1–1.5)
BUN SERPL-MCNC: 14 MG/DL (ref 8–22)
CALCIUM ALBUM COR SERPL-MCNC: 9.3 MG/DL (ref 8.5–10.5)
CALCIUM SERPL-MCNC: 8.9 MG/DL (ref 8.5–10.5)
CHLORIDE SERPL-SCNC: 101 MMOL/L (ref 96–112)
CO2 SERPL-SCNC: 25 MMOL/L (ref 20–33)
CREAT SERPL-MCNC: 0.72 MG/DL (ref 0.5–1.4)
EKG IMPRESSION: NORMAL
ERYTHROCYTE [DISTWIDTH] IN BLOOD BY AUTOMATED COUNT: 56.7 FL (ref 35.9–50)
GFR SERPLBLD CREATININE-BSD FMLA CKD-EPI: 86 ML/MIN/1.73 M 2
GLOBULIN SER CALC-MCNC: 3.8 G/DL (ref 1.9–3.5)
GLUCOSE BLD STRIP.AUTO-MCNC: 105 MG/DL (ref 65–99)
GLUCOSE BLD STRIP.AUTO-MCNC: 114 MG/DL (ref 65–99)
GLUCOSE BLD STRIP.AUTO-MCNC: 144 MG/DL (ref 65–99)
GLUCOSE BLD STRIP.AUTO-MCNC: 158 MG/DL (ref 65–99)
GLUCOSE BLD STRIP.AUTO-MCNC: 158 MG/DL (ref 65–99)
GLUCOSE SERPL-MCNC: 142 MG/DL (ref 65–99)
HCT VFR BLD AUTO: 38.9 % (ref 37–47)
HGB BLD-MCNC: 12.4 G/DL (ref 12–16)
MCH RBC QN AUTO: 30 PG (ref 27–33)
MCHC RBC AUTO-ENTMCNC: 31.9 G/DL (ref 32.2–35.5)
MCV RBC AUTO: 94.2 FL (ref 81.4–97.8)
NT-PROBNP SERPL IA-MCNC: 655 PG/ML (ref 0–125)
PLATELET # BLD AUTO: 177 K/UL (ref 164–446)
PMV BLD AUTO: 9 FL (ref 9–12.9)
POTASSIUM SERPL-SCNC: 4.4 MMOL/L (ref 3.6–5.5)
PROT SERPL-MCNC: 7.3 G/DL (ref 6–8.2)
RBC # BLD AUTO: 4.13 M/UL (ref 4.2–5.4)
SODIUM SERPL-SCNC: 135 MMOL/L (ref 135–145)
WBC # BLD AUTO: 9.9 K/UL (ref 4.8–10.8)

## 2025-06-02 PROCEDURE — 93010 ELECTROCARDIOGRAM REPORT: CPT | Performed by: INTERNAL MEDICINE

## 2025-06-02 PROCEDURE — 160015 HCHG STAT PREOP MINUTES: Performed by: INTERNAL MEDICINE

## 2025-06-02 PROCEDURE — 36415 COLL VENOUS BLD VENIPUNCTURE: CPT

## 2025-06-02 PROCEDURE — 700101 HCHG RX REV CODE 250: Performed by: ANESTHESIOLOGY

## 2025-06-02 PROCEDURE — 83880 ASSAY OF NATRIURETIC PEPTIDE: CPT

## 2025-06-02 PROCEDURE — 770020 HCHG ROOM/CARE - TELE (206)

## 2025-06-02 PROCEDURE — C1769 GUIDE WIRE: HCPCS | Performed by: INTERNAL MEDICINE

## 2025-06-02 PROCEDURE — 700111 HCHG RX REV CODE 636 W/ 250 OVERRIDE (IP): Performed by: INTERNAL MEDICINE

## 2025-06-02 PROCEDURE — 503001 HCHG PERFUSION: Performed by: INTERNAL MEDICINE

## 2025-06-02 PROCEDURE — 160035 HCHG PACU - 1ST 60 MINS PHASE I: Performed by: INTERNAL MEDICINE

## 2025-06-02 PROCEDURE — 93005 ELECTROCARDIOGRAM TRACING: CPT | Mod: TC

## 2025-06-02 PROCEDURE — 02RF38Z REPLACEMENT OF AORTIC VALVE WITH ZOOPLASTIC TISSUE, PERCUTANEOUS APPROACH: ICD-10-PCS | Performed by: INTERNAL MEDICINE

## 2025-06-02 PROCEDURE — 85347 COAGULATION TIME ACTIVATED: CPT

## 2025-06-02 PROCEDURE — 700111 HCHG RX REV CODE 636 W/ 250 OVERRIDE (IP): Performed by: ANESTHESIOLOGY

## 2025-06-02 PROCEDURE — 700111 HCHG RX REV CODE 636 W/ 250 OVERRIDE (IP): Mod: JZ

## 2025-06-02 PROCEDURE — C1760 CLOSURE DEV, VASC: HCPCS | Performed by: INTERNAL MEDICINE

## 2025-06-02 PROCEDURE — 700105 HCHG RX REV CODE 258: Performed by: INTERNAL MEDICINE

## 2025-06-02 PROCEDURE — 700105 HCHG RX REV CODE 258

## 2025-06-02 PROCEDURE — C1751 CATH, INF, PER/CENT/MIDLINE: HCPCS | Performed by: INTERNAL MEDICINE

## 2025-06-02 PROCEDURE — 160042 HCHG SURGERY MINUTES - EA ADDL 1 MIN LEVEL 5: Performed by: INTERNAL MEDICINE

## 2025-06-02 PROCEDURE — C1894 INTRO/SHEATH, NON-LASER: HCPCS | Performed by: INTERNAL MEDICINE

## 2025-06-02 PROCEDURE — 700117 HCHG RX CONTRAST REV CODE 255: Performed by: INTERNAL MEDICINE

## 2025-06-02 PROCEDURE — 502240 HCHG MISC OR SUPPLY RC 0272: Performed by: INTERNAL MEDICINE

## 2025-06-02 PROCEDURE — 700105 HCHG RX REV CODE 258: Performed by: ANESTHESIOLOGY

## 2025-06-02 PROCEDURE — A9270 NON-COVERED ITEM OR SERVICE: HCPCS | Performed by: ANESTHESIOLOGY

## 2025-06-02 PROCEDURE — 700102 HCHG RX REV CODE 250 W/ 637 OVERRIDE(OP): Performed by: ANESTHESIOLOGY

## 2025-06-02 PROCEDURE — 71045 X-RAY EXAM CHEST 1 VIEW: CPT

## 2025-06-02 PROCEDURE — 160009 HCHG ANES TIME/MIN: Performed by: INTERNAL MEDICINE

## 2025-06-02 PROCEDURE — A9270 NON-COVERED ITEM OR SERVICE: HCPCS

## 2025-06-02 PROCEDURE — C1883 ADAPT/EXT, PACING/NEURO LEAD: HCPCS | Performed by: INTERNAL MEDICINE

## 2025-06-02 PROCEDURE — 85027 COMPLETE CBC AUTOMATED: CPT

## 2025-06-02 PROCEDURE — 700101 HCHG RX REV CODE 250: Performed by: INTERNAL MEDICINE

## 2025-06-02 PROCEDURE — 160048 HCHG OR STATISTICAL LEVEL 1-5: Performed by: INTERNAL MEDICINE

## 2025-06-02 PROCEDURE — 80053 COMPREHEN METABOLIC PANEL: CPT

## 2025-06-02 PROCEDURE — 33361 REPLACE AORTIC VALVE PERQ: CPT | Mod: 62,Q0 | Performed by: INTERNAL MEDICINE

## 2025-06-02 PROCEDURE — C1887 CATHETER, GUIDING: HCPCS | Performed by: INTERNAL MEDICINE

## 2025-06-02 PROCEDURE — 160036 HCHG PACU - EA ADDL 30 MINS PHASE I: Performed by: INTERNAL MEDICINE

## 2025-06-02 PROCEDURE — 700111 HCHG RX REV CODE 636 W/ 250 OVERRIDE (IP): Mod: JZ | Performed by: ANESTHESIOLOGY

## 2025-06-02 PROCEDURE — 160031 HCHG SURGERY MINUTES - 1ST 30 MINS LEVEL 5: Performed by: INTERNAL MEDICINE

## 2025-06-02 PROCEDURE — 33361 REPLACE AORTIC VALVE PERQ: CPT | Mod: 62,Q0 | Performed by: THORACIC SURGERY (CARDIOTHORACIC VASCULAR SURGERY)

## 2025-06-02 PROCEDURE — 110372 HCHG SHELL REV 278: Performed by: INTERNAL MEDICINE

## 2025-06-02 PROCEDURE — 82962 GLUCOSE BLOOD TEST: CPT

## 2025-06-02 PROCEDURE — 160002 HCHG RECOVERY MINUTES (STAT): Performed by: INTERNAL MEDICINE

## 2025-06-02 PROCEDURE — 93355 ECHO TRANSESOPHAGEAL (TEE): CPT

## 2025-06-02 PROCEDURE — 700102 HCHG RX REV CODE 250 W/ 637 OVERRIDE(OP)

## 2025-06-02 DEVICE — IMPLANTABLE DEVICE: Type: IMPLANTABLE DEVICE | Site: GROIN | Status: FUNCTIONAL

## 2025-06-02 DEVICE — DEVICE CLSR 6FR HMST IMPL SLF STS PLUS ANGIOSEAL (10EA/CA): Type: IMPLANTABLE DEVICE | Site: GROIN | Status: FUNCTIONAL

## 2025-06-02 RX ORDER — DEXAMETHASONE SODIUM PHOSPHATE 4 MG/ML
INJECTION, SOLUTION INTRA-ARTICULAR; INTRALESIONAL; INTRAMUSCULAR; INTRAVENOUS; SOFT TISSUE PRN
Status: DISCONTINUED | OUTPATIENT
Start: 2025-06-02 | End: 2025-06-02 | Stop reason: SURG

## 2025-06-02 RX ORDER — PROTAMINE SULFATE 10 MG/ML
INJECTION, SOLUTION INTRAVENOUS PRN
Status: DISCONTINUED | OUTPATIENT
Start: 2025-06-02 | End: 2025-06-02 | Stop reason: SURG

## 2025-06-02 RX ORDER — BISACODYL 10 MG
10 SUPPOSITORY, RECTAL RECTAL
Status: DISCONTINUED | OUTPATIENT
Start: 2025-06-02 | End: 2025-06-03 | Stop reason: HOSPADM

## 2025-06-02 RX ORDER — LIDOCAINE HYDROCHLORIDE 40 MG/ML
SOLUTION TOPICAL PRN
Status: DISCONTINUED | OUTPATIENT
Start: 2025-06-02 | End: 2025-06-02 | Stop reason: SURG

## 2025-06-02 RX ORDER — LIDOCAINE HYDROCHLORIDE 20 MG/ML
INJECTION, SOLUTION EPIDURAL; INFILTRATION; INTRACAUDAL; PERINEURAL PRN
Status: DISCONTINUED | OUTPATIENT
Start: 2025-06-02 | End: 2025-06-02 | Stop reason: SURG

## 2025-06-02 RX ORDER — SCOPOLAMINE 1 MG/3D
PATCH, EXTENDED RELEASE TRANSDERMAL
Status: COMPLETED
Start: 2025-06-02 | End: 2025-06-02

## 2025-06-02 RX ORDER — HYDRALAZINE HYDROCHLORIDE 20 MG/ML
INJECTION INTRAMUSCULAR; INTRAVENOUS PRN
Status: DISCONTINUED | OUTPATIENT
Start: 2025-06-02 | End: 2025-06-02 | Stop reason: SURG

## 2025-06-02 RX ORDER — SODIUM CHLORIDE, SODIUM LACTATE, POTASSIUM CHLORIDE, CALCIUM CHLORIDE 600; 310; 30; 20 MG/100ML; MG/100ML; MG/100ML; MG/100ML
INJECTION, SOLUTION INTRAVENOUS
Status: DISCONTINUED | OUTPATIENT
Start: 2025-06-02 | End: 2025-06-02 | Stop reason: SURG

## 2025-06-02 RX ORDER — CEFAZOLIN SODIUM 1 G/3ML
INJECTION, POWDER, FOR SOLUTION INTRAMUSCULAR; INTRAVENOUS PRN
Status: DISCONTINUED | OUTPATIENT
Start: 2025-06-02 | End: 2025-06-02 | Stop reason: SURG

## 2025-06-02 RX ORDER — HALOPERIDOL 5 MG/ML
1 INJECTION INTRAMUSCULAR
Status: COMPLETED | OUTPATIENT
Start: 2025-06-02 | End: 2025-06-02

## 2025-06-02 RX ORDER — DOCUSATE SODIUM 100 MG/1
100 CAPSULE, LIQUID FILLED ORAL 2 TIMES DAILY
Status: DISCONTINUED | OUTPATIENT
Start: 2025-06-02 | End: 2025-06-03 | Stop reason: HOSPADM

## 2025-06-02 RX ORDER — ONDANSETRON 2 MG/ML
4 INJECTION INTRAMUSCULAR; INTRAVENOUS EVERY 4 HOURS PRN
Status: DISCONTINUED | OUTPATIENT
Start: 2025-06-02 | End: 2025-06-03 | Stop reason: HOSPADM

## 2025-06-02 RX ORDER — DEXTROSE MONOHYDRATE 25 G/50ML
25 INJECTION, SOLUTION INTRAVENOUS
Status: DISCONTINUED | OUTPATIENT
Start: 2025-06-02 | End: 2025-06-03 | Stop reason: HOSPADM

## 2025-06-02 RX ORDER — INSULIN LISPRO 100 [IU]/ML
2-9 INJECTION, SOLUTION INTRAVENOUS; SUBCUTANEOUS
Status: DISCONTINUED | OUTPATIENT
Start: 2025-06-02 | End: 2025-06-03 | Stop reason: HOSPADM

## 2025-06-02 RX ORDER — HYDRALAZINE HYDROCHLORIDE 20 MG/ML
10 INJECTION INTRAMUSCULAR; INTRAVENOUS
Status: DISCONTINUED | OUTPATIENT
Start: 2025-06-02 | End: 2025-06-03 | Stop reason: HOSPADM

## 2025-06-02 RX ORDER — ONDANSETRON 2 MG/ML
INJECTION INTRAMUSCULAR; INTRAVENOUS PRN
Status: DISCONTINUED | OUTPATIENT
Start: 2025-06-02 | End: 2025-06-02 | Stop reason: HOSPADM

## 2025-06-02 RX ORDER — PHENYLEPHRINE HYDROCHLORIDE 10 MG/ML
INJECTION, SOLUTION INTRAMUSCULAR; INTRAVENOUS; SUBCUTANEOUS PRN
Status: DISCONTINUED | OUTPATIENT
Start: 2025-06-02 | End: 2025-06-02 | Stop reason: SURG

## 2025-06-02 RX ORDER — HEPARIN SODIUM,PORCINE 1000/ML
VIAL (ML) INJECTION PRN
Status: DISCONTINUED | OUTPATIENT
Start: 2025-06-02 | End: 2025-06-02 | Stop reason: SURG

## 2025-06-02 RX ORDER — ACETAMINOPHEN 325 MG/1
650 TABLET ORAL EVERY 6 HOURS PRN
Status: DISCONTINUED | OUTPATIENT
Start: 2025-06-02 | End: 2025-06-03 | Stop reason: HOSPADM

## 2025-06-02 RX ORDER — SODIUM PHOSPHATE,MONO-DIBASIC 19G-7G/118
1 ENEMA (ML) RECTAL
Status: DISCONTINUED | OUTPATIENT
Start: 2025-06-02 | End: 2025-06-03 | Stop reason: HOSPADM

## 2025-06-02 RX ORDER — LIDOCAINE HYDROCHLORIDE 20 MG/ML
INJECTION, SOLUTION INFILTRATION; PERINEURAL
Status: DISCONTINUED | OUTPATIENT
Start: 2025-06-02 | End: 2025-06-02 | Stop reason: HOSPADM

## 2025-06-02 RX ORDER — AMOXICILLIN 250 MG
1 CAPSULE ORAL
Status: DISCONTINUED | OUTPATIENT
Start: 2025-06-02 | End: 2025-06-03 | Stop reason: HOSPADM

## 2025-06-02 RX ORDER — BUPIVACAINE HYDROCHLORIDE 2.5 MG/ML
INJECTION, SOLUTION EPIDURAL; INFILTRATION; INTRACAUDAL; PERINEURAL
Status: DISCONTINUED | OUTPATIENT
Start: 2025-06-02 | End: 2025-06-02 | Stop reason: HOSPADM

## 2025-06-02 RX ORDER — OXYCODONE HCL 5 MG/5 ML
5 SOLUTION, ORAL ORAL
Status: DISCONTINUED | OUTPATIENT
Start: 2025-06-02 | End: 2025-06-02 | Stop reason: HOSPADM

## 2025-06-02 RX ORDER — HYDROMORPHONE HYDROCHLORIDE 1 MG/ML
0.1 INJECTION, SOLUTION INTRAMUSCULAR; INTRAVENOUS; SUBCUTANEOUS
Status: DISCONTINUED | OUTPATIENT
Start: 2025-06-02 | End: 2025-06-02 | Stop reason: HOSPADM

## 2025-06-02 RX ORDER — MEPERIDINE HYDROCHLORIDE 50 MG/ML
12.5 INJECTION INTRAMUSCULAR; INTRAVENOUS; SUBCUTANEOUS
Status: DISCONTINUED | OUTPATIENT
Start: 2025-06-02 | End: 2025-06-02 | Stop reason: HOSPADM

## 2025-06-02 RX ORDER — LISINOPRIL 5 MG/1
5 TABLET ORAL EVERY EVENING
Status: DISCONTINUED | OUTPATIENT
Start: 2025-06-02 | End: 2025-06-03 | Stop reason: HOSPADM

## 2025-06-02 RX ORDER — DIPHENHYDRAMINE HYDROCHLORIDE 50 MG/ML
12.5 INJECTION, SOLUTION INTRAMUSCULAR; INTRAVENOUS
Status: DISCONTINUED | OUTPATIENT
Start: 2025-06-02 | End: 2025-06-02 | Stop reason: HOSPADM

## 2025-06-02 RX ORDER — OXYCODONE HCL 5 MG/5 ML
10 SOLUTION, ORAL ORAL
Status: DISCONTINUED | OUTPATIENT
Start: 2025-06-02 | End: 2025-06-02 | Stop reason: HOSPADM

## 2025-06-02 RX ORDER — POLYETHYLENE GLYCOL 3350 17 G/17G
1 POWDER, FOR SOLUTION ORAL 2 TIMES DAILY PRN
Status: DISCONTINUED | OUTPATIENT
Start: 2025-06-02 | End: 2025-06-03 | Stop reason: HOSPADM

## 2025-06-02 RX ORDER — HYDROMORPHONE HYDROCHLORIDE 1 MG/ML
0.4 INJECTION, SOLUTION INTRAMUSCULAR; INTRAVENOUS; SUBCUTANEOUS
Status: DISCONTINUED | OUTPATIENT
Start: 2025-06-02 | End: 2025-06-02 | Stop reason: HOSPADM

## 2025-06-02 RX ORDER — FUROSEMIDE 10 MG/ML
20 INJECTION INTRAMUSCULAR; INTRAVENOUS DAILY
Status: DISCONTINUED | OUTPATIENT
Start: 2025-06-02 | End: 2025-06-03 | Stop reason: HOSPADM

## 2025-06-02 RX ORDER — HYDROMORPHONE HYDROCHLORIDE 1 MG/ML
0.2 INJECTION, SOLUTION INTRAMUSCULAR; INTRAVENOUS; SUBCUTANEOUS
Status: DISCONTINUED | OUTPATIENT
Start: 2025-06-02 | End: 2025-06-02 | Stop reason: HOSPADM

## 2025-06-02 RX ORDER — SCOPOLAMINE 1 MG/3D
PATCH, EXTENDED RELEASE TRANSDERMAL PRN
Status: DISCONTINUED | OUTPATIENT
Start: 2025-06-02 | End: 2025-06-02 | Stop reason: SURG

## 2025-06-02 RX ORDER — HYDRALAZINE HYDROCHLORIDE 20 MG/ML
5 INJECTION INTRAMUSCULAR; INTRAVENOUS
Status: DISCONTINUED | OUTPATIENT
Start: 2025-06-02 | End: 2025-06-02 | Stop reason: HOSPADM

## 2025-06-02 RX ORDER — INSULIN LISPRO 100 [IU]/ML
2-9 INJECTION, SOLUTION INTRAVENOUS; SUBCUTANEOUS EVERY 6 HOURS
Status: DISCONTINUED | OUTPATIENT
Start: 2025-06-02 | End: 2025-06-02 | Stop reason: HOSPADM

## 2025-06-02 RX ORDER — SODIUM CHLORIDE 9 MG/ML
INJECTION, SOLUTION INTRAVENOUS CONTINUOUS
Status: ACTIVE | OUTPATIENT
Start: 2025-06-02 | End: 2025-06-02

## 2025-06-02 RX ORDER — POTASSIUM CHLORIDE 1500 MG/1
20 TABLET, EXTENDED RELEASE ORAL DAILY
Status: DISCONTINUED | OUTPATIENT
Start: 2025-06-02 | End: 2025-06-03 | Stop reason: HOSPADM

## 2025-06-02 RX ORDER — SODIUM CHLORIDE, SODIUM LACTATE, POTASSIUM CHLORIDE, CALCIUM CHLORIDE 600; 310; 30; 20 MG/100ML; MG/100ML; MG/100ML; MG/100ML
INJECTION, SOLUTION INTRAVENOUS CONTINUOUS
Status: DISCONTINUED | OUTPATIENT
Start: 2025-06-02 | End: 2025-06-02 | Stop reason: HOSPADM

## 2025-06-02 RX ORDER — ROSUVASTATIN CALCIUM 10 MG/1
10 TABLET, COATED ORAL EVERY EVENING
Status: DISCONTINUED | OUTPATIENT
Start: 2025-06-02 | End: 2025-06-03 | Stop reason: HOSPADM

## 2025-06-02 RX ORDER — DEXTROSE MONOHYDRATE 25 G/50ML
25 INJECTION, SOLUTION INTRAVENOUS
Status: DISCONTINUED | OUTPATIENT
Start: 2025-06-02 | End: 2025-06-02 | Stop reason: HOSPADM

## 2025-06-02 RX ORDER — ASPIRIN 81 MG/1
81 TABLET ORAL DAILY
Status: DISCONTINUED | OUTPATIENT
Start: 2025-06-02 | End: 2025-06-03 | Stop reason: HOSPADM

## 2025-06-02 RX ORDER — DIPHENHYDRAMINE HYDROCHLORIDE 50 MG/ML
25 INJECTION, SOLUTION INTRAMUSCULAR; INTRAVENOUS EVERY 6 HOURS PRN
Status: DISCONTINUED | OUTPATIENT
Start: 2025-06-02 | End: 2025-06-03 | Stop reason: HOSPADM

## 2025-06-02 RX ORDER — AMOXICILLIN 250 MG
1 CAPSULE ORAL NIGHTLY
Status: DISCONTINUED | OUTPATIENT
Start: 2025-06-02 | End: 2025-06-03 | Stop reason: HOSPADM

## 2025-06-02 RX ORDER — SODIUM CHLORIDE, SODIUM LACTATE, POTASSIUM CHLORIDE, CALCIUM CHLORIDE 600; 310; 30; 20 MG/100ML; MG/100ML; MG/100ML; MG/100ML
INJECTION, SOLUTION INTRAVENOUS CONTINUOUS
Status: ACTIVE | OUTPATIENT
Start: 2025-06-02 | End: 2025-06-02

## 2025-06-02 RX ORDER — ALBUTEROL SULFATE 5 MG/ML
2.5 SOLUTION RESPIRATORY (INHALATION)
Status: DISCONTINUED | OUTPATIENT
Start: 2025-06-02 | End: 2025-06-02 | Stop reason: HOSPADM

## 2025-06-02 RX ORDER — DIPHENHYDRAMINE HCL 25 MG
25 TABLET ORAL NIGHTLY PRN
Status: DISCONTINUED | OUTPATIENT
Start: 2025-06-02 | End: 2025-06-03 | Stop reason: HOSPADM

## 2025-06-02 RX ADMIN — FENTANYL CITRATE 50 MCG: 50 INJECTION, SOLUTION INTRAMUSCULAR; INTRAVENOUS at 08:40

## 2025-06-02 RX ADMIN — FENTANYL CITRATE 50 MCG: 50 INJECTION, SOLUTION INTRAMUSCULAR; INTRAVENOUS at 08:02

## 2025-06-02 RX ADMIN — PROPOFOL 100 MG: 10 INJECTION, EMULSION INTRAVENOUS at 07:35

## 2025-06-02 RX ADMIN — ASPIRIN 81 MG: 81 TABLET, COATED ORAL at 14:16

## 2025-06-02 RX ADMIN — ONDANSETRON 8 MG: 2 INJECTION INTRAMUSCULAR; INTRAVENOUS at 08:41

## 2025-06-02 RX ADMIN — SODIUM CHLORIDE, POTASSIUM CHLORIDE, SODIUM LACTATE AND CALCIUM CHLORIDE: 600; 310; 30; 20 INJECTION, SOLUTION INTRAVENOUS at 07:29

## 2025-06-02 RX ADMIN — FENTANYL CITRATE 25 MCG: 50 INJECTION, SOLUTION INTRAMUSCULAR; INTRAVENOUS at 09:23

## 2025-06-02 RX ADMIN — SCOPOLAMINE 1 PATCH: 1.5 PATCH, EXTENDED RELEASE TRANSDERMAL at 07:25

## 2025-06-02 RX ADMIN — DEXAMETHASONE SODIUM PHOSPHATE 8 MG: 4 INJECTION INTRA-ARTICULAR; INTRALESIONAL; INTRAMUSCULAR; INTRAVENOUS; SOFT TISSUE at 07:35

## 2025-06-02 RX ADMIN — PHENYLEPHRINE HYDROCHLORIDE 100 MCG: 10 INJECTION INTRAVENOUS at 08:38

## 2025-06-02 RX ADMIN — HYDRALAZINE HYDROCHLORIDE 5 MG: 20 INJECTION, SOLUTION INTRAMUSCULAR; INTRAVENOUS at 08:47

## 2025-06-02 RX ADMIN — INSULIN LISPRO 2 UNITS: 100 INJECTION, SOLUTION INTRAVENOUS; SUBCUTANEOUS at 18:10

## 2025-06-02 RX ADMIN — ROSUVASTATIN CALCIUM 10 MG: 10 TABLET, FILM COATED ORAL at 18:08

## 2025-06-02 RX ADMIN — LISINOPRIL 5 MG: 5 TABLET ORAL at 18:08

## 2025-06-02 RX ADMIN — LIDOCAINE HYDROCHLORIDE 100 MG: 20 INJECTION, SOLUTION EPIDURAL; INFILTRATION; INTRACAUDAL; PERINEURAL at 07:35

## 2025-06-02 RX ADMIN — SODIUM CHLORIDE, POTASSIUM CHLORIDE, SODIUM LACTATE AND CALCIUM CHLORIDE: 600; 310; 30; 20 INJECTION, SOLUTION INTRAVENOUS at 07:07

## 2025-06-02 RX ADMIN — LIDOCAINE HYDROCHLORIDE 4 ML: 40 SOLUTION TOPICAL at 07:36

## 2025-06-02 RX ADMIN — HEPARIN SODIUM 10000 UNITS: 1000 INJECTION, SOLUTION INTRAVENOUS; SUBCUTANEOUS at 08:07

## 2025-06-02 RX ADMIN — SUGAMMADEX 200 MG: 100 INJECTION, SOLUTION INTRAVENOUS at 08:51

## 2025-06-02 RX ADMIN — FUROSEMIDE 20 MG: 10 INJECTION, SOLUTION INTRAVENOUS at 12:07

## 2025-06-02 RX ADMIN — SENNOSIDES AND DOCUSATE SODIUM 1 TABLET: 50; 8.6 TABLET ORAL at 20:01

## 2025-06-02 RX ADMIN — CEFAZOLIN 2 G: 1 INJECTION, POWDER, FOR SOLUTION INTRAMUSCULAR; INTRAVENOUS at 07:35

## 2025-06-02 RX ADMIN — LIDOCAINE HYDROCHLORIDE 0.5 ML: 10 INJECTION, SOLUTION EPIDURAL; INFILTRATION; INTRACAUDAL; PERINEURAL at 07:07

## 2025-06-02 RX ADMIN — HALOPERIDOL LACTATE 1 MG: 5 INJECTION, SOLUTION INTRAMUSCULAR at 09:10

## 2025-06-02 RX ADMIN — ROCURONIUM BROMIDE 50 MG: 10 INJECTION INTRAVENOUS at 07:35

## 2025-06-02 RX ADMIN — PROTAMINE SULFATE 50 MG: 10 INJECTION, SOLUTION INTRAVENOUS at 08:40

## 2025-06-02 RX ADMIN — ACETAMINOPHEN 650 MG: 325 TABLET ORAL at 20:01

## 2025-06-02 RX ADMIN — SODIUM CHLORIDE: 9 INJECTION, SOLUTION INTRAVENOUS at 12:05

## 2025-06-02 RX ADMIN — INSULIN LISPRO 2 UNITS: 100 INJECTION, SOLUTION INTRAVENOUS; SUBCUTANEOUS at 12:09

## 2025-06-02 RX ADMIN — HALOPERIDOL LACTATE 1 MG: 5 INJECTION, SOLUTION INTRAMUSCULAR at 09:20

## 2025-06-02 RX ADMIN — POTASSIUM CHLORIDE 20 MEQ: 1500 TABLET, EXTENDED RELEASE ORAL at 14:15

## 2025-06-02 ASSESSMENT — PAIN DESCRIPTION - PAIN TYPE
TYPE: SURGICAL PAIN
TYPE: ACUTE PAIN
TYPE: SURGICAL PAIN

## 2025-06-02 ASSESSMENT — FIBROSIS 4 INDEX
FIB4 SCORE: 2.44
FIB4 SCORE: 2.44

## 2025-06-02 NOTE — OP REPORT
"TRANSCATHETER AORTIC VALVE REPLACEMENT REPORT    Referring Provider: Dr. Xiao    INTERVENTIONAL CARDIOLOGIST: Chad Cortes MD  CARDIAC SURGEON: Gomez Navarrete DO  ANESTHESIOLOGIST: Ghulam Hill MD    ASSISTANT: None    IMPRESSIONS:  1. Successful TAVR with implantation of a 23 Hinojosa Angela S3 Ultra Resilia deployed at nominal volume via the transfemoral approach  2. Acute decompensated diastolic heart failure with LVEDP of 20 mmHg    Recommendations:  4 hour bedrest    Pre-procedure diagnosis: TAVR recommended by heart valve team. Stage D3 (symptomatic severe AS, pLF/LG)  Post-procedure diagnosis: Same    Procedure performed  Pigtail placement/ascending aortography  23 Hinojosa S3 Ultra Resilia  Perclose closure  Angioseal closure    Procedure Description  1. Access:   A) Valve Sheath: Right femoral, 14F Hinojosa eSheath. Fluoroscopic guidance was utilized for femoral access Dynamic ultrasound was utilized to gain access.  B) Temporary pacemaker: 6 Georgian Left femoral vein. Fluoroscopic guidance was utilized for femoral access Dynamic ultrasound was utilized to gain access.  C) Pigtail catheter: 5/6 Georgian right radial artery Micropuncture technique was utilized following local anesthesia with lidocaine.  A radial cocktail containing verapamil and saline was administered in the radial artery sheath  D) Other: 6 Azerbaijani left femoral artery, Fluoroscopic guidance was utilized for femoral access Dynamic ultrasound was utilized to gain access.    2. Procedure Description:  A TVP and pigtail catheter were placed and appropriate pacer function and implantation angle confirmed. A pigtail catheter was used to perform left heart catheterization and LVEDP measured at 20 mmHg. The preclose was deployed followed by dilation of the tract with an 8F sheath. Root angiography demonstrated a low RCA. I then accessed the LFA and used a JR4, prowater and guidezilla to protect the RCA. A standard 0.035\" J wire was used to " "deliver an catheter to the ascending aorta and then exchange for a 0.035\" Lunderquist wire. Over this wire the Hinojosa E sheath advanced into the descending aorta. An AL1 was placed in the ascending aorta and the valve crossed with a 0.035\" strait wire. The catheter was advanced into the LV apex and wire exchanged for a 0.035\" Amplatz Super Stiff wire.   Next a 23 mm Hinojosa Angela S3 Ultra Resilia was deployed under rapid pacing with nominal volume  and 6 tejinder. Successful valve implantation confirmed by NAVEED. The RCA equipment was removed.  Protamine was administered and the Hinojosa sheath was removed from the body after reinsertion of the dilator. The perclose sutures were tightened hemostasis was achieved. The pigtail catheter was removed. The TVP was removed.    3. Hemostasis:   A) TAVR Sheath: Perclose by Preclose technique  B) TVP: Manual  C) Pigtail access: TR band  D) Left femoral: Angioseal    Technical Factors  1. Complications: None  2. Estimated Blood Loss: 50 cc  3. Specimens: None  4. Contrast Volume: 90 ml  5. Sedation: General Anesthesia  6. Echo: NAVEED    "

## 2025-06-02 NOTE — ANESTHESIA POSTPROCEDURE EVALUATION
Patient: Madelyn Schmitz    Procedure Summary       Date: 06/02/25 Room / Location: [facility] 19 / SURGERY [facility]    Anesthesia Start: 0729 Anesthesia Stop: 0901    Procedures:       TRANSCATHETER AORTIC VALVE REPLACEMENT (Bilateral: Groin)      ECHOCARDIOGRAM, TRANSESOPHAGEAL, INTRAOPERATIVE (Throat) Diagnosis: (SEVERE AORTIC STENOSIS)    Surgeons: Chad Cortes M.D. Responsible Provider: Ghulam Hill M.D.    Anesthesia Type: general ASA Status: 3            Final Anesthesia Type: general  Last vitals  BP   Blood Pressure : 124/57    Temp   36.6 °C (97.9 °F)    Pulse   85   Resp   17    SpO2   95 %      Anesthesia Post Evaluation    Patient location during evaluation: PACU  Patient participation: complete - patient participated  Level of consciousness: awake and alert    Airway patency: patent  Anesthetic complications: no  Cardiovascular status: hemodynamically stable  Respiratory status: acceptable  Hydration status: euvolemic    PONV: controlled          There were no known notable events for this encounter.     Nurse Pain Score: 0 (NPRS)

## 2025-06-02 NOTE — OR NURSING
Pt A&OX4. VSS. Afebrile. Pt on 2 L of oxygen via nasal cannula.   Bilateral groin sites soft w/ 2+ bilateral pedal pulses. R TR band off, site soft and gauze/tega in place.  ART line removed from L radial and dressing in place. Bilateral hands cool with three second capillary refill. Warm blanket kept on hands throughout recovery.  Pt reports pain with palpitation of right groin site only.  Nausea upon waking in pacu, IV haldol administered per anesthesia order set.  CXR, labs, and EKG obtained.   Report given to CHELE Church. Pt transported [facility] via hospital bed and on monitor.   Pt's son, Richmond, updated with pt's room.

## 2025-06-02 NOTE — ANESTHESIA PROCEDURE NOTES
Airway    Date/Time: 6/2/2025 7:36 AM    Performed by: Ghulam Hill M.D.  Authorized by: Ghulam Hill M.D.    Location:  OR  Urgency:  Elective  Difficult Airway: No    Indications for Airway Management:  Anesthesia      Spontaneous Ventilation: absent    Sedation Level:  Deep  Preoxygenated: Yes    Patient Position:  Sniffing  Final Airway Type:  Endotracheal airway  Final Endotracheal Airway:  ETT  Cuffed: Yes    Technique Used for Successful ETT Placement:  Direct laryngoscopy    Insertion Site:  Oral  Blade Type:  Agnieszka  Laryngoscope Blade/Videolaryngoscope Blade Size:  3  ETT Size (mm):  7.5  Measured from:  Gums  ETT to Gums (cm):  21  Placement Verified by: auscultation and capnometry    Cormack-Lehane Classification:  Grade I - full view of glottis  Number of Attempts at Approach:  1

## 2025-06-02 NOTE — PROGRESS NOTES
Medication history reviewed with PT at bedside with prescription bottles. PT confirmed 4 bottles and 1 tube of oint was returned to her.Language line  Brien #[id number] was used.    Med rec is complete per PT reporting    Allergies reviewed. PT has a bloodless consent on file    Patient denies any outpatient antibiotics in the last 30 days.     Patient is not taking anticoagulants.    Dispense history is available in [employer].    Preferred pharmacy for this encounter - [facility] on [address] ([phone])

## 2025-06-02 NOTE — ANESTHESIA PROCEDURE NOTES
NAVEED    Date/Time: 6/2/2025 7:50 AM    Performed by: Ghulam Hill M.D.  Authorized by: Ghulam Hill M.D.    Start Time:6/2/2025 7:50 AM  Preanesthetic Checklist: patient identified, IV checked, risks and benefits discussed, surgical consent, monitors and equipment checked, pre-op evaluation and timeout performed    Indication for NAVEED: diagnostic   Patient Location: OR  Intubated: Yes  Bite Block: Yes  Heart Visualized: Yes  Insertion: atraumatic    **See FULL NAVEED report in patient's chart via [facility] [employer]**

## 2025-06-02 NOTE — ANESTHESIA TIME REPORT
Anesthesia Start and Stop Event Times       Date Time Event    6/2/2025 0710 Ready for Procedure     0729 Anesthesia Start     0901 Anesthesia Stop          Responsible Staff  06/02/25      Name Role Begin End    Ghulam Hill M.D. Anesth 0729 0901          Overtime Reason:  no overtime (within assigned shift)    Comments:

## 2025-06-02 NOTE — ANESTHESIA PREPROCEDURE EVALUATION
Case: [id number] Anesthesia Start Date/Time: 06/02/25 0729    Procedures:       TRANSCATHETER AORTIC VALVE REPLACEMENT (Bilateral: Groin) - Site also includes right wrist, site clean, closure N/A    Hinojosa Angela 3 Ultra Resilia 23mm  Ref [id number]  SN   Exp      ECHOCARDIOGRAM, TRANSESOPHAGEAL, INTRAOPERATIVE (Throat)    Anesthesia type: general    Pre-op diagnosis: SEVERE AORTIC STENOSIS    [facility]    Surgeons: Chad Cortes M.D.            Relevant Problems   ANESTHESIA   (positive) Sleep apnea      CARDIAC   (positive) Aortic atherosclerosis (HCC)   (positive) Hypertension associated with diabetes (HCC)   (positive) Severe aortic stenosis      ENDO   (positive) Type 2 diabetes mellitus with microalbuminuria, without long-term current use of insulin (HCC)      Other   (positive) Hyperlipidemia associated with type 2 diabetes mellitus (HCC)       Physical Exam    Airway   Mallampati: II  TM distance: >3 FB  Neck ROM: full       Cardiovascular - normal exam  Rhythm: regular  Rate: normal    (-) murmur     Dental     (+) upper dentures, lower dentures           Pulmonary - normal examBreath sounds clear to auscultation     Abdominal    Neurological - normal exam                   Anesthesia Plan    ASA 3 (Severe valvular dysfunction)   ASA physical status 3 criteria: other (comment)    Plan - general       Airway plan will be ETT  NAVEED Planned        Induction: intravenous      Pertinent diagnostic labs and testing reviewed    Informed Consent:    Anesthetic plan and risks discussed with patient and spouse.    Use of blood products discussed with: patient and spouse whom did not consent to blood products.       Professional translation service was used to obtain informed consent.  It was clearly explained to the patient that by refusing blood she is subjecting herself to the possibility of a stroke, myocardial infarction and/or death.  The patient expressed understanding, along  with her spouse, and agreed to the risks involved with the refusal of blood.

## 2025-06-02 NOTE — OP REPORT
Operative Report    PreOp Diagnosis: Severe Aortic Symptomatic Stenosis      PostOp Diagnosis: Same as above      Procedure(s):  TRANSCATHETER AORTIC VALVE REPLACEMENT - Wound Class: Clean  ECHOCARDIOGRAM, TRANSESOPHAGEAL, INTRAOPERATIVE - Wound Class: Clean Contaminated    Surgeon(s):  ELIGIO Blum D.O.    Anesthesiologist/Type of Anesthesia:  Anesthesiologist: Ghulam Hill M.D.  Perfusionist: Sae Denise/General    Surgical Staff:  Circulator: Zack Valdez R.N.  Scrub Person: Mirella Ramirez  Radiology Technologist: Mack Farley  Cardiology Nurse: Cathie Wild R.N.    Specimens removed if any:  * No specimens in log *    Estimated Blood Loss: minimal    Findings: No PVL    Complications: None immediate    Procedure:    After informed consent was obtained, the patient was brought to the operating room and placed in the supine position on the operating table afterwards, general endotracheal anesthesia was induced by the anesthesia team.  Lines, catheters etc. were placed by the nursing and anesthesia team in the usual fashion.  Bony prominences were padded, joints placed in neutral position, and the patient was prepped and draped in the usual sterile fashion.  A timeout was performed.    We began the procedure by obtaining primary and secondary arterial access.  Primary arterial access was in the right common femoral artery, and secondary arterial access was in the right radial artery.  Access for temporary venous pacing wire was placed via the left common femoral vein.  The femoral vessels were accessed using a  Seldinger's technique with ultrasound guidance.  After obtaining access, 6 Icelandic sheaths were placed here.  The right radial artery was accessed by my cardiology colleague in the usual fashion.  Right femoral angiogram was performed to confirm good positioning of our sheath.  Following that, the temporary pacing lead was floated from the 14 common femoral vein  to the right ventricular apex.  Testing confirmed excellent capture.  Following that, a J-wire was advanced via the right radial catheter with an overriding pigtail catheter into the aortic root.  Angiography was performed there and are coplanar view was determined.  Heparinization was performed    Via the right femoral artery, access obtained with a J-wire.  The 6 Maltese sheath was removed and 2 Perclose sutures were placed in the usual fashion.  Following those, an 8 Maltese sheath was placed.  We again obtained access with a J-wire and pigtail catheter.  The J-wire was exchanged for a Lunderquist wire.  This allowed us to upsize our primary arterial access site to a 14 Maltese sheath.  Once the sheath was in place, it was secured with an Ethibond stitch.  A J-wire with an overriding AL-1 catheter was advanced to the aortic root.  The J-wire was exchanged for a 0.035 straight wire which was used to cross the aortic valve.  The AL-1 catheter was then advanced into the left ventricle.  The straight wire was then exchanged for extra-stiff Amplatz deployment wire.  The AL-1 catheter was removed.      WE did note a low lying right coronary that required protection via a left femoral access. Please see Dr. Cortes's note for further details.     The 23 mm valve was prepped on the back table in the usual fashion and brought up for advancement into the aorta.  Prior to placement, the configuration of the valve was confirmed to be correct.  It was advanced into the abdominal aorta under fluoroscopic guidance.  The valve and balloon were docked in the usual fashion in the abdominal aorta.  It was then advanced across the aortic arch and down into the aortic root and across the aortic valve.  The valve was then prepped for deployment.  Following that, respirations were held, rapid ventricular pacing performed, and root angiogram shot.  The valve was then deployed us at a pressure of 6 tejinder.  The balloon was deflated at that point  in the delivery system removed.  Post deployment echo revealed no PVL .    We proceeded with removal of all her wires, catheters etc.  The right femoral artery was secured using the 2 previously placed Perclose sutures.  Protamine was administered to reverse heparinization.  The right radial artery was treated with a TR band and pressure was held over the left common femoral venous site.  The patient tolerated procedure well, was taken to the CCU in stable condition.        6/2/2025 8:55 AM Gomez Navarrete D.O.

## 2025-06-02 NOTE — PROGRESS NOTES
4 Eyes Skin Assessment Completed by Lynnette, CHELE and CHELE Tony.    Head WDL  Ears WDL  Nose WDL  Mouth WDL  Neck WDL  Breast/Chest WDL  Shoulder Blades WDL  Spine WDL  (R) Arm/Elbow/Hand WDL  (L) Arm/Elbow/Hand WDL  Abdomen WDL  Groin WDL  Scrotum/Coccyx/Buttocks Redness and Blanching  (R) Leg WDL  (L) Leg WDL  (R) Heel/Foot/Toe Redness, Blanching, and Boggy, bunion  (L) Heel/Foot/Toe Redness, Blanching, and Boggy, bunion          Devices In Places Tele Box, Blood Pressure Cuff, and Pulse Ox      Interventions In Place Heel Mepilex, Pillows, Barrier Cream, and Pressure Redistribution Mattress    Possible Skin Injury No    Pictures Uploaded Into Epic N/A  Wound Consult Placed N/A  RN Wound Prevention Protocol Ordered No

## 2025-06-02 NOTE — ANESTHESIA PROCEDURE NOTES
Arterial Line    Performed by: Ghulam Hill M.D.  Authorized by: Ghulam Hill M.D.    Start Time:  6/2/2025 7:42 AM  End Time:  6/2/2025 7:44 AM  Localization: ultrasound guidance  Image captured, interpreted and electronically stored.  Patient Location:  OR  Indication: continuous blood pressure monitoring and blood sampling needed        Catheter Size:  20 G  Seldinger Technique?: Yes    Laterality:  Left  Site:  Radial artery  Line Secured:  Antimicrobial disc, tape and transparent dressing  Events: patient tolerated procedure well with no complications

## 2025-06-03 ENCOUNTER — HOME CARE VISIT (OUTPATIENT)
Dept: HOME HEALTH SERVICES | Facility: HOME HEALTHCARE | Age: 75
End: 2025-06-03
Payer: MEDICARE

## 2025-06-03 ENCOUNTER — APPOINTMENT (OUTPATIENT)
Dept: RADIOLOGY | Facility: MEDICAL CENTER | Age: 75
DRG: 266 | End: 2025-06-03
Payer: MEDICARE

## 2025-06-03 VITALS
OXYGEN SATURATION: 96 % | HEIGHT: 63 IN | WEIGHT: 206.57 LBS | HEART RATE: 79 BPM | SYSTOLIC BLOOD PRESSURE: 134 MMHG | DIASTOLIC BLOOD PRESSURE: 64 MMHG | RESPIRATION RATE: 17 BRPM | BODY MASS INDEX: 36.6 KG/M2 | TEMPERATURE: 97.9 F

## 2025-06-03 PROBLEM — I35.0 SEVERE AORTIC STENOSIS: Status: RESOLVED | Noted: 2025-02-13 | Resolved: 2025-06-03

## 2025-06-03 PROBLEM — I50.31 ACUTE DIASTOLIC HF (HEART FAILURE) (HCC): Status: ACTIVE | Noted: 2025-06-03

## 2025-06-03 LAB
ALBUMIN SERPL BCP-MCNC: 3.3 G/DL (ref 3.2–4.9)
ALBUMIN/GLOB SERPL: 1 G/DL
ALP SERPL-CCNC: 58 U/L (ref 30–99)
ALT SERPL-CCNC: 8 U/L (ref 2–50)
ANION GAP SERPL CALC-SCNC: 7 MMOL/L (ref 7–16)
AST SERPL-CCNC: 29 U/L (ref 12–45)
BILIRUB SERPL-MCNC: 0.4 MG/DL (ref 0.1–1.5)
BUN SERPL-MCNC: 21 MG/DL (ref 8–22)
CALCIUM ALBUM COR SERPL-MCNC: 9.8 MG/DL (ref 8.5–10.5)
CALCIUM SERPL-MCNC: 9.2 MG/DL (ref 8.5–10.5)
CHLORIDE SERPL-SCNC: 102 MMOL/L (ref 96–112)
CO2 SERPL-SCNC: 26 MMOL/L (ref 20–33)
CREAT SERPL-MCNC: 0.79 MG/DL (ref 0.5–1.4)
EKG IMPRESSION: NORMAL
ERYTHROCYTE [DISTWIDTH] IN BLOOD BY AUTOMATED COUNT: 54.6 FL (ref 35.9–50)
GFR SERPLBLD CREATININE-BSD FMLA CKD-EPI: 77 ML/MIN/1.73 M 2
GLOBULIN SER CALC-MCNC: 3.4 G/DL (ref 1.9–3.5)
GLUCOSE BLD STRIP.AUTO-MCNC: 116 MG/DL (ref 65–99)
GLUCOSE SERPL-MCNC: 120 MG/DL (ref 65–99)
HCT VFR BLD AUTO: 34.1 % (ref 37–47)
HGB BLD-MCNC: 10.9 G/DL (ref 12–16)
MCH RBC QN AUTO: 30 PG (ref 27–33)
MCHC RBC AUTO-ENTMCNC: 32 G/DL (ref 32.2–35.5)
MCV RBC AUTO: 93.9 FL (ref 81.4–97.8)
NT-PROBNP SERPL IA-MCNC: 1693 PG/ML (ref 0–125)
PLATELET # BLD AUTO: 154 K/UL (ref 164–446)
PMV BLD AUTO: 9.2 FL (ref 9–12.9)
POTASSIUM SERPL-SCNC: 4.7 MMOL/L (ref 3.6–5.5)
PROT SERPL-MCNC: 6.7 G/DL (ref 6–8.2)
RBC # BLD AUTO: 3.63 M/UL (ref 4.2–5.4)
SODIUM SERPL-SCNC: 135 MMOL/L (ref 135–145)
WBC # BLD AUTO: 9.6 K/UL (ref 4.8–10.8)

## 2025-06-03 PROCEDURE — 700102 HCHG RX REV CODE 250 W/ 637 OVERRIDE(OP)

## 2025-06-03 PROCEDURE — 71045 X-RAY EXAM CHEST 1 VIEW: CPT

## 2025-06-03 PROCEDURE — 700111 HCHG RX REV CODE 636 W/ 250 OVERRIDE (IP): Mod: JZ

## 2025-06-03 PROCEDURE — 99238 HOSP IP/OBS DSCHRG MGMT 30/<: CPT

## 2025-06-03 PROCEDURE — 93010 ELECTROCARDIOGRAM REPORT: CPT | Performed by: INTERNAL MEDICINE

## 2025-06-03 PROCEDURE — RXMED WILLOW AMBULATORY MEDICATION CHARGE

## 2025-06-03 PROCEDURE — 83880 ASSAY OF NATRIURETIC PEPTIDE: CPT

## 2025-06-03 PROCEDURE — 82962 GLUCOSE BLOOD TEST: CPT

## 2025-06-03 PROCEDURE — 97163 PT EVAL HIGH COMPLEX 45 MIN: CPT

## 2025-06-03 PROCEDURE — 36415 COLL VENOUS BLD VENIPUNCTURE: CPT

## 2025-06-03 PROCEDURE — 80053 COMPREHEN METABOLIC PANEL: CPT

## 2025-06-03 PROCEDURE — A9270 NON-COVERED ITEM OR SERVICE: HCPCS

## 2025-06-03 PROCEDURE — 85027 COMPLETE CBC AUTOMATED: CPT

## 2025-06-03 PROCEDURE — 93005 ELECTROCARDIOGRAM TRACING: CPT | Mod: TC

## 2025-06-03 PROCEDURE — 97535 SELF CARE MNGMENT TRAINING: CPT

## 2025-06-03 RX ORDER — ASPIRIN 81 MG/1
81 TABLET ORAL DAILY
Qty: 100 TABLET | Refills: 3 | Status: SHIPPED | OUTPATIENT
Start: 2025-06-04

## 2025-06-03 RX ADMIN — FUROSEMIDE 20 MG: 10 INJECTION, SOLUTION INTRAVENOUS at 04:28

## 2025-06-03 RX ADMIN — POTASSIUM CHLORIDE 20 MEQ: 1500 TABLET, EXTENDED RELEASE ORAL at 04:28

## 2025-06-03 RX ADMIN — DOCUSATE SODIUM 100 MG: 100 CAPSULE, LIQUID FILLED ORAL at 04:34

## 2025-06-03 RX ADMIN — ASPIRIN 81 MG: 81 TABLET, COATED ORAL at 04:28

## 2025-06-03 ASSESSMENT — FIBROSIS 4 INDEX: FIB4 SCORE: 5.13

## 2025-06-03 ASSESSMENT — COGNITIVE AND FUNCTIONAL STATUS - GENERAL
SUGGESTED CMS G CODE MODIFIER MOBILITY: CI
CLIMB 3 TO 5 STEPS WITH RAILING: A LITTLE
MOBILITY SCORE: 23

## 2025-06-03 ASSESSMENT — GAIT ASSESSMENTS
DISTANCE (FEET): 300
GAIT LEVEL OF ASSIST: SUPERVISED
ASSISTIVE DEVICE: FRONT WHEEL WALKER

## 2025-06-03 ASSESSMENT — PAIN DESCRIPTION - PAIN TYPE: TYPE: ACUTE PAIN

## 2025-06-03 NOTE — CARE PLAN
The patient is Stable - Low risk of patient condition declining or worsening    Shift Goals  Clinical Goals: Q4 neuro, safety, moblize  Patient Goals: rest  Family Goals: POC    Progress made toward(s) clinical / shift goals:    Problem: Day of surgery post CABG/Heart valve replacement  Goal: Stabilization in immediate post op period  Outcome: Progressing  Note: Post Op vitals done. After bedrest pt was up in chair. Ambulated x2 for this RNs shift. I/Os documented. Daily weight documented. CXR confirmed after TAVR. Q4 neuros, no neuro changes seen. Pt not requiring any O2 at this time. Cath sites are clean, dry, intact, and soft. EKG and CXR done prior to transfer.          Patient is not progressing towards the following goals:

## 2025-06-03 NOTE — THERAPY
Physical Therapy   Initial Evaluation     Patient Name:  Madelyn Schmitz  Age:  75 y.o., Sex:  female  Medical Record #:  [id number]  Today's Date: 6/3/2025     Precautions  Medical: Cardiac (s/p TAVR)    Assessment  Patient is 75 y.o. female that presented for TAVR due to severe symptomatic aortic stenosis. PMHx significant for CANDIDA, DM, HTN, HLD, obesity, aortic atherosclerosis.    She presented to PT with impaired balance, functional weakness, and decreased activity tolerance which are limiting her ability to safely perform functional mobility. No signs/symptoms of inappropriate hemodynamic response.    Additional time required for patient education regarding: physiology of cardiovascular system and hemodynamic response; home exercise program and appropriate activity progression; and self monitoring via RPE scale/talk test/HR. Patient was receptive to education and demonstrated understanding but would likely benefit from reinforcement. Recommend outpatient cardiac rehab.      Plan    Physical Therapy Initial Treatment Plan   Duration: Evaluation only    DC Equipment Recommendations: None (has FWW)  Discharge Recommendations:  (outpatient cardiac rehab)       Subjective    Patient received in bed, agreeable to evaluation     Objective       06/03/25 1005   Vitals   O2 (LPM) 0   O2 Delivery Device None - Room Air   Vitals Comments BP, HR respectively: 91/44, 68 supine; 107/46, 72 sitting; 107/48, 77 standing; 103/50, 76 sitting post activity   Pain 0 - 10 Group   Therapist Pain Assessment   (no pain complaint)   Prior Living Situation   Prior Services None   Housing / Facility 2 Story House   Steps Into Home 1   Steps In Home   (flight)   Rail Right Rail  (Steps in Home)   Equipment Owned Front-Wheel Walker   Lives with - Patient's Self Care Capacity Spouse   Comments Family able to assist as needed   Prior Level of Functional Mobility   Bed Mobility Independent   Transfer Status Independent   Ambulation  Independent   Ambulation Distance community   Assistive Devices Used None   Stairs Independent   Cognition    Cognition / Consciousness WDL   Level of Consciousness Alert   Comments pleasant, participatory. unclear if patient internalized education   Other Treatments   Other Treatments Provided additional time required for cardiac rehab education   Balance Assessment   Sitting Balance (Static) Fair +   Sitting Balance (Dynamic) Fair   Standing Balance (Static) Fair   Standing Balance (Dynamic) Fair   Weight Shift Sitting Fair   Weight Shift Standing Fair   Comments used FWW, no LOB   Gait Analysis   Gait Level Of Assist Supervised   Assistive Device Front Wheel Walker   Distance (Feet) 300   # of Times Distance was Traveled 1   Deviation   (decreased regis, step length, clearance)   # of Stairs Climbed 13   Level of Assist with Stairs Standby Assist   Weight Bearing Status no restrictions   Vision Deficits Impacting Mobility NT   Comments negative talk test throughout   6 Clicks Assessment - How much HELP from from another person do you currently need... (If the patient hasn't done an activity recently, how much help from another person do you think he/she would need if he/she tried?)   Turning from your back to your side while in a flat bed without using bedrails? 4   Moving from lying on your back to sitting on the side of a flat bed without using bedrails? 4   Moving to and from a bed to a chair (including a wheelchair)? 4   Standing up from a chair using your arms (e.g., wheelchair, or bedside chair)? 4   Walking in hospital room? 4   Climbing 3-5 steps with a railing? 3   6 clicks Mobility Score 23   Education Group   Education Provided Role of Physical Therapist;Cardiac Precautions   Cardiac Precautions Patient Response Patient;Acceptance;Explanation;Demonstration;Handout;Verbal Demonstration;Action Demonstration;Family;Significant Other   Role of Physical Therapist Patient Response  Patient;Acceptance;Explanation;Verbal Demonstration;Family;Significant Other   Problem List    Problems Decreased Activity Tolerance;Impaired Balance;Functional Strength Deficit;Impaired Ambulation;Limited Knowledge of Post-Op Precautions

## 2025-06-03 NOTE — DISCHARGE PLANNING
Post Acute Navigator Team    Per chart review, patient has been identified as a candidate for a goals of care discussion from following data:    End of Life Care Index score 24  High LACE + score 49  6 click Mobility score NA   6 click ADL score NA   Readmission: No     Code Status:Full   Advanced Directive present in EMR: Yes, dated  2/8/25  POLST present in EMR: Not on file      Per chart review, patient is not a candidate for home-based post acute programs at this time.   Per chart review patient is tolerating walking with front wheel walker.    Please reach out to me directly should care team feel patient will benefit from a discharge goals of care conversation regarding outpatient/home palliative care or hospice.

## 2025-06-03 NOTE — PROGRESS NOTES
Bedside report received from off going RN [place], assumed care of patient.     Fall Risk Score: LOW RISK  Fall risk interventions in place: Place yellow fall risk ID band on patient, Provide patient/family education based on risk assessment, Educate patient/family to call staff for assistance when getting out of bed, Place fall precaution signage outside patient door, Place patient in room close to nursing station, and Utilize bed/chair fall alarm  Bed type: Regular (Steffen Score less than 17 interventions in place)  Patient on cardiac monitor: Yes  IVF/IV medications: Not Applicable   Oxygen: Room Air  Bedside sitter: Not Applicable   Isolation: Not applicable

## 2025-06-03 NOTE — DISCHARGE SUMMARY
PRIMARY DISCHARGE DIAGNOSIS: Status post transcatheter aortic valve replacement.    DISCHARGE DIAGNOSIS:  S/P TAVR    PROCEDURES/TESTING:    Successful transcatheter aortic valve replacement (TAVR) with #23 Hinojosa Angela S3 Ultra Resilia deployed at nominal volume via the right transfemoral approach under general anesthesia     Intraoperative transesophageal echocardiogram showing results pending    CXR on 6/3/2025 showing no acute process    EKG on 6/3/2025   Sinus rhythm, rate 68, 0.17/0.093/0.436      Labs   Lab Results   Component Value Date/Time    SODIUM 135 06/03/2025 03:18 AM    POTASSIUM 4.7 06/03/2025 03:18 AM    CHLORIDE 102 06/03/2025 03:18 AM    CO2 26 06/03/2025 03:18 AM    GLUCOSE 120 (H) 06/03/2025 03:18 AM    BUN 21 06/03/2025 03:18 AM    CREATININE 0.79 06/03/2025 03:18 AM       Lab Results   Component Value Date/Time    PROTHROMBTM 14.2 05/29/2025 07:54 AM    INR 1.10 05/29/2025 07:54 AM       Lab Results   Component Value Date/Time    WBC 9.6 06/03/2025 03:18 AM    RBC 3.63 (L) 06/03/2025 03:18 AM    HEMOGLOBIN 10.9 (L) 06/03/2025 03:18 AM    HEMATOCRIT 34.1 (L) 06/03/2025 03:18 AM    MCV 93.9 06/03/2025 03:18 AM    MCH 30.0 06/03/2025 03:18 AM    MCHC 32.0 (L) 06/03/2025 03:18 AM    MPV 9.2 06/03/2025 03:18 AM    NEUTSPOLYS 53.30 05/29/2025 07:54 AM    LYMPHOCYTES 33.30 05/29/2025 07:54 AM    MONOCYTES 8.40 05/29/2025 07:54 AM    EOSINOPHILS 4.30 05/29/2025 07:54 AM    BASOPHILS 0.40 05/29/2025 07:54 AM         HOSPITAL COURSE: The patient is a pleasant 75 year old female with severe symptomatic aortic stenosis, sleep apnea, diabetes, hypertension, hyperlipidemia, obesity, aortic atherosclerosis. Due to the patient's symptoms, the patient underwent successful TAVR described as above. Post-procedure, the patient did well. They did require IV diuresis during their stay and will continue on oral diuretics post-operatively. Acute diastolic heart failure exacerbation as evidenced by: signs and  symptoms: shortness of breath, STEINBERG, weakness/fatigue; Echocardiogram showing severe aortic stenosis; corroborated by: elevated BNP (1693), pulmonary vascular congestion and mild interstitial edema on chest x-ray, or LVEDP of 20. They were able to ambulate without difficulty.  No further events were noted during their stay. They are now off oxygen and are to be discharged to home.    DISCHARGE MEDICATIONS:      Medication List        START taking these medications        Instructions   aspirin 81 MG EC tablet  Start taking on: June 4, 2025   Take 1 Tablet by mouth every day.  Dose: 81 mg            CONTINUE taking these medications        Instructions   clobetasol 0.05 % Oint  Commonly known as: Temovate   Apply 1 Act topically 2 times a day as needed (itching).  Dose: 1 Act     FISH OIL PO   Take 1 Capsule by mouth every day. Indications: suppliment  Dose: 1 Capsule     furosemide 20 MG Tabs  Commonly known as: Lasix   Take 1 Tablet by mouth every day.  Dose: 20 mg     glipiZIDE 2.5 MG Tabs   Take 2.5 mg by mouth every day.  Dose: 2.5 mg     lisinopril 5 MG Tabs  Commonly known as: Prinivil   Take 1 Tablet by mouth every evening.  Dose: 5 mg     MAGNESIUM PO   Take 500 mg by mouth every day. Indications: suppliment  Dose: 500 mg     rosuvastatin 10 MG Tabs  Commonly known as: Crestor   Take 1 Tablet by mouth every evening.  Dose: 10 mg     Vitamin C 1000 MG Tabs   Take 1,000 mg by mouth every day. Indications: Inadequate Vitamin C  Dose: 1,000 mg              DISCHARGE INSTRUCTIONS: They are given discharge instructions on potential post-operative complications and symptoms to watch out for. Their groin sites were checked and were clean, dry, and intact. Patient or family to notify us for any complications noted on the discharge instructions. They will follow up with, MICHAEL Mahan, on Tuesday in our cardiology office. They will not need labs before their follow up appointment. They will then follow up  with myself with a repeat echocardiogram in one month for post TAVR assessment.      Future Appointments   Date Time Provider Department Center   6/3/2025 To Be Determined Handy Wagner R.N. [facility] None   6/5/2025 To Be Determined Shakira Canchola, PT [facility] None   6/10/2025 To Be Determined Handy Wagner R.N. [facility] None   6/11/2025 To Be Determined Shakira Canchola, PT [facility] None   6/11/2025  2:15 PM JV Gutiérrez [facility] None   6/17/2025 To Be Determined Handy Wagner R.N. [facility] None   6/30/2025  2:15 PM [facility] EXAM 8 NONINV [facility]   7/10/2025  1:45 PM POLO GonsalezRCAR [facility] None   8/20/2025  2:40 PM ELIGIO [facility] [place]   6/2/2026  1:15 PM [facility] EXAM 9 ECHO [facility]       Discharge time spent with patient was 20 minutes.

## 2025-06-03 NOTE — CARE PLAN
The patient is Watcher - Medium risk of patient condition declining or worsening    Shift Goals  Clinical Goals: Q4 neuro, safety, moblize  Patient Goals: rest  Family Goals: POC    Progress made toward(s) clinical / shift goals:      Patient is not progressing towards the following goals:      Problem: Knowledge Deficit - Standard  Goal: Patient and family/care givers will demonstrate understanding of plan of care, disease process/condition, diagnostic tests and medications  Outcome: Progressing  Note: Discuss and review POC with patient/family. Re-educate as needed.       Problem: Fall Risk  Goal: Patient will remain free from falls  Outcome: Progressing  Note: Fall risk assessment complete. Fall precautions implemented; bed alarm on, patient wearing non-slip socks, call light within reach, hourly rounding in progress, and tolieting needs assessed.

## 2025-06-04 ENCOUNTER — TELEPHONE (OUTPATIENT)
Dept: HEALTH INFORMATION MANAGEMENT | Facility: OTHER | Age: 75
End: 2025-06-04
Payer: MEDICARE

## 2025-06-06 ENCOUNTER — OFFICE VISIT (OUTPATIENT)
Dept: MEDICAL GROUP | Facility: PHYSICIAN GROUP | Age: 75
End: 2025-06-06
Payer: MEDICARE

## 2025-06-06 VITALS
WEIGHT: 204.9 LBS | HEART RATE: 83 BPM | BODY MASS INDEX: 37.71 KG/M2 | SYSTOLIC BLOOD PRESSURE: 100 MMHG | DIASTOLIC BLOOD PRESSURE: 72 MMHG | OXYGEN SATURATION: 96 % | TEMPERATURE: 97.1 F | HEIGHT: 62 IN

## 2025-06-06 DIAGNOSIS — I15.2 HYPERTENSION ASSOCIATED WITH DIABETES (HCC): ICD-10-CM

## 2025-06-06 DIAGNOSIS — R80.9 TYPE 2 DIABETES MELLITUS WITH MICROALBUMINURIA, WITHOUT LONG-TERM CURRENT USE OF INSULIN (HCC): Primary | ICD-10-CM

## 2025-06-06 DIAGNOSIS — L30.9 DERMATITIS: ICD-10-CM

## 2025-06-06 DIAGNOSIS — Z95.2 S/P TAVR (TRANSCATHETER AORTIC VALVE REPLACEMENT): ICD-10-CM

## 2025-06-06 DIAGNOSIS — Z09 HOSPITAL DISCHARGE FOLLOW-UP: ICD-10-CM

## 2025-06-06 DIAGNOSIS — E11.29 TYPE 2 DIABETES MELLITUS WITH MICROALBUMINURIA, WITHOUT LONG-TERM CURRENT USE OF INSULIN (HCC): Primary | ICD-10-CM

## 2025-06-06 DIAGNOSIS — E11.59 HYPERTENSION ASSOCIATED WITH DIABETES (HCC): ICD-10-CM

## 2025-06-06 LAB — LV EJECT FRACT  99904: 70

## 2025-06-06 RX ORDER — BLOOD PRESSURE TEST KIT
1 KIT MISCELLANEOUS ONCE
Qty: 1 KIT | Refills: 0 | Status: SHIPPED | OUTPATIENT
Start: 2025-06-06 | End: 2025-06-06

## 2025-06-06 ASSESSMENT — FIBROSIS 4 INDEX: FIB4 SCORE: 5.13

## 2025-06-06 NOTE — PROGRESS NOTES
Subjective:     Madelyn Schmitz is a 75 y.o. female who presents for Hospital Follow-up.    HPI:   History of Present Illness  The patient presents for a TCM visit, accompanied by her sister.    Hospitalization and Surgery  - Hospitalized from 06/02/2025 to 06/03/2025 for elective TAVR due to severe symptomatic aortic stenosis.  - Required IV diuresis, discharged on oral diuretics, tapered off oxygen, and discharged on Lasix 20 mg daily.  - Follow-up with Hayley Andrews on 06/11/2025.  - Reports persistent soreness post-surgery, necessitating an overnight stay.  - No home health care services.    Medications  - Lasix daily, rosuvastatin, Lisinopril 5 mg for hypertension, fish oil, magnesium, aspirin, glipizide 2.5 mg for diabetes, and Mounjaro.    Post-Surgery Symptoms  - Occasional dizziness post-surgery, using a walker due to fear of falling.  - Malfunctioning blood pressure monitor, takes Lisinopril in the morning.  - Advised to ambulate without overexertion, reported fatigue after minimal walking.  - No chest pain, shortness of breath, or leg swelling.    Pain and Bowel Movements  - No pain medication, normal urination, struggles with bowel movements.  - Declined constipation medication during hospital stay, later accepted 2-3 doses.    Rash  - Rash on abdomen, believes due to irritation, not infected.  - Considering towel or powder for management.    Supplemental information: Inquires about dietary restrictions for recovery.    MEDICATIONS  - Lasix  - Rosuvastatin  - Lisinopril  - Fish oil  - Magnesium  - Aspirin  - Glipizide  - Mounjaro       Current medicines (including reconciliation performed today)  Current Medications[1]    Allergies:   Bloodless, Ibuprofen, and Penicillins    Social History[2]    ROS:  Slight dizziness  No chest pain  No SOB    Objective:     Vitals:    06/06/25 0827   BP: 100/72   BP Location: Right arm   Patient Position: Sitting   BP Cuff Size: Large adult   Pulse: 83  "  Temp: 36.2 °C (97.1 °F)   TempSrc: Temporal   SpO2: 96%   Weight: 92.9 kg (204 lb 14.4 oz)   Height: 1.575 m (5' 2\")     Body mass index is 37.48 kg/m².    Physical Exam:  Constitutional: Alert, no distress, well-groomed.  Skin: Warm, dry, good turgor, left wrist, bilateral groin ecchymoses- improving.  Eye: conjunctiva clear, lids normal.  ENMT: Lips without lesions, good dentition, moist mucous membranes.  Neck: supple  Respiratory: Unlabored respiratory effort, no cough.  Cardio: No LE edema  Abdomen:  no gross masses., left pannus with slight excoriation- will start on polysporin  MSK: front wheel walker -new  Neuro: no tremors  Psych: Alert and oriented x3, normal affect and mood.     Assessment and Plan:   There are no diagnoses linked to this encounter.    Assessment & Plan  1.  Hospital discharge follow-up  2.  Status post TAVR postoperative status following TAVR:   3.  Hypertension   Consistently low blood pressure since surgery on 06/02/2025. On Lisinopril 5 mg for hypertension.  - Monitor blood pressure daily before medication and log readings  - Prescription for arm blood pressure monitor sent to pharmacy  - Review log during appointment with Hayley Andrews on 06/11/2025    - Chart and discharge summary were reviewed.   - Hospitalization and results reviewed with patient.   - Medications reviewed including instructions regarding high risk medications, dosing and side effects.  - Recommended Services: No services needed at this time  - Advance directive/POLST on file?  Yes    Constipation  - Use over-the-counter Colace as needed  - Dietary recommendations: prunes and high-fiber foods, mindful of sugar intake    4.  Type 2 diabetes  Chronic medical diagnoses.  Stable.  Last A1c was at 6.6%.  Continue with glipizide 2.5 mg daily.    5.  Dermatitis  New medical diagnosis.  - Apply over-the-counter Neosporin or Polysporin and keep dry  - Sample of Polysporin provided today    4. Dietary management.  - " Control sugar intake  - Adhere to Mediterranean diet: fish, chicken, nuts, olives, olive oil, beans, salads  - Avoid fried foods  - Limit beef and pork    Follow-up  - Scheduled for follow-up visit on 08/20/2025 at 2:41 PM    PROCEDURE  Underwent TAVR on 06/02/2025.       Follow-up:No follow-ups on file.    Face-to-face transitional care management services with HIGH (today's visit is within days post discharge & LACE+ score 59+) medical decision complexity were provided.     LACE+ Historical Score Over Time (0-28: Low, 29-58: Medium, 59+: High): 49                 [1]   Current Outpatient Medications   Medication Sig Dispense Refill    aspirin 81 MG EC tablet Take 1 Tablet by mouth every day. 100 Tablet 3    glipiZIDE 2.5 MG Tab Take 2.5 mg by mouth every day. 100 Tablet 1    Omega-3 Fatty Acids (FISH OIL PO) Take 1 Capsule by mouth every day. Indications: suppliment      furosemide (LASIX) 20 MG Tab Take 1 Tablet by mouth every day. 100 Tablet 3    rosuvastatin (CRESTOR) 10 MG Tab Take 1 Tablet by mouth every evening. 100 Tablet 3    lisinopril (PRINIVIL) 5 MG Tab Take 1 Tablet by mouth every evening. 100 Tablet 3    Ascorbic Acid (VITAMIN C) 1000 MG Tab Take 1,000 mg by mouth every day. Indications: Inadequate Vitamin C      clobetasol (TEMOVATE) 0.05 % Ointment Apply 1 Act topically 2 times a day as needed (itching). 60 g 1    MAGNESIUM PO Take 500 mg by mouth every day. Indications: suppliment       No current facility-administered medications for this visit.   [2]   Social History  Tobacco Use    Smoking status: Never    Smokeless tobacco: Never   Vaping Use    Vaping status: Never Used   Substance Use Topics    Alcohol use: No    Drug use: No

## 2025-06-11 ENCOUNTER — OFFICE VISIT (OUTPATIENT)
Dept: CARDIOLOGY | Facility: MEDICAL CENTER | Age: 75
End: 2025-06-11
Attending: NURSE PRACTITIONER
Payer: MEDICARE

## 2025-06-11 VITALS
DIASTOLIC BLOOD PRESSURE: 50 MMHG | BODY MASS INDEX: 37.36 KG/M2 | SYSTOLIC BLOOD PRESSURE: 96 MMHG | OXYGEN SATURATION: 95 % | RESPIRATION RATE: 18 BRPM | WEIGHT: 203 LBS | HEIGHT: 62 IN | HEART RATE: 82 BPM

## 2025-06-11 DIAGNOSIS — E11.59 HYPERTENSION ASSOCIATED WITH DIABETES (HCC): ICD-10-CM

## 2025-06-11 DIAGNOSIS — Z95.2 S/P TAVR (TRANSCATHETER AORTIC VALVE REPLACEMENT): Primary | ICD-10-CM

## 2025-06-11 DIAGNOSIS — I50.31 ACUTE DIASTOLIC HF (HEART FAILURE) (HCC): ICD-10-CM

## 2025-06-11 DIAGNOSIS — E66.01 CLASS 2 SEVERE OBESITY DUE TO EXCESS CALORIES WITH SERIOUS COMORBIDITY AND BODY MASS INDEX (BMI) OF 37.0 TO 37.9 IN ADULT (HCC): ICD-10-CM

## 2025-06-11 DIAGNOSIS — E11.69 HYPERLIPIDEMIA ASSOCIATED WITH TYPE 2 DIABETES MELLITUS (HCC): ICD-10-CM

## 2025-06-11 DIAGNOSIS — G47.30 SLEEP APNEA, UNSPECIFIED TYPE: ICD-10-CM

## 2025-06-11 DIAGNOSIS — I70.0 ATHEROSCLEROSIS OF AORTA (HCC): ICD-10-CM

## 2025-06-11 DIAGNOSIS — I15.2 HYPERTENSION ASSOCIATED WITH DIABETES (HCC): ICD-10-CM

## 2025-06-11 DIAGNOSIS — E11.29 TYPE 2 DIABETES MELLITUS WITH MICROALBUMINURIA, WITHOUT LONG-TERM CURRENT USE OF INSULIN (HCC): ICD-10-CM

## 2025-06-11 DIAGNOSIS — E66.812 CLASS 2 SEVERE OBESITY DUE TO EXCESS CALORIES WITH SERIOUS COMORBIDITY AND BODY MASS INDEX (BMI) OF 37.0 TO 37.9 IN ADULT (HCC): ICD-10-CM

## 2025-06-11 DIAGNOSIS — E78.5 HYPERLIPIDEMIA ASSOCIATED WITH TYPE 2 DIABETES MELLITUS (HCC): ICD-10-CM

## 2025-06-11 DIAGNOSIS — R80.9 TYPE 2 DIABETES MELLITUS WITH MICROALBUMINURIA, WITHOUT LONG-TERM CURRENT USE OF INSULIN (HCC): ICD-10-CM

## 2025-06-11 LAB — EKG IMPRESSION: NORMAL

## 2025-06-11 PROCEDURE — 3078F DIAST BP <80 MM HG: CPT | Performed by: NURSE PRACTITIONER

## 2025-06-11 PROCEDURE — 93005 ELECTROCARDIOGRAM TRACING: CPT | Mod: TC | Performed by: NURSE PRACTITIONER

## 2025-06-11 PROCEDURE — 99214 OFFICE O/P EST MOD 30 MIN: CPT | Performed by: NURSE PRACTITIONER

## 2025-06-11 PROCEDURE — 99212 OFFICE O/P EST SF 10 MIN: CPT | Performed by: NURSE PRACTITIONER

## 2025-06-11 PROCEDURE — 3074F SYST BP LT 130 MM HG: CPT | Performed by: NURSE PRACTITIONER

## 2025-06-11 PROCEDURE — 93010 ELECTROCARDIOGRAM REPORT: CPT | Performed by: INTERNAL MEDICINE

## 2025-06-11 ASSESSMENT — ENCOUNTER SYMPTOMS
PND: 0
CLAUDICATION: 0
ORTHOPNEA: 0
FEVER: 0
MYALGIAS: 0
PALPITATIONS: 0
COUGH: 0
ABDOMINAL PAIN: 0
DIZZINESS: 0
SHORTNESS OF BREATH: 1

## 2025-06-11 ASSESSMENT — FIBROSIS 4 INDEX: FIB4 SCORE: 5.13

## 2025-06-11 NOTE — PROGRESS NOTES
Chief Complaint   Patient presents with    Follow-Up     1 week post TAVR     Subjective     Madelyn Schmitz is a 75 y.o. female who presents today for 1 week S/P TAVR.    She is a patient of Dr. Cortes in our office. Hx of S/P TAVR, HLD with DM type II, CANDIDA on CPAP, obesity, aorta atherosclerosis, and generalized fatigue.    She presents today with her sister and daughter in law (translating for her-as patient is Mongolian speaking).    She has symptoms of generalized and exertional fatigue alongside frequent urination and some flank pain.    No other cardiac symptoms to report.    She has no chest pain, shortness of breath, edema, dizziness/lightheadedness, or palpitations.    Past Medical History:   Diagnosis Date    Acute cholecystitis 09/19/2019    Anesthesia     Arthritis     osteo, finger/shoulders    Back pain     Blood clotting disorder ([facility]) 2004    leg approx 1999    Body mass index (BMI) 38.0-38.9, adult 09/15/2022    Breath shortness 03/18/2024    with exertion    Bronchitis     Cancer ([facility]) 09/07/2022    endometrial cancer    Chickenpox     Chronic headaches     when its cold    Dental disorder     upper/lower dentures    Diabetes     oral meds    Diabetes mellitus ([facility]) 09/19/2019      Ref. Range 4/8/2019 13:20 11/6/2019 16:47 Glycohemoglobin Latest Ref Range: 0.0 - 5.6 % 6.6 (H) 5.7 (A) Estim. Avg Glu Latest Units: mg/dL 143     Ref. Range 11/6/2019 16:35 Micro Alb Creat Ratio Latest Ref Range: 0 - 30 mg/g 167 (H)   She has history of well-controlled type 2 diabetes.  She is previously taking metformin 500 mg twice daily however her A1c on recheck was down to 5.7 so we discon    Dysuria 09/11/2019    Fatigue 11/06/2019    Since her hysterectomy and cholecystectomy she has had some trouble bouncing back to her usual self and feels a bit deconditioned. I suspect a trial with prozac will help her energy. She agrees to add a short daily walk with her  to assist with deconditioning.      "Heart murmur 09/07/2022    Heart valve disease     \"murmur\"    High cholesterol     medicated    HTN (hypertension) 09/15/2022    Hyperlipidemia     Hypertension 09/07/2022    medicated    Hypotension due to hypovolemia 09/26/2019    She has a low blood pressure in office today of 84/52 with a baseline in the 100s to 120s systolic.  This is likely due to her frequent episodes of diarrhea exacerbated by ongoing use of lisinopril.  Yesterday, she felt lightheaded and dizzy but did not pass out or have a fall.  She is having challenges of what is appropriate to eat as she had recent pelvic radiation as well as a cholecystectomy b    Mumps     Nasal drainage     Pneumonia 09/07/2022    12 years ago    PONV (postoperative nausea and vomiting)     \"a lot of vomiting after hernia surgery\"    Psychiatric problem 09/07/2022    medicated at time    Sleep apnea 09/07/2022    CPAP    Urinary incontinence 09/07/2022    at times     Past Surgical History:   Procedure Laterality Date    TRANSCATHETER AORTIC VALVE REPLACEMENT Bilateral 6/2/2025    Procedure: TRANSCATHETER AORTIC VALVE REPLACEMENT;  Surgeon: Chad Cortes M.D.;  Location: [facility];  Service: Cardiac    ECHOCARDIOGRAM, TRANSESOPHAGEAL, INTRAOPERATIVE N/A 6/2/2025    Procedure: ECHOCARDIOGRAM, TRANSESOPHAGEAL, INTRAOPERATIVE;  Surgeon: Chad Cortes M.D.;  Location: [facility];  Service: Cardiac    LAPAROSCOPIC INCISIONAL HERNIA REPAIR N/A 9/9/2022    Procedure: LAPAROSCOPIC REPAIR OF INCISIONAL HERNIA;  Surgeon: Sim Anaya M.D.;  Location: [facility];  Service: General    GABY BY LAPAROSCOPY  9/19/2019    Procedure: CHOLECYSTECTOMY, LAPAROSCOPIC;  Surgeon: Jenniffer Johnson M.D.;  Location: [facility];  Service: General    HYSTERECTOMY ROBOTIC XI  6/27/2019    Procedure: HYSTERECTOMY, ROBOT-ASSISTED, USING DA YO XI;  Surgeon: Alexandr Mancia M.D.;  Location: [facility];  Service: Gynecology    " SALPINGO OOPHORECTOMY Bilateral 6/27/2019    Procedure: SALPINGO-OOPHORECTOMY;  Surgeon: Alexandr Mancia M.D.;  Location: SURGERY [facility];  Service: Gynecology    NODE BIOPSY SENTINEL  6/27/2019    Procedure: BIOPSY, LYMPH NODE, SENTINEL;  Surgeon: Alexandr Mancia M.D.;  Location: SURGERY [facility];  Service: Gynecology    HYSTEROSCOPY WITH MYOSURE N/A 4/17/2019    Procedure: HYSTEROSCOPY, WITH TISSUE REMOVAL, USING HYSTEROSCOPIC ROTATING CUTTER BLADE - DIAGNOSTIC;  Surgeon: Racquel Gatica M.D.;  Location: SURGERY SAME DAY [facility];  Service: Gynecology    DILATION AND CURETTAGE N/A 4/17/2019    Procedure: DILATION AND CURETTAGE;  Surgeon: Racquel Gatica M.D.;  Location: SURGERY SAME DAY [facility];  Service: Gynecology    PRIMARY C SECTION  1972/1974/1977    x 3     Family History   Problem Relation Age of Onset    Heart Disease Father     Asthma Brother     Asthma Brother     Cancer Maternal Aunt         gyn cancer    Sleep Apnea Neg Hx      Social History     Socioeconomic History    Marital status:      Spouse name: Not on file    Number of children: Not on file    Years of education: Not on file    Highest education level: Not on file   Occupational History    Not on file   Tobacco Use    Smoking status: Never    Smokeless tobacco: Never   Vaping Use    Vaping status: Never Used   Substance and Sexual Activity    Alcohol use: No    Drug use: No    Sexual activity: Not Currently     Partners: Male     Birth control/protection: Post-Menopausal   Other Topics Concern    Not on file   Social History Narrative    She is Maori speaking. Madelyn has been  to her  for 50 years. Her daughter-in-law, Delores, participates in her medical care and assists with translation. She has three grown sons. '72, '74, '77. Has 8 grandchildren. 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 Was born in [place], [place], Came to the [place] in '79. She is happy  with her life and her sons. She enjoys reading, watch tv shows, talk with her friends on the phone. No tob/etoh/drugs.      Social Drivers of Health     Financial Resource Strain: Low Risk  (5/22/2024)    Overall Financial Resource Strain (CARDIA)     Difficulty of Paying Living Expenses: Not hard at all   Food Insecurity: No Food Insecurity (5/22/2024)    Hunger Vital Sign     Worried About Running Out of Food in the Last Year: Never true     Ran Out of Food in the Last Year: Never true   Transportation Needs: No Transportation Needs (5/22/2024)    PRAPARE - Transportation     Lack of Transportation (Medical): No     Lack of Transportation (Non-Medical): No   Physical Activity: Not on file   Stress: Not on file   Social Connections: Feeling Socially Integrated (5/7/2025)    OASIS : Social Isolation     Frequency of experiencing loneliness or isolation: Never   Intimate Partner Violence: Not on file   Housing Stability: Not on file     Allergies   Allergen Reactions    Bloodless      Mandaeism    Ibuprofen Rash and Swelling     .    Penicillins Rash and Swelling     .     Outpatient Encounter Medications as of 6/11/2025   Medication Sig Dispense Refill    aspirin 81 MG EC tablet Take 1 Tablet by mouth every day. 100 Tablet 3    glipiZIDE 2.5 MG Tab Take 2.5 mg by mouth every day. 100 Tablet 1    Omega-3 Fatty Acids (FISH OIL PO) Take 1 Capsule by mouth every day. Indications: suppliment      rosuvastatin (CRESTOR) 10 MG Tab Take 1 Tablet by mouth every evening. 100 Tablet 3    lisinopril (PRINIVIL) 5 MG Tab Take 1 Tablet by mouth every evening. 100 Tablet 3    Ascorbic Acid (VITAMIN C) 1000 MG Tab Take 1,000 mg by mouth every day. Indications: Inadequate Vitamin C      clobetasol (TEMOVATE) 0.05 % Ointment Apply 1 Act topically 2 times a day as needed (itching). 60 g 1    MAGNESIUM PO Take 500 mg by mouth every day. Indications: suppliment      [DISCONTINUED] furosemide (LASIX) 20 MG Tab Take 1 Tablet  "by mouth every day. 100 Tablet 3     No facility-administered encounter medications on file as of 6/11/2025.     Review of Systems   Unable to perform ROS: Language   Constitutional:  Positive for malaise/fatigue. Negative for fever.   Respiratory:  Positive for shortness of breath. Negative for cough.    Cardiovascular:  Negative for chest pain, palpitations, orthopnea, claudication, leg swelling and PND.   Gastrointestinal:  Negative for abdominal pain.   Musculoskeletal:  Negative for myalgias.   Neurological:  Negative for dizziness.              Objective     BP 96/50 (BP Location: Left arm, Patient Position: Sitting, BP Cuff Size: Adult)   Pulse 82   Resp 18   Ht 1.575 m (5' 2\")   Wt 92.1 kg (203 lb)   LMP  (LMP Unknown)   SpO2 95%   BMI 37.13 kg/m²     Physical Exam  Vitals and nursing note reviewed.   Constitutional:       Appearance: Normal appearance. She is well-developed. She is obese.   HENT:      Head: Normocephalic and atraumatic.   Neck:      Vascular: No JVD.   Cardiovascular:      Rate and Rhythm: Normal rate and regular rhythm.      Pulses: Normal pulses.      Heart sounds: Normal heart sounds.   Pulmonary:      Effort: Pulmonary effort is normal.      Breath sounds: Normal breath sounds.   Musculoskeletal:         General: Normal range of motion.   Skin:     General: Skin is warm and dry.      Capillary Refill: Capillary refill takes less than 2 seconds.   Neurological:      General: No focal deficit present.      Mental Status: She is alert and oriented to person, place, and time. Mental status is at baseline.   Psychiatric:         Mood and Affect: Mood normal.         Behavior: Behavior normal.         Thought Content: Thought content normal.         Judgment: Judgment normal.                Assessment & Plan     1. S/P TAVR (transcatheter aortic valve replacement)  EKG      2. Aortic atherosclerosis (HCC)        3. Class 2 severe obesity due to excess calories with serious comorbidity " and body mass index (BMI) of 37.0 to 37.9 in adult ([facility])        4. Hyperlipidemia associated with type 2 diabetes mellitus ([facility])        5. Hypertension associated with diabetes ([facility])        6. Sleep apnea, unspecified type        7. Type 2 diabetes mellitus with microalbuminuria, without long-term current use of insulin ([facility])        8. Acute diastolic HF (heart failure) ([facility])          Medical Decision Making: Today's Assessment/Status/Plan:      1. S/P TAVR, NYHA I  -doing well post-op  -cont asa lifelong  -groins CDI with mild bruising and small nodule   -SBE prophylaxis understands  -echo 1 month with structural heart follow up  -reviewed hospital imaging, labs, and EKG  -cardiac rehab referral placed  -EKG shows SR with 77 bpm    2. Obesity with CANDIDA on CPAP and DM type II  -work on weight loss management  -DM management per PCP  -CANDIDA compliant and tolerating CPAP    3. HLD with aortic atherosclerosis  -cont statin  -LDL goal <70  -follow labs    Patient is to follow up with Hayley RODRIGUEZ in 1 month with echo.

## 2025-06-12 ENCOUNTER — DOCUMENTATION (OUTPATIENT)
Dept: CARDIOLOGY | Facility: MEDICAL CENTER | Age: 75
End: 2025-06-12
Payer: MEDICARE

## 2025-06-12 NOTE — Clinical Note
REFERRAL APPROVAL NOTICE         Sent on June 12, 2025                   Madelyn Schmitz  [address] [address]                   Dear Ms. Schmitz,    After a careful review of the medical information and benefit coverage, [facility] has processed your referral. See below for additional details.    If applicable, you must be actively enrolled with your insurance for coverage of the authorized service. If you have any questions regarding your coverage, please contact your insurance directly.    REFERRAL INFORMATION   Referral #:  [id number]  Referred-To Department    Referred-By Provider:  Cardiac Intensive Care    J[facility] Gonsalez   Intensive Card Rehab      [address]  [address]  [facility] [id number] [phone] [address]  [address]  [facility] [address] [phone]    Referral Start Date:  06/02/2025  Referral End Date:   06/03/2026             SCHEDULING  If you do not already have an appointment, please call [phone] to make an appointment.     MORE INFORMATION  If you do not already have a [facility] account, sign up at: [email or web address]  You can access your medical information, make appointments, see lab results, billing information, and more.  If you have questions regarding this referral, please contact  the [facility] Referrals department at:             [phone]. Monday - Friday 8:00AM - 5:00PM.     Sincerely,    [facility]

## 2025-06-12 NOTE — PROGRESS NOTES
On behalf of [facility] Structural Heart Program, we would like to thank you for allowing us to participate in the care of your patient.     She underwent a successful transcatheter aortic valve replacement (TAVR) on Monday, June 2, 2025.     Your patient is scheduled to follow up with our Structural Heart Program at one month and one year post procedure.     Again, thank you for allowing us to participate in the care of your patient. If you have any questions, please do not hesitate to contact our Structural Heart team.     Sincerely,    [facility] Structural Heart Team    Cathie Solis, TEVINN, RN, Structural Heart Program Nurse Coordinator ([phone])  HENRY Eduardo, RN, Structural Heart Program Nurse Coordinator ([phone])

## 2025-06-12 NOTE — Clinical Note
[facility]  [address] [place] [address]    [id number]    Madelyn Schmitz  [address] [place] [address]    June 12, 2025    Member Name: Madelyn Schmitz   Member Number: [id number]   Reference Number: [id number]   Approved Services: Echos and EKG   Approved Service Dates: 05/29/2025 - 08/27/2025   Requesting Provider: Chad Cortes   Requested Provider: [facility]     Dear Madelyn Schmitz:    The following medical service(s) requested by Chad Cortes have been approved:    Procedure Code Procedure Code Name Requested Quantity Approved Quantity Status   38453 (CPT®) MA ECHO HEART XTHORACIC,COMPLETE W DOPPLER 1 1 Authorized       Approved Quantity means the number of visits approved for medication treatments and/or medical services.    The services should be provided by [facility] no later than 08/27/2025. Please contact the provider listed below with any questions.     Provider Information:  [facility]  [phone]    Your plan benefit may require a deductible, co-payment or coinsurance for these services. This authorization does not guarantee [facility] will pay the claim for services that you receive. Payment by [facility] for these services is subject to the terms of your Evidence of Coverage, your eligibility at the time of service, and confirmation of benefit coverage.    For any questions or additional information, please contact Customer Service:    [facility] Toll Free: [phone]  [employer] users dial: [phone]   Call Center Hours:  Oct 1 - Mar 31, Mon - Fri 7 AM to 8 PM PST  Oct 1 - Mar 31, Sat - Sun 8 AM to 8 PM PST  Apr 1 - Sep 30, Mon - Fri 7 AM to 8 PM PST   Office Hours: Mon - Fri 8 AM to 5 PM PST   E-mail: [email or web address]   Website:  [email or web address]      This information is available for free in other languages. Please contact  Customer Service at the phone number above for more information. [facility] complies with applicable Federal civil rights laws and does not discriminate on the basis of race, color, national origin, age, disability or sex.    Sincerely,     Healthcare Utilization Management Department     Cc: [facility]   Chad Cortes    Multi-Language Insert  Multi- Services  English: We have free  services to answer any questions you may have about our health or drug plan.  To get an , just call us at [phone].  Someone who speaks English/Language can help you.  This is a free service.  Armenian: Tenemos servicios de intérprete sin costo alguno  para responder cualquier pregunta que pueda tener sobre nuestro plan de clem o medicamentos. Para hablar con un intérprete, por favor llame al [phone]. Alguien que hable español le podrá ayudar. Sugey es un servicio gratuito.  Chinese Mandarin: ?????????????????????????????? ???????????????? [phone]????????????????? ?????????  Chinese Cantonese: ?????????????????????????????? ????????????? [phone]???????????????????? ????????  Tagalog:  Luis herrera serbisyo sa patelsamarbella rowella petty rojasnggirosa madrid o panggamot.  warren Maravilla  [phone]. Maaari kayong jose Mullins.  Amado godinez.  Estonian:  Nous proposons demetris services gratuits d'interprétation pour répondre à toutes johnathan questions relatives à notre régime de santé ou d'assurance-médicaments. Pour accéder au service d'interprétation, il vous suffit de nous appeler au [phone]. Un interlocuteur parWis[facility] Hospital[facility] Wauwatosat Françs pourra vous aider. Ce service est gratuit.  Montenegrin:  Nory pascal có d?ch v? thông d?ch mi?n phí ð? tr? l?i các câu h?i v? chýõng s?c kh?e và chýõng trình thu?c men. N?u  quí v? c?n thông d?ch viên erin g?i [phone] s? có nhân viên nói ti?ng Vi?t giúp ð? quí v?. Ðây là d?ch v? mi?n phí .  Icelandic:  Unser kostenloser Dolmetscherservice beantwortet Ihren Fragen zu unserem Gesundheits- und Arzneimittelplan. Unsere Dolmetscher erreichen Sie [phone]. Man wird Ihnen whitley auf [facility]. Dieser Service ist \A [facility] [place] [facility]\"".  Georgian:  ??? ?? ?? ?? ?? ??? ?? ??? ?? ???? ?? ?? ???? ???? ????. ?? ???? ????? ?? [phone] ??? ??? ????.  ???? ?? ???? ?? ?? ????. ? ???? ??? ?????.   Nicaraguan: Åñëè ó âàñ âîçíèêíóò âîïðîñû îòíîñèòåëüíî ñòðàõîâîãî èëè ìåäèêàìåíòíîãî ïëàíà, âû ìîæåòå âîñïîëüçîâàòüñÿ íàøèìè áåñïëàòíûìè óñëóãàìè ïåðåâîä÷èêîâ. ×òîáû âîñïîëüçîâàòüñÿ óñëóãàìè ïåðåâîä÷èêà, ïîçâîíèòå íàì ïî òåëåôîíó [phone]. Âàì îêàæåò ïîìîùü ñîòðóäíèê, êîòîðûé ãîâîðèò ïî-póññêè. Äàííàÿ óñëóãà áåñïëàòíàÿ.  Tajik: ÅääÇ äÞÏã ÎÏãÇÊ ÇáãÊÑÌã ÇáÝæÑí ÇáãÌÇäíÉ ááÅÌÇÈÉ Úä Ãí ÃÓÆáÉ ÊÊÚáÞ ÈÇáÕÍÉ Ãæ ÌÏæá ÇáÃÏæíÉ áÏíäÇ. ááÍÕæá Úáì ãÊÑÌã ÝæÑí¡ áíÓ Úáíß Óæì ÇáÇÊÕÇá ÈäÇ Úáì [phone] . ÓíÞæã ÔÎÕ ãÇ íÊÍÏË ÇáÚÑÈíÉ ÈãÓÇÚÏÊß. åÐå ÎÏãÉ ãÌÇäíÉ.  Suly: ????? ????????? ?? ??? ?? ????? ?? ???? ??? ???? ???? ?? ?????? ?? ???? ???? ?? ??? ????? ??? ????? ???????? ?????? ?????? ???. ?? ???????? ??????? ???? ?? ???, ?? ???? [phone] ?? ??? ????. ??? ??????? ?? ?????? ????? ?? ???? ??? ?? ???? ??. ?? ?? ????? ???? ??.   Colombian:  È disponibile un servizio di interpretariato gratuito per rispondere a eventuali domande sul nostro piano sanitario e farmaceutico. Per un interprete, contattare il rosalba [phone]. Un nostro incaricato miriam parla Italianovi fornirà l'assistenza necessaria. È un servizio gratuito.  Portugués:  Dispomos de serviços de interpretação gratuitos para responder a qualquer questão que tenha acerca do nosso plano de saúde ou de medicação. Para obter um intérprete, contacte-nos através do número [phone]. Irá encontrar alguém que fale o idioma  Português para o ajudar. Sugey  serviço é gratuito.  Indonesian Creole:  Nou genyen sèvis entèprèt gratis anibal reponn tout kesyon ou ta genyen konsènan plan medikal oswa dwòg nou an.  Anibal jwenn yon entèprèt, jis rele nou nan [phone]. Yon moun ki pale Kreyòl kapab vamshi w.  Sa a se yon sèvis ki gratis.  Polish:  Umo¿liwiamy bezp³atne skorzystanie z us³ug t³umacza ustnego, który pomo¿e w uzyskaniu odpowiedzi na temat planu zdrowotnego lub dawcarla vieyra. Claudia skorzystaæ z pomocy t³umacza znaj¹cego isis hunt¿y zadzwoniæ pod numer [phone]. Ta us³uga jest bezp³atna.  Togolese: ????? ??????? ????????????????????? ??????????????????????????????????[phone] ???????????????? ? ????????????????? ?????

## 2025-06-18 ENCOUNTER — HOSPITAL ENCOUNTER (INPATIENT)
Facility: MEDICAL CENTER | Age: 75
LOS: 2 days | DRG: 389 | End: 2025-06-21
Attending: STUDENT IN AN ORGANIZED HEALTH CARE EDUCATION/TRAINING PROGRAM | Admitting: STUDENT IN AN ORGANIZED HEALTH CARE EDUCATION/TRAINING PROGRAM
Payer: MEDICARE

## 2025-06-18 ENCOUNTER — APPOINTMENT (OUTPATIENT)
Dept: RADIOLOGY | Facility: MEDICAL CENTER | Age: 75
DRG: 389 | End: 2025-06-18
Attending: STUDENT IN AN ORGANIZED HEALTH CARE EDUCATION/TRAINING PROGRAM
Payer: MEDICARE

## 2025-06-18 DIAGNOSIS — K56.609 SBO (SMALL BOWEL OBSTRUCTION) (HCC): ICD-10-CM

## 2025-06-18 DIAGNOSIS — R10.84 GENERALIZED ABDOMINAL PAIN: Primary | ICD-10-CM

## 2025-06-18 DIAGNOSIS — R11.2 NAUSEA AND VOMITING, UNSPECIFIED VOMITING TYPE: ICD-10-CM

## 2025-06-18 LAB
ALBUMIN SERPL BCP-MCNC: 4 G/DL (ref 3.2–4.9)
ALBUMIN/GLOB SERPL: 1 G/DL
ALP SERPL-CCNC: 79 U/L (ref 30–99)
ALT SERPL-CCNC: 8 U/L (ref 2–50)
ANION GAP SERPL CALC-SCNC: 15 MMOL/L (ref 7–16)
AST SERPL-CCNC: 19 U/L (ref 12–45)
BASOPHILS # BLD AUTO: 0.4 % (ref 0–1.8)
BASOPHILS # BLD: 0.04 K/UL (ref 0–0.12)
BILIRUB SERPL-MCNC: 0.5 MG/DL (ref 0.1–1.5)
BUN SERPL-MCNC: 10 MG/DL (ref 8–22)
CALCIUM ALBUM COR SERPL-MCNC: 9.6 MG/DL (ref 8.5–10.5)
CALCIUM SERPL-MCNC: 9.6 MG/DL (ref 8.5–10.5)
CHLORIDE SERPL-SCNC: 102 MMOL/L (ref 96–112)
CO2 SERPL-SCNC: 21 MMOL/L (ref 20–33)
CREAT SERPL-MCNC: 0.69 MG/DL (ref 0.5–1.4)
EKG IMPRESSION: NORMAL
EOSINOPHIL # BLD AUTO: 0.18 K/UL (ref 0–0.51)
EOSINOPHIL NFR BLD: 1.7 % (ref 0–6.9)
ERYTHROCYTE [DISTWIDTH] IN BLOOD BY AUTOMATED COUNT: 53.9 FL (ref 35.9–50)
GFR SERPLBLD CREATININE-BSD FMLA CKD-EPI: 89 ML/MIN/1.73 M 2
GLOBULIN SER CALC-MCNC: 3.9 G/DL (ref 1.9–3.5)
GLUCOSE SERPL-MCNC: 157 MG/DL (ref 65–99)
HCT VFR BLD AUTO: 42.6 % (ref 37–47)
HGB BLD-MCNC: 13.3 G/DL (ref 12–16)
HOLDING TUBE BB 8507: NORMAL
IMM GRANULOCYTES # BLD AUTO: 0.05 K/UL (ref 0–0.11)
IMM GRANULOCYTES NFR BLD AUTO: 0.5 % (ref 0–0.9)
LIPASE SERPL-CCNC: 24 U/L (ref 11–82)
LYMPHOCYTES # BLD AUTO: 2.71 K/UL (ref 1–4.8)
LYMPHOCYTES NFR BLD: 25.3 % (ref 22–41)
MCH RBC QN AUTO: 29.2 PG (ref 27–33)
MCHC RBC AUTO-ENTMCNC: 31.2 G/DL (ref 32.2–35.5)
MCV RBC AUTO: 93.6 FL (ref 81.4–97.8)
MONOCYTES # BLD AUTO: 0.63 K/UL (ref 0–0.85)
MONOCYTES NFR BLD AUTO: 5.9 % (ref 0–13.4)
NEUTROPHILS # BLD AUTO: 7.12 K/UL (ref 1.82–7.42)
NEUTROPHILS NFR BLD: 66.2 % (ref 44–72)
NRBC # BLD AUTO: 0 K/UL
NRBC BLD-RTO: 0 /100 WBC (ref 0–0.2)
PLATELET # BLD AUTO: 210 K/UL (ref 164–446)
PMV BLD AUTO: 8.5 FL (ref 9–12.9)
POTASSIUM SERPL-SCNC: 4.3 MMOL/L (ref 3.6–5.5)
PROT SERPL-MCNC: 7.9 G/DL (ref 6–8.2)
RBC # BLD AUTO: 4.55 M/UL (ref 4.2–5.4)
SODIUM SERPL-SCNC: 138 MMOL/L (ref 135–145)
TROPONIN T SERPL-MCNC: 6 NG/L (ref 6–19)
WBC # BLD AUTO: 10.7 K/UL (ref 4.8–10.8)

## 2025-06-18 PROCEDURE — 80053 COMPREHEN METABOLIC PANEL: CPT

## 2025-06-18 PROCEDURE — 93005 ELECTROCARDIOGRAM TRACING: CPT | Mod: TC

## 2025-06-18 PROCEDURE — 700111 HCHG RX REV CODE 636 W/ 250 OVERRIDE (IP): Performed by: STUDENT IN AN ORGANIZED HEALTH CARE EDUCATION/TRAINING PROGRAM

## 2025-06-18 PROCEDURE — 700117 HCHG RX CONTRAST REV CODE 255: Performed by: STUDENT IN AN ORGANIZED HEALTH CARE EDUCATION/TRAINING PROGRAM

## 2025-06-18 PROCEDURE — 84484 ASSAY OF TROPONIN QUANT: CPT

## 2025-06-18 PROCEDURE — 74177 CT ABD & PELVIS W/CONTRAST: CPT

## 2025-06-18 PROCEDURE — 700105 HCHG RX REV CODE 258: Performed by: STUDENT IN AN ORGANIZED HEALTH CARE EDUCATION/TRAINING PROGRAM

## 2025-06-18 PROCEDURE — 84100 ASSAY OF PHOSPHORUS: CPT

## 2025-06-18 PROCEDURE — 93005 ELECTROCARDIOGRAM TRACING: CPT | Mod: TC | Performed by: STUDENT IN AN ORGANIZED HEALTH CARE EDUCATION/TRAINING PROGRAM

## 2025-06-18 PROCEDURE — 96361 HYDRATE IV INFUSION ADD-ON: CPT

## 2025-06-18 PROCEDURE — 83735 ASSAY OF MAGNESIUM: CPT

## 2025-06-18 PROCEDURE — 700111 HCHG RX REV CODE 636 W/ 250 OVERRIDE (IP)

## 2025-06-18 PROCEDURE — 85025 COMPLETE CBC W/AUTO DIFF WBC: CPT

## 2025-06-18 PROCEDURE — 96374 THER/PROPH/DIAG INJ IV PUSH: CPT

## 2025-06-18 PROCEDURE — 36415 COLL VENOUS BLD VENIPUNCTURE: CPT

## 2025-06-18 PROCEDURE — 99285 EMERGENCY DEPT VISIT HI MDM: CPT

## 2025-06-18 PROCEDURE — 83690 ASSAY OF LIPASE: CPT

## 2025-06-18 RX ORDER — SODIUM CHLORIDE, SODIUM LACTATE, POTASSIUM CHLORIDE, AND CALCIUM CHLORIDE .6; .31; .03; .02 G/100ML; G/100ML; G/100ML; G/100ML
1000 INJECTION, SOLUTION INTRAVENOUS ONCE
Status: COMPLETED | OUTPATIENT
Start: 2025-06-18 | End: 2025-06-18

## 2025-06-18 RX ORDER — ONDANSETRON 4 MG/1
4 TABLET, ORALLY DISINTEGRATING ORAL ONCE
Status: COMPLETED | OUTPATIENT
Start: 2025-06-18 | End: 2025-06-18

## 2025-06-18 RX ORDER — MORPHINE SULFATE 4 MG/ML
4 INJECTION INTRAVENOUS ONCE
Status: COMPLETED | OUTPATIENT
Start: 2025-06-18 | End: 2025-06-18

## 2025-06-18 RX ADMIN — IOHEXOL 100 ML: 350 INJECTION, SOLUTION INTRAVENOUS at 23:43

## 2025-06-18 RX ADMIN — ONDANSETRON 4 MG: 4 TABLET, ORALLY DISINTEGRATING ORAL at 21:00

## 2025-06-18 RX ADMIN — SODIUM CHLORIDE, POTASSIUM CHLORIDE, SODIUM LACTATE AND CALCIUM CHLORIDE 1000 ML: 600; 310; 30; 20 INJECTION, SOLUTION INTRAVENOUS at 22:00

## 2025-06-18 RX ADMIN — MORPHINE SULFATE 4 MG: 4 INJECTION INTRAVENOUS at 22:00

## 2025-06-18 ASSESSMENT — FIBROSIS 4 INDEX: FIB4 SCORE: 5.13

## 2025-06-19 ENCOUNTER — APPOINTMENT (OUTPATIENT)
Dept: RADIOLOGY | Facility: MEDICAL CENTER | Age: 75
DRG: 389 | End: 2025-06-19
Payer: MEDICARE

## 2025-06-19 ENCOUNTER — PHARMACY VISIT (OUTPATIENT)
Dept: PHARMACY | Facility: MEDICAL CENTER | Age: 75
End: 2025-06-19
Payer: COMMERCIAL

## 2025-06-19 ENCOUNTER — APPOINTMENT (OUTPATIENT)
Dept: RADIOLOGY | Facility: MEDICAL CENTER | Age: 75
DRG: 389 | End: 2025-06-19
Attending: STUDENT IN AN ORGANIZED HEALTH CARE EDUCATION/TRAINING PROGRAM
Payer: MEDICARE

## 2025-06-19 PROBLEM — K56.609 SBO (SMALL BOWEL OBSTRUCTION) (HCC): Status: ACTIVE | Noted: 2025-06-19

## 2025-06-19 LAB
APPEARANCE UR: CLEAR
BACTERIA #/AREA URNS HPF: NORMAL /HPF
BILIRUB UR QL STRIP.AUTO: NEGATIVE
CASTS URNS QL MICRO: NORMAL /LPF (ref 0–2)
COLOR UR: YELLOW
EPITHELIAL CELLS 1715: NORMAL /HPF (ref 0–5)
GLUCOSE BLD STRIP.AUTO-MCNC: 97 MG/DL (ref 65–99)
GLUCOSE UR STRIP.AUTO-MCNC: NEGATIVE MG/DL
KETONES UR STRIP.AUTO-MCNC: NEGATIVE MG/DL
LACTATE SERPL-SCNC: 0.7 MMOL/L (ref 0.5–2)
LEUKOCYTE ESTERASE UR QL STRIP.AUTO: ABNORMAL
MAGNESIUM SERPL-MCNC: 2.1 MG/DL (ref 1.5–2.5)
MICRO URNS: ABNORMAL
NITRITE UR QL STRIP.AUTO: NEGATIVE
PH UR STRIP.AUTO: 8 [PH] (ref 5–8)
PHOSPHATE SERPL-MCNC: 3.4 MG/DL (ref 2.5–4.5)
PROT UR QL STRIP: NEGATIVE MG/DL
RBC # URNS HPF: NORMAL /HPF (ref 0–2)
RBC UR QL AUTO: NEGATIVE
SP GR UR STRIP.AUTO: 1.02
UROBILINOGEN UR STRIP.AUTO-MCNC: 1 EU/DL
WBC #/AREA URNS HPF: NORMAL /HPF

## 2025-06-19 PROCEDURE — 36415 COLL VENOUS BLD VENIPUNCTURE: CPT

## 2025-06-19 PROCEDURE — 700105 HCHG RX REV CODE 258: Performed by: STUDENT IN AN ORGANIZED HEALTH CARE EDUCATION/TRAINING PROGRAM

## 2025-06-19 PROCEDURE — 96375 TX/PRO/DX INJ NEW DRUG ADDON: CPT

## 2025-06-19 PROCEDURE — 96376 TX/PRO/DX INJ SAME DRUG ADON: CPT

## 2025-06-19 PROCEDURE — 96361 HYDRATE IV INFUSION ADD-ON: CPT

## 2025-06-19 PROCEDURE — 770001 HCHG ROOM/CARE - MED/SURG/GYN PRIV*

## 2025-06-19 PROCEDURE — 81001 URINALYSIS AUTO W/SCOPE: CPT

## 2025-06-19 PROCEDURE — 83605 ASSAY OF LACTIC ACID: CPT

## 2025-06-19 PROCEDURE — 99223 1ST HOSP IP/OBS HIGH 75: CPT | Mod: AI | Performed by: STUDENT IN AN ORGANIZED HEALTH CARE EDUCATION/TRAINING PROGRAM

## 2025-06-19 PROCEDURE — 82962 GLUCOSE BLOOD TEST: CPT

## 2025-06-19 PROCEDURE — 700111 HCHG RX REV CODE 636 W/ 250 OVERRIDE (IP): Mod: JZ | Performed by: STUDENT IN AN ORGANIZED HEALTH CARE EDUCATION/TRAINING PROGRAM

## 2025-06-19 PROCEDURE — 99222 1ST HOSP IP/OBS MODERATE 55: CPT | Performed by: STUDENT IN AN ORGANIZED HEALTH CARE EDUCATION/TRAINING PROGRAM

## 2025-06-19 PROCEDURE — 700111 HCHG RX REV CODE 636 W/ 250 OVERRIDE (IP): Performed by: STUDENT IN AN ORGANIZED HEALTH CARE EDUCATION/TRAINING PROGRAM

## 2025-06-19 RX ORDER — MORPHINE SULFATE 4 MG/ML
4 INJECTION INTRAVENOUS EVERY 4 HOURS PRN
Status: DISCONTINUED | OUTPATIENT
Start: 2025-06-19 | End: 2025-06-21 | Stop reason: HOSPADM

## 2025-06-19 RX ORDER — MIDAZOLAM HYDROCHLORIDE 1 MG/ML
1 INJECTION INTRAMUSCULAR; INTRAVENOUS ONCE
Status: COMPLETED | OUTPATIENT
Start: 2025-06-19 | End: 2025-06-19

## 2025-06-19 RX ORDER — FUROSEMIDE 20 MG/1
20 TABLET ORAL 2 TIMES DAILY
COMMUNITY
End: 2025-07-08

## 2025-06-19 RX ORDER — MORPHINE SULFATE 4 MG/ML
4 INJECTION INTRAVENOUS ONCE
Status: COMPLETED | OUTPATIENT
Start: 2025-06-19 | End: 2025-06-19

## 2025-06-19 RX ORDER — SODIUM CHLORIDE 9 MG/ML
INJECTION, SOLUTION INTRAVENOUS CONTINUOUS
Status: DISCONTINUED | OUTPATIENT
Start: 2025-06-19 | End: 2025-06-21 | Stop reason: HOSPADM

## 2025-06-19 RX ORDER — HYDRALAZINE HYDROCHLORIDE 20 MG/ML
10 INJECTION INTRAMUSCULAR; INTRAVENOUS EVERY 4 HOURS PRN
Status: DISCONTINUED | OUTPATIENT
Start: 2025-06-19 | End: 2025-06-21 | Stop reason: HOSPADM

## 2025-06-19 RX ORDER — ONDANSETRON 2 MG/ML
4 INJECTION INTRAMUSCULAR; INTRAVENOUS EVERY 4 HOURS PRN
Status: DISCONTINUED | OUTPATIENT
Start: 2025-06-19 | End: 2025-06-21 | Stop reason: HOSPADM

## 2025-06-19 RX ORDER — ENOXAPARIN SODIUM 100 MG/ML
40 INJECTION SUBCUTANEOUS DAILY
Status: DISCONTINUED | OUTPATIENT
Start: 2025-06-19 | End: 2025-06-21 | Stop reason: HOSPADM

## 2025-06-19 RX ADMIN — MORPHINE SULFATE 4 MG: 4 INJECTION INTRAVENOUS at 00:28

## 2025-06-19 RX ADMIN — SODIUM CHLORIDE: 9 INJECTION, SOLUTION INTRAVENOUS at 22:38

## 2025-06-19 RX ADMIN — ENOXAPARIN SODIUM 40 MG: 100 INJECTION SUBCUTANEOUS at 17:05

## 2025-06-19 RX ADMIN — MORPHINE SULFATE 4 MG: 4 INJECTION INTRAVENOUS at 15:16

## 2025-06-19 RX ADMIN — MORPHINE SULFATE 4 MG: 4 INJECTION INTRAVENOUS at 08:38

## 2025-06-19 RX ADMIN — SODIUM CHLORIDE: 9 INJECTION, SOLUTION INTRAVENOUS at 11:21

## 2025-06-19 RX ADMIN — MIDAZOLAM HYDROCHLORIDE 1 MG: 1 INJECTION, SOLUTION INTRAMUSCULAR; INTRAVENOUS at 01:56

## 2025-06-19 RX ADMIN — SODIUM CHLORIDE: 9 INJECTION, SOLUTION INTRAVENOUS at 02:45

## 2025-06-19 SDOH — ECONOMIC STABILITY: TRANSPORTATION INSECURITY
IN THE PAST 12 MONTHS, HAS LACK OF RELIABLE TRANSPORTATION KEPT YOU FROM MEDICAL APPOINTMENTS, MEETINGS, WORK OR FROM GETTING THINGS NEEDED FOR DAILY LIVING?: NO

## 2025-06-19 ASSESSMENT — LIFESTYLE VARIABLES
HOW MANY TIMES IN THE PAST YEAR HAVE YOU HAD 5 OR MORE DRINKS IN A DAY: 0
EVER FELT BAD OR GUILTY ABOUT YOUR DRINKING: NO
EVER HAD A DRINK FIRST THING IN THE MORNING TO STEADY YOUR NERVES TO GET RID OF A HANGOVER: NO
HAVE PEOPLE ANNOYED YOU BY CRITICIZING YOUR DRINKING: NO
TOTAL SCORE: 0
HAVE YOU EVER FELT YOU SHOULD CUT DOWN ON YOUR DRINKING: NO
ON A TYPICAL DAY WHEN YOU DRINK ALCOHOL HOW MANY DRINKS DO YOU HAVE: 0
TOTAL SCORE: 0
CONSUMPTION TOTAL: NEGATIVE
ALCOHOL_USE: NO
AVERAGE NUMBER OF DAYS PER WEEK YOU HAVE A DRINK CONTAINING ALCOHOL: 0
DOES PATIENT WANT TO STOP DRINKING: NO
TOTAL SCORE: 0

## 2025-06-19 ASSESSMENT — COGNITIVE AND FUNCTIONAL STATUS - GENERAL
PERSONAL GROOMING: A LITTLE
DAILY ACTIVITIY SCORE: 18
STANDING UP FROM CHAIR USING ARMS: A LITTLE
HELP NEEDED FOR BATHING: A LITTLE
WALKING IN HOSPITAL ROOM: A LITTLE
MOVING FROM LYING ON BACK TO SITTING ON SIDE OF FLAT BED: A LITTLE
MOBILITY SCORE: 18
TURNING FROM BACK TO SIDE WHILE IN FLAT BAD: A LITTLE
TOILETING: A LITTLE
EATING MEALS: A LITTLE
MOVING TO AND FROM BED TO CHAIR: A LITTLE
SUGGESTED CMS G CODE MODIFIER DAILY ACTIVITY: CK
DRESSING REGULAR LOWER BODY CLOTHING: A LITTLE
CLIMB 3 TO 5 STEPS WITH RAILING: A LITTLE
SUGGESTED CMS G CODE MODIFIER MOBILITY: CK
DRESSING REGULAR UPPER BODY CLOTHING: A LITTLE

## 2025-06-19 ASSESSMENT — SOCIAL DETERMINANTS OF HEALTH (SDOH)
WITHIN THE PAST 12 MONTHS, YOU WORRIED THAT YOUR FOOD WOULD RUN OUT BEFORE YOU GOT THE MONEY TO BUY MORE: NEVER TRUE
WITHIN THE LAST YEAR, HAVE TO BEEN RAPED OR FORCED TO HAVE ANY KIND OF SEXUAL ACTIVITY BY YOUR PARTNER OR EX-PARTNER?: NO
WITHIN THE LAST YEAR, HAVE YOU BEEN HUMILIATED OR EMOTIONALLY ABUSED IN OTHER WAYS BY YOUR PARTNER OR EX-PARTNER?: NO
WITHIN THE LAST YEAR, HAVE YOU BEEN AFRAID OF YOUR PARTNER OR EX-PARTNER?: NO
WITHIN THE PAST 12 MONTHS, THE FOOD YOU BOUGHT JUST DIDN'T LAST AND YOU DIDN'T HAVE MONEY TO GET MORE: NEVER TRUE
IN THE PAST 12 MONTHS, HAS THE ELECTRIC, GAS, OIL, OR WATER COMPANY THREATENED TO SHUT OFF SERVICE IN YOUR HOME?: NO
WITHIN THE LAST YEAR, HAVE YOU BEEN KICKED, HIT, SLAPPED, OR OTHERWISE PHYSICALLY HURT BY YOUR PARTNER OR EX-PARTNER?: NO

## 2025-06-19 ASSESSMENT — ENCOUNTER SYMPTOMS
CONSTIPATION: 1
CONSTITUTIONAL NEGATIVE: 1
EYES NEGATIVE: 1
MUSCULOSKELETAL NEGATIVE: 1
PSYCHIATRIC NEGATIVE: 1
NEUROLOGICAL NEGATIVE: 1
FEVER: 0
NAUSEA: 1
CARDIOVASCULAR NEGATIVE: 1
CHILLS: 0
COUGH: 0
DIARRHEA: 0
VOMITING: 1
SHORTNESS OF BREATH: 0
ABDOMINAL PAIN: 1
RESPIRATORY NEGATIVE: 1

## 2025-06-19 ASSESSMENT — PAIN DESCRIPTION - PAIN TYPE
TYPE: ACUTE PAIN

## 2025-06-19 NOTE — ASSESSMENT & PLAN NOTE
Abdominal pain, nausea, vomiting, constipation.  History of previous abdominal surgeries  CT scan reviewed and shows an SBO transition point  I discussed the case with surgery  NG tube, IV fluids, strict n.p.o., IV pain management

## 2025-06-19 NOTE — CARE PLAN
The patient is Stable - Low risk of patient condition declining or worsening    Shift Goals  Clinical Goals: NGT management, orient to GSU    Progress made toward(s) clinical / shift goals:  NGT re-tapped and advanced after transport. DX tube x-ray reordered.     Patient is not progressing towards the following goals:

## 2025-06-19 NOTE — ED TRIAGE NOTES
Chief Complaint   Patient presents with    Abdominal Pain     Abdominal pain with nausea and vomiting since 2 days ago    + post  transcatheter aortic valve replacement (TAVR) on Monday, June 2, 2025    N/V    Weakness     Generalized body weakness     Pain:  7/10  Ambulatory:  on wheelchair  Alert and Oriented: x 4  Oxygen Treatment: No    Pt came in to triage for the above complaints.     Pt is speaking in full sentences, follows commands and responds appropriately to questions.     Respirations are even and unlabored.    Pt placed in lobby. Pt educated on triage process.     Pt encouraged to inform staff for any changes in condition or if needs help while waiting to be room in.      Vitals:    06/18/25 2038   BP: (!) 154/82   Pulse: 89   Resp: (!) 26   Temp: 36.7 °C (98.1 °F)   SpO2: 94%

## 2025-06-19 NOTE — PROGRESS NOTES
Hospital Medicine Daily Progress Note    Date of Service  6/19/2025    Chief Complaint  Madelyn Schmitz is a 75 y.o. female who presented 6/18/2025 with abdominal pain.    Hospital Course  Patient reports pain has been worsening since the morning of 6/19 with associated with nausea, vomiting.  She did have bowel movement early yesterday morning but nothing since then.  She has had multiple abdominal surgeries in the past including cholecystectomy, hysterectomy, hernia repair.  She says she has had an NG tube placed in the past around the time she had a cholecystectomy but does not remember if it was due to obstruction.     In the ED afebrile, hemodynamically stable.  CT of the abdomen shows an SBO with transition point.    Interval Problem Update  Surgery consulted in the ER, currently recommending conservative management with nasogastric decompression.  Patient will undergo small bowel follow-through on 6/20.  At bedside patient reporting improved pain and nausea post NG tube placement.  Reporting generalized abdominal pain that is not worse upon palpation.  No guarding or other peritoneal signs noted on exam.  Patient is currently afebrile, on 2 L nasal cannula, and other vital signs stable.    I have discussed this patient's plan of care and discharge plan at IDT rounds today with Case Management, Nursing, Nursing leadership, and other members of the IDT team.    Consultants/Specialty  general surgery    Code Status  Full Code    Disposition  The patient is not medically cleared for discharge to home or a post-acute facility.      I have placed the appropriate orders for post-discharge needs.    Review of Systems  Review of Systems   Constitutional: Negative.    HENT: Negative.     Eyes: Negative.    Respiratory: Negative.     Cardiovascular: Negative.    Gastrointestinal:  Positive for abdominal pain and nausea.   Genitourinary: Negative.    Musculoskeletal: Negative.    Skin: Negative.     Neurological: Negative.    Endo/Heme/Allergies: Negative.    Psychiatric/Behavioral: Negative.          Physical Exam  Temp:  [36.4 °C (97.5 °F)-36.7 °C (98.1 °F)] 36.4 °C (97.5 °F)  Pulse:  [63-89] 80  Resp:  [12-74] 16  BP: (111-160)/(55-82) 128/70  SpO2:  [89 %-100 %] 98 %    Physical Exam  Constitutional:       Appearance: She is obese.   HENT:      Mouth/Throat:      Mouth: Mucous membranes are moist.      Pharynx: Oropharynx is clear.   Eyes:      Pupils: Pupils are equal, round, and reactive to light.   Cardiovascular:      Rate and Rhythm: Normal rate and regular rhythm.   Pulmonary:      Effort: Pulmonary effort is normal.      Breath sounds: Normal breath sounds.   Abdominal:      Comments: Generalized tenderness, nausea  No guarding   Skin:     General: Skin is warm.   Neurological:      Mental Status: She is alert and oriented to person, place, and time.         Fluids    Intake/Output Summary (Last 24 hours) at 6/19/2025 1238  Last data filed at 6/18/2025 2331  Gross per 24 hour   Intake 1000 ml   Output --   Net 1000 ml        Laboratory  Recent Labs     06/18/25  2103   WBC 10.7   RBC 4.55   HEMOGLOBIN 13.3   HEMATOCRIT 42.6   MCV 93.6   MCH 29.2   MCHC 31.2*   RDW 53.9*   PLATELETCT 210   MPV 8.5*     Recent Labs     06/18/25  2103   SODIUM 138   POTASSIUM 4.3   CHLORIDE 102   CO2 21   GLUCOSE 157*   BUN 10   CREATININE 0.69   CALCIUM 9.6                   Imaging      Assessment/Plan  * SBO (small bowel obstruction) ([facility])- (present on admission)  Assessment & Plan  Abdominal pain, nausea, vomiting, constipation.  History of previous abdominal surgeries  CT scan reviewed and shows an SBO transition point  I discussed the case with surgery  NG tube, IV fluids, strict n.p.o., IV pain management    S/P TAVR (transcatheter aortic valve replacement)- (present on admission)  Assessment & Plan  TAVR 6/3/2025    Class 2 severe obesity due to excess calories with serious comorbidity and body mass index  (BMI) of 37.0 to 37.9 in adult (HCC)- (present on admission)  Assessment & Plan  BMI 37.1, outpatient follow-up    Hyperlipidemia associated with type 2 diabetes mellitus ([facility])- (present on admission)  Assessment & Plan  Restart home meds when able    Type 2 diabetes mellitus with microalbuminuria, without long-term current use of insulin ([facility])- (present on admission)  Assessment & Plan  Well-controlled, start sliding scale if necessary    Hypertension associated with diabetes ([facility])- (present on admission)  Assessment & Plan  Strict n.p.o., restart home meds when able  As needed antihypertensives ordered    CANDIDA on CPAP- (present on admission)  Assessment & Plan  Compliant with CPAP, family will bring her home machine in.  Might be difficult to use while she has an NG tube         VTE prophylaxis:   SCDs/TEDs      I have performed a physical exam and reviewed and updated ROS and Plan today (6/19/2025). In review of yesterday's note (6/18/2025), there are no changes except as documented above.

## 2025-06-19 NOTE — ED NOTES
Pt from waiting room to [facility] via WC. Pt oriented to room, hooked up to monitor, call light w/in reach. No further needs at this time.  Chart up for ERP

## 2025-06-19 NOTE — ED NOTES
Bedside report received from previous shift.   Assumed patient care. Verified patient identification.  Checked on bed, connected to monitor,  with unlabored respirations. Discussed plan of care.   Vital signs is stable. Denied any new complaints.   Gurney in low position, side rail up for pt safety. Call light within reach.   No needs identified at the moment. Instructed to use call light when needed.    Contraptions: IV gauge 20 Lt AC  Alert and Oriented: x 4  Ambulatory: on wheelchair  Oxygen Treatment:  2 LPM via nasal cannula  Pending: UA

## 2025-06-19 NOTE — HOSPITAL COURSE
Patient reports pain has been worsening since the morning of 6/19 with associated with nausea, vomiting.  She did have bowel movement early yesterday morning but nothing since then.  She has had multiple abdominal surgeries in the past including cholecystectomy, hysterectomy, hernia repair.  She says she has had an NG tube placed in the past around the time she had a cholecystectomy but does not remember if it was due to obstruction.     In the ED afebrile, hemodynamically stable.  CT of the abdomen shows an SBO with transition point.

## 2025-06-19 NOTE — ED NOTES
Assist RN:  PIV placed and blood drawn sent to lab. Pt also medicated with Zofran per MAR as she was actively vomiting in the hallway.

## 2025-06-19 NOTE — PROGRESS NOTES
4 Eyes Skin Assessment Completed by Jayleen RN and Linda RN.    Skin assessment is primarily focused on high risk bony prominences. Pay special attention to skin beneath and around medical devices, high risk bony prominences, skin to skin areas and areas where the patient lacks sensation to feel pain and areas where the patient previously had breakdown.     Head (Occipital):  WDL   Ears (Under Medical Devices): WDL   Nose (Under Medical Devices): WDL - Right nare NGT   Mouth:  WDL   Neck: WDL   Breast/Chest:  WDL   Shoulder Blades:  WDL   Spine:   WDL   (R) Arm/Elbow/Hand: WDL   (L) Arm/Elbow/Hand: WDL   Abdomen: WDL   Pannus/Groin:  Bilateral groin sites from previous TAVR with mild bruising   Sacrum/Coccyx:   WDL   (R) Ischial Tuberosity (Sit Bones):  WDL   (L) Ischial Tuberosity (Sit Bones):  WDL   (R) Leg:  Varicose veins, bruising, dry   (L) Leg:  Varicose veins, bruising, dry   (R) Heel:  Red and Blanching   (R) Foot/Toe: WDL   (L) Heel: Red and Blanching   (L) Foot/Toe:  Small scab on second toe       DEVICES IN USE:   Respiratory Devices:  Nasal cannula and Pulse ox  Feeding Devices:  NG tube   Lines & BP Monitoring Devices:  Peripheral IV, BP cuff, and Pulse ox    Orthopedic Devices:  N/A  Miscellaneous Devices:  SCDs    PROTOCOL INTERVENTIONS:   Standard/Trauma Bed:  Applied this assessment  Silicone Nasal Cannula Tubing:  Already in place  Nasal Cannula with Gray Foams:  Applied this assessment    WOUND PHOTOS:   N/A no wounds identified    WOUND CONSULT:   N/A, no advanced wound care needs identified

## 2025-06-19 NOTE — ED NOTES
Med Rec completed per patient and family at bedside translating      Allergies reviewed: yes     Oral antibiotics in the past 30 days: no    Anticoagulant in past 14 days: no  Patient takes Aspirin 81 mg daily. Last dose 06/18/2025 @ 1400    Dispense history available in EPIC: partial    Pharmacy patient utilizes: [facility]  [phone]

## 2025-06-19 NOTE — PROGRESS NOTES
Patient has NG tube in place: Right nare    Skin integrity beneath NG tube assessed with CHELE Mckeon    Skin underneath NG tube is visible: yes    NG retaped to visualize skin underneath: yes    Skin description:CDI

## 2025-06-19 NOTE — PROGRESS NOTES
"  DATE: 6/19/2025    Surgical consultation for abdominal pain.  Imaging suggestive of SBO with question of transition point at the terminal ileum.  Complex surgical history and history of endometrial cancer.     INTERVAL EVENTS:    No nausea vomiting with nasogastric tube in place. No peritoneal signs on exam.    REVIEW OF SYSTEMS:  Comprehensive review of systems is negative with the exception of the aforementioned HPI, PMH, and PSH bullets in accordance with CMS guidelines.    PHYSICAL EXAMINATION:  Vital Signs: /70   Pulse 80   Temp 36.4 °C (97.5 °F) (Temporal)   Resp 16   Ht 1.575 m (5' 2\")   Wt 92.1 kg (203 lb)   SpO2 98%   Physical Exam  Constitutional:       General: She is not in acute distress.     Appearance: She is morbidly obese. She is not ill-appearing, toxic-appearing or diaphoretic.   HENT:      Head: Normocephalic.   Cardiovascular:      Rate and Rhythm: Normal rate.   Pulmonary:      Effort: No respiratory distress.   Chest:      Chest wall: No tenderness.   Abdominal:      General: There is no distension.      Tenderness: There is no abdominal tenderness. There is no guarding or rebound.   Skin:     General: Skin is warm.      Capillary Refill: Capillary refill takes less than 2 seconds.   Neurological:      Mental Status: She is alert.      Comments: Conversant   Psychiatric:         Behavior: Behavior is cooperative.         LABORATORY VALUES:  Recent Labs     06/18/25 2103   WBC 10.7   RBC 4.55   HEMOGLOBIN 13.3   HEMATOCRIT 42.6   MCV 93.6   MCH 29.2   MCHC 31.2*   RDW 53.9*   PLATELETCT 210   MPV 8.5*     Recent Labs     06/18/25  2103   SODIUM 138   POTASSIUM 4.3   CHLORIDE 102   CO2 21   GLUCOSE 157*   BUN 10   CREATININE 0.69   CALCIUM 9.6     Recent Labs     06/18/25  2103   ASTSGOT 19   ALTSGPT 8   TBILIRUBIN 0.5   ALKPHOSPHAT 79   GLOBULIN 3.9*           IMAGING:  DX-ABDOMEN FOR TUBE PLACEMENT   Final Result         1.  Nonspecific bowel gas pattern in the upper abdomen. "   2.  Nasogastric tube tip terminates overlying the expected location of the gastric body.      CT-ABDOMEN-PELVIS WITH   Final Result         1.  Changes of small bowel obstruction with distal small bowel transition point and thickening of the small bowel wall at the transition point, appearance favoring evolving obstructive changes. Thickening of the small bowel wall transition point could    represent small bowel stenosis or possibly mass involving small bowel wall.   2.  Irregular hepatic contour favoring changes of cirrhosis.   3.  Right hydronephrosis and hydroureter without visualized obstructing stone, consider recently passed stone or urinary tract infection.   4.  Atherosclerosis and atherosclerotic coronary artery disease.                ASSESSMENT AND PLAN:    Continue conservative management with nasogastric tube decompression.  SBFT  6/20/2025       ____________________________________     PAWAN Martinez.    DD: 6/19/2025  12:06 PM

## 2025-06-19 NOTE — H&P
Hospital Medicine History & Physical Note    Date of Service  6/19/2025    Primary Care Physician  Miriam Xiao M.D.    Consultants  general surgery  Code Status  Full Code    Chief Complaint  Chief Complaint   Patient presents with    Abdominal Pain     Abdominal pain with nausea and vomiting since 2 days ago    + post  transcatheter aortic valve replacement (TAVR) on Monday, June 2, 2025    N/V    Weakness     Generalized body weakness     History of Presenting Illness  Madelyn Schmitz is a 75 y.o. female who presented 6/18/2025 with abdominal pain.  PMH of hypertension, dyslipidemia, diabetes s/p TAVR earlier this month, CANDIDA on CPAP.  Pain has been worsening since this morning associated with nausea, vomiting.  She did have bowel movement early yesterday morning but nothing since then.  She has had multiple abdominal surgeries in the past including cholecystectomy, hysterectomy, hernia repair.  She says she has had an NG tube placed in the past around the time she had a cholecystectomy but does not remember if it was due to obstruction.    In the ED afebrile, hemodynamically stable.  CT of the abdomen shows an SBO with transition point.    I discussed the plan of care with patient, bedside RN, general surgery, and edp.    Review of Systems  Review of Systems   Constitutional:  Negative for chills and fever.   Respiratory:  Negative for cough and shortness of breath.    Cardiovascular:  Negative for chest pain.   Gastrointestinal:  Positive for abdominal pain, constipation, nausea and vomiting. Negative for diarrhea.   Genitourinary:  Negative for dysuria and urgency.     Past Medical History   has a past medical history of Acute cholecystitis (09/19/2019), Anesthesia, Arthritis, Back pain, Blood clotting disorder ([facility]) (2004), Body mass index (BMI) 38.0-38.9, adult (09/15/2022), Breath shortness (03/18/2024), Bronchitis, Cancer ([facility]) (09/07/2022), Chickenpox, Chronic headaches, Dental disorder,  Diabetes, Diabetes mellitus (HCC) (09/19/2019), Dysuria (09/11/2019), Fatigue (11/06/2019), Heart murmur (09/07/2022), Heart valve disease, High cholesterol, HTN (hypertension) (09/15/2022), Hyperlipidemia, Hypertension (09/07/2022), Hypotension due to hypovolemia (09/26/2019), Mumps, Nasal drainage, Pneumonia (09/07/2022), PONV (postoperative nausea and vomiting), Psychiatric problem (09/07/2022), Sleep apnea (09/07/2022), and Urinary incontinence (09/07/2022).    Surgical History   has a past surgical history that includes primary c section (1972/1974/1977); hysteroscopy with myosure (N/A, 4/17/2019); dilation and curettage (N/A, 4/17/2019); hysterectomy robotic xi (6/27/2019); salpingo oophorectomy (Bilateral, 6/27/2019); node biopsy sentinel (6/27/2019); janie by laparoscopy (9/19/2019); laparoscopic incisional hernia repair (N/A, 9/9/2022); transcatheter aortic valve replacement (Bilateral, 6/2/2025); and echocardiogram, transesophageal, intraoperative (N/A, 6/2/2025).     Family History  Family History   Problem Relation Age of Onset    Heart Disease Father     Asthma Brother     Asthma Brother     Cancer Maternal Aunt         gyn cancer    Sleep Apnea Neg Hx         Family history reviewed with patient. There is no family history that is pertinent to the chief complaint.     Social History   reports that she has never smoked. She has never used smokeless tobacco. She reports that she does not drink alcohol and does not use drugs.    Allergies  Allergies[1]    Medications  Prior to Admission Medications   Prescriptions Last Dose Informant Patient Reported? Taking?   Ascorbic Acid (VITAMIN C) 1000 MG Tab  Patient Yes No   Sig: Take 1,000 mg by mouth every day. Indications: Inadequate Vitamin C   MAGNESIUM PO  Patient Yes No   Sig: Take 500 mg by mouth every day. Indications: suppliment   Omega-3 Fatty Acids (FISH OIL PO)  Patient Yes No   Sig: Take 1 Capsule by mouth every day. Indications: suppliment   aspirin  81 MG EC tablet   No No   Sig: Take 1 Tablet by mouth every day.   clobetasol (TEMOVATE) 0.05 % Ointment  Patient, Rx Bottle (For Med Information) No No   Sig: Apply 1 Act topically 2 times a day as needed (itching).   glipiZIDE 2.5 MG Tab  Patient, Rx Bottle (For Med Information) No No   Sig: Take 2.5 mg by mouth every day.   lisinopril (PRINIVIL) 5 MG Tab  Patient, Rx Bottle (For Med Information) No No   Sig: Take 1 Tablet by mouth every evening.   rosuvastatin (CRESTOR) 10 MG Tab  Patient, Rx Bottle (For Med Information) No No   Sig: Take 1 Tablet by mouth every evening.      Facility-Administered Medications: None       Physical Exam  Temp:  [36.7 °C (98.1 °F)] 36.7 °C (98.1 °F)  Pulse:  [63-89] 74  Resp:  [19-26] 20  BP: (111-154)/(55-82) 121/59  SpO2:  [89 %-99 %] 99 %  Blood Pressure : 121/59   Temperature: 36.7 °C (98.1 °F)   Pulse: 74   Respiration: 20   Pulse Oximetry: 99 %       Physical Exam  HENT:      Head: Normocephalic and atraumatic.      Mouth/Throat:      Mouth: Mucous membranes are moist.      Pharynx: No oropharyngeal exudate or posterior oropharyngeal erythema.   Cardiovascular:      Rate and Rhythm: Normal rate and regular rhythm.      Pulses: Normal pulses.      Heart sounds: Normal heart sounds. No murmur heard.  Pulmonary:      Effort: Pulmonary effort is normal. No respiratory distress.      Breath sounds: Normal breath sounds.   Musculoskeletal:         General: No swelling or tenderness. Normal range of motion.   Skin:     General: Skin is warm and dry.   Neurological:      General: No focal deficit present.      Mental Status: She is alert and oriented to person, place, and time.   Psychiatric:         Mood and Affect: Mood normal.         Laboratory:  Recent Labs     06/18/25 2103   WBC 10.7   RBC 4.55   HEMOGLOBIN 13.3   HEMATOCRIT 42.6   MCV 93.6   MCH 29.2   MCHC 31.2*   RDW 53.9*   PLATELETCT 210   MPV 8.5*     Recent Labs     06/18/25 2103   SODIUM 138   POTASSIUM 4.3   CHLORIDE  "102   CO2 21   GLUCOSE 157*   BUN 10   CREATININE 0.69   CALCIUM 9.6     Recent Labs     06/18/25  2103   ALTSGPT 8   ASTSGOT 19   ALKPHOSPHAT 79   TBILIRUBIN 0.5   LIPASE 24   GLUCOSE 157*         No results for input(s): \"NTPROBNP\" in the last 72 hours.      Recent Labs     06/18/25  2103   TROPONINT 6       Imaging:  CT-ABDOMEN-PELVIS WITH   Final Result         1.  Changes of small bowel obstruction with distal small bowel transition point and thickening of the small bowel wall at the transition point, appearance favoring evolving obstructive changes. Thickening of the small bowel wall transition point could    represent small bowel stenosis or possibly mass involving small bowel wall.   2.  Irregular hepatic contour favoring changes of cirrhosis.   3.  Right hydronephrosis and hydroureter without visualized obstructing stone, consider recently passed stone or urinary tract infection.   4.  Atherosclerosis and atherosclerotic coronary artery disease.                EKG:  I have personally reviewed the images and compared with prior images. and My impression is: nsr    Assessment/Plan:  Justification for Admission Status  I anticipate this patient will require at least two midnights for appropriate medical management, necessitating inpatient admission because SBO    * SBO (small bowel obstruction) (HCC)- (present on admission)  Assessment & Plan  Abdominal pain, nausea, vomiting, constipation.  History of previous abdominal surgeries  CT scan reviewed and shows an SBO transition point  I discussed the case with surgery  NG tube, IV fluids, strict n.p.o., IV pain management    CANDIDA on CPAP- (present on admission)  Assessment & Plan  Compliant with CPAP, family will bring her home machine in.  Might be difficult to use while she has an NG tube    S/P TAVR (transcatheter aortic valve replacement)- (present on admission)  Assessment & Plan  TAVR 6/3/2025    Class 2 severe obesity due to excess calories with serious " comorbidity and body mass index (BMI) of 37.0 to 37.9 in adult ([facility])- (present on admission)  Assessment & Plan  BMI 37.1, outpatient follow-up    Hyperlipidemia associated with type 2 diabetes mellitus ([facility])- (present on admission)  Assessment & Plan  Restart home meds when able    Type 2 diabetes mellitus with microalbuminuria, without long-term current use of insulin ([facility])- (present on admission)  Assessment & Plan  Well-controlled, start sliding scale if necessary    Hypertension associated with diabetes ([facility])- (present on admission)  Assessment & Plan  Strict n.p.o., restart home meds when able  As needed antihypertensives ordered        VTE prophylaxis: enoxaparin ppx         [1]   Allergies  Allergen Reactions    Bloodless      Religious    Ibuprofen Rash and Swelling     .    Penicillins Rash and Swelling     .

## 2025-06-19 NOTE — CONSULTS
DATE OF CONSULTATION:  2025     REFERRING PHYSICIAN:   ELIGIO Aceves     CONSULTING PHYSICIAN:  Malik Martinez M.D.     REASON FOR CONSULTATION:  I have been asked by Dr. Aceves to see the patient in surgical consultation for evaluation of abdominal pain.    HISTORY OF PRESENT ILLNESS: The patient is a 75-year-old female with a past medical history notable for hypertension, hyperlipidemia, type 2 diabetes, and diastolic heart failure.  She also has a complex surgical history.  The patient presents with a 1 day history of abdominal pain.  Her pain started at approximately 8:00 in the morning on the morning of .  It was associated with nausea and 2 episodes of emesis.  She states that she was passing gas up until the afternoon she also notes having a small bowel movement yesterday morning.  For the most part, the pain seems to be mostly periumbilical but does travel around her abdomen.    On arrival to the ED, vital signs were within normal limits with the exception of some mild hypertension on arrival.  Labs were notable for a normal white blood cell count of 10.7 with no left shift.  Her comprehensive metabolic panel was unremarkable.  CT of the abdomen and pelvis revealed dilated loops of bowel and a distended stomach.  There seems to be a transition to narrowing bowel in the distal ileum.  There is some mesenteric edema throughout the small bowel.  There is no obvious mesenteric swirl, free fluid, or other concerning signs on the CT scan.    Surgical history is notable for  section x 3, laparoscopic incisional ventral hernia repair (intraperitoneal underlay mesh, ), laparoscopic cholecystectomy (), and hysterectomy.  The patient also recently underwent a TAVR ( of this year; last echo  showed normal LVEF, moderately dilated right ventricle, mild mitral valve regurgitation and a well-seated aortic valve)      PAST MEDICAL HISTORY:  has a past medical history of Acute  cholecystitis (09/19/2019), Anesthesia, Arthritis, Back pain, Blood clotting disorder (HCC) (2004), Body mass index (BMI) 38.0-38.9, adult (09/15/2022), Breath shortness (03/18/2024), Bronchitis, Cancer (HCC) (09/07/2022), Chickenpox, Chronic headaches, Dental disorder, Diabetes, Diabetes mellitus (HCC) (09/19/2019), Dysuria (09/11/2019), Fatigue (11/06/2019), Heart murmur (09/07/2022), Heart valve disease, High cholesterol, HTN (hypertension) (09/15/2022), Hyperlipidemia, Hypertension (09/07/2022), Hypotension due to hypovolemia (09/26/2019), Mumps, Nasal drainage, Pneumonia (09/07/2022), PONV (postoperative nausea and vomiting), Psychiatric problem (09/07/2022), Sleep apnea (09/07/2022), and Urinary incontinence (09/07/2022).    She has no past medical history of Encounter for long-term (current) use of other medications or Snoring.    PAST SURGICAL HISTORY:  has a past surgical history that includes primary c section (1972/1974/1977); hysteroscopy with myosure (N/A, 4/17/2019); dilation and curettage (N/A, 4/17/2019); hysterectomy robotic xi (6/27/2019); salpingo oophorectomy (Bilateral, 6/27/2019); node biopsy sentinel (6/27/2019); janie by laparoscopy (9/19/2019); laparoscopic incisional hernia repair (N/A, 9/9/2022); transcatheter aortic valve replacement (Bilateral, 6/2/2025); and echocardiogram, transesophageal, intraoperative (N/A, 6/2/2025).    ALLERGIES: Allergies[1]    CURRENT MEDICATIONS:    Home Medications       Reviewed by Prema Parikh R.N. (Registered Nurse) on 06/18/25 at 2044  Med List Status: Partial     Medication Last Dose Status   Ascorbic Acid (VITAMIN C) 1000 MG Tab  Active   aspirin 81 MG EC tablet  Active   clobetasol (TEMOVATE) 0.05 % Ointment  Active   glipiZIDE 2.5 MG Tab  Active   lisinopril (PRINIVIL) 5 MG Tab  Active   MAGNESIUM PO  Active   Omega-3 Fatty Acids (FISH OIL PO)  Active   rosuvastatin (CRESTOR) 10 MG Tab  Active                  Audit from Redirected Encounters     **Home medications have not yet been reviewed for this encounter**         FAMILY HISTORY: family history includes Asthma in her brother and brother; Cancer in her maternal aunt; Heart Disease in her father.    SOCIAL HISTORY:  reports that she has never smoked. She has never used smokeless tobacco. She reports that she does not drink alcohol and does not use drugs.    REVIEW OF SYSTEMS: Comprehensive review of systems is negative with the exception of the aforementioned HPI, PMH, and PSH bullets in accordance with CMS guidelines.    PHYSICAL EXAMINATION:    General: No acute distress, resting comfortably  Respiratory: Nonlabored breathing on room air  Cardiovascular: Sinus rhythm on the monitor  Abdomen: Obese abdomen with a well healed lower midline laparotomy incision.  There is a well-healed transverse Pfannenstiel incision as well.  The abdomen is moderately distended but soft.  She has some tenderness to palpation along the right side and less tenderness to deep palpation in the periumbilical region.  Not peritonitic    LABORATORY VALUES:   Recent Labs     06/18/25  2103   WBC 10.7   RBC 4.55   HEMOGLOBIN 13.3   HEMATOCRIT 42.6   MCV 93.6   MCH 29.2   MCHC 31.2*   RDW 53.9*   PLATELETCT 210   MPV 8.5*     Recent Labs     06/18/25  2103   SODIUM 138   POTASSIUM 4.3   CHLORIDE 102   CO2 21   GLUCOSE 157*   BUN 10   CREATININE 0.69   CALCIUM 9.6     Recent Labs     06/18/25  2103   ASTSGOT 19   ALTSGPT 8   TBILIRUBIN 0.5   ALKPHOSPHAT 79   GLOBULIN 3.9*            IMAGING:   CT-ABDOMEN-PELVIS WITH   Final Result         1.  Changes of small bowel obstruction with distal small bowel transition point and thickening of the small bowel wall at the transition point, appearance favoring evolving obstructive changes. Thickening of the small bowel wall transition point could    represent small bowel stenosis or possibly mass involving small bowel wall.   2.  Irregular hepatic contour favoring changes of cirrhosis.   3.   Right hydronephrosis and hydroureter without visualized obstructing stone, consider recently passed stone or urinary tract infection.   4.  Atherosclerosis and atherosclerotic coronary artery disease.                ASSESSMENT AND PLAN:   75-year-old female with a complex surgical history who presents with a 1 day history of abdominal pain.  Imaging is suggestive of a small bowel obstruction with question of some small bowel thickening at the terminal ileum where there is a transition in bowel caliber.  It is not clear whether this thickening represents a true transition point, a mass, or an incidental finding.  Of note, the patient does have a history of endometrial cancer, and reportedly has had polyps on colonoscopy (per report last colonoscopy was 2 years ago).  Overall, the patient's exam is reassuring and there are no concerning findings on her CT scan.  As such, there is no indication for urgent surgical intervention.  Would recommend NG tube decompression for 24 hours followed by Gastrografin challenge (SBFT).  Please obtain a lactate level.  Please trend and replace electrolytes as necessary (including magnesium and phosphorus).  General surgery will continue to follow.  Please do not hesitate to reach out if there are questions, concerns, or changes in the patient's clinical condition    Explained this to the patient and her family at the bedside.  I also discussed the patient with the hospitalist, Dr. Syed.        DISPOSITION: Medical evaluation and admission. The patient was admitted to the Medical Service prior to surgical consultation. [facility] Acute [facility] Surgery [facility] Service will follow.     ____________________________________     Malik Martinez M.D.    DD: 6/19/2025  1:35 AM    AAST Grading System for EGS Conditions  ACS NSQIP Surgical Risk Calculator        [1]   Allergies  Allergen Reactions    Bloodless      Worship    Ibuprofen Rash and Swelling     .    Penicillins Rash and Swelling      .

## 2025-06-19 NOTE — PROGRESS NOTES
Virtual Nurse portion of admission profile and rounding complete.  Family at bedside.    Round Needs: Other: Patient is bloodless and Notified of Patient Needs: Relayed to bedside RN

## 2025-06-19 NOTE — ED PROVIDER NOTES
ED Provider Note    CHIEF COMPLAINT  Chief Complaint   Patient presents with    Abdominal Pain     Abdominal pain with nausea and vomiting since 2 days ago    + post  transcatheter aortic valve replacement (TAVR) on Monday, June 2, 2025    N/V    Weakness     Generalized body weakness       EXTERNAL RECORDS REVIEWED  Outpatient Notes patient was seen by cardiology on June 11, 2025 as she is status post TAVR and she has history of hypertension, hyperlipidemia, type 2 diabetes, diastolic heart failure.  She was noted to be doing well postop    HPI/ROS  LIMITATION TO HISTORY   Select: : None  OUTSIDE HISTORIAN(S):  Daughter in law and sister at bedside    Madelyn Schmitz is a 75 y.o. female who presents for evaluation of abdominal pain with nausea and vomiting.  In contrast to what the triage note says, patient states that her symptoms started at 8 AM this morning.  She said that she woke up feeling fine but then she went to the bathroom and she noticed that she is feeling quite nauseated.  She vomited once.  She started having pain in her lower abdomen and is creeping up.  She states that her pain feels similar to when she had her gallbladder taken out but she had her gallbladder removed in 2019.  She vomited again in the waiting room.  The pain is colicky.  She had 2 bowel movements today without diarrhea, melena, blood.  She denies any painful urination.  She denies any chest pain or difficulty breathing.  She has felt warm today but there has been no documented fevers.  She ate at 2 PM.  Had her uterus removed, gallbladder, multiple hernias.    PAST MEDICAL HISTORY   has a past medical history of Acute cholecystitis (09/19/2019), Anesthesia, Arthritis, Back pain, Blood clotting disorder ([facility]) (2004), Body mass index (BMI) 38.0-38.9, adult (09/15/2022), Breath shortness (03/18/2024), Bronchitis, Cancer ([facility]) (09/07/2022), Chickenpox, Chronic headaches, Dental disorder, Diabetes, Diabetes mellitus ([facility])  (09/19/2019), Dysuria (09/11/2019), Fatigue (11/06/2019), Heart murmur (09/07/2022), Heart valve disease, High cholesterol, HTN (hypertension) (09/15/2022), Hyperlipidemia, Hypertension (09/07/2022), Hypotension due to hypovolemia (09/26/2019), Mumps, Nasal drainage, Pneumonia (09/07/2022), PONV (postoperative nausea and vomiting), Psychiatric problem (09/07/2022), Sleep apnea (09/07/2022), and Urinary incontinence (09/07/2022).    SURGICAL HISTORY   has a past surgical history that includes primary c section (1972/1974/1977); hysteroscopy with myosure (N/A, 4/17/2019); dilation and curettage (N/A, 4/17/2019); hysterectomy robotic xi (6/27/2019); salpingo oophorectomy (Bilateral, 6/27/2019); node biopsy sentinel (6/27/2019); janie by laparoscopy (9/19/2019); laparoscopic incisional hernia repair (N/A, 9/9/2022); transcatheter aortic valve replacement (Bilateral, 6/2/2025); and echocardiogram, transesophageal, intraoperative (N/A, 6/2/2025).    FAMILY HISTORY  Family History   Problem Relation Age of Onset    Heart Disease Father     Asthma Brother     Asthma Brother     Cancer Maternal Aunt         gyn cancer    Sleep Apnea Neg Hx        SOCIAL HISTORY  Social History     Tobacco Use    Smoking status: Never    Smokeless tobacco: Never   Vaping Use    Vaping status: Never Used   Substance and Sexual Activity    Alcohol use: No    Drug use: No    Sexual activity: Not Currently     Partners: Male     Birth control/protection: Post-Menopausal       CURRENT MEDICATIONS  Home Medications       Reviewed by Prema Parikh R.N. (Registered Nurse) on 06/18/25 at 2044  Med List Status: Partial     Medication Last Dose Status   Ascorbic Acid (VITAMIN C) 1000 MG Tab  Active   aspirin 81 MG EC tablet  Active   clobetasol (TEMOVATE) 0.05 % Ointment  Active   glipiZIDE 2.5 MG Tab  Active   lisinopril (PRINIVIL) 5 MG Tab  Active   MAGNESIUM PO  Active   Omega-3 Fatty Acids (FISH OIL PO)  Active   rosuvastatin (CRESTOR) 10 MG Tab   "Active                    ALLERGIES  Allergies[1]    PHYSICAL EXAM  VITAL SIGNS: BP (!) 154/82   Pulse 89   Temp 36.7 °C (98.1 °F) (Temporal)   Resp (!) 26   Ht 1.575 m (5' 2\")   Wt 92.1 kg (203 lb)   LMP  (LMP Unknown)   SpO2 94%   BMI 37.13 kg/m²      Constitutional: Well developed, Well nourished, Non-toxic appearance. Has episodes of pain where she clenches her abdomen  HEENT: Normocephalic, Atraumatic,  external ears normal, pharynx pink,  Mucous  Membranes moist, No rhinorrhea or mucosal edema.   Cardiovascular: Regular Rate and Rhythm, No murmurs,  rubs, or gallops.   Thorax & Lungs: Lungs clear to auscultation bilaterally, No respiratory distress, No wheezes, rhales or rhonchi, No chest wall tenderness.   Abdomen:  Soft, periumbilical tenderness and epigastric TTP, mild distension  No pulsatile masses., no rebound guarding or peritoneal signs.   Skin: Warm, Dry, No erythema, No rash,   Back:  No CVA tenderness,  No spinal tenderness, bony crepitance step offs or instability.   Extremities: Equal, intact distal pulses, No cyanosis, clubbing or edema,  No tenderness.   Musculoskeletal: Good range of motion in all major joints. No tenderness to palpation or major deformities noted.   Neurologic: Alert & oriented No focal deficits noted.  Psychiatric: Affect normal, Judgment normal, Mood normal.    EKG/LABS  Results for orders placed or performed during the hospital encounter of 06/18/25   CBC with Differential    Collection Time: 06/18/25  9:03 PM   Result Value Ref Range    WBC 10.7 4.8 - 10.8 K/uL    RBC 4.55 4.20 - 5.40 M/uL    Hemoglobin 13.3 12.0 - 16.0 g/dL    Hematocrit 42.6 37.0 - 47.0 %    MCV 93.6 81.4 - 97.8 fL    MCH 29.2 27.0 - 33.0 pg    MCHC 31.2 (L) 32.2 - 35.5 g/dL    RDW 53.9 (H) 35.9 - 50.0 fL    Platelet Count 210 164 - 446 K/uL    MPV 8.5 (L) 9.0 - 12.9 fL    Neutrophils-Polys 66.20 44.00 - 72.00 %    Lymphocytes 25.30 22.00 - 41.00 %    Monocytes 5.90 0.00 - 13.40 %    Eosinophils " 1.70 0.00 - 6.90 %    Basophils 0.40 0.00 - 1.80 %    Immature Granulocytes 0.50 0.00 - 0.90 %    Nucleated RBC 0.00 0.00 - 0.20 /100 WBC    Neutrophils (Absolute) 7.12 1.82 - 7.42 K/uL    Lymphs (Absolute) 2.71 1.00 - 4.80 K/uL    Monos (Absolute) 0.63 0.00 - 0.85 K/uL    Eos (Absolute) 0.18 0.00 - 0.51 K/uL    Baso (Absolute) 0.04 0.00 - 0.12 K/uL    Immature Granulocytes (abs) 0.05 0.00 - 0.11 K/uL    NRBC (Absolute) 0.00 K/uL   Complete Metabolic Panel    Collection Time: 06/18/25  9:03 PM   Result Value Ref Range    Sodium 138 135 - 145 mmol/L    Potassium 4.3 3.6 - 5.5 mmol/L    Chloride 102 96 - 112 mmol/L    Co2 21 20 - 33 mmol/L    Anion Gap 15.0 7.0 - 16.0    Glucose 157 (H) 65 - 99 mg/dL    Bun 10 8 - 22 mg/dL    Creatinine 0.69 0.50 - 1.40 mg/dL    Calcium 9.6 8.5 - 10.5 mg/dL    Correct Calcium 9.6 8.5 - 10.5 mg/dL    AST(SGOT) 19 12 - 45 U/L    ALT(SGPT) 8 2 - 50 U/L    Alkaline Phosphatase 79 30 - 99 U/L    Total Bilirubin 0.5 0.1 - 1.5 mg/dL    Albumin 4.0 3.2 - 4.9 g/dL    Total Protein 7.9 6.0 - 8.2 g/dL    Globulin 3.9 (H) 1.9 - 3.5 g/dL    A-G Ratio 1.0 g/dL   Lipase    Collection Time: 06/18/25  9:03 PM   Result Value Ref Range    Lipase 24 11 - 82 U/L   TROPONIN    Collection Time: 06/18/25  9:03 PM   Result Value Ref Range    Troponin T 6 6 - 19 ng/L   HOLD BLOOD BANK SPECIMEN (NOT TESTED)    Collection Time: 06/18/25  9:03 PM   Result Value Ref Range    Holding Tube - Bb DONE    ESTIMATED GFR    Collection Time: 06/18/25  9:03 PM   Result Value Ref Range    GFR (CKD-EPI) 89 >60 mL/min/1.73 m 2   MAGNESIUM    Collection Time: 06/18/25  9:03 PM   Result Value Ref Range    Magnesium 2.1 1.5 - 2.5 mg/dL   PHOSPHORUS    Collection Time: 06/18/25  9:03 PM   Result Value Ref Range    Phosphorus 3.4 2.5 - 4.5 mg/dL   EKG    Collection Time: 06/18/25  9:52 PM   Result Value Ref Range    Report       [facility] Emergency Dept.    Test Date:  2025-06-18  Pt Name:    JONI DIAMOND                Department: ER  MRN:        [id number]                      Room:  Gender:     Female                       Technician: [id number]  :        [id number]                   Requested By:ER TRIAGE PROTOCOL  Order #:    [id number]                    Reading MD: Melany Aceves    Measurements  Intervals                                Axis  Rate:       91                           P:          2  MO:         146                          QRS:        67  QRSD:       88                           T:          33  QT:         356  QTc:        439    Interpretive Statements  Sinus rhythm  Normal axis  No ST or T wave changes  Compared to ECG 2025 14:23:28  No significant changes  Electronically Signed On 2025 21:52:08 PDT by Melany Aceves     Urinalysis    Collection Time: 25 12:20 AM    Specimen: Urine   Result Value Ref Range    Color Yellow     Character Clear     Specific Gravity 1.018 <1.035    Ph 8.0 5.0 - 8.0    Glucose Negative Negative mg/dL    Ketones Negative Negative mg/dL    Protein Negative Negative mg/dL    Bilirubin Negative Negative    Urobilinogen, Urine 1.0 <=1.0 EU/dL    Nitrite Negative Negative    Leukocyte Esterase Trace (A) Negative    Occult Blood Negative Negative    Micro Urine Req Microscopic    URINE MICROSCOPIC (W/UA)    Collection Time: 25 12:20 AM   Result Value Ref Range    WBC 0-2 /hpf    RBC 0-2 0 - 2 /hpf    Bacteria None Seen None /hpf    Epithelial Cells 0-2 0 - 5 /hpf    Urine Casts 0-2 0 - 2 /lpf       I have independently interpreted this EKG    RADIOLOGY/PROCEDURES   I have independently interpreted the diagnostic imaging associated with this visit and am waiting the final reading from the radiologist.   My preliminary interpretation is as follows: + SBO    Radiologist interpretation:  CT-ABDOMEN-PELVIS WITH   Final Result         1.  Changes of small bowel obstruction with distal small bowel transition point and thickening of the small bowel wall at the  transition point, appearance favoring evolving obstructive changes. Thickening of the small bowel wall transition point could    represent small bowel stenosis or possibly mass involving small bowel wall.   2.  Irregular hepatic contour favoring changes of cirrhosis.   3.  Right hydronephrosis and hydroureter without visualized obstructing stone, consider recently passed stone or urinary tract infection.   4.  Atherosclerosis and atherosclerotic coronary artery disease.                COURSE & MEDICAL DECISION MAKING    ASSESSMENT, COURSE AND PLAN  Care Narrative:   This is a 75-year-old female who is presenting for evaluation of abdominal pain with vomiting that started today.  On arrival, vital signs are stable.  She is nontoxic appearance.  She has colicky like abdominal pain.  Differential diagnose includes but is not limited to pancreatitis, gastroenteritis, gastritis, PUD, perforated ulcer, SBO, electrolyte abnormality, ureteral stone, appendicitis, urinary tract infection, atypical ACS.    Labs are reassuring without leukocytosis.  EKG with no evidence of ischemia, troponin 6, doubt ACS.  Electrolytes within normal limits.  Lipase normal and LFTs within normal limits.  CT abdomen was obtained and does showed evidence of SBO with distal small bowel transition point thickening in the small bowel wall at the transition point which is likely secondary to obstruction.  Radiologist does note that this could present possible mass involving small bowel wall.  Patient did have colonoscopy with the last one approximate 2 hours ago and it was normal.    Results were discussed with patient and family.  Discussed plan for nonoperative management of the SBO.  Patient has no history of SBO in the past.  They are agreeable to plan.  All questions answered.  I discussed with general surgery, Dr. Saul, who will come see the patient.  He agrees with plan for nonoperative management and NG tube.  I then discussed the  hospitalist, Dr. Syed, agreed to admit the hospital.    Hydration: Based on the patient's presentation of Acute Vomiting the patient was given IV fluids. IV Hydration was used because oral hydration was not adequate alone. Upon recheck following hydration, the patient was improved.          ADDITIONAL PROBLEMS MANAGED  None    DISPOSITION AND DISCUSSIONS  I have discussed management of the patient with the following physicians and JENNI's:    General surgery - Dr. Saul  Hospitalist - Dr. Syed    Discussion of management with other QHP or appropriate source(s): None     Escalation of care considered, and ultimately not performed: None    Barriers to care at this time, including but not limited to: None.     Decision tools and prescription drugs considered including, but not limited to: none.    DISPOSITION:  Patient will be hospitalized by Dr. Syed, hospitalist, in guarded condition.      FINAL DIAGNOSIS  1. Generalized abdominal pain Acute   2. Nausea and vomiting, unspecified vomiting type Acute   3. SBO (small bowel obstruction) ([facility]) Acute        Electronically signed by: Melany Aceves M.D., 6/18/2025 9:35 PM           [1]   Allergies  Allergen Reactions    Bloodless      Caodaism    Ibuprofen Rash and Swelling     .    Penicillins Rash and Swelling     .

## 2025-06-19 NOTE — ASSESSMENT & PLAN NOTE
Compliant with CPAP, family will bring her home machine in.  Might be difficult to use while she has an NG tube

## 2025-06-20 ENCOUNTER — APPOINTMENT (OUTPATIENT)
Dept: RADIOLOGY | Facility: MEDICAL CENTER | Age: 75
DRG: 389 | End: 2025-06-20
Attending: NURSE PRACTITIONER
Payer: MEDICARE

## 2025-06-20 LAB
ANION GAP SERPL CALC-SCNC: 9 MMOL/L (ref 7–16)
BUN SERPL-MCNC: 11 MG/DL (ref 8–22)
CALCIUM SERPL-MCNC: 8.3 MG/DL (ref 8.5–10.5)
CHLORIDE SERPL-SCNC: 107 MMOL/L (ref 96–112)
CO2 SERPL-SCNC: 24 MMOL/L (ref 20–33)
CREAT SERPL-MCNC: 0.66 MG/DL (ref 0.5–1.4)
ERYTHROCYTE [DISTWIDTH] IN BLOOD BY AUTOMATED COUNT: 56.5 FL (ref 35.9–50)
GFR SERPLBLD CREATININE-BSD FMLA CKD-EPI: 90 ML/MIN/1.73 M 2
GLUCOSE BLD STRIP.AUTO-MCNC: 86 MG/DL (ref 65–99)
GLUCOSE SERPL-MCNC: 95 MG/DL (ref 65–99)
HCT VFR BLD AUTO: 36.7 % (ref 37–47)
HGB BLD-MCNC: 11.3 G/DL (ref 12–16)
MCH RBC QN AUTO: 29.5 PG (ref 27–33)
MCHC RBC AUTO-ENTMCNC: 30.8 G/DL (ref 32.2–35.5)
MCV RBC AUTO: 95.8 FL (ref 81.4–97.8)
PLATELET # BLD AUTO: 169 K/UL (ref 164–446)
PMV BLD AUTO: 9.3 FL (ref 9–12.9)
POTASSIUM SERPL-SCNC: 4.1 MMOL/L (ref 3.6–5.5)
RBC # BLD AUTO: 3.83 M/UL (ref 4.2–5.4)
SODIUM SERPL-SCNC: 140 MMOL/L (ref 135–145)
WBC # BLD AUTO: 8.3 K/UL (ref 4.8–10.8)

## 2025-06-20 PROCEDURE — 700117 HCHG RX CONTRAST REV CODE 255: Performed by: NURSE PRACTITIONER

## 2025-06-20 PROCEDURE — 82962 GLUCOSE BLOOD TEST: CPT | Performed by: STUDENT IN AN ORGANIZED HEALTH CARE EDUCATION/TRAINING PROGRAM

## 2025-06-20 PROCEDURE — 99233 SBSQ HOSP IP/OBS HIGH 50: CPT | Performed by: NURSE PRACTITIONER

## 2025-06-20 PROCEDURE — 74250 X-RAY XM SM INT 1CNTRST STD: CPT

## 2025-06-20 PROCEDURE — 85027 COMPLETE CBC AUTOMATED: CPT

## 2025-06-20 PROCEDURE — 700111 HCHG RX REV CODE 636 W/ 250 OVERRIDE (IP): Mod: JZ | Performed by: STUDENT IN AN ORGANIZED HEALTH CARE EDUCATION/TRAINING PROGRAM

## 2025-06-20 PROCEDURE — 80048 BASIC METABOLIC PNL TOTAL CA: CPT

## 2025-06-20 PROCEDURE — 99233 SBSQ HOSP IP/OBS HIGH 50: CPT | Performed by: STUDENT IN AN ORGANIZED HEALTH CARE EDUCATION/TRAINING PROGRAM

## 2025-06-20 PROCEDURE — 700105 HCHG RX REV CODE 258: Performed by: STUDENT IN AN ORGANIZED HEALTH CARE EDUCATION/TRAINING PROGRAM

## 2025-06-20 PROCEDURE — 770001 HCHG ROOM/CARE - MED/SURG/GYN PRIV*

## 2025-06-20 RX ADMIN — SODIUM CHLORIDE: 9 INJECTION, SOLUTION INTRAVENOUS at 09:27

## 2025-06-20 RX ADMIN — ENOXAPARIN SODIUM 40 MG: 100 INJECTION SUBCUTANEOUS at 16:58

## 2025-06-20 RX ADMIN — IOHEXOL 400 ML: 350 INJECTION, SOLUTION INTRAVENOUS at 10:00

## 2025-06-20 ASSESSMENT — PAIN DESCRIPTION - PAIN TYPE
TYPE: ACUTE PAIN

## 2025-06-20 NOTE — CARE PLAN
The patient is Stable - Low risk of patient condition declining or worsening    Shift Goals  Clinical Goals: VSS, NGT management, Pain less than 5/10 on pain scale  Patient Goals: Comfort  Family Goals: Not Present    Progress made toward(s) clinical / shift goals:      Problem: Knowledge Deficit - Standard  Goal: Patient and family/care givers will demonstrate understanding of plan of care, disease process/condition, diagnostic tests and medications  Outcome: Progressing  Pt verbalizes understanding of diagnosis and is an active participant in care provided. No questions or concerns at this time.     Problem: Pain - Standard  Goal: Alleviation of pain or a reduction in pain to the patient’s comfort goal  Outcome: Progressing  Pt is able to verbalize pain using NRS pain scale. Pt's pain is managed through pharmacological intervention per MAR, repositioning techniques, and cold packs.     Problem: Fall Risk  Goal: Patient will remain free from falls  Outcome: Progressing  Pt verbalizes understanding of safety education and communicates the importance of use of the call light. Fall precautions are in place.       Patient is not progressing towards the following goals:

## 2025-06-20 NOTE — PROGRESS NOTES
"  DATE: 6/20/2025    Surgical consultation for abdominal pain.  Imaging suggestive of SBO with question of transition point at the terminal ileum.  Complex surgical history and history of endometrial cancer.     INTERVAL EVENTS:    No nausea vomiting with nasogastric tube in place.  Abdomen soft with no peritoneal signs on exam.    REVIEW OF SYSTEMS:  Comprehensive review of systems is negative with the exception of the aforementioned HPI, PMH, and PSH bullets in accordance with CMS guidelines.    PHYSICAL EXAMINATION:  Vital Signs: /66   Pulse 88   Temp 36.6 °C (97.9 °F) (Temporal)   Resp 18   Ht 1.575 m (5' 2\")   Wt 92.1 kg (203 lb)   SpO2 97%   Physical Exam  Constitutional:       General: She is not in acute distress.     Appearance: She is morbidly obese. She is not ill-appearing, toxic-appearing or diaphoretic.   HENT:      Head: Normocephalic.   Cardiovascular:      Rate and Rhythm: Normal rate.   Pulmonary:      Effort: No respiratory distress.   Chest:      Chest wall: No tenderness.   Abdominal:      General: There is no distension.      Tenderness: There is abdominal tenderness. There is no guarding or rebound.   Skin:     General: Skin is warm.      Capillary Refill: Capillary refill takes less than 2 seconds.   Neurological:      Mental Status: She is alert.      Comments: Conversant   Psychiatric:         Behavior: Behavior is cooperative.         LABORATORY VALUES:  Recent Labs     06/18/25 2103 06/20/25  0315   WBC 10.7 8.3   RBC 4.55 3.83*   HEMOGLOBIN 13.3 11.3*   HEMATOCRIT 42.6 36.7*   MCV 93.6 95.8   MCH 29.2 29.5   MCHC 31.2* 30.8*   RDW 53.9* 56.5*   PLATELETCT 210 169   MPV 8.5* 9.3     Recent Labs     06/18/25 2103 06/20/25  0315   SODIUM 138 140   POTASSIUM 4.3 4.1   CHLORIDE 102 107   CO2 21 24   GLUCOSE 157* 95   BUN 10 11   CREATININE 0.69 0.66   CALCIUM 9.6 8.3*     Recent Labs     06/18/25 2103   ASTSGOT 19   ALTSGPT 8   TBILIRUBIN 0.5   ALKPHOSPHAT 79   GLOBULIN 3.9* "           IMAGING:  DX-ABDOMEN FOR TUBE PLACEMENT   Final Result      Enteric tube tip projects over the stomach.      DX-ABDOMEN FOR TUBE PLACEMENT   Final Result         1.  Nonspecific bowel gas pattern in the upper abdomen.   2.  Nasogastric tube tip terminates overlying the expected location of the gastric body.      CT-ABDOMEN-PELVIS WITH   Final Result         1.  Changes of small bowel obstruction with distal small bowel transition point and thickening of the small bowel wall at the transition point, appearance favoring evolving obstructive changes. Thickening of the small bowel wall transition point could    represent small bowel stenosis or possibly mass involving small bowel wall.   2.  Irregular hepatic contour favoring changes of cirrhosis.   3.  Right hydronephrosis and hydroureter without visualized obstructing stone, consider recently passed stone or urinary tract infection.   4.  Atherosclerosis and atherosclerotic coronary artery disease.            DX-SMALL BOWEL SERIES    (Results Pending)       ASSESSMENT AND PLAN:    6/20 Small bowel follow-through.       ____________________________________     LUCILLE Martinez    DD: 6/20/2025  8:47 AM

## 2025-06-20 NOTE — CARE PLAN
The patient is Stable - Low risk of patient condition declining or worsening    Shift Goals  Clinical Goals: small bowel follow through today  Patient Goals: Comfort  Family Goals: Not Present    Progress made toward(s) clinical / shift goals:  sbft completed today. NGT removed, clear liquid diet started.     Patient is not progressing towards the following goals:

## 2025-06-20 NOTE — PROGRESS NOTES
Patient has NG tube in place: Right nare    Skin integrity beneath NG tube assessed with CHELE Ford    Skin underneath NG tube is visible: yes     NG retaped to visualize skin underneath: yes    Skin description: CDI

## 2025-06-20 NOTE — PROGRESS NOTES
A/P: SBFT with diffusely dilated small bowel loops with normal transit time. Large BM post contrast administration.  DC NG tube.  Clear liquid diet.  Okay to advance as tolerated.  No indication for acute surgical intervention at this time.  ACS Blue to sign off at this time.  Please reconsult should the need arise.  Thank you for allowing us to participate in this patients care.

## 2025-06-20 NOTE — PROGRESS NOTES
Virtual Nurse rounding complete using  services ID#[id number].  at bedside.    Round Needs: No needs at this time.

## 2025-06-20 NOTE — PROGRESS NOTES
Report received from RN, assumed care of pt. Pt is A&O x 4, on 2L O2 NC, resting in bed with family at bedside. Pt states pain is 2/10 using NRS pain scale. Declines pain intervention at this time. No other needs. Bed is locked and in lowest position, call light is within reach, fall precautions are in place, safety education provided.

## 2025-06-21 ENCOUNTER — PHARMACY VISIT (OUTPATIENT)
Dept: PHARMACY | Facility: MEDICAL CENTER | Age: 75
End: 2025-06-21
Payer: COMMERCIAL

## 2025-06-21 VITALS
SYSTOLIC BLOOD PRESSURE: 118 MMHG | OXYGEN SATURATION: 94 % | BODY MASS INDEX: 37.36 KG/M2 | RESPIRATION RATE: 16 BRPM | DIASTOLIC BLOOD PRESSURE: 61 MMHG | TEMPERATURE: 97.3 F | WEIGHT: 203 LBS | HEIGHT: 62 IN | HEART RATE: 73 BPM

## 2025-06-21 LAB
GLUCOSE BLD STRIP.AUTO-MCNC: 84 MG/DL (ref 65–99)
GLUCOSE BLD STRIP.AUTO-MCNC: 86 MG/DL (ref 65–99)

## 2025-06-21 PROCEDURE — 99239 HOSP IP/OBS DSCHRG MGMT >30: CPT | Performed by: STUDENT IN AN ORGANIZED HEALTH CARE EDUCATION/TRAINING PROGRAM

## 2025-06-21 PROCEDURE — 82962 GLUCOSE BLOOD TEST: CPT | Performed by: STUDENT IN AN ORGANIZED HEALTH CARE EDUCATION/TRAINING PROGRAM

## 2025-06-21 PROCEDURE — RXMED WILLOW AMBULATORY MEDICATION CHARGE: Performed by: STUDENT IN AN ORGANIZED HEALTH CARE EDUCATION/TRAINING PROGRAM

## 2025-06-21 PROCEDURE — 700105 HCHG RX REV CODE 258: Performed by: STUDENT IN AN ORGANIZED HEALTH CARE EDUCATION/TRAINING PROGRAM

## 2025-06-21 RX ORDER — SENNOSIDES 8.6 MG
1 TABLET ORAL
Qty: 14 TABLET | Refills: 0 | Status: SHIPPED | OUTPATIENT
Start: 2025-06-21 | End: 2025-07-08

## 2025-06-21 RX ORDER — ONDANSETRON 4 MG/1
4 TABLET, ORALLY DISINTEGRATING ORAL EVERY 6 HOURS PRN
Qty: 10 TABLET | Refills: 0 | Status: SHIPPED | OUTPATIENT
Start: 2025-06-21 | End: 2025-07-08

## 2025-06-21 RX ORDER — OXYCODONE HYDROCHLORIDE 5 MG/1
5 TABLET ORAL EVERY 8 HOURS PRN
Qty: 12 TABLET | Refills: 0 | Status: SHIPPED | OUTPATIENT
Start: 2025-06-21 | End: 2025-06-25

## 2025-06-21 RX ADMIN — SODIUM CHLORIDE: 9 INJECTION, SOLUTION INTRAVENOUS at 04:57

## 2025-06-21 ASSESSMENT — PAIN DESCRIPTION - PAIN TYPE
TYPE: ACUTE PAIN

## 2025-06-21 ASSESSMENT — ENCOUNTER SYMPTOMS
PSYCHIATRIC NEGATIVE: 1
ABDOMINAL PAIN: 1
CARDIOVASCULAR NEGATIVE: 1
RESPIRATORY NEGATIVE: 1
CONSTITUTIONAL NEGATIVE: 1
NAUSEA: 1
MUSCULOSKELETAL NEGATIVE: 1
NEUROLOGICAL NEGATIVE: 1
EYES NEGATIVE: 1

## 2025-06-21 ASSESSMENT — FIBROSIS 4 INDEX: FIB4 SCORE: 3.06

## 2025-06-21 NOTE — PROGRESS NOTES
[facility] Medicine Daily Progress Note    Date of Service  6/20/2025    Chief Complaint  Madelyn Schmitz is a 75 y.o. female who presented 6/18/2025 with abdominal pain.    Hospital Course  Patient reports pain has been worsening since the morning of 6/19 with associated with nausea, vomiting.  She did have bowel movement early yesterday morning but nothing since then.  She has had multiple abdominal surgeries in the past including cholecystectomy, hysterectomy, hernia repair.  She says she has had an NG tube placed in the past around the time she had a cholecystectomy but does not remember if it was due to obstruction.     In the ED afebrile, hemodynamically stable.  CT of the abdomen shows an SBO with transition point.    Interval Problem Update  Surgery consulted in the ER, currently recommending conservative management with nasogastric decompression.  Patient will undergo small bowel follow-through on 6/20.  At bedside patient reporting improved pain and nausea post NG tube placement.  Reporting generalized abdominal pain that is not worse upon palpation.  No guarding or other peritoneal signs noted on exam.  Patient is currently afebrile, on 2 L nasal cannula, and other vital signs stable.    6/20  Afebrile normal pulse and respiratory rate systolic blood pressure 100 120s, oxygen saturation 89 to 99%, weaned down to 1 L nasal cannula.  Labs reassuring mild anemia, calcium 8.3.  NG tube was clamped today by surgery, small bowel follow-through ordered.  Later on the date she did start having bowel movements.  Small bowel follow-through with diffusely dilated small bowel loops with normal transit time, large BM post contrast, DC NG tube, started on clear liquid diet, advance as tolerated, surgery signed off.  Pain improved, still little tender.  Monitor as we advance diet and see how she tolerates.  If  tolerating without difficulty no GI symptoms she may be able to discharge tomorrow.    I have  discussed this patient's plan of care and discharge plan at IDT rounds today with Case Management, Nursing, Nursing leadership, and other members of the IDT team.    Consultants/Specialty  general surgery    Code Status  Full Code    Disposition  The patient is not medically cleared for discharge to home or a post-acute facility.      I have placed the appropriate orders for post-discharge needs.    Review of Systems  Review of Systems   Constitutional: Negative.    HENT: Negative.     Eyes: Negative.    Respiratory: Negative.     Cardiovascular: Negative.    Gastrointestinal:  Positive for abdominal pain and nausea.   Genitourinary: Negative.    Musculoskeletal: Negative.    Skin: Negative.    Neurological: Negative.    Endo/Heme/Allergies: Negative.    Psychiatric/Behavioral: Negative.          Physical Exam  Temp:  [36.4 °C (97.5 °F)-36.6 °C (97.9 °F)] 36.6 °C (97.9 °F)  Pulse:  [70-88] 70  Resp:  [16-18] 16  BP: (102-118)/(38-66) 102/38  SpO2:  [83 %-100 %] 100 %    Physical Exam  Vitals and nursing note reviewed. Exam conducted with a chaperone present.   Constitutional:       Appearance: She is obese. She is not toxic-appearing.   HENT:      Head: Normocephalic.      Nose:      Comments: NGT in place, clamped     Mouth/Throat:      Mouth: Mucous membranes are dry.      Pharynx: Oropharynx is clear.   Eyes:      General: No scleral icterus.     Extraocular Movements: Extraocular movements intact.      Conjunctiva/sclera: Conjunctivae normal.   Cardiovascular:      Rate and Rhythm: Normal rate and regular rhythm.      Pulses: Normal pulses.   Pulmonary:      Effort: Pulmonary effort is normal.      Breath sounds: Normal breath sounds.   Abdominal:      General: There is no distension.      Palpations: Abdomen is soft.      Tenderness: There is abdominal tenderness (mild generalized). There is no guarding or rebound.      Comments: Nonperitoneal   Musculoskeletal:         General: Normal range of motion.       Cervical back: Normal range of motion. No rigidity.   Skin:     General: Skin is warm.      Capillary Refill: Capillary refill takes less than 2 seconds.   Neurological:      Mental Status: She is alert and oriented to person, place, and time. Mental status is at baseline.      Cranial Nerves: No cranial nerve deficit.   Psychiatric:         Mood and Affect: Mood normal.         Behavior: Behavior normal.         Thought Content: Thought content normal.         Judgment: Judgment normal.         Fluids    Intake/Output Summary (Last 24 hours) at 6/21/2025 0601  Last data filed at 6/20/2025 1800  Gross per 24 hour   Intake 360 ml   Output --   Net 360 ml        Laboratory  Recent Labs     06/18/25 2103 06/20/25  0315   WBC 10.7 8.3   RBC 4.55 3.83*   HEMOGLOBIN 13.3 11.3*   HEMATOCRIT 42.6 36.7*   MCV 93.6 95.8   MCH 29.2 29.5   MCHC 31.2* 30.8*   RDW 53.9* 56.5*   PLATELETCT 210 169   MPV 8.5* 9.3     Recent Labs     06/18/25 2103 06/20/25 0315   SODIUM 138 140   POTASSIUM 4.3 4.1   CHLORIDE 102 107   CO2 21 24   GLUCOSE 157* 95   BUN 10 11   CREATININE 0.69 0.66   CALCIUM 9.6 8.3*                   Imaging      Assessment/Plan  * SBO (small bowel obstruction) ([facility])- (present on admission)  Assessment & Plan  Abdominal pain, nausea, vomiting, constipation.  History of previous abdominal surgeries  CT scan reviewed and shows an SBO transition point  I discussed the case with surgery  NG tube, IV fluids, strict n.p.o., IV pain management    S/P TAVR (transcatheter aortic valve replacement)- (present on admission)  Assessment & Plan  TAVR 6/3/2025    Class 2 severe obesity due to excess calories with serious comorbidity and body mass index (BMI) of 37.0 to 37.9 in adult ([facility])- (present on admission)  Assessment & Plan  BMI 37.1, outpatient follow-up    Hyperlipidemia associated with type 2 diabetes mellitus (HCC)- (present on admission)  Assessment & Plan  Restart home meds when able    Type 2 diabetes mellitus with  microalbuminuria, without long-term current use of insulin (HCC)- (present on admission)  Assessment & Plan  Well-controlled, start sliding scale if necessary    Hypertension associated with diabetes (HCC)- (present on admission)  Assessment & Plan  Strict n.p.o., restart home meds when able  As needed antihypertensives ordered    CANDIDA on CPAP- (present on admission)  Assessment & Plan  Compliant with CPAP, family will bring her home machine in.  Might be difficult to use while she has an NG tube         VTE prophylaxis:   SCDs/TEDs      I have performed a physical exam and reviewed and updated ROS and Plan today (6/20/2025). In review of yesterday's note (6/19/2025), there are no changes except as documented above.  Total time spent 52 minutes. I spent greater than 50% of the time for patient care, counseling, and coordination on this date, including unit/floor time, and face-to-face time with the patient as per interval events, my own review of patient's imaging and lab analysis and developing my assessment and plan above.

## 2025-06-21 NOTE — PROGRESS NOTES
Assumed care of patient at 0645. Bedside report received.   Assessment complete.  AA&Ox4. Denies CP/SOB.  Reporting 0/10 pain.Declined intervention at this time.  Educated patient regarding pharmacologic and non pharmacologic modalities for pain management.  Skin per flowsheets  Tolerating GI Soft Low Fiber diet. Denies N/V.  + void. + BM. Last BM 6/21  Pt ambulates SBA w FWW.  All needs met at this time. Call light within reach. Pt calls appropriately. Bed low and locked, non skid socks in place. Hourly rounding in place.

## 2025-06-21 NOTE — PROGRESS NOTES
DC instructions reviewed w/ pt and sister , verbalized understanding.no iv armband removed. Meds to bed delivered

## 2025-06-21 NOTE — CARE PLAN
Problem: Pain - Standard  Goal: Alleviation of pain or a reduction in pain to the patient’s comfort goal  Outcome: Progressing     Problem: Knowledge Deficit - Standard  Goal: Patient and family/care givers will demonstrate understanding of plan of care, disease process/condition, diagnostic tests and medications  Outcome: Progressing     Problem: Fall Risk  Goal: Patient will remain free from falls  Outcome: Progressing     Problem: Respiratory:  Goal: Respiratory status will improve  Outcome: Progressing     Problem: Bowel/Gastric:  Goal: Normal bowel function is maintained or improved  Outcome: Progressing  Goal: Will not experience complications related to bowel motility  Outcome: Progressing     Problem: Mobility  Goal: Risk for activity intolerance will decrease  Outcome: Progressing     Problem: Discharge Barriers/Planning  Goal: Patient's continuum of care needs will be met  Outcome: Progressing   The patient is Stable - Low risk of patient condition declining or worsening    Shift Goals  Clinical Goals: Tolerate Diet  Patient Goals: Comfort, Discharge  Family Goals: Update on Plan of Care    Progress made toward(s) clinical / shift goals:  Patient tolerating GI Soft Diet, Ambulated multiple times to and from bathroom, Patient to dc to DCL     Patient is not progressing towards the following goals:

## 2025-06-21 NOTE — CARE PLAN
The patient is Stable - Low risk of patient condition declining or worsening    Shift Goals  Clinical Goals: tolerate clear liquid, nausea/pain control prn, iv fluids, have BMs  Patient Goals: comfort, have BM, pain managed  Family Goals: support, help translate, updates    Progress made toward(s) clinical / shift goals: Plan of care and medications discussed and reviewed over with pt. Pt reported no pain at this time. Pt placed on support O2 to help with sleep from the CANDIDA.Pt been having multiple bowel movements. Pt able to ambulate to bathroom with FWW and assistance      Problem: Pain - Standard  Goal: Alleviation of pain or a reduction in pain to the patient’s comfort goal  Outcome: Progressing     Problem: Knowledge Deficit - Standard  Goal: Patient and family/care givers will demonstrate understanding of plan of care, disease process/condition, diagnostic tests and medications  Outcome: Progressing     Problem: Respiratory:  Goal: Respiratory status will improve  Outcome: Progressing     Problem: Bowel/Gastric:  Goal: Normal bowel function is maintained or improved  Outcome: Progressing  Goal: Will not experience complications related to bowel motility  Outcome: Progressing     Problem: Mobility  Goal: Risk for activity intolerance will decrease  Outcome: Progressing     Problem: Discharge Barriers/Planning  Goal: Patient's continuum of care needs will be met  Outcome: Progressing

## 2025-06-23 ENCOUNTER — PATIENT OUTREACH (OUTPATIENT)
Dept: MEDICAL GROUP | Facility: PHYSICIAN GROUP | Age: 75
End: 2025-06-23
Payer: MEDICARE

## 2025-06-23 ENCOUNTER — DOCUMENTATION (OUTPATIENT)
Dept: HEALTH INFORMATION MANAGEMENT | Facility: OTHER | Age: 75
End: 2025-06-23
Payer: MEDICARE

## 2025-06-23 NOTE — PROGRESS NOTES
Transitional Care Management  TCM Outreach Date and Time: Filed (6/23/2025  9:43 AM)    Discharge Questions  Actual Discharge Date: 06/21/25  Now that you are home, how are you feeling?: Good  Did you receive any new prescriptions?: Yes (Prescribed zofran, roxicodone & senna)  Were you able to get them filled?: Yes  Meds to Bed or Pharmacy filled?: Meds to Bed  Do you have any questions about your current medications or new medications (Review Med Rec)?: No  Did you have any durable medical equipment ordered?: No  Do you have a follow up appointment scheduled with your PCP?: No  Was an appointment scheduled for the patient?: No  Any issues or paperwork you wish to discuss with your PCP?: No  Reason not scheduled?: Patient to Schedule  If Home Health was ordered, have they contacted you (Patient): Not Applicable  Did you have enough support after your last discharge?: Yes  Does this patient qualify for the CCM program?: Yes    Transitional Care  Number of attempts made to contact patient: 1  Current or previous attempts completed within two business days of discharge? : Yes  Provided education regarding treatment plan, medications, self-management, ADLs?: Yes (ER precautions reviewed)  Has patient completed an Advanced Directive?: Yes  Is the patient's advanced directive on file?: Yes  Has the Care Manager's phone number provided?: No  Is there anything else I can help you with?: No    Discharge Summary  Chief Complaint: Abdominal pain, nausea, vomiting & weakness  Admitting Diagnosis: SBO (small bowel obstruction)  Discharge Diagnosis: SBO (small bowel obstruction)      Patient states no other questions, concerns, or needs at this time.

## 2025-06-30 ENCOUNTER — HOSPITAL ENCOUNTER (OUTPATIENT)
Dept: CARDIOLOGY | Facility: MEDICAL CENTER | Age: 75
End: 2025-06-30
Attending: INTERNAL MEDICINE
Payer: MEDICARE

## 2025-06-30 DIAGNOSIS — I35.0 NONRHEUMATIC AORTIC VALVE STENOSIS: ICD-10-CM

## 2025-06-30 LAB
LV EJECT FRACT  99904: 71
LV EJECT FRACT MOD 2C 99903: 66.33
LV EJECT FRACT MOD 4C 99902: 73.98
LV EJECT FRACT MOD BP 99901: 70.7

## 2025-06-30 PROCEDURE — 93306 TTE W/DOPPLER COMPLETE: CPT

## 2025-06-30 PROCEDURE — 93306 TTE W/DOPPLER COMPLETE: CPT | Mod: 26 | Performed by: INTERNAL MEDICINE

## 2025-07-21 NOTE — DISCHARGE SUMMARY
Discharge Summary    CHIEF COMPLAINT ON ADMISSION  Chief Complaint   Patient presents with    Abdominal Pain     Abdominal pain with nausea and vomiting since 2 days ago    + post  transcatheter aortic valve replacement (TAVR) on Monday, June 2, 2025    N/V    Weakness     Generalized body weakness       Reason for Admission  Vomiting     Admission Date  6/18/2025    CODE STATUS  Prior    HPI & HOSPITAL COURSE  Madelyn Schmitz is a 75 y.o. female who presented 6/18/2025 with abdominal pain.     Hospital Course  Patient reports pain has been worsening since the morning of 6/19 with associated with nausea, vomiting.  She did have bowel movement early yesterday morning but nothing since then.  She has had multiple abdominal surgeries in the past including cholecystectomy, hysterectomy, hernia repair.  She says she has had an NG tube placed in the past around the time she had a cholecystectomy but does not remember if it was due to obstruction.     In the ED afebrile, hemodynamically stable.  CT of the abdomen shows an SBO with transition point.     Interval Problem Update  Surgery consulted in the ER, currently recommending conservative management with nasogastric decompression.  Patient will undergo small bowel follow-through on 6/20.  At bedside patient reporting improved pain and nausea post NG tube placement.  Reporting generalized abdominal pain that is not worse upon palpation.  No guarding or other peritoneal signs noted on exam.  Patient is currently afebrile, on 2 L nasal cannula, and other vital signs stable.     6/20  Afebrile normal pulse and respiratory rate systolic blood pressure 100 120s, oxygen saturation 89 to 99%, weaned down to 1 L nasal cannula.  Labs reassuring mild anemia, calcium 8.3.  NG tube was clamped today by surgery, small bowel follow-through ordered.  Later on the date she did start having bowel movements.  Small bowel follow-through with diffusely dilated small bowel loops  "with normal transit time, large BM post contrast, DC NG tube, started on clear liquid diet, advance as tolerated, surgery signed off.  Pain improved, still little tender.  Monitor as we advance diet and see how she tolerates.  If  tolerating without difficulty no GI symptoms she may be able to discharge tomorrow.    6/21 tolerated po intake, abdominal complaints resolved. Vitals and labs stable and patient requesting to be discharged. Surgery signed off.    Therefore, she is discharged in good and stable condition to home with close outpatient follow-up.    The patient met 2-midnight criteria for an inpatient stay at the time of discharge.    Discharge Date  6/21/2025    FOLLOW UP ITEMS POST DISCHARGE  Follow up with PCP     DISCHARGE DIAGNOSES  Principal Problem (Resolved):    SBO (small bowel obstruction) (HCC) (POA: Yes)  Active Problems:    CANDIDA on CPAP (POA: Yes)      Overview: Cpap 3-5 hrs night            Last saw sleep medicine (Chula Greene) in 8/2019:       \"PSG from 3/2018 indicated an AHI of 103.6 and low oxygenation of 72%.        She is here for first compliance.  Currently she is being treated with       CPAP @ 9cmH20. Compliance download over the past 30 days indicates 93 %       compliance, average use of 2 hours 3 minutes per night, AHI of 1. Patient       reports she is acclimating to therapy.  Prior compliance showed only 38       minutes of use per night.  She still feels pressures are slightly too       high.  She is using nasal pillows which are comfortable.  She feels       restricted by the tubing and is unable to move around. She we will get up       and go to the bathroom and does not put her machine back on after that       time.  She does however feel she is getting a deeper sleep for those 2       hours and has more energy during the day.\"            03/31/21 states that she wakes sometimes feeling tired w/o energy to do       things and with a mild HA. She defers a referral back to " the sleep clinic.                         Hypertension associated with diabetes ([facility]) (POA: Yes)      Overview:       Hypertension      Chronic medical diagnosis      He has noticed frequent headaches headaches      Last one was a week ago.       Takes tylenol/alleve prn      Last eye exam was 2-3 yr ago.      Home bps  are 149/76.      Blood pressure today in the office was 114/68            8/30/23 Chronic.      Hasn't taken her lisinopril 10mg since her appt on 8/3/23.      bp today 124/70. Her dizziness has improved since stopping this med. Urine       microalbumin from 2019 was elevated at 167, resutls normal since then       Has a machine at home- will start checking bps at home.                                  Type 2 diabetes mellitus with microalbuminuria, without long-term current use of insulin ([facility]) (POA: Yes)      Overview: Chronic. Recent  labs  with A1c at 6.6%.  -stable.      Remains diet controlled. Has declined meds for this.     Hyperlipidemia associated with type 2 diabetes mellitus (HCC) (POA: Yes)      Overview: Chronic medical condition.       Crestor 10       Latest Reference Range & Units 12/04/23 08:27       Cholesterol,Tot 100 - 199 mg/dL 171       Triglycerides 0 - 149 mg/dL 108       HDL >=40 mg/dL 46       LDL <100 mg/dL 103 (H)       (H): Data is abnormally high                  Stopped pravastatin 10mg a month ago. Has been feeling fatigue with this       med.      Has been feeling better since stopping this med.       Records reviewed.  She has taken atorvastatin and simvastatin in the past.             Latest Reference Range & Units 08/21/23 08:37       Cholesterol,Tot 100 - 199 mg/dL 184       Triglycerides 0 - 149 mg/dL 124       HDL >=40 mg/dL 37 !       LDL <100 mg/dL 122 (H)       !: Data is abnormal      (H): Data is abnormally high    Class 2 severe obesity due to excess calories with serious comorbidity and body mass index (BMI) of 37.0 to 37.9 in adult (HCC) (POA: Yes)     S/P TAVR (transcatheter aortic valve replacement) (POA: Yes)      Overview: Successful TAVR with implantation of a 23 Hinojosa Angela S3 Ultra Resilia       deployed at nominal volume via the right transfemoral approach under       general anesthesia      FOLLOW UP  Future Appointments   Date Time Provider Department Center   7/25/2025  1:00 PM KEREN [facility] [place], operated [facility]   9/24/2025  3:20 PM Miriam Xiao M.D. [facility] [place]   12/8/2025  9:40 AM Juan Felix D.O. [facility] None   6/2/2026  1:15 PM V EXAM 9 ECHO [facility]     Miriam Xiao M.D.  [address]  [address]  [facility] [id number] [phone]    Call in 1 week(s)        MEDICATIONS ON DISCHARGE     Medication List        CONTINUE taking these medications        Instructions   Aspirin Low Dose 81 MG EC tablet  Generic drug: aspirin   Danielson 1 tableta por vía oral diariamente.  (Take 1 Tablet by mouth every day.)  Dose: 81 mg     FISH OIL PO   Take 1 Capsule by mouth every day. Indications: suppliment  Dose: 1 Capsule     glipiZIDE 2.5 MG Tabs   Take 2.5 mg by mouth every day.  Dose: 2.5 mg     lisinopril 5 MG Tabs  Commonly known as: Prinivil   Take 1 Tablet by mouth every evening.  Dose: 5 mg     MAGNESIUM PO   Take 500 mg by mouth every day. Indications: suppliment  Dose: 500 mg     rosuvastatin 10 MG Tabs  Commonly known as: Crestor   Take 1 Tablet by mouth every evening.  Dose: 10 mg     Vitamin C 1000 MG Tabs   Take 1,000 mg by mouth every day. Indications: Inadequate Vitamin C  Dose: 1,000 mg            ASK your doctor about these medications        Instructions   clobetasol 0.05 % Oint  Commonly known as: Temovate   Apply 1 Act topically 2 times a day as needed (itching).  Dose: 1 Act     oxyCODONE immediate-release 5 MG Tabs  Commonly known as: Roxicodone  Ask about: Should I take this medication?   Take 1 Tablet by mouth every 8 hours as needed for Severe Pain for up to 4 days.  Dose: 5 mg               Allergies  Allergies[1]    DIET  No orders of the defined types were placed in this encounter.      ACTIVITY  As tolerated.  Weight bearing as tolerated    CONSULTATIONS  General surgery    PROCEDURES  SBFT    LABORATORY  Lab Results   Component Value Date    SODIUM 140 06/20/2025    POTASSIUM 4.1 06/20/2025    CHLORIDE 107 06/20/2025    CO2 24 06/20/2025    GLUCOSE 95 06/20/2025    BUN 11 06/20/2025    CREATININE 0.66 06/20/2025        Lab Results   Component Value Date    WBC 8.3 06/20/2025    HEMOGLOBIN 11.3 (L) 06/20/2025    HEMATOCRIT 36.7 (L) 06/20/2025    PLATELETCT 169 06/20/2025        Total time of the discharge process exceeds 35 minutes.       [1]   Allergies  Allergen Reactions    Bloodless      Uatsdin    Ibuprofen Rash and Swelling     .    Penicillins Rash and Swelling     .

## (undated) DEVICE — LACTATED RINGERS INJ 1000 ML - (14EA/CA 60CA/PF)

## (undated) DEVICE — SYRINGE SAFETY 10 ML 18 GA X 1 1/2 BLUNT LL (100/BX 4BX/CA)

## (undated) DEVICE — SYRINGE 30 ML LS (56/BX)

## (undated) DEVICE — SET EXTENSION WITH 2 PORTS (48EA/CA) ***PART #2C8610 IS A SUBSTITUTE*****

## (undated) DEVICE — SUTURE GENERAL

## (undated) DEVICE — BLADE SURGICAL CLIPPER - (50EA/CA)

## (undated) DEVICE — DRAPE COLUMN  BOX OF 20

## (undated) DEVICE — ROBOTIC SURGERY SERVICES

## (undated) DEVICE — SHEATH RO 6F 25CM (10EA/BX)

## (undated) DEVICE — GLOVE BIOGEL PI ORTHO SZ 8 PF LF (40PR/BX)

## (undated) DEVICE — NEEDLE DRIVER MEGA SUTURECUT DA VINCI 15X'S REUSABLE

## (undated) DEVICE — TOWEL STOP TIMEOUT SAFETY FLAG (40EA/CA)

## (undated) DEVICE — GLOVE BIOGEL INDICATOR SZ 7SURGICAL PF LTX - (50/BX 4BX/CA)

## (undated) DEVICE — ELECTRODE DUAL RETURN W/ CORD - (50/PK)

## (undated) DEVICE — MASK ANESTHESIA ADULT  - (100/CA)

## (undated) DEVICE — KIT ANESTHESIA W/CIRCUIT & 3/LT BAG W/FILTER (20EA/CA)

## (undated) DEVICE — ELECTRODE RADIOLUCNT SOLID GEL DEFIB PADS (12EA/CA)

## (undated) DEVICE — GLOVE BIOGEL SZ 6.5 SURGICAL PF LTX (50PR/BX 4BX/CA)

## (undated) DEVICE — CABLE TEMPORARY PACING

## (undated) DEVICE — HEAD HOLDER JUNIOR/ADULT

## (undated) DEVICE — SET SUCTION/IRRIGATION WITH DISPOSABLE TIP (6/CA )PART #0250-070-520 IS A SUB

## (undated) DEVICE — PACK TRENGUARD 450 PROCEDURE (12EA/CA)

## (undated) DEVICE — WIRE GUIDE LUNDQST.035X180 - TSMG-35-180-4-LES ORDER BY BOX (5EA/BX)

## (undated) DEVICE — TUBING INFLOW HYSTEROSCOPY (10EA/CA)

## (undated) DEVICE — CANISTER SUCTION 3000ML MECHANICAL FILTER AUTO SHUTOFF MEDI-VAC NONSTERILE LF DISP  (40EA/CA)

## (undated) DEVICE — COVER LIGHT HANDLE ALC PLUS DISP (18EA/BX)

## (undated) DEVICE — DRAPE MAYO STAND - (30/CA)

## (undated) DEVICE — GLOVE BIOGEL PI INDICATOR SZ 6.5 SURGICAL PF LF - (50/BX 4BX/CA)

## (undated) DEVICE — GOWN SURGICAL XX-LARGE - (28EA/CA) SIRUS NON REINFORCED

## (undated) DEVICE — PACK TAVR (3EA/CA)

## (undated) DEVICE — CHLORAPREP 26 ML APPLICATOR - ORANGE TINT(25/CA)

## (undated) DEVICE — CATHETER PIGTAIL 6FR 145 (5EA/BX)

## (undated) DEVICE — TUBE CONNECT SUCTION CLEAR 120 X 1/4" (50EA/CA)"

## (undated) DEVICE — TUBE CONNECTING SUCTION - CLEAR PLASTIC STERILE 72 IN (50EA/CA)

## (undated) DEVICE — BAG RETRIEVAL 10ML (10EA/BX)

## (undated) DEVICE — PROTECTOR ULNA NERVE - (36PR/CA)

## (undated) DEVICE — DEVICE INFLATION ATRION NOVALFEX TRANSFEMORAL SYSTEM (1EA)

## (undated) DEVICE — GLOVE SZ 6 BIOGEL PI MICRO - PF LF (50PR/BX 4BX/CA)

## (undated) DEVICE — KIT ROOM DECONTAMINATION

## (undated) DEVICE — PAD SANITARY 11IN MAXI IND WRAPPED  (12EA/PK 24PK/CA)

## (undated) DEVICE — DEVICE 5MM ABSRB STRAP FIXATION  (6EA/BX)

## (undated) DEVICE — SENSOR SPO2 NEO LNCS ADHESIVE (20/BX) SEE USER NOTES

## (undated) DEVICE — SUCTION INSTRUMENT YANKAUER BULBOUS TIP W/O VENT (50EA/CA)

## (undated) DEVICE — NEEDLE INSFL 120MM 14GA VRRS - (20/BX)

## (undated) DEVICE — KIT  I.V. START (100EA/CA)

## (undated) DEVICE — INTRODUCER SHEATH 6FR 2.5CM - DILATOR PROTRUDING (10/BX)

## (undated) DEVICE — NEEDLE SPINAL NON-SAFETY 18 GA X 3 IN (25EA/BX)

## (undated) DEVICE — WATER IRRIG. STER. 1500 ML - (9/CA)

## (undated) DEVICE — Device

## (undated) DEVICE — ARM BAND RADIAL TR BAND (5EA/BX)

## (undated) DEVICE — FORCEPS MARYLAND BIPOLAR DA VINCI 10X'S REUSABLE

## (undated) DEVICE — TUBING CLEARLINK DUO-VENT - C-FLO (48EA/CA)

## (undated) DEVICE — SPECIMEN PLASTIC CONVERTOR - (10/CA)

## (undated) DEVICE — SODIUM CHL IRRIGATION 0.9% 1000ML (12EA/CA)

## (undated) DEVICE — SUTURE 0 COATED VICRYL 6-18IN - (12PK/BX)

## (undated) DEVICE — TROCAR Z THREAD11MM OPTICAL - NON BLADED(6/BX)

## (undated) DEVICE — SPATULA PERMANENT CAUTERY DA VINCI 10X'S REUSABLE

## (undated) DEVICE — NEEDLE SPINAL NON-SAFETY 22 GA X 3 (25EA/BX)"

## (undated) DEVICE — DRESSING NON ADHERENT 3 X 4 - STERILE (100/BX 12BX/CA)

## (undated) DEVICE — SUTURE QUILL 0 PDO 14X14 - (12/BX)

## (undated) DEVICE — NEEDLE INSUFFLATION FOR STEP - (12/BX)

## (undated) DEVICE — CANNULA W/SEAL11X100ZTHREAD - (12/BX)

## (undated) DEVICE — GLOVE BIOGEL PI ORTHO SZ 7 PF LF (40PR/BX)

## (undated) DEVICE — SYRINGE ASEPTO - (50EA/CA

## (undated) DEVICE — DETERGENT RENUZYME PLUS 10 OZ PACKET (50/BX)

## (undated) DEVICE — CATHETER IV 20 GA X 1-1/4 ---SURG.& SDS ONLY--- (50EA/BX)

## (undated) DEVICE — KIT RETROFIT PROBE COVERS (24EA/BX)

## (undated) DEVICE — SYRINGE 10 ML CONTROL LL (25EA/BX 4BX/CA)

## (undated) DEVICE — GLIDESHEATH SLENDER NITINOL KIT .021 GW 6FR 10CM SINGLE WALL

## (undated) DEVICE — SYRINGE 20 ML LL (50EA/BX 4BX/CA)

## (undated) DEVICE — TROCAR 5X100 NON BLADED Z-TH - READ KII (6/BX)

## (undated) DEVICE — TROCAR 5X100 SEPARTATOR ADV - FIXATION (6/BX)

## (undated) DEVICE — SENSOR OXIMETER ADULT SPO2 RD SET (20EA/BX)

## (undated) DEVICE — DERMABOND ADVANCED - (12EA/BX)

## (undated) DEVICE — SET LEADWIRE 5 LEAD BEDSIDE DISPOSABLE ECG (1SET OF 5/EA)

## (undated) DEVICE — NEPTUNE 4 PORT MANIFOLD - (20/PK)

## (undated) DEVICE — DECANTER FLD BLS - (50/CA)

## (undated) DEVICE — TUBING OUTFLOW HYSTEROSCPY (10EA/BX)

## (undated) DEVICE — SUTURE 0 ETHIBOND CT-1 30 IN (36PK/BX)

## (undated) DEVICE — COVER FOOT UNIVERSAL DISP. - (25EA/CA)

## (undated) DEVICE — DEVICE TISSUE REMOVAL LITE MYOSURE

## (undated) DEVICE — SUTURE 4-0 MONOCRYL PLUS PS-2 - 27 INCH (36/BX)

## (undated) DEVICE — PACK GYN DAVINCI (2EA/CA)

## (undated) DEVICE — GOWN WARMING STANDARD FLEX - (30/CA)

## (undated) DEVICE — SHEATH DESTINATION WITH DILATOR 6FR 45CM

## (undated) DEVICE — STAPLER SKIN DISP - (6/BX 10BX/CA) VISISTAT

## (undated) DEVICE — CANISTER SUCTION 3000ML MECHANICAL FILTER AUTO SHUTOFF MEDI-VAC NONSTERILE LF DISP (40EA/CA)

## (undated) DEVICE — DRAPE UNDER BUTTOCKS FLUID - (20/CA)

## (undated) DEVICE — CATHETER 6FR AL1 100CM (5/BX)

## (undated) DEVICE — PACK LAP CHOLE OR - (2EA/CA)

## (undated) DEVICE — DRAPE CLEAR W/ELASTIC BAND RAD CARM 40 X40" (20EA/CA)"

## (undated) DEVICE — SYSTEM DELIVERY COMMANDER TAVR KIT 23MM COMPONENT (1EA)

## (undated) DEVICE — GLOVE SZ 6.5 BIOGEL PI MICRO - PF LF (50PR/BX)

## (undated) DEVICE — GLOVE BIOGEL PI ORTHO SZ 6 SURGICAL PF LF (40PR/BX)

## (undated) DEVICE — TOWELS CLOTH SURGICAL - (4/PK 20PK/CA)

## (undated) DEVICE — SODIUM CHL. IRRIGATION 0.9% 3000ML (4EA/CA 65CA/PF)

## (undated) DEVICE — SEAL 5MM-8MM UNIVERSAL  BOX OF 10

## (undated) DEVICE — TRAY SRGPRP PVP IOD WT PRP - (20/CA)

## (undated) DEVICE — ELECTRODE 850 FOAM ADHESIVE - HYDROGEL RADIOTRNSPRNT (50/PK)

## (undated) DEVICE — TELFA 8 X 10 BIOSEAL - (50EA/CA)

## (undated) DEVICE — STOPCOCK IV 400 PSI 3W ROT (50EA/BX)

## (undated) DEVICE — CRIMPER CATHETER EDWARDS DISPOSABLE (1EA)

## (undated) DEVICE — FORCEPS PROGRASP DA VINCI 10X'S REUSABLE

## (undated) DEVICE — DEVICESORBAFIX W/30 ABSOB CLP - (5EA/CA)

## (undated) DEVICE — SYRINGE NON SAFETY 10 CC 21 GA X 1-1/2 IN (100/BX 4BX/CA)

## (undated) DEVICE — SUTURE 3-0 MONOCRYL PLUS PS-1 - 27 INCH (36/BX)

## (undated) DEVICE — CANISTER SUCTION RIGID RED 1500CC (40EA/CA)

## (undated) DEVICE — ARMREST CRADLE FOAM - (2PR/PK 12PR/CA)

## (undated) DEVICE — TROCAR 5X100 BLADED Z-THREAD - KII (6/BX)

## (undated) DEVICE — SUTURE DEVICE CLOSURE REPAIR SYSTEM PERCLOSE PROSTYLE (10EA/PK)

## (undated) DEVICE — WATER IRRIG. STER 3000 ML - (4/CA)

## (undated) DEVICE — CLIP MED LG INTNL HRZN TI ESCP - (20/BX)

## (undated) DEVICE — CANNULA W/SEAL 5X100 Z-THRE - ADED KII (12/BX)

## (undated) DEVICE — BLADE SURGICAL #11 - (50/BX)

## (undated) DEVICE — TROCAR Z THREAD 12 X 100 - BLADED (6/BX)

## (undated) DEVICE — SLEEVE, VASO, THIGH, MED

## (undated) DEVICE — DRAPE ARM  BOX OF 20

## (undated) DEVICE — WATER IRRIGATION STERILE 1000ML (12EA/CA)

## (undated) DEVICE — GUIDEWIRE STARTER STRAIGHT FIXED CORE .035 150CM 4 STRAIGHT PTFE/HEPARIN COATED (10/BX)

## (undated) DEVICE — INTRODUCER CATHETER DILATOR PROTRUDING 8FR 2.5CM (10EA/BX)

## (undated) DEVICE — IV TUBING HI-FLO RATE W/CLAMP (50/CA)

## (undated) DEVICE — PAD OR TABLE DA VINCI 2IN X 20IN X 72IN - (12EA/CA)

## (undated) DEVICE — WIRE GUIDE AES .035 260CM WITH 3MM J TIP"

## (undated) DEVICE — GLOVE BIOGEL SZ 7 SURGICAL PF LTX - (50PR/BX 4BX/CA)

## (undated) DEVICE — SYR ANGIO CNRST INJ HI-PRS 3W 65 - (10EA/CA)"

## (undated) DEVICE — GLOVESZ 8.5 BIOGEL PI MICRO - PF LF (50PR/BX)

## (undated) DEVICE — SCISSORS 5MM CVD (6EA/BX)

## (undated) DEVICE — DRAPESURG STERI-DRAPE LONG - (10/BX 4BX/CA)